# Patient Record
Sex: FEMALE | Race: WHITE | NOT HISPANIC OR LATINO | Employment: OTHER | URBAN - METROPOLITAN AREA
[De-identification: names, ages, dates, MRNs, and addresses within clinical notes are randomized per-mention and may not be internally consistent; named-entity substitution may affect disease eponyms.]

---

## 2019-02-14 ENCOUNTER — OFFICE VISIT CONVERTED (OUTPATIENT)
Dept: INTERNAL MEDICINE | Facility: CLINIC | Age: 59
End: 2019-02-14
Attending: NURSE PRACTITIONER

## 2019-02-14 ENCOUNTER — CONVERSION ENCOUNTER (OUTPATIENT)
Dept: INTERNAL MEDICINE | Facility: CLINIC | Age: 59
End: 2019-02-14

## 2020-11-10 ENCOUNTER — HOSPITAL ENCOUNTER (OUTPATIENT)
Dept: ULTRASOUND IMAGING | Facility: HOSPITAL | Age: 60
Discharge: HOME | End: 2020-11-10

## 2020-11-19 ENCOUNTER — HOSPITAL ENCOUNTER (OUTPATIENT)
Dept: MRI IMAGING | Facility: HOSPITAL | Age: 60
Discharge: HOME | End: 2020-11-19
Attending: SURGERY

## 2020-11-19 ENCOUNTER — OFFICE VISIT CONVERTED (OUTPATIENT)
Dept: OTHER | Facility: HOSPITAL | Age: 60
End: 2020-11-19
Attending: SURGERY

## 2020-11-24 ENCOUNTER — HOSPITAL ENCOUNTER (OUTPATIENT)
Dept: ULTRASOUND IMAGING | Facility: HOSPITAL | Age: 60
Discharge: HOME | End: 2020-11-24
Attending: SURGERY

## 2020-12-03 ENCOUNTER — HOSPITAL ENCOUNTER (OUTPATIENT)
Dept: MRI IMAGING | Facility: HOSPITAL | Age: 60
Discharge: HOME | End: 2020-12-03
Attending: SURGERY

## 2020-12-10 ENCOUNTER — OFFICE VISIT CONVERTED (OUTPATIENT)
Dept: OTHER | Facility: HOSPITAL | Age: 60
End: 2020-12-10
Attending: SURGERY

## 2020-12-10 ENCOUNTER — OFFICE VISIT CONVERTED (OUTPATIENT)
Dept: ONCOLOGY | Facility: HOSPITAL | Age: 60
End: 2020-12-10
Attending: INTERNAL MEDICINE

## 2020-12-10 ENCOUNTER — HOSPITAL ENCOUNTER (OUTPATIENT)
Dept: OTHER | Facility: HOSPITAL | Age: 60
Discharge: HOME | End: 2020-12-10
Attending: INTERNAL MEDICINE

## 2020-12-10 ENCOUNTER — HOSPITAL ENCOUNTER (OUTPATIENT)
Dept: PREADMISSION TESTING | Facility: HOSPITAL | Age: 60
Discharge: HOME | End: 2020-12-10
Attending: SURGERY

## 2020-12-12 LAB — SARS-COV-2 RNA SPEC QL NAA+PROBE: NOT DETECTED

## 2020-12-14 ENCOUNTER — HOSPITAL ENCOUNTER (OUTPATIENT)
Dept: CARDIOLOGY | Facility: HOSPITAL | Age: 60
Discharge: HOME | End: 2020-12-14
Attending: INTERNAL MEDICINE

## 2020-12-15 ENCOUNTER — HOSPITAL ENCOUNTER (OUTPATIENT)
Dept: PERIOP | Facility: HOSPITAL | Age: 60
Setting detail: HOSPITAL OUTPATIENT SURGERY
Discharge: HOME | End: 2020-12-15
Attending: SURGERY

## 2020-12-18 ENCOUNTER — OFFICE VISIT CONVERTED (OUTPATIENT)
Dept: ONCOLOGY | Facility: HOSPITAL | Age: 60
End: 2020-12-18
Attending: INTERNAL MEDICINE

## 2020-12-18 ENCOUNTER — HOSPITAL ENCOUNTER (OUTPATIENT)
Dept: OTHER | Facility: HOSPITAL | Age: 60
Setting detail: RECURRING SERIES
Discharge: STILL ADMITTED OR ADMINISTRATIVE | End: 2021-03-19
Attending: INTERNAL MEDICINE

## 2020-12-18 LAB
ALBUMIN SERPL-MCNC: 4.5 G/DL (ref 3.5–5)
ALBUMIN/GLOB SERPL: 2 {RATIO} (ref 1.4–2.6)
ALP SERPL-CCNC: 87 U/L (ref 53–141)
ALT SERPL-CCNC: 10 U/L (ref 10–40)
ANION GAP SERPL CALC-SCNC: 11 MMOL/L (ref 8–19)
AST SERPL-CCNC: 17 U/L (ref 15–50)
BASOPHILS # BLD AUTO: 0.04 10*3/UL (ref 0–0.2)
BASOPHILS NFR BLD AUTO: 0.6 % (ref 0–3)
BILIRUB SERPL-MCNC: 0.17 MG/DL (ref 0.2–1.3)
BUN SERPL-MCNC: 13 MG/DL (ref 5–25)
BUN/CREAT SERPL: 17 {RATIO} (ref 6–20)
CALCIUM SERPL-MCNC: 9 MG/DL (ref 8.7–10.4)
CHLORIDE SERPL-SCNC: 106 MMOL/L (ref 99–111)
CONV ABS IMM GRAN: 0.01 10*3/UL (ref 0–0.54)
CONV CO2: 22 MMOL/L (ref 22–32)
CONV EOSINOPHILS PERCENT BY MANUAL COUNT: 1.4 % (ref 0–7)
CONV IMMATURE GRAN: 0.1 % (ref 0–0.4)
CONV TOTAL PROTEIN: 6.7 G/DL (ref 6.3–8.2)
CREAT UR-MCNC: 0.75 MG/DL (ref 0.5–0.9)
EOSINOPHIL # BLD MANUAL: 0.1 10*3/UL (ref 0–0.7)
ERYTHROCYTE [DISTWIDTH] IN BLOOD BY AUTOMATED COUNT: 12.1 % (ref 11.5–14.5)
ERYTHROCYTE [DISTWIDTH] IN BLOOD BY AUTOMATED COUNT: 40.9 FL
GFR SERPLBLD BASED ON 1.73 SQ M-ARVRAT: >60 ML/MIN/{1.73_M2}
GLOBULIN UR ELPH-MCNC: 2.2 G/DL (ref 2–3.5)
GLUCOSE SERPL-MCNC: 121 MG/DL (ref 65–99)
HBA1C MFR BLD: 11.8 G/DL (ref 12–16)
HCT VFR BLD AUTO: 34.3 % (ref 37–47)
LYMPHOCYTES # BLD AUTO: 0.83 10*3/UL (ref 1–5)
LYMPHOCYTES NFR BLD AUTO: 11.7 % (ref 20–45)
MCH RBC QN AUTO: 31.6 PG (ref 27–31)
MCHC RBC AUTO-ENTMCNC: 34.4 G/DL (ref 33–37)
MCV RBC AUTO: 91.7 FL (ref 81–99)
MONOCYTES # BLD AUTO: 0.35 10*3/UL (ref 0.2–1.2)
MONOCYTES NFR BLD MANUAL: 4.9 % (ref 3–10)
NEUTROPHILS # BLD AUTO: 5.79 10*3/UL (ref 2–8)
NEUTROPHILS NFR BLD MANUAL: 81.3 % (ref 30–85)
OSMOLALITY SERPL CALC.SUM OF ELEC: 281 MOSM/KG (ref 273–304)
PLATELET # BLD AUTO: 216 10*3/UL (ref 130–400)
PMV BLD AUTO: 9 FL (ref 7.4–10.4)
POTASSIUM SERPL-SCNC: 3.6 MMOL/L (ref 3.5–5.3)
RBC MORPH BLD: 3.74 10*6/UL (ref 4.2–5.4)
SODIUM SERPL-SCNC: 135 MMOL/L (ref 135–147)
WBC # BLD AUTO: 7.12 10*3/UL (ref 4.8–10.8)

## 2020-12-23 ENCOUNTER — OFFICE VISIT CONVERTED (OUTPATIENT)
Dept: SURGERY | Facility: CLINIC | Age: 60
End: 2020-12-23
Attending: SURGERY

## 2021-01-08 ENCOUNTER — OFFICE VISIT CONVERTED (OUTPATIENT)
Dept: ONCOLOGY | Facility: HOSPITAL | Age: 61
End: 2021-01-08
Attending: INTERNAL MEDICINE

## 2021-01-08 LAB
ALBUMIN SERPL-MCNC: 4.2 G/DL (ref 3.5–5)
ALBUMIN/GLOB SERPL: 2 {RATIO} (ref 1.4–2.6)
ALP SERPL-CCNC: 93 U/L (ref 53–141)
ALT SERPL-CCNC: 17 U/L (ref 10–40)
ANION GAP SERPL CALC-SCNC: 11 MMOL/L (ref 8–19)
AST SERPL-CCNC: 21 U/L (ref 15–50)
BASOPHILS # BLD AUTO: 0.05 10*3/UL (ref 0–0.2)
BASOPHILS NFR BLD AUTO: 0.5 % (ref 0–3)
BILIRUB SERPL-MCNC: <0.15 MG/DL (ref 0.2–1.3)
BUN SERPL-MCNC: 15 MG/DL (ref 5–25)
BUN/CREAT SERPL: 19 {RATIO} (ref 6–20)
CALCIUM SERPL-MCNC: 8.9 MG/DL (ref 8.7–10.4)
CHLORIDE SERPL-SCNC: 110 MMOL/L (ref 99–111)
CONV ABS IMM GRAN: 0.02 10*3/UL (ref 0–0.54)
CONV CO2: 21 MMOL/L (ref 22–32)
CONV EOSINOPHILS PERCENT BY MANUAL COUNT: 0.2 % (ref 0–7)
CONV IMMATURE GRAN: 0.2 % (ref 0–0.4)
CONV TOTAL PROTEIN: 6.3 G/DL (ref 6.3–8.2)
CREAT UR-MCNC: 0.77 MG/DL (ref 0.5–0.9)
EOSINOPHIL # BLD MANUAL: 0.02 10*3/UL (ref 0–0.7)
ERYTHROCYTE [DISTWIDTH] IN BLOOD BY AUTOMATED COUNT: 13.7 % (ref 11.5–14.5)
ERYTHROCYTE [DISTWIDTH] IN BLOOD BY AUTOMATED COUNT: 44.6 FL
GFR SERPLBLD BASED ON 1.73 SQ M-ARVRAT: >60 ML/MIN/{1.73_M2}
GLOBULIN UR ELPH-MCNC: 2.1 G/DL (ref 2–3.5)
GLUCOSE SERPL-MCNC: 115 MG/DL (ref 65–99)
HBA1C MFR BLD: 10.9 G/DL (ref 12–16)
HCT VFR BLD AUTO: 33.4 % (ref 37–47)
LYMPHOCYTES # BLD AUTO: 1.19 10*3/UL (ref 1–5)
LYMPHOCYTES NFR BLD AUTO: 13 % (ref 20–45)
MAGNESIUM SERPL-MCNC: 1.97 MG/DL (ref 1.6–2.3)
MCH RBC QN AUTO: 30.4 PG (ref 27–31)
MCHC RBC AUTO-ENTMCNC: 32.6 G/DL (ref 33–37)
MCV RBC AUTO: 93 FL (ref 81–99)
MONOCYTES # BLD AUTO: 0.52 10*3/UL (ref 0.2–1.2)
MONOCYTES NFR BLD MANUAL: 5.7 % (ref 3–10)
NEUTROPHILS # BLD AUTO: 7.32 10*3/UL (ref 2–8)
NEUTROPHILS NFR BLD MANUAL: 80.4 % (ref 30–85)
OSMOLALITY SERPL CALC.SUM OF ELEC: 290 MOSM/KG (ref 273–304)
PLATELET # BLD AUTO: 214 10*3/UL (ref 130–400)
PMV BLD AUTO: 8.7 FL (ref 7.4–10.4)
POTASSIUM SERPL-SCNC: 3.2 MMOL/L (ref 3.5–5.3)
RBC MORPH BLD: 3.59 10*6/UL (ref 4.2–5.4)
SODIUM SERPL-SCNC: 139 MMOL/L (ref 135–147)
WBC # BLD AUTO: 9.12 10*3/UL (ref 4.8–10.8)

## 2021-01-11 ENCOUNTER — DOCUMENTATION (OUTPATIENT)
Dept: GENETICS | Facility: HOSPITAL | Age: 61
End: 2021-01-11

## 2021-01-11 DIAGNOSIS — C50.912 MALIGNANT NEOPLASM OF LEFT BREAST IN FEMALE, ESTROGEN RECEPTOR POSITIVE, UNSPECIFIED SITE OF BREAST (HCC): ICD-10-CM

## 2021-01-11 DIAGNOSIS — Z17.0 MALIGNANT NEOPLASM OF LEFT BREAST IN FEMALE, ESTROGEN RECEPTOR POSITIVE, UNSPECIFIED SITE OF BREAST (HCC): ICD-10-CM

## 2021-01-11 DIAGNOSIS — D05.12 DUCTAL CARCINOMA IN SITU (DCIS) OF LEFT BREAST: Primary | ICD-10-CM

## 2021-01-11 NOTE — PROGRESS NOTES
Ragini Dozier is a 60-year-old female who was seen for genetic counseling due to a personal history of breast cancer.   Genetic counseling was provided via telehealth.  Ms. Dozier was recently diagnosed with multifocal breast cancer at age 60.  She is undergoing neoadjuvant chemotherapy.  Ms. Dozier is postmenopausal, has had a hysterectomy, but retains her ovaries.   Ms. Dozier was interested in discussing her risk for a hereditary cancer syndrome, and decided to pursue genetic testing.   Ms. Dozier opted to pursue comprehensive testing via the CancerNext panel ordered through [employer] which includes BRCA1/2 and 34 additional genes associated with an increased risk of breast cancer or other cancers (APC PANCHITO, AXIN2, BARD1, BMPR1A, BRCA1, BRCA2, BRIP1, CDH1, CDK4, CDKN2A, CHEK2, DICER1, EPCAM, GREM1, HOXB13, MLH1, MRE11A, MSH2, MSH3, MSH6, MUTYH, NBN, NF1, NTHL1, PALB2, PMS2, POLD1, POLE, PTEN, RAD51C, RAD51D, RECQL, SMAD4, SMARCA4, STK11, and TP53). The results are expected in 2-3 weeks.       PERTINENT FAMILY HISTORY:   Father:   Kidney cancer, 77  Pat. Grandfather: Prostate cancer (metastatic)  Maternal family history is unknown    RISK ASSESSMENT:  Ms. Dozier’s personal history and family history of breast cancer raises the question of a hereditary cancer syndrome.   NCCN guidelines recommend BRCA1/2 testing for individuals diagnosed with breast cancer at any age if there is history off a close relative with high grade or metastatic prostate cancer.  Therefore, testing is appropriate for Ms. Dozier.  We discussed that standard approach to BRCA1/2 testing is via a multigene panel that evaluates BRCA1/2 and multiple other genes known to impact cancer risk.  This risk assessment is based on the family history information provided at the time of the appointment.  The assessment could change in the future should new information be obtained.     GENETIC COUNSELING: We reviewed the family history  information in detail.  Cases of breast cancer follow three general patterns: sporadic, familial, and hereditary.  While most cancer is sporadic, some cases appear to occur in family clusters.  These cases are said to be familial and account for 10-20% of breast cancer cases.  Familial cases may be due to a combination of shared genes and environmental factors among family members.  In even fewer families, the risk for cancer is inherited, and the genes responsible for the increased cancer risk are known.       Family histories typical of hereditary cancer syndromes usually include multiple first- and second-degree relatives diagnosed with cancer types that define a syndrome.  These cases tend to be diagnosed at younger-than-expected ages and can be bilateral or multifocal.  The cancer in these families follows an autosomal dominant inheritance pattern, which indicates the likely presence of a mutation in a cancer susceptibility gene.  Children and siblings of an individual believed to carry this mutation have a 50% chance of inheriting that mutation, thereby inheriting the increased risk to develop cancer.  These mutations can be passed down from the maternal or the paternal lineage.     Hereditary breast cancer accounts for 5-10% of all cases of breast cancer.  A significant proportion of hereditary breast cancer can be attributed to mutations in the BRCA1 and BRCA2 genes.  Mutations in these genes confer an increased risk for breast cancer, ovarian cancer, male breast cancer, prostate cancer and pancreatic cancer.  There are other genes that are known to be associated with an increased risk for breast cancer and other cancers. Based on Ms. Dozier’s desire to get as much information as possible regarding her personal risks and potential risks for her family, she opted to pursue testing through a panel that would look at several other genes known to increase the risk for breast cancer.     GENETIC TESTING:  The  risks, benefits and limitations of genetic testing and implications for clinical management following testing were reviewed.  DNA test results can influence decisions regarding screening, prevention and surgical management.  Genetic testing can have significant psychological implications for both individuals and families.  Also discussed was the possibility of insurance discrimination based on genetic test results and the laws ([place]) in place to prevent this.     We discussed panel testing, which would involve testing for BRCA1/2 as well as several other cancer susceptibility genes at the same time.  The benefits and limitations of genetic testing were discussed and Ms. Dozier decided to pursue testing. The implications of a positive or negative test result were discussed. We discussed the possibility that, in some cases, genetic test results may be uninformative due to the identification of a genetic variant. These variants may or may not be associated with an increased cancer risk.  Given her personal history, a negative test result would not eliminate all breast cancer risk to her relatives, although the risk would not be as high as it would with positive genetic testing.          PLAN: Results are expected in 2-3 weeks, and the patient will be contacted by telephone at that time.  Genetic counseling remains available to Ms. Dozier and her family in the future, and she is welcome to contact us at [phone] with any questions she may have.        Blanca Levy MS, [facility] [place], [facility]  Licensed Certified Genetic Counselor

## 2021-01-28 ENCOUNTER — DOCUMENTATION (OUTPATIENT)
Dept: GENETICS | Facility: HOSPITAL | Age: 61
End: 2021-01-28

## 2021-01-28 NOTE — PROGRESS NOTES
Ragini Dozier is a 60-year-old female who was seen for genetic counseling due to a personal history of breast cancer.   Genetic counseling was provided via telehealth.  Ms. Dozier was recently diagnosed with multifocal breast cancer at age 60.  She is undergoing neoadjuvant chemotherapy.  Ms. Dozier is postmenopausal, has had a hysterectomy, but retains her ovaries.   Ms. Dozier was interested in discussing her risk for a hereditary cancer syndrome, and decided to pursue genetic testing.   Ms. Dozier opted to pursue comprehensive testing via the CancerNext panel ordered through [employer] which includes BRCA1/2 and 34 additional genes associated with an increased risk of breast cancer or other cancers (APC PANCHITO, AXIN2, BARD1, BMPR1A, BRCA1, BRCA2, BRIP1, CDH1, CDK4, CDKN2A, CHEK2, DICER1, EPCAM, GREM1, HOXB13, MLH1, MRE11A, MSH2, MSH3, MSH6, MUTYH, NBN, NF1, NTHL1, PALB2, PMS2, POLD1, POLE, PTEN, RAD51C, RAD51D, RECQL, SMAD4, SMARCA4, STK11, and TP53). Genetic testing was negative by DNA sequencing and rearrangement testing for deleterious mutations in the BRCA1/2 genes and 34 additional genes included on the CancerNext Panel (see attached results). These normal results were discussed with Ms. Dozier by telephone on 1/28/21.    PERTINENT FAMILY HISTORY:   Father:   Kidney cancer, 77  Pat. Grandfather: Prostate cancer (metastatic)  Maternal family history is unknown    RISK ASSESSMENT:  Ms. Dozier’ personal history and family history of breast cancer raises the question of a hereditary cancer syndrome.   NCCN guidelines recommend BRCA1/2 testing for individuals diagnosed with breast cancer at any age if there is history off a close relative with high grade or metastatic prostate cancer.  Therefore, testing is appropriate for Ms. Dozier.  We discussed that standard approach to BRCA1/2 testing is via a multigene panel that evaluates BRCA1/2 and multiple other genes known to impact cancer risk.  This risk  assessment is based on the family history information provided at the time of the appointment.  The assessment could change in the future should new information be obtained.     GENETIC COUNSELING: We reviewed the family history information in detail.  Cases of breast cancer follow three general patterns: sporadic, familial, and hereditary.  While most cancer is sporadic, some cases appear to occur in family clusters.  These cases are said to be familial and account for 10-20% of breast cancer cases.  Familial cases may be due to a combination of shared genes and environmental factors among family members.  In even fewer families, the risk for cancer is inherited, and the genes responsible for the increased cancer risk are known.       Family histories typical of hereditary cancer syndromes usually include multiple first- and second-degree relatives diagnosed with cancer types that define a syndrome.  These cases tend to be diagnosed at younger-than-expected ages and can be bilateral or multifocal.  The cancer in these families follows an autosomal dominant inheritance pattern, which indicates the likely presence of a mutation in a cancer susceptibility gene.  Children and siblings of an individual believed to carry this mutation have a 50% chance of inheriting that mutation, thereby inheriting the increased risk to develop cancer.  These mutations can be passed down from the maternal or the paternal lineage.     Hereditary breast cancer accounts for 5-10% of all cases of breast cancer.  A significant proportion of hereditary breast cancer can be attributed to mutations in the BRCA1 and BRCA2 genes.  Mutations in these genes confer an increased risk for breast cancer, ovarian cancer, male breast cancer, prostate cancer and pancreatic cancer.  There are other genes that are known to be associated with an increased risk for breast cancer and other cancers. Based on Ms. oDzier’s desire to get as much information as  possible regarding her personal risks and potential risks for her family, she opted to pursue testing through a panel that would look at several other genes known to increase the risk for breast cancer.     GENETIC TESTING:  The risks, benefits and limitations of genetic testing and implications for clinical management following testing were reviewed.  DNA test results can influence decisions regarding screening, prevention and surgical management.  Genetic testing can have significant psychological implications for both individuals and families.  Also discussed was the possibility of insurance discrimination based on genetic test results and the laws ([place]) in place to prevent this.     We discussed panel testing, which would involve testing for BRCA1/2 as well as several other cancer susceptibility genes at the same time.  The benefits and limitations of genetic testing were discussed and Ms. Dozier decided to pursue testing. The implications of a positive or negative test result were discussed. We discussed the possibility that, in some cases, genetic test results may be uninformative due to the identification of a genetic variant. These variants may or may not be associated with an increased cancer risk.  Given her personal history, a negative test result would not eliminate all breast cancer risk to her relatives, although the risk would not be as high as it would with positive genetic testing.          TEST RESULTS:  Genetic testing was negative by sequencing, deletion/duplication testing for mutations in BRCA1/2 and the additional 34 genes on the CancerNext panel.  The negative result greatly lowers the risk of a hereditary cancer syndrome.  Ms. Dozier’s unaffected female relatives may still be considered to have a somewhat increased risk for breast cancer based on the family history.  This assessment is based on the information provided at the time of the consultation.    Cancer Prevention:  Despite the  negative genetic test results, Ms. Dozier’ female relatives may have a somewhat increased lifetime risk for breast cancer based on family history.  Female relatives could have a risk assessment performed using a family history-based model, such as the Tyrer-Cuzick model, to determine their individual risks.   Given the increased risk, options available to individuals with an elevated lifetime risk for breast cancer were briefly discussed.   Surveillance for individuals with an elevated lifetime risk of breast cancer (>20%, versus the average risk of 12%), based on NCCN guidelines, would consist of semi-annual clinical breast exams and monthly self-breast exams starting by age 18 and annual mammography starting 10 years younger than the earliest diagnosis in a close relative, or by age 40.  According to an American Cancer Society expert panel and NCCN guidelines, annual breast MRI should be offered to women whose lifetime risk of breast cancer is 20-25 percent or more.      PLAN:  Genetic counseling remains available for Ms. Dozier.  If Ms. Dozier has any questions or concerns she is welcome to call us at [phone].     Blanca Levy MS, [facility] [place], [place], [facility]  Licensed Certified Genetic Counselor    Cc: MD Ragini Adams

## 2021-01-29 ENCOUNTER — OFFICE VISIT CONVERTED (OUTPATIENT)
Dept: ONCOLOGY | Facility: HOSPITAL | Age: 61
End: 2021-01-29
Attending: INTERNAL MEDICINE

## 2021-01-29 LAB
ALBUMIN SERPL-MCNC: 4.2 G/DL (ref 3.5–5)
ALBUMIN/GLOB SERPL: 2.2 {RATIO} (ref 1.4–2.6)
ALP SERPL-CCNC: 92 U/L (ref 53–141)
ALT SERPL-CCNC: 13 U/L (ref 10–40)
ANION GAP SERPL CALC-SCNC: 12 MMOL/L (ref 8–19)
AST SERPL-CCNC: 16 U/L (ref 15–50)
BASOPHILS # BLD AUTO: 0.03 10*3/UL (ref 0–0.2)
BASOPHILS NFR BLD AUTO: 0.3 % (ref 0–3)
BILIRUB SERPL-MCNC: 0.16 MG/DL (ref 0.2–1.3)
BUN SERPL-MCNC: 15 MG/DL (ref 5–25)
BUN/CREAT SERPL: 20 {RATIO} (ref 6–20)
CALCIUM SERPL-MCNC: 9 MG/DL (ref 8.7–10.4)
CHLORIDE SERPL-SCNC: 106 MMOL/L (ref 99–111)
CONV ABS IMM GRAN: 0.02 10*3/UL (ref 0–0.54)
CONV CO2: 21 MMOL/L (ref 22–32)
CONV EOSINOPHILS PERCENT BY MANUAL COUNT: 0.1 % (ref 0–7)
CONV IMMATURE GRAN: 0.2 % (ref 0–0.4)
CONV TOTAL PROTEIN: 6.1 G/DL (ref 6.3–8.2)
CREAT UR-MCNC: 0.74 MG/DL (ref 0.5–0.9)
EOSINOPHIL # BLD MANUAL: 0.01 10*3/UL (ref 0–0.7)
ERYTHROCYTE [DISTWIDTH] IN BLOOD BY AUTOMATED COUNT: 15.9 % (ref 11.5–14.5)
ERYTHROCYTE [DISTWIDTH] IN BLOOD BY AUTOMATED COUNT: 53.2 FL
GFR SERPLBLD BASED ON 1.73 SQ M-ARVRAT: >60 ML/MIN/{1.73_M2}
GLOBULIN UR ELPH-MCNC: 1.9 G/DL (ref 2–3.5)
GLUCOSE SERPL-MCNC: 120 MG/DL (ref 65–99)
HBA1C MFR BLD: 9.8 G/DL (ref 12–16)
HCT VFR BLD AUTO: 28.8 % (ref 37–47)
LYMPHOCYTES # BLD AUTO: 1.08 10*3/UL (ref 1–5)
LYMPHOCYTES NFR BLD AUTO: 9.7 % (ref 20–45)
MCH RBC QN AUTO: 31.9 PG (ref 27–31)
MCHC RBC AUTO-ENTMCNC: 34 G/DL (ref 33–37)
MCV RBC AUTO: 93.8 FL (ref 81–99)
MONOCYTES # BLD AUTO: 0.84 10*3/UL (ref 0.2–1.2)
MONOCYTES NFR BLD MANUAL: 7.6 % (ref 3–10)
NEUTROPHILS # BLD AUTO: 9.13 10*3/UL (ref 2–8)
NEUTROPHILS NFR BLD MANUAL: 82.1 % (ref 30–85)
OSMOLALITY SERPL CALC.SUM OF ELEC: 284 MOSM/KG (ref 273–304)
PLATELET # BLD AUTO: 245 10*3/UL (ref 130–400)
PMV BLD AUTO: 8.5 FL (ref 7.4–10.4)
POTASSIUM SERPL-SCNC: 3.3 MMOL/L (ref 3.5–5.3)
RBC MORPH BLD: 3.07 10*6/UL (ref 4.2–5.4)
SODIUM SERPL-SCNC: 136 MMOL/L (ref 135–147)
WBC # BLD AUTO: 11.11 10*3/UL (ref 4.8–10.8)

## 2021-02-19 ENCOUNTER — OFFICE VISIT CONVERTED (OUTPATIENT)
Dept: ONCOLOGY | Facility: HOSPITAL | Age: 61
End: 2021-02-19
Attending: INTERNAL MEDICINE

## 2021-02-19 LAB
ALBUMIN SERPL-MCNC: 3.8 G/DL (ref 3.5–5)
ALBUMIN/GLOB SERPL: 2.1 {RATIO} (ref 1.4–2.6)
ALP SERPL-CCNC: 87 U/L (ref 53–141)
ALT SERPL-CCNC: 10 U/L (ref 10–40)
ANION GAP SERPL CALC-SCNC: 18 MMOL/L (ref 8–19)
AST SERPL-CCNC: 18 U/L (ref 15–50)
BASOPHILS # BLD AUTO: 0.01 10*3/UL (ref 0–0.2)
BASOPHILS NFR BLD AUTO: 0.1 % (ref 0–3)
BILIRUB SERPL-MCNC: 0.2 MG/DL (ref 0.2–1.3)
BUN SERPL-MCNC: 20 MG/DL (ref 5–25)
BUN/CREAT SERPL: 25 {RATIO} (ref 6–20)
CALCIUM SERPL-MCNC: 8.5 MG/DL (ref 8.7–10.4)
CHLORIDE SERPL-SCNC: 107 MMOL/L (ref 99–111)
CONV ABS IMM GRAN: 0.06 10*3/UL (ref 0–0.54)
CONV CO2: 15 MMOL/L (ref 22–32)
CONV EOSINOPHILS PERCENT BY MANUAL COUNT: 0 % (ref 0–7)
CONV IMMATURE GRAN: 0.5 % (ref 0–0.4)
CONV TOTAL PROTEIN: 5.6 G/DL (ref 6.3–8.2)
CREAT UR-MCNC: 0.81 MG/DL (ref 0.5–0.9)
EOSINOPHIL # BLD MANUAL: 0 10*3/UL (ref 0–0.7)
ERYTHROCYTE [DISTWIDTH] IN BLOOD BY AUTOMATED COUNT: 18.2 % (ref 11.5–14.5)
ERYTHROCYTE [DISTWIDTH] IN BLOOD BY AUTOMATED COUNT: 66.3 FL
GFR SERPLBLD BASED ON 1.73 SQ M-ARVRAT: >60 ML/MIN/{1.73_M2}
GLOBULIN UR ELPH-MCNC: 1.8 G/DL (ref 2–3.5)
GLUCOSE SERPL-MCNC: 133 MG/DL (ref 65–99)
HBA1C MFR BLD: 9.5 G/DL (ref 12–16)
HCT VFR BLD AUTO: 28.3 % (ref 37–47)
LYMPHOCYTES # BLD AUTO: 0.74 10*3/UL (ref 1–5)
LYMPHOCYTES NFR BLD AUTO: 6.6 % (ref 20–45)
MCH RBC QN AUTO: 33.5 PG (ref 27–31)
MCHC RBC AUTO-ENTMCNC: 33.6 G/DL (ref 33–37)
MCV RBC AUTO: 99.6 FL (ref 81–99)
MONOCYTES # BLD AUTO: 0.32 10*3/UL (ref 0.2–1.2)
MONOCYTES NFR BLD MANUAL: 2.8 % (ref 3–10)
NEUTROPHILS # BLD AUTO: 10.13 10*3/UL (ref 2–8)
NEUTROPHILS NFR BLD MANUAL: 90 % (ref 30–85)
OSMOLALITY SERPL CALC.SUM OF ELEC: 287 MOSM/KG (ref 273–304)
PLATELET # BLD AUTO: 175 10*3/UL (ref 130–400)
PMV BLD AUTO: 8.1 FL (ref 7.4–10.4)
POTASSIUM SERPL-SCNC: 3.7 MMOL/L (ref 3.5–5.3)
RBC MORPH BLD: 2.84 10*6/UL (ref 4.2–5.4)
SODIUM SERPL-SCNC: 136 MMOL/L (ref 135–147)
WBC # BLD AUTO: 11.26 10*3/UL (ref 4.8–10.8)

## 2021-03-12 ENCOUNTER — OFFICE VISIT CONVERTED (OUTPATIENT)
Dept: ONCOLOGY | Facility: HOSPITAL | Age: 61
End: 2021-03-12
Attending: INTERNAL MEDICINE

## 2021-03-12 LAB
ALBUMIN SERPL-MCNC: 3 G/DL (ref 3.5–5)
ALBUMIN/GLOB SERPL: 1.8 {RATIO} (ref 1.4–2.6)
ALP SERPL-CCNC: 57 U/L (ref 53–141)
ALT SERPL-CCNC: 6 U/L (ref 10–40)
ANION GAP SERPL CALC-SCNC: 11 MMOL/L (ref 8–19)
APPEARANCE UR: ABNORMAL
AST SERPL-CCNC: 13 U/L (ref 15–50)
BASOPHILS # BLD AUTO: 0.01 10*3/UL (ref 0–0.2)
BASOPHILS NFR BLD AUTO: 0.1 % (ref 0–3)
BILIRUB SERPL-MCNC: 0.16 MG/DL (ref 0.2–1.3)
BILIRUB UR QL: NEGATIVE
BUN SERPL-MCNC: 20 MG/DL (ref 5–25)
BUN/CREAT SERPL: 25 {RATIO} (ref 6–20)
CALCIUM SERPL-MCNC: 8.9 MG/DL (ref 8.7–10.4)
CHLORIDE SERPL-SCNC: 110 MMOL/L (ref 99–111)
COLOR UR: YELLOW
CONV ABS IMM GRAN: 0.04 10*3/UL (ref 0–0.54)
CONV BACTERIA: NEGATIVE
CONV CO2: 19 MMOL/L (ref 22–32)
CONV COLLECTION SOURCE (UA): ABNORMAL
CONV EOSINOPHILS PERCENT BY MANUAL COUNT: 0 % (ref 0–7)
CONV IMMATURE GRAN: 0.2 % (ref 0–0.4)
CONV TOTAL PROTEIN: 4.7 G/DL (ref 6.3–8.2)
CONV UROBILINOGEN IN URINE BY AUTOMATED TEST STRIP: 0.2 {EHRLICHU}/DL (ref 0.1–1)
CREAT UR-MCNC: 0.81 MG/DL (ref 0.5–0.9)
EOSINOPHIL # BLD MANUAL: 0 10*3/UL (ref 0–0.7)
ERYTHROCYTE [DISTWIDTH] IN BLOOD BY AUTOMATED COUNT: 17.7 % (ref 11.5–14.5)
ERYTHROCYTE [DISTWIDTH] IN BLOOD BY AUTOMATED COUNT: 67.9 FL
GFR SERPLBLD BASED ON 1.73 SQ M-ARVRAT: >60 ML/MIN/{1.73_M2}
GLOBULIN UR ELPH-MCNC: 1.7 G/DL (ref 2–3.5)
GLUCOSE SERPL-MCNC: 167 MG/DL (ref 65–99)
GLUCOSE UR QL: NEGATIVE MG/DL
HBA1C MFR BLD: 7.6 G/DL (ref 12–16)
HCT VFR BLD AUTO: 23.5 % (ref 37–47)
HGB UR QL STRIP: NEGATIVE
KETONES UR QL STRIP: NEGATIVE MG/DL
LEUKOCYTE ESTERASE UR QL STRIP: NEGATIVE
LYMPHOCYTES # BLD AUTO: 1.34 10*3/UL (ref 1–5)
LYMPHOCYTES NFR BLD AUTO: 8.2 % (ref 20–45)
MCH RBC QN AUTO: 34.2 PG (ref 27–31)
MCHC RBC AUTO-ENTMCNC: 32.3 G/DL (ref 33–37)
MCV RBC AUTO: 105.9 FL (ref 81–99)
MONOCYTES # BLD AUTO: 0.96 10*3/UL (ref 0.2–1.2)
MONOCYTES NFR BLD MANUAL: 5.9 % (ref 3–10)
NEUTROPHILS # BLD AUTO: 14.06 10*3/UL (ref 2–8)
NEUTROPHILS NFR BLD MANUAL: 85.6 % (ref 30–85)
NITRITE UR QL STRIP: NEGATIVE
OSMOLALITY SERPL CALC.SUM OF ELEC: 290 MOSM/KG (ref 273–304)
PH UR STRIP.AUTO: 7.5 [PH] (ref 5–8)
PLATELET # BLD AUTO: 154 10*3/UL (ref 130–400)
PMV BLD AUTO: 8.9 FL (ref 7.4–10.4)
POTASSIUM SERPL-SCNC: 3 MMOL/L (ref 3.5–5.3)
PROT UR QL: NEGATIVE MG/DL
RBC #/AREA URNS HPF: ABNORMAL /[HPF]
RBC MORPH BLD: 2.22 10*6/UL (ref 4.2–5.4)
SODIUM SERPL-SCNC: 137 MMOL/L (ref 135–147)
SP GR UR: 1.02 (ref 1–1.03)
WBC # BLD AUTO: 16.41 10*3/UL (ref 4.8–10.8)
WBC #/AREA URNS HPF: ABNORMAL /[HPF]

## 2021-03-17 LAB
ALBUMIN SERPL-MCNC: 3.1 G/DL (ref 3.5–5)
ALBUMIN/GLOB SERPL: 1.4 {RATIO} (ref 1.4–2.6)
ALP SERPL-CCNC: 68 U/L (ref 53–141)
ALT SERPL-CCNC: 7 U/L (ref 10–40)
ANION GAP SERPL CALC-SCNC: 14 MMOL/L (ref 8–19)
AST SERPL-CCNC: 9 U/L (ref 15–50)
BASOPHILS # BLD AUTO: 0.05 10*3/UL (ref 0–0.2)
BASOPHILS NFR BLD AUTO: 1.7 % (ref 0–3)
BILIRUB SERPL-MCNC: 0.27 MG/DL (ref 0.2–1.3)
BUN SERPL-MCNC: 15 MG/DL (ref 5–25)
BUN/CREAT SERPL: 23 {RATIO} (ref 6–20)
CALCIUM SERPL-MCNC: 8.7 MG/DL (ref 8.7–10.4)
CHLORIDE SERPL-SCNC: 108 MMOL/L (ref 99–111)
CONV ABS IMM GRAN: 0.05 10*3/UL (ref 0–0.2)
CONV CO2: 22 MMOL/L (ref 22–32)
CONV IMMATURE GRAN: 1.7 % (ref 0–1.8)
CONV TOTAL PROTEIN: 5.3 G/DL (ref 6.3–8.2)
CREAT UR-MCNC: 0.65 MG/DL (ref 0.5–0.9)
DEPRECATED RDW RBC AUTO: 63.6 FL (ref 36.4–46.3)
EOSINOPHIL # BLD AUTO: 0.08 10*3/UL (ref 0–0.7)
EOSINOPHIL # BLD AUTO: 2.7 % (ref 0–7)
ERYTHROCYTE [DISTWIDTH] IN BLOOD BY AUTOMATED COUNT: 16.2 % (ref 11.7–14.4)
GFR SERPLBLD BASED ON 1.73 SQ M-ARVRAT: >60 ML/MIN/{1.73_M2}
GLOBULIN UR ELPH-MCNC: 2.2 G/DL (ref 2–3.5)
GLUCOSE SERPL-MCNC: 160 MG/DL (ref 65–99)
HCT VFR BLD AUTO: 23.9 % (ref 37–47)
HGB BLD-MCNC: 7.8 G/DL (ref 12–16)
LYMPHOCYTES # BLD AUTO: 0.72 10*3/UL (ref 1–5)
LYMPHOCYTES NFR BLD AUTO: 24.6 % (ref 20–45)
MAGNESIUM SERPL-MCNC: 1.6 MG/DL (ref 1.6–2.3)
MCH RBC QN AUTO: 34.5 PG (ref 27–31)
MCHC RBC AUTO-ENTMCNC: 32.6 G/DL (ref 33–37)
MCV RBC AUTO: 105.8 FL (ref 81–99)
MONOCYTES # BLD AUTO: 0.13 10*3/UL (ref 0.2–1.2)
MONOCYTES NFR BLD AUTO: 4.4 % (ref 3–10)
NEUTROPHILS # BLD AUTO: 1.9 10*3/UL (ref 2–8)
NEUTROPHILS NFR BLD AUTO: 64.9 % (ref 30–85)
NRBC CBCN: 0 % (ref 0–0.7)
OSMOLALITY SERPL CALC.SUM OF ELEC: 296 MOSM/KG (ref 273–304)
PLATELET # BLD AUTO: 104 10*3/UL (ref 130–400)
PMV BLD AUTO: 10.6 FL (ref 9.4–12.3)
POTASSIUM SERPL-SCNC: 2.8 MMOL/L (ref 3.5–5.3)
RBC # BLD AUTO: 2.26 10*6/UL (ref 4.2–5.4)
SODIUM SERPL-SCNC: 141 MMOL/L (ref 135–147)
WBC # BLD AUTO: 2.93 10*3/UL (ref 4.8–10.8)

## 2021-03-22 ENCOUNTER — HOSPITAL ENCOUNTER (OUTPATIENT)
Dept: OTHER | Facility: HOSPITAL | Age: 61
Discharge: HOME | End: 2021-03-22
Attending: INTERNAL MEDICINE

## 2021-03-23 LAB
ABO GROUP BLD: NORMAL
BASOPHILS # BLD AUTO: 0.02 10*3/UL (ref 0–0.2)
BASOPHILS NFR BLD AUTO: 0.1 % (ref 0–3)
BLD GP AB SCN SERPL QL: NORMAL
CONV ABD CONTROL: NORMAL
CONV ABS IMM GRAN: 0.31 10*3/UL (ref 0–0.54)
CONV EOSINOPHILS PERCENT BY MANUAL COUNT: 0.1 % (ref 0–7)
CONV IMMATURE GRAN: 2.1 % (ref 0–0.4)
EOSINOPHIL # BLD MANUAL: 0.01 10*3/UL (ref 0–0.7)
ERYTHROCYTE [DISTWIDTH] IN BLOOD BY AUTOMATED COUNT: 17.4 % (ref 11.5–14.5)
ERYTHROCYTE [DISTWIDTH] IN BLOOD BY AUTOMATED COUNT: 69.1 FL
HBA1C MFR BLD: 6.3 G/DL (ref 12–16)
HCT VFR BLD AUTO: 20.5 % (ref 37–47)
LYMPHOCYTES # BLD AUTO: 1.73 10*3/UL (ref 1–5)
LYMPHOCYTES NFR BLD AUTO: 11.9 % (ref 20–45)
MCH RBC QN AUTO: 34.2 PG (ref 27–31)
MCHC RBC AUTO-ENTMCNC: 30.7 G/DL (ref 33–37)
MCV RBC AUTO: 111.4 FL (ref 81–99)
MONOCYTES # BLD AUTO: 1.29 10*3/UL (ref 0.2–1.2)
MONOCYTES NFR BLD MANUAL: 8.9 % (ref 3–10)
NEUTROPHILS # BLD AUTO: 11.18 10*3/UL (ref 2–8)
NEUTROPHILS NFR BLD MANUAL: 76.9 % (ref 30–85)
PATHOLOGY REVIEW: NORMAL
PLATELET # BLD AUTO: 287 10*3/UL (ref 130–400)
PMV BLD AUTO: 8.7 FL (ref 7.4–10.4)
RBC MORPH BLD: 1.84 10*6/UL (ref 4.2–5.4)
RH BLD: NORMAL
WBC # BLD AUTO: 14.54 10*3/UL (ref 4.8–10.8)

## 2021-03-24 ENCOUNTER — HOSPITAL ENCOUNTER (OUTPATIENT)
Dept: ONCOLOGY | Facility: HOSPITAL | Age: 61
Discharge: HOME | End: 2021-03-24
Attending: INTERNAL MEDICINE

## 2021-03-24 ENCOUNTER — OFFICE VISIT CONVERTED (OUTPATIENT)
Dept: ONCOLOGY | Facility: HOSPITAL | Age: 61
End: 2021-03-24
Attending: NURSE PRACTITIONER

## 2021-03-24 LAB
APPEARANCE UR: ABNORMAL
BILIRUB UR QL: NEGATIVE
COLOR UR: YELLOW
CONV COLLECTION SOURCE (UA): ABNORMAL
CONV CRYSTALS: ABNORMAL /[HPF]
CONV UROBILINOGEN IN URINE BY AUTOMATED TEST STRIP: 0.2 {EHRLICHU}/DL (ref 0.1–1)
DAT C3: NORMAL
DAT POLY-SP REAG RBC QL: NORMAL
FERRITIN SERPL-MCNC: 61 NG/ML (ref 10–200)
FOLATE SERPL-MCNC: 7.4 NG/ML (ref 4.8–20)
GLUCOSE UR QL: NEGATIVE MG/DL
HCT VFR BLD AUTO: 23.1 % (ref 37–47)
HGB BLD-MCNC: 7.2 G/DL (ref 12–16)
HGB UR QL STRIP: ABNORMAL
IRON SATN MFR SERPL: 8 % (ref 20–55)
IRON SERPL-MCNC: 18 UG/DL (ref 60–170)
KETONES UR QL STRIP: NEGATIVE MG/DL
LEUKOCYTE ESTERASE UR QL STRIP: NEGATIVE
Lab: NORMAL
NITRITE UR QL STRIP: NEGATIVE
PH UR STRIP.AUTO: 8 [PH] (ref 5–8)
PROT UR QL: NEGATIVE MG/DL
RBC # BLD AUTO: 2.02 10*6/UL (ref 4.2–5.4)
RBC #/AREA URNS HPF: ABNORMAL /[HPF]
RENAL EPI CELLS #/AREA URNS HPF: ABNORMAL /[HPF]
RETICS # AUTO: 0.13 10*6/UL (ref 0.02–0.08)
RETICS/RBC NFR AUTO: 6.44 % (ref 0.5–1.7)
SP GR UR: 1.01 (ref 1–1.03)
SQUAMOUS SPT QL MICRO: ABNORMAL /[HPF]
TIBC SERPL-MCNC: 216 UG/DL (ref 245–450)
TRANSFERRIN SERPL-MCNC: 151 MG/DL (ref 250–380)
VIT B12 SERPL-MCNC: >2000 PG/ML (ref 211–911)
WBC # BLD AUTO: 17.63 10*3/UL (ref 4.8–10.8)
WBC #/AREA URNS HPF: ABNORMAL /[HPF]

## 2021-03-29 LAB — BACTERIA BLD CULT: NORMAL

## 2021-04-02 ENCOUNTER — CONVERSION ENCOUNTER (OUTPATIENT)
Dept: ONCOLOGY | Facility: HOSPITAL | Age: 61
End: 2021-04-02

## 2021-04-02 LAB
ALBUMIN SERPL-MCNC: 3.5 G/DL (ref 3.5–5)
ALBUMIN/GLOB SERPL: 1.7 {RATIO} (ref 1.4–2.6)
ALP SERPL-CCNC: 82 U/L (ref 53–141)
ALT SERPL-CCNC: 4 U/L (ref 10–40)
ANION GAP SERPL CALC-SCNC: 14 MMOL/L (ref 8–19)
AST SERPL-CCNC: 9 U/L (ref 15–50)
BASOPHILS # BLD AUTO: 0.02 10*3/UL (ref 0–0.2)
BASOPHILS NFR BLD AUTO: 0.2 % (ref 0–3)
BILIRUB SERPL-MCNC: <0.15 MG/DL (ref 0.2–1.3)
BUN SERPL-MCNC: 17 MG/DL (ref 5–25)
BUN/CREAT SERPL: 15 {RATIO} (ref 6–20)
CALCIUM SERPL-MCNC: 9.4 MG/DL (ref 8.7–10.4)
CHLORIDE SERPL-SCNC: 108 MMOL/L (ref 99–111)
CONV ABS IMM GRAN: 0.05 10*3/UL (ref 0–0.54)
CONV CO2: 20 MMOL/L (ref 22–32)
CONV EOSINOPHILS PERCENT BY MANUAL COUNT: 0 % (ref 0–7)
CONV IMMATURE GRAN: 0.5 % (ref 0–0.4)
CONV TOTAL PROTEIN: 5.6 G/DL (ref 6.3–8.2)
CREAT UR-MCNC: 1.15 MG/DL (ref 0.5–0.9)
EOSINOPHIL # BLD MANUAL: 0 10*3/UL (ref 0–0.7)
ERYTHROCYTE [DISTWIDTH] IN BLOOD BY AUTOMATED COUNT: 17.5 % (ref 11.5–14.5)
ERYTHROCYTE [DISTWIDTH] IN BLOOD BY AUTOMATED COUNT: 65.9 FL
GFR SERPLBLD BASED ON 1.73 SQ M-ARVRAT: 52 ML/MIN/{1.73_M2}
GLOBULIN UR ELPH-MCNC: 2.1 G/DL (ref 2–3.5)
GLUCOSE SERPL-MCNC: 168 MG/DL (ref 65–99)
HBA1C MFR BLD: 9.4 G/DL (ref 12–16)
HCT VFR BLD AUTO: 29.8 % (ref 37–47)
LYMPHOCYTES # BLD AUTO: 1.1 10*3/UL (ref 1–5)
LYMPHOCYTES NFR BLD AUTO: 11.8 % (ref 20–45)
MCH RBC QN AUTO: 31.5 PG (ref 27–31)
MCHC RBC AUTO-ENTMCNC: 31.5 G/DL (ref 33–37)
MCV RBC AUTO: 100 FL (ref 81–99)
MONOCYTES # BLD AUTO: 0.72 10*3/UL (ref 0.2–1.2)
MONOCYTES NFR BLD MANUAL: 7.7 % (ref 3–10)
NEUTROPHILS # BLD AUTO: 7.46 10*3/UL (ref 2–8)
NEUTROPHILS NFR BLD MANUAL: 79.8 % (ref 30–85)
OSMOLALITY SERPL CALC.SUM OF ELEC: 293 MOSM/KG (ref 273–304)
PLATELET # BLD AUTO: 423 10*3/UL (ref 130–400)
PMV BLD AUTO: 8.3 FL (ref 7.4–10.4)
POTASSIUM SERPL-SCNC: 3.4 MMOL/L (ref 3.5–5.3)
RBC MORPH BLD: 2.98 10*6/UL (ref 4.2–5.4)
SODIUM SERPL-SCNC: 139 MMOL/L (ref 135–147)
WBC # BLD AUTO: 9.35 10*3/UL (ref 4.8–10.8)

## 2021-04-13 ENCOUNTER — OFFICE VISIT CONVERTED (OUTPATIENT)
Dept: ONCOLOGY | Facility: HOSPITAL | Age: 61
End: 2021-04-13
Attending: INTERNAL MEDICINE

## 2021-04-13 LAB
ALBUMIN SERPL-MCNC: 3.5 G/DL (ref 3.5–5)
ALBUMIN/GLOB SERPL: 1.8 {RATIO} (ref 1.4–2.6)
ALP SERPL-CCNC: 84 U/L (ref 53–141)
ALT SERPL-CCNC: 7 U/L (ref 10–40)
ANION GAP SERPL CALC-SCNC: 10 MMOL/L (ref 8–19)
AST SERPL-CCNC: 13 U/L (ref 15–50)
BASOPHILS # BLD AUTO: 0.02 10*3/UL (ref 0–0.2)
BASOPHILS NFR BLD AUTO: 0.2 % (ref 0–3)
BILIRUB SERPL-MCNC: <0.15 MG/DL (ref 0.2–1.3)
BUN SERPL-MCNC: 16 MG/DL (ref 5–25)
BUN/CREAT SERPL: 19 {RATIO} (ref 6–20)
CALCIUM SERPL-MCNC: 9 MG/DL (ref 8.7–10.4)
CHLORIDE SERPL-SCNC: 110 MMOL/L (ref 99–111)
CONV ABS IMM GRAN: 0.03 10*3/UL (ref 0–0.54)
CONV CO2: 22 MMOL/L (ref 22–32)
CONV EOSINOPHILS PERCENT BY MANUAL COUNT: 0 % (ref 0–7)
CONV IMMATURE GRAN: 0.3 % (ref 0–0.4)
CONV TOTAL PROTEIN: 5.4 G/DL (ref 6.3–8.2)
CREAT UR-MCNC: 0.83 MG/DL (ref 0.5–0.9)
EOSINOPHIL # BLD MANUAL: 0 10*3/UL (ref 0–0.7)
ERYTHROCYTE [DISTWIDTH] IN BLOOD BY AUTOMATED COUNT: 15.6 % (ref 11.5–14.5)
ERYTHROCYTE [DISTWIDTH] IN BLOOD BY AUTOMATED COUNT: 55.7 FL
GFR SERPLBLD BASED ON 1.73 SQ M-ARVRAT: >60 ML/MIN/{1.73_M2}
GLOBULIN UR ELPH-MCNC: 1.9 G/DL (ref 2–3.5)
GLUCOSE SERPL-MCNC: 123 MG/DL (ref 65–99)
HBA1C MFR BLD: 8.8 G/DL (ref 12–16)
HCT VFR BLD AUTO: 27 % (ref 37–47)
LYMPHOCYTES # BLD AUTO: 1.09 10*3/UL (ref 1–5)
LYMPHOCYTES NFR BLD AUTO: 11.4 % (ref 20–45)
MCH RBC QN AUTO: 31.8 PG (ref 27–31)
MCHC RBC AUTO-ENTMCNC: 32.6 G/DL (ref 33–37)
MCV RBC AUTO: 97.5 FL (ref 81–99)
MONOCYTES # BLD AUTO: 0.77 10*3/UL (ref 0.2–1.2)
MONOCYTES NFR BLD MANUAL: 8.1 % (ref 3–10)
NEUTROPHILS # BLD AUTO: 7.64 10*3/UL (ref 2–8)
NEUTROPHILS NFR BLD MANUAL: 80 % (ref 30–85)
OSMOLALITY SERPL CALC.SUM OF ELEC: 291 MOSM/KG (ref 273–304)
PLATELET # BLD AUTO: 274 10*3/UL (ref 130–400)
PMV BLD AUTO: 8.5 FL (ref 7.4–10.4)
POTASSIUM SERPL-SCNC: 3.4 MMOL/L (ref 3.5–5.3)
RBC MORPH BLD: 2.77 10*6/UL (ref 4.2–5.4)
SODIUM SERPL-SCNC: 139 MMOL/L (ref 135–147)
WBC # BLD AUTO: 9.55 10*3/UL (ref 4.8–10.8)

## 2021-04-19 ENCOUNTER — OFFICE VISIT CONVERTED (OUTPATIENT)
Dept: SURGERY | Facility: CLINIC | Age: 61
End: 2021-04-19
Attending: SURGERY

## 2021-04-19 ENCOUNTER — HOSPITAL ENCOUNTER (OUTPATIENT)
Dept: MRI IMAGING | Facility: HOSPITAL | Age: 61
Discharge: HOME | End: 2021-04-19
Attending: INTERNAL MEDICINE

## 2021-04-27 ENCOUNTER — HOSPITAL ENCOUNTER (OUTPATIENT)
Dept: CARDIOLOGY | Facility: HOSPITAL | Age: 61
Discharge: HOME | End: 2021-04-27
Attending: INTERNAL MEDICINE

## 2021-05-10 NOTE — H&P
History and Physical      Patient Name: Ragini Dozier   Patient ID: [id number]   Sex: Female   YOB: 1960    Primary Care Provider: Aure DOMINGUEZ    Visit Date: November 19, 2020    Provider: Lizbeth York MD   Location: [facility]   Location Address: [address] [place] [address]  [place]  [id number]   Location Phone: [phone]          Chief Complaint  · Breast Cancer      History Of Present Illness  Ragini Dozier is a 60 year old /White female who is referred from Ashlee Dubose PA-C ; very pleasant lady who had mammogram done and it showed a some calcifications and on ultrasound 2 masses at 0100 hours about 5-6 cm FTN in the left breast and it was biopsied and returned as:   Palpation-guided or Image Guided needle biopsy:    * PALPATION GUIDED IMAGE-GUIDED NEEDLE BIOSPY WAS PERFORMED. Results from the needle guided biopsy are high grade DCIS with er and pr weakly positive.   Breast Cancer Staging:  * T is ;N 0 ;M 0   * 0 Stage Group      She had an MRI that showed the 2 masses right beside the hematoma with one being immediately above and one immediately below the hematoma and it was recommended these areas be biopsied due to suspicious appearance on MRI and possibility of upgraded path.       Past Medical History  Anxiety; Cataract; Depression; Migraine; Psychiatric Care         Medication List  [place] Thyroid 15 mg oral tablet; buspirone 15 mg oral tablet; butalbital-acetaminophen-caff -40 mg oral tablet; Calcium 500 + D 500 mg(1,250mg) -200 unit oral tablet; diazepam 5 mg oral tablet; doxepin 10 mg oral capsule; gabapentin 100 mg oral capsule; propranolol 80 mg oral tablet; risedronate 150 mg oral tablet; sumatriptan succinate 100 mg oral tablet; topiramate 50 mg oral tablet; venlafaxine 150 mg oral tablet extended release 24hr; zolpidem 10 mg oral tablet         Allergy List  NO KNOWN DRUG ALLERGIES       Allergies Reconciled  Family Medical  History  Heart Disease; Diabetes Mellitus, Type II         Social History  Alcohol; Tobacco (Never)         Review of Systems  · Constitutional  o Denies  o : fever, chills  · Breasts  o * See HPI  · Cardiovascular  o Denies  o : chest pain, cardiac murmurs, irregular heart beats, dyspnea on exertion  · Integument  o Denies  o : rash, itching, new skin lesions  · Heme-Lymph  o Denies  o : easy bleeding, easy bruising, lymph node enlargement or tenderness      Physical Examination  · Constitutional  o Appearance  o : A well-nourished, well-developed patient who ambulates without difficulty, Alert and Oriented X3.  · Respiratory  o Respiratory Effort  o : breathing unlabored  o Inspection of Chest  o : breaths equal bilaterally  · Breasts  o Inspection of Breasts  o : developmental state normal for age  o Palpation of Breasts, Axillae  o : no masses or tenderness noted on palpation, well helaed biopsy site, no skin ornipple cahgnes, no LAD          Assessment  · Ductal carcinoma in situ (DCIS) of left breast     233.0/D05.12      Plan  · Orders  o Lymphedema Clinic Consult (Order_PT/OT Consult for Bioimpedence Lymph fluid analysis,Pre-op and retest Post-op, every 3 mo. Yr. 1-3, 6 mo. Yr. 4-5, annually year 6+) (LYMCL) - 233.0/D05.12 - 11/19/2020  o Biopsy, breast, with placement of localization device (clip), first lesion, with ultrasound guidance [facility] ([id number]) - 233.0/D05.12 - 11/19/2020  o Biopsy, breast, with placement of localization device (clip), each additional lesion, with ultrasound guidance [facility] ([id number]) - 233.0/D05.12 - 11/19/2020  · Instructions  o DISCUSSION:  o PLAN:  o Needs to have 2 masses biopsied after discussion in tumor board   o Proceed with biopsy  o Follow up after biopsy of 2 masses  o ****Surgical Treatment Addendum****  o Breast Conserving Therapy: Offered  o [place] node dissection offfered and will perform as a part of surgery.  o Reconstructive/Plastice Surgery referral offered prior to  surgery and patient declined. Reason: likely bct            Electronically Signed by: Lizbeth York MD -Author on November 19, 2020 01:33:13 PM

## 2021-05-10 NOTE — H&P
History and Physical      Patient Name: Ragini Dozier   Patient ID: [id number]   Sex: Female   YOB: 1960    Primary Care Provider: Aure DOMINGUEZ    Visit Date: December 23, 2020    Provider: Lizbeth York MD   [place] [facility]   Location Address: [address] [place] [address]  [id number]   Location Phone: [phone]          Chief Complaint  · Follow Up Surgery      History Of Present Illness  Ragini Dozier is a 60 year old /White female who is here today for follow up of: Central Venous Access Catheter Placement -Right Side.   She is doing well-status post surgery.       Past Medical History  Anxiety; Cataract; Depression; Migraine; Psychiatric Care         Past Surgical History  Cataract surgery; Hysterectomy; Tubal ligation         Medication List  [place] Thyroid 15 mg oral tablet; buspirone 15 mg oral tablet; butalbital-acetaminophen-caff -40 mg oral tablet; Calcium 500 + D 500 mg(1,250mg) -200 unit oral tablet; diazepam 5 mg oral tablet; doxepin 10 mg oral capsule; gabapentin 100 mg oral capsule; propranolol 80 mg oral tablet; risedronate 150 mg oral tablet; sumatriptan succinate 100 mg oral tablet; topiramate 50 mg oral tablet; venlafaxine 150 mg oral tablet extended release 24hr; zolpidem 10 mg oral tablet         Allergy List  NO KNOWN DRUG ALLERGIES       Allergies Reconciled  Family Medical History  Heart Disease; Diabetes Mellitus, Type II         Social History  Alcohol; Tobacco (Never)         Review of Systems  · Constitutional  o Denies  o : fever, chills  · Eyes  o Denies  o : yellowish discoloration of the eyes, eye pain  · HENT  o Denies  o : difficulty swallowing, hoarseness  · Cardiovascular  o Denies  o : chest pain on exertion, irregular heart beats  · Respiratory  o Denies  o : shortness of breath, cough  · Gastrointestinal  o Denies  o : nausea, vomiting, diarrhea, constipation  · Integument  o Admits  o : see HPI for  surgical incision healing  · Neurologic  o Denies  o : tingling or numbness, loss of balance  · Musculoskeletal  o Denies  o : joint pain, back pain  · Endocrine  o Denies  o : weight gain, weight loss  · All Others Negative      Vitals  Date Time BP Position Site L\R Cuff Size HR RR TEMP (F) WT  HT  BMI kg/m2 BSA m2 O2 Sat FR L/min FiO2 HC       12/23/2020 09:22 AM       15  120lbs 0oz 5'   23.44 1.52             Physical Examination  · Constitutional  o Appearance  o : well developed/well nourished patient in no apparent distress  · Respiratory  o Inspection of Chest  o : equal breaths bilaterally  · Gastrointestinal  o Abdominal Examination  o : soft/nontender, nondistended, no organomegaly appreciated  · Post Surgical Incision  o Surgical wound  o : healing well, no erythema          Assessment  · Postoperative Exam Following Surgery     V67.00/Z09      Plan  · Medications  o Medications have been Reconciled  o Transition of Care or Provider Policy  · Instructions  o Return to office with any issues.  o F/U PRN  o Keep apt with cancer care cetlanden            Electronically Signed by: Lizbeth York MD -Author on December 23, 2020 09:32:53 AM

## 2021-05-10 NOTE — H&P
History and Physical      Patient Name: Ragini Dozier   Patient ID: [id number]   Sex: Female   YOB: 1960    Primary Care Provider: Aure DOMINGUEZ    Visit Date: December 10, 2020    Provider: Lizbeth York MD   Location: [facility] [place] [facility]   Location Address: [address] [place] [address]  [place]  [id number]   Location Phone: [phone]          Chief Complaint  · Breast Cancer  · Pre-Surgical History and Physical Examination for [facility]  · Consents for Surgery      History Of Present Illness  Ragini Dozier is a 60 year old /White female who is referred from [facility]; very pleasant lady who had been seen for DCIS of L breast was sent for MRI that showed 2 masses with the more superiorly located above post bx hematoma being 1.1 cm and the mass below the hematoma being 1.2 cm and these were also biopsied and returned as:   Palpation-guided or Image Guided needle biopsy:    * Image guided needle biospy performed. Results from the needle guided biopsy are IDC grade 2 with ER/SC weakly positive with Her2 + and ki of 60% with associated high grade DCIS.   Breast Cancer Staging:  * T 1 ;N 0 ;M 0   * 1 Stage Group       Past Medical History  Anxiety; Cataract; Depression; Migraine; Psychiatric Care         Past Surgical History  Cataract surgery; Hysterectomy; Tubal ligation         Medication List  [place] Thyroid 15 mg oral tablet; buspirone 15 mg oral tablet; butalbital-acetaminophen-caff -40 mg oral tablet; Calcium 500 + D 500 mg(1,250mg) -200 unit oral tablet; diazepam 5 mg oral tablet; doxepin 10 mg oral capsule; gabapentin 100 mg oral capsule; propranolol 80 mg oral tablet; risedronate 150 mg oral tablet; sumatriptan succinate 100 mg oral tablet; topiramate 50 mg oral tablet; venlafaxine 150 mg oral tablet extended release 24hr; zolpidem 10 mg oral tablet         Allergy List  NO KNOWN DRUG ALLERGIES         Family Medical History  Heart  Disease; Diabetes Mellitus, Type II         Social History  Alcohol; Tobacco (Never)         Review of Systems  · Constitutional  o Denies  o : fever, chills  · Breasts  o * See HPI  · Cardiovascular  o Denies  o : chest pain, cardiac murmurs, irregular heart beats, dyspnea on exertion  · Integument  o Denies  o : rash, itching, new skin lesions  · Heme-Lymph  o Denies  o : easy bleeding, easy bruising, lymph node enlargement or tenderness      Physical Examination  · Constitutional  o Appearance  o : A well-nourished, well-developed patient who ambulates without difficulty, Alert and Oriented X3.  · Respiratory  o Respiratory Effort  o : breathing unlabored  o Inspection of Chest  o : breaths equal bilaterally  · Breasts  o Inspection of Breasts  o : developmental state normal for age  o Palpation of Breasts, Axillae  o : no masses or tenderness noted on palpation on right breast, left breast with well helaed biopsy site, no LAD, no skin or nipple chagnes          Assessment  · Breast cancer of lower-inner quadrant of left female breast     174.3/C50.312  · Preoperative Examination     V72.83      Plan  · Orders  o Lymphedema Clinic Consult (Order_PT/OT Consult for Bioimpedence Lymph fluid analysis,Pre-op and retest Post-op, every 3 mo. Yr. 1-3, 6 mo. Yr. 4-5, annually year 6+) (LYMCL) - 174.3/C50.312 - 12/10/2020  o PowerPort/Port Placement (01674) - 174.3/C50.312 - 12/15/2020  o [facility] [place] (49773) - 174.3/C50.312 - 12/10/2020  · Instructions  o DISCUSSION:  o The patient has a primary breast cancer confirmed by biopsy. I have reviewed the radiographic studies and pathology report. I have recommended a surgical procedure to the patient as a option in treatment. Other options were discussed in detail with ample time to answer questions.  o PLAN:  o Schedule operation  o Handouts Provided-Pre-Procedure Instructions including date and time and location of procedure.  o ****Surgical  Orders****  o ****Patient Status****  o RISK AND BENEFITS:  o Consent for surgery: Given these options, the patient has verbally expressed an understanding of the risks of surgery and finds these risks acceptable. We will proceed with surgery as soon as possible.  o Possible risks, complications, benefits, and alternatives to surgical or invasive procedure have been explained to patient and/or legal guardian.  o O.R. PREP: Per protocol  o IV: Per Anesthesia  o Please sign permit for: Powerport placement  o Kefzol 2 grams IV on call to OR.  o The above History and Physical Examination has been completed within 30 days of admission.            Electronically Signed by: Lizbeth York MD -Author on December 10, 2020 02:34:40 PM

## 2021-05-11 ENCOUNTER — HOSPITAL ENCOUNTER (OUTPATIENT)
Dept: PERIOP | Facility: HOSPITAL | Age: 61
Setting detail: HOSPITAL OUTPATIENT SURGERY
Discharge: HOME | End: 2021-05-11
Attending: SURGERY

## 2021-05-11 NOTE — H&P
History and Physical      Patient Name: Ragini Dozier   Patient ID: [id number]   Sex: Female   YOB: 1960    Primary Care Provider: Aure DOMINGUEZ    Visit Date: April 19, 2021    Provider: Lizbeth York MD   Location: [facility]   Location Address: [address] [place] [address]  [id number]   Location Phone: [phone]          Chief Complaint  · Breast Cancer  · Pre-Surgical History and Physical Examination for [facility]  · Consents for Surgery      History Of Present Illness  Ragini Dozier is a 60 year old /White female who is referred from Princess Alvarez MD ; she has completed her neoadjuvant chemotherapy for her her2 + breast cancer and is here today to schedule surgery. She has an MRI ordered for later today.   Palpation-guided or Image Guided needle biopsy:    * Image guided needle biospy performed. Results from the needle guided biopsy are IDC grade2 ER/PA weakly positive and her 2+ with ki 67 of 60% and additional high grade DCIS.   Breast Cancer Staging:  * T 1 ;N 0 ;M 0   * 1 Stage Group       Past Medical History  Anxiety; Cataract; Depression; Migraine; Psychiatric Care         Past Surgical History  Cataract surgery; Hysterectomy; Tubal ligation         Medication List  [place] Thyroid 15 mg oral tablet; buspirone 15 mg oral tablet; butalbital-acetaminophen-caff -40 mg oral tablet; Calcium 500 + D 500 mg(1,250mg) -200 unit oral tablet; diazepam 5 mg oral tablet; doxepin 10 mg oral capsule; gabapentin 100 mg oral capsule; propranolol 80 mg oral tablet; risedronate 150 mg oral tablet; sumatriptan succinate 100 mg oral tablet; topiramate 50 mg oral tablet; venlafaxine 150 mg oral tablet extended release 24hr; zolpidem 10 mg oral tablet         Allergy List  NO KNOWN DRUG ALLERGIES       Allergies Reconciled  Family Medical History  Heart Disease; Diabetes Mellitus, Type II         Social History  Alcohol; Tobacco (Never)          Review of Systems  · Constitutional  o Denies  o : fever, chills  · Breasts  o * See HPI  · Cardiovascular  o Denies  o : chest pain, cardiac murmurs, irregular heart beats, dyspnea on exertion  · Integument  o Denies  o : rash, itching, new skin lesions  · Heme-Lymph  o Denies  o : easy bleeding, easy bruising, lymph node enlargement or tenderness      Vitals  Date Time BP Position Site L\R Cuff Size HR RR TEMP (F) WT  HT  BMI kg/m2 BSA m2 O2 Sat FR L/min FiO2        04/19/2021 09:27 AM       15   5'                 Physical Examination  · Constitutional  o Appearance  o : A well-nourished, well-developed patient who ambulates without difficulty, Alert and Oriented X3.  · Respiratory  o Respiratory Effort  o : breathing unlabored  o Inspection of Chest  o : breaths equal bilaterally  · Breasts  o Inspection of Breasts  o : developmental state normal for age  o Palpation of Breasts, Axillae  o : no masses or tenderness noted on palpation in right breast, left breast with area of increased density, hematoma resolved, no LAD          Assessment  · Malignant neoplasm of upper-outer quadrant of left breast in female, estrogen receptor positive       Malignant neoplasm of upper-outer quadrant of left female breast     174.4/C50.412  Estrogen receptor positive status [ER+]     174.4/Z17.0  · Preoperative Examination     V72.83      Plan  · Orders  o General Surgery Order (GENOR) - - 04/19/2021  o Lymphedema Clinic Consult (Order_PT/OT Consult for Bioimpedence Lymph fluid analysis,Pre-op and retest Post-op, every 3 mo. Yr. 1-3, 6 mo. Yr. 4-5, annually year 6+) (LYMCL) - 174.4/C50.412, 174.4/Z17.0 - 04/19/2021  o Needle localization, percutaneous, for wire placement, 1st lesion, mammographic guidance [facility] ([id number]) - 174.4/C50.412, 174.4/Z17.0 - 05/11/2021  o Needle localization, percutaneous, for wire placement, 1st lesion, ultrasound guidance [facility] ([id number]) - 174.4/C50.412, 174.4/Z17.0 - 05/11/2021  o [place]  Node(Left) Identification (Detection) [[id number]] ([id number]) - 174.4/C50.412, 174.4/Z17.0 - 05/11/2021  o General Surgery Order (GENOR) - 174.4/C50.412, 174.4/Z17.0 - 05/11/2021  · Medications  o Medications have been Reconciled  o Transition of Care or Provider Policy  · Instructions  o DISCUSSION:  o The patient has a primary breast cancer confirmed by biopsy. I have reviewed the radiographic studies and pathology report. I have recommended a surgical procedure to the patient as a option in treatment. Other options were discussed in detail with ample time to answer questions.  o PLAN:  o ****Surgical Treatment Addendum****  o Breast Conserving Therapy: Offered  o [place] node dissection offfered and will perform as a part of surgery.  o Reconstructive/Plastice Surgery referral offered prior to surgery and patient declined. Reason: wants bct  o Handouts Provided-Pre-Procedure Instructions including date and time and location of procedure.  o ****Surgical Orders****  o ****Patient Status****  o RISK AND BENEFITS:  o Consent for surgery: Given these options, the patient has verbally expressed an understanding of the risks of surgery and finds these risks acceptable. We will proceed with surgery as soon as possible.  o Possible risks, complications, benefits, and alternatives to surgical or invasive procedure have been explained to patient and/or legal guardian.  o O.R. PREP: Per protocol  o IV: Per Anesthesia  o Please sign permit for: Left needle localized lumpectomy with left sentinel node identification and biopsy  o Kefzol 2 grams IV on call to OR.  o The above History and Physical Examination has been completed within 30 days of admission.  · Referrals  o ID: [id number] Date: 11/17/2020 Type: Inbound  Specialty: General Surgery            Electronically Signed by: Lizbeth York MD -Author on April 19, 2021 10:03:31 AM

## 2021-05-14 VITALS — RESPIRATION RATE: 15 BRPM | WEIGHT: 120 LBS | BODY MASS INDEX: 23.56 KG/M2 | HEIGHT: 60 IN

## 2021-05-14 VITALS — WEIGHT: 120 LBS | HEIGHT: 60 IN | BODY MASS INDEX: 23.56 KG/M2 | RESPIRATION RATE: 16 BRPM

## 2021-05-14 VITALS — HEIGHT: 60 IN | RESPIRATION RATE: 15 BRPM

## 2021-05-14 VITALS — RESPIRATION RATE: 16 BRPM | WEIGHT: 121 LBS | HEIGHT: 60 IN | BODY MASS INDEX: 23.75 KG/M2

## 2021-05-15 VITALS
TEMPERATURE: 99 F | WEIGHT: 121.12 LBS | OXYGEN SATURATION: 99 % | RESPIRATION RATE: 15 BRPM | BODY MASS INDEX: 23.78 KG/M2 | HEIGHT: 60 IN | HEART RATE: 57 BPM | SYSTOLIC BLOOD PRESSURE: 100 MMHG | DIASTOLIC BLOOD PRESSURE: 68 MMHG

## 2021-05-19 ENCOUNTER — OFFICE VISIT CONVERTED (OUTPATIENT)
Dept: SURGERY | Facility: CLINIC | Age: 61
End: 2021-05-19
Attending: SURGERY

## 2021-05-21 ENCOUNTER — HOSPITAL ENCOUNTER (OUTPATIENT)
Dept: OTHER | Facility: HOSPITAL | Age: 61
Setting detail: RECURRING SERIES
Discharge: HOME | End: 2021-05-21
Attending: INTERNAL MEDICINE

## 2021-05-28 VITALS
TEMPERATURE: 98.1 F | BODY MASS INDEX: 23.42 KG/M2 | RESPIRATION RATE: 18 BRPM | DIASTOLIC BLOOD PRESSURE: 75 MMHG | TEMPERATURE: 98.5 F | WEIGHT: 125.44 LBS | OXYGEN SATURATION: 96 % | BODY MASS INDEX: 24.37 KG/M2 | HEART RATE: 102 BPM | OXYGEN SATURATION: 99 % | SYSTOLIC BLOOD PRESSURE: 118 MMHG | HEART RATE: 79 BPM | WEIGHT: 119.93 LBS | WEIGHT: 121.25 LBS | DIASTOLIC BLOOD PRESSURE: 65 MMHG | WEIGHT: 124.78 LBS | BODY MASS INDEX: 23.35 KG/M2 | OXYGEN SATURATION: 100 % | HEART RATE: 85 BPM | TEMPERATURE: 98.5 F | TEMPERATURE: 98.7 F | OXYGEN SATURATION: 98 % | RESPIRATION RATE: 18 BRPM | WEIGHT: 122.14 LBS | OXYGEN SATURATION: 99 % | WEIGHT: 123.68 LBS | SYSTOLIC BLOOD PRESSURE: 127 MMHG | RESPIRATION RATE: 20 BRPM | DIASTOLIC BLOOD PRESSURE: 80 MMHG | BODY MASS INDEX: 23.68 KG/M2 | SYSTOLIC BLOOD PRESSURE: 108 MMHG | TEMPERATURE: 98.8 F | BODY MASS INDEX: 23.23 KG/M2 | HEART RATE: 82 BPM | TEMPERATURE: 98 F | TEMPERATURE: 98.9 F | HEART RATE: 63 BPM | BODY MASS INDEX: 23.85 KG/M2 | HEART RATE: 88 BPM | SYSTOLIC BLOOD PRESSURE: 125 MMHG | RESPIRATION RATE: 18 BRPM | DIASTOLIC BLOOD PRESSURE: 76 MMHG | RESPIRATION RATE: 16 BRPM | HEIGHT: 61 IN | SYSTOLIC BLOOD PRESSURE: 119 MMHG | RESPIRATION RATE: 20 BRPM | DIASTOLIC BLOOD PRESSURE: 69 MMHG | DIASTOLIC BLOOD PRESSURE: 63 MMHG | BODY MASS INDEX: 24.5 KG/M2 | HEART RATE: 109 BPM | DIASTOLIC BLOOD PRESSURE: 82 MMHG | WEIGHT: 123.02 LBS | OXYGEN SATURATION: 98 % | HEIGHT: 61 IN | SYSTOLIC BLOOD PRESSURE: 113 MMHG | SYSTOLIC BLOOD PRESSURE: 110 MMHG | OXYGEN SATURATION: 98 %

## 2021-05-28 NOTE — PROGRESS NOTES
Patient: MURPHY SY     Acct: [id number]     Report: #[id number]  UNIT #: [id number]     : 1960    Encounter Date:2020  PRIMARY CARE: GARY TIWARI  ***Signed***  --------------------------------------------------------------------------------------------------------------------  NURSE INTAKE      Visit Type      Established Patient Visit            Chief Complaint      BREAST CA            Referring Provider/Copies To      Primary Care Provider:  [facility]      Copies To:   ALEN SMITH [facility]            History and Present Illness      Past Oncology Illness History      HR positive, HER-2 positive breast cancer:      -Found on routine screening mammogram      -Diagnostic mammogram on 11/10/2020: Calcifications in the left breast      -11/10/2020 left breast biopsy: Pathology positive for DCIS, high-grade,     estrogen receptor positive (5%, strong), progesterone receptor positive (10%,     weak-moderate)      -2020 MRI of the breast: 1.8 cm hematoma at the site of the recent biopsy     showing DCIS.  There is a 1.1 cm nonfocal mass enhancement at the superior     lateral margin of the hematoma and a separate 1.2 cm suspicious mass along the     inferior medial margin.  These correspond the masses seen on ultrasound on 10/2          -2020 MRI guided breast biopsy: Left lower inner quadrant pathology     positive for invasive ductal carcinoma, grade 2, estrogen receptor positive (3%,    moderate), progesterone receptor positive (1%, weak), HER-2 positive (3+ by     IHC), Ki-67 approximately 60%.  Associated with high-grade ductal carcinoma in     situ.      -Cycle 1 of TCH P on 2020.  Echocardiogram on 2020 shows an EF of     55%            HPI - Oncology Interim      Patient comes in today for her first cycle of chemotherapy.  Her only complaint     is that she has a lot of anxiety around starting the treatment.  She is very     nervous about  what it would mean and what her side effects will be.  I reviewed     typical expected side effects with her and how she will take medications.  I     also gave her instructions on when she should contact the clinic.  I reviewed     the results of her recent echo on 12/14/2020 which shows that her ejection     fraction is 55% which is normal.  I do not anticipate that she will have any     complications from her septum but we will continue monitor.            ECOG Performance Status      0            PAST, FAMILY   Past Medical History      Past Medical History:  Depression, Osteoporosis      Other PMH:        Migraine headaches      Hematology/Oncology (F):  Breast Cancer            Past Surgical History      Biopsy, Cataract Surgery, Hysterectomy (PARTIAL), Skin Cancer Removal            Family History      Family History:  Kidney Cancer (FATHER), Myeloma (FATHER), Skin Cancer (FATHER)            Mother's history is unknown            Social History      Marital Status:        Lives independently:  Yes      Number of Children:  2      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            Tobacco Use      Tobacco status:  Never smoker      Currently Vaping:  No            Alcohol Use      Alcohol intake:  OCCASIONAL            Substance Use      Substance use:  Denies use            Additional  Information      Additional History:        COLONOSCOPY--2019      HEMOCCULT STOOL--2019      MAMMOGRAM--2020            REVIEW OF SYSTEMS      General:  Denies: Appetite Change, Fatigue, Fever, Night Sweats, Weight Gain,     Weight Loss      Eye:  Denies Blurred Vision, Denies Corrective Lenses, Denies Diplopia, Denies     Vision Changes      ENT:  Denies Headache, Denies Hearing Loss, Denies Hoarseness, Denies Sore     Throat      Cardiovascular:  Denies Chest Pain, Denies Palpitations      Respiratory:  Denies: Cough, Coughing Blood, Productive Cough, Shortness of Air,    Wheezing      Gastrointestinal:  Denies Bloody Stools,  Denies Constipation, Denies Diarrhea,     Denies Nausea/Vomiting, Denies Problem Swallowing, Denies Unable to Control     Bowels      Genitourinary:  Denies Blood in Urine, Denies Incontinence, Denies Painful Uri    nation      Musculoskeletal:  Denies Back Pain, Denies Muscle Pain, Denies Painful Joints      Integumentary:  Denies Itching, Denies Lesions, Denies Rash      Neurologic:  Denies Dizziness, Denies Numbness\Tingling, Denies Seizures      Psychiatric:  Denies Anxiety; Admits Depression      Endocrine:  Denies Cold Intolerance, Denies Heat Intolerance      Hematologic/Lymphatic:  Denies Bruising, Denies Bleeding, Denies Enlarged Lymph     Nodes      Reproductive:  Denies: Menopause, Heavy Periods, Pregnant, Still Menstruating            VITAL SIGNS AND SCORES      Vitals      Weight 125 lbs 7.068 oz / 56.9 kg      Temperature 98.7 F / 37.06 C - Temporal      Pulse 79      Respirations 18      Blood Pressure 127/82 Sitting      Pulse Oximetry 99%, ROOM AIR            Pain Score      Experiencing any pain?:  No      Pain Scale Used:  Numerical      Pain Intensity:  0            Fatigue Score      Experiencing any fatigue?:  No      Fatigue (0-10 scale):  0 (none)            EXAM      General: Alert, cooperative, no acute distress      Eyes: Anicteric sclera, PERRLA      Respiratory: CTAB, normal respiratory effort      Abdomen: Normal active bowel sounds, no tenderness, no distention      Cardiovascular: RRR, no murmur, no lower extremity edema      Skin: Normal tone, no rash, no lesions      Psychiatric: Appropriate affect, intact judgment      Neurologic: No focal sensory or motor deficits, no weakness, numbness, dizziness      Musculoskeletal: Normal muscle strength and tone      Extremities: No clubbing, cyanosis, or deformities            PREVENTION      Hx Influenza Vaccination:  Yes      Date Influenza Vaccine Given:  Nov 2, 2020      Influenza Vaccine Declined:  No      2 or More Falls in Past Year?:   No      Fall Past Year with Injury?:  No      Hx Pneumococcal Vaccination:  No      Encouraged to follow-up with:  PCP regarding preventative exams.      Chart initiated by      ISHAN HYDE MA            ALLERGY/MEDS      Allergies      Coded Allergies:             NO KNOWN ALLERGIES (Unverified , 12/18/20)            Medications      Last Reconciled on 12/18/20 18:12 by PANCHO LUKE      Prochlorperazine Maleate (Prochlorperazine Maleate) 10 Mg Tab      10 MG PO Q6H PRN for NAUSEA AND/OR VOMITING, #60 TAB 0 Refills         Prov: PANCHO LUKE         12/17/20       Ondansetron Odt (ONDANSETRON ODT) 8 Mg Tab.rapdis      8 MG PO Q8 PRN for NAUSEA AND/OR VOMITING, #30 TAB.RAPDIS 0 Refills         Prov: PANCHO LUKE         12/17/20       Dexamethasone (Decadron) 4 Mg Tablet      8 MG PO ASDIR, #12 TAB 0 Refills         Prov: PANCHO LUKE         12/17/20       HYDROcodone-Acetaminophen 5-325 Mg (HYDROcodone-Acetaminophen 5-325 Mg) 1 Each     Tablet      2 TAB PO Q6H PRN for PAIN, #15 TAB         Prov: ZACHARY PARDO MD         12/15/20       Topiramate (Topamax*) 50 Mg Tablet      50 MG PO BID, TAB         Reported         12/14/20       Risedronate Sodium (Actonel) 150 Mg Tab      150 MG PO Q30D, #1 TAB         Reported         12/14/20       Propranolol ER (Propranolol ER) 80 Mg Cap.sa.24h      80 MG PO QDAY, CAP         Reported         12/14/20       Prochlorperazine Maleate (Prochlorperazine Maleate) 10 Mg Tab      10 MG PO Q6H PRN for NAUSEA AND/OR VOMITING, #60 TAB 3 Refills         Prov: PANCHO LUKE         12/11/20       Ondansetron Odt (ONDANSETRON ODT) 8 Mg Tab.rapdis      8 MG PO Q8 PRN for NAUSEA AND/OR VOMITING, #30 TAB.RAPDIS 3 Refills         Prov: PANCHO LUKE         12/11/20       Dexamethasone (Decadron) 4 Mg Tablet      8 MG PO ASDIR, #72 TAB 0 Refills         Prov: PANCHO LUKE         12/11/20       QUEtiapine (QUEtiapine) 400 Mg Tablet      200 MG PO HS for 30 Days, #15 TAB          Reported         12/10/20       Calcium Citrate/Vitamin D3 (Calcium Citrate 315 + D) 1 Each Tablet      1 EACH PO BID, #60 TAB         Reported         12/10/20       SUMAtriptan Succinate (Imitrex) 100 Mg Tablet      100 MG PO ASDIR PRN for MIGRAINE, TAB         Reported         12/10/20       diazePAM (diazePAM) 5 Mg Tablet      5 MG PO QDAY PRN for ANXIETY, #60 TAB         Reported         12/10/20       Acetaminophen (ACETAMINOPHEN) 325 Mg Tablet      325 MG PO Q6H PRN for HEADACHE, #100 TAB         Reported         12/10/20       DULoxetine (Cymbalta) 30 Mg Capsule.dr      30 MG PO BID, #60 CAP 0 Refills         Reported         12/10/20       OXcarbazepine (OXcarbazepine) 300 Mg Tab      300 MG PO BID, TAB         Reported         12/10/20      Medications Reviewed:  No Changes made to meds            IMPRESSION/PLAN      Plan      HR positive, HER-2 positive left breast cancer: Patient was initially diagnosed     with DCIS, weakly ER/SC positive.  After breast MRI she was found have a second     lesion.  This appears be 1.2 cm in size and is also weakly ER/SC positive and     HER-2 positive.  The plan will be for 6 cycles of TCHP.  She is here today for     her first cycle.  Echocardiograms shows a normal EF.  The patient will return     the clinic in 3 weeks for her next cycle.            Osteoporosis: Patient is currently being treated with an oral medication by her     primary care provider.  Her recent follow-up DEXA scan was canceled.  We will     plan to do this prior to the start of her aromatase inhibitor therapy.            Genetic risk: Patient has a father with a diagnosis of RCC and smoldering     myeloma.  Patient's mother's history is unknown.  She is interested in genetic     testing which can be arranged at a later date.            Patient Education      Patient Education Provided:  Yes            Electronically signed by PANCHO LUKE  12/18/2020 18:12       Disclaimer: Converted  document may not contain table formatting or lab diagrams. Please see [employer] for the authenticated document.

## 2021-05-28 NOTE — PROGRESS NOTES
Patient: MURPHY SY     Acct: [id number]     Report: #[id number]  UNIT #: [id number]     : 1960    Encounter Date:2021  PRIMARY CARE: GARY TIWARI  ***Signed***  --------------------------------------------------------------------------------------------------------------------  NURSE INTAKE      Chief Complaint       blood transfusion reaction            Referring Provider/Copies To      Primary Care Provider:  [facility]            History and Present Illness      Past Oncology Illness History      HR positive, HER-2 positive breast cancer:      -Found on routine screening mammogram      -Diagnostic mammogram on 11/10/2020: Calcifications in the left breast      -11/10/2020 left breast biopsy: Pathology positive for DCIS, high-grade,     estrogen receptor positive (5%, strong), progesterone receptor positive (10%,     weak-moderate)      -2020 MRI of the breast: 1.8 cm hematoma at the site of the recent biopsy     showing DCIS.  There is a 1.1 cm nonfocal mass enhancement at the superior     lateral margin of the hematoma and a separate 1.2 cm suspicious mass along the     inferior medial margin.  These correspond the masses seen on ultrasound on     10/20/2020      -2020 MRI guided breast biopsy: Left lower inner quadrant pathology     positive for invasive ductal carcinoma, grade 2, estrogen receptor positive (3%,    moderate), progesterone receptor positive (1%, weak), HER-2 positive (3+ by     IHC), Ki-67 approximately 60%.  Associated with high-grade ductal carcinoma in     situ.      -Cycle 1 of TCH P on 2020.  Echocardiogram on 2020 shows an EF of     55%. C3 on 21            HPI - Oncology Interim      1) Anemia: Patient with a hemoglobin of 6.3 on 3/23/21 and was scheduled to have    2 units of blood on 3/24/ here at the infusion center. Anemia is likely     secondary to recent chemotherapy.             2)  Blood Transfusion reaction:  Started # 1 transfusion of blood. Within martinez    wojciech, patient develops a rash to her face, and then with fevers up to 102.2.     Blood was stopped. Blood bank was notified of reaction. Transfusion reaction     protocol initiated.             3) Fevers: Temp of 102.2. after few minutes of # 1 blood transfusion. Stopped     blood transfusion. Fevers coming down to 101.1.Other vitals are stable. Denies     any cough, itching, SOA, throat swelling.            ECOG Performance Status      0            Most Recent Lab Findings      Laboratory Tests      3/19/21 17:04            3/23/21 10:41            Laboratory Tests            Test       3/19/21      17:04             Magnesium Level       1.67 mg/dL      (1.60-2.30)            PAST, FAMILY   Past Medical History      Past Medical History:  Depression, Osteoporosis      Other PMH:        Migraine headaches      Hematology/Oncology (F):  Breast Cancer            Past Surgical History      Biopsy, Cataract Surgery, Hysterectomy (PARTIAL), Skin Cancer Removal            Family History      Family History:  Kidney Cancer (FATHER), Myeloma (FATHER), Skin Cancer (FATHER)            Mother's history is unknown            Social History      Marital Status:        Lives independently:  Yes      Number of Children:  2      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            Tobacco Use      Tobacco status:  Never smoker      Currently Vaping:  No            Alcohol Use      Alcohol intake:  OCCASIONAL            Substance Use      Substance use:  Denies use            Additional  Information      Additional History:        COLONOSCOPY--2019      HEMOCCULT STOOL--2019      MAMMOGRAM--2020            REVIEW OF SYSTEMS      General:  Admits: Fatigue      ENT:  Denies Headache      Cardiovascular:  Denies Chest Pain      Respiratory:  Denies: Cough, Coughing Blood, Shortness of Air      Gastrointestinal:  Denies Bloody Stools, Denies Constipation      Genitourinary:  Denies Blood in Urine       Musculoskeletal:  Denies Back Pain      Integumentary:  Denies Itching; Admits Rash      Neurologic:  Admits Dizziness      Psychiatric:  Denies Anxiety      Endocrine:  Denies Cold Intolerance      Hematologic/Lymphatic:  Denies Bruising      Reproductive:  Denies: Menopause, Heavy Periods            PT/OT Functional Screening      No needs identified            Speech Functional Screening      No needs identified            Rehab to be Ordered      Type of Referral to be Ordered:  No needs identified            EXAM      General appearance:  in no apparent distress, cooperative, appears stated age.      HEENT: No pallor, no icterus, oral mucosa moist      Neck: Supple, trachea central-not deviated      Lymph nodes: none palpable peripherally      Cardiovascular: S1-S2 heard, no murmurs, no rubs, no gallops.      Breast exam:      Respiratory: Clear to auscultation bilaterally, no adventitious sounds      Abdomen/gastro: Soft, nontender, no palpable hepatosplenomegaly, bowel sounds     heard      Skin:  Rash to bilateral cheeks, No lesions, no petechiae.      Extremities: No pedal edema, peripheral pulses felt, no clubbing      Musculoskeletal: Normal tone, no rigidity of muscles; range of motion intact      Spine: No deformities      Psychiatric: Alert and oriented x3, appropriate affect, intact judgment      Neurologic: Cranial nerves II through XII grossly intact, no gross neurological     deficits noted.            PREVENTION      Hx Influenza Vaccination:  Yes      Date Influenza Vaccine Given:  Nov 2, 2020      Influenza Vaccine Declined:  No      Hx Pneumococcal Vaccination:  No      Encouraged to follow-up with:  PCP regarding preventative exams.            ALLERGY/MEDS      Allergies      Coded Allergies:             NO KNOWN ALLERGIES (Unverified , 3/12/21)            Medications      Last Reconciled on 3/24/21 12:00 by STEPHY TYLER      OLANZapine (OLANZapine) 5 Mg Tablet      5 MG PO HS for  30 Days, #30 TAB 2 Refills         Prov: PANCHO LUKE         3/23/21       Docusate Sodium (Colace) 100 Mg Capsule      100 MG PO BID PRN for CONSTIPATION, #60 CAP 1 Refill         Prov: PANCHO LUKE         3/18/21       Mouthwash (Magic Mouthwash) 1 Each Bottle      5 ML PO Q6H PRN for SORE THROAT, #600 ML 3 Refills         Prov: PANCHO LUKE         3/18/21       Potassium Chloride (Klor-Con*) 20 Meq Packet      20 MEQ PO QDAY, #30 PKT         Prov: PANCHO LUKE         3/18/21       predniSONE (Deltasone) 10 Mg Tablet      40 MG PO QDAY for 5 Days, #20 TAB 0 Refills         Prov: LARRY LUKEIE         3/16/21       Ondansetron Hcl (ONDANSETRON HCL) 8 Mg Tablet      8 MG PO Q8H PRN for NAUSEA AND/OR VOMITING, #30 TAB 3 Refills         Prov: LUKE,PANCHO         3/12/21       Sennosides/Docusate Sod 8.6/50 MG (Senokot-S) 1 Each Tablet      2 TAB PO QDAY PRN for CONSTIPATION, #60 TAB 3 Refills         Prov: PANCHO LUKE         3/12/21       Potassium Chloride (Potassium Chloride) 10 Meq Capsule.er      20 MEQ PO QDAY, #60 CAP.ER 0 Refills         Prov: PANCHO LUKE         1/29/21       Potassium Chloride (Potassium Chloride) 10 Meq Capsule.er      20 MEQ PO QDAY, #60 CAP.ER 0 Refills         Prov: LUKE,PANCHO         1/8/21       Dexamethasone (Decadron) 4 Mg Tablet      8 MG PO ASDIR, #12 TAB 0 Refills         Prov: PANCHO LUKE         12/17/20       HYDROcodone-Acetaminophen 5-325 Mg (HYDROcodone-Acetaminophen 5-325 Mg) 1 Each     Tablet      2 TAB PO Q6H PRN for PAIN, #15 TAB         Prov: ZACHARY PARDO MD         12/15/20       Topiramate (Topamax*) 50 Mg Tablet      50 MG PO BID, TAB         Reported         12/14/20       Risedronate Sodium (Actonel) 150 Mg Tab      150 MG PO Q30D, #1 TAB         Reported         12/14/20       Propranolol ER (Propranolol ER) 80 Mg Cap.sa.24h      80 MG PO QDAY, CAP         Reported         12/14/20       QUEtiapine (QUEtiapine) 400 Mg Tablet       200 MG PO HS for 30 Days, #15 TAB         Reported         12/10/20       Calcium Citrate/Vitamin D3 (Calcium Citrate 315 + D) 1 Each Tablet      1 EACH PO BID, #60 TAB         Reported         12/10/20       diazePAM (diazePAM) 5 Mg Tablet      5 MG PO QDAY PRN for ANXIETY, #60 TAB         Reported         12/10/20       Acetaminophen (ACETAMINOPHEN) 325 Mg Tablet      325 MG PO Q6H PRN for HEADACHE, #100 TAB         Reported         12/10/20       DULoxetine (Cymbalta) 30 Mg Capsule.dr      30 MG PO BID, #60 CAP 0 Refills         Reported         12/10/20       OXcarbazepine (OXcarbazepine) 300 Mg Tab      300 MG PO BID, TAB         Reported         12/10/20            IMPRESSION/PLAN      Impression      1) Anemia: Patient with a hemoglobin of 6.3 on 3/23/21 and was scheduled to have    2 units of blood on 3/24/ here at the infusion center. Anemia is likely     secondary to recent chemotherapy.             2)  Blood Transfusion reaction: Started # 1 transfusion of blood. Within     minutes, patient develops a rash to her face, and then with fevers up to 102.2.     Blood was stopped. Blood bank was notified of reaction. Transfusion reaction     protocol initiated.             3) Fevers: Temp of 102.2. after few minutes of # 1 blood transfusion. Stopped     blood transfusion. Fevers coming down to 101.1.Other vitals are stable. Denies     any cough, itching, SOA, throat swelling. Pt. to be given Tylenol 650 mg and     Benadry 25 mg x 1 now and will watch for any further reactions for an hour.            Plan      Blood transfusion reaction protocol initiated after speaking to blood bank.             Tylenol 650 mg po x 1, Benadryl 25 mg x 1 now.             Observe for 1 hour with vital signs.             Discharge to home after observation period.             Patient can not receive any blood transfusions until BB procedure completed for     tranfusion reaction.             Patient instructed to go to ER if any  further decline in respiratory status or     otherwise S / S of reactions.             Instructed patient to have Benadryl and Tylenol on hand in case needed.            Pain      Pain Zero Today            Advanced Care Plan Discussion      Declines Discussion 1124F            Patient Education            Blood Transfusion      DI for Adverse Drug Reaction -- Allergic      Patient Education Provided:  Yes            Topics Patient Counseled on      Topics Pt. Counseled On:  Other (signs and symptoms of transfusion reaction. )            Electronically signed by STEPHY TYLER onc  03/24/2021 12:00       Disclaimer: Converted document may not contain table formatting or lab diagrams. Please see [facility] for the authenticated document.

## 2021-05-28 NOTE — PROGRESS NOTES
Patient: MURPHY SY     Acct: [id number]     Report: #[id number]  UNIT #: [id number]     : 1960    Encounter Date:2021  PRIMARY CARE: GARY TIWARI  ***Signed***  --------------------------------------------------------------------------------------------------------------------  NURSE INTAKE      Visit Type      Established Patient Visit            Chief Complaint      BREAST CA            Referring Provider/Copies To      Primary Care Provider:  [facility]      Copies To:   [facility]            History and Present Illness      Past Oncology Illness History      HR positive, HER-2 positive breast cancer:      -Found on routine screening mammogram      -Diagnostic mammogram on 11/10/2020: Calcifications in the left breast      -11/10/2020 left breast biopsy: Pathology positive for DCIS, high-grade,     estrogen receptor positive (5%, strong), progesterone receptor positive (10%,     weak-moderate)      -2020 MRI of the breast: 1.8 cm hematoma at the site of the recent biopsy     showing DCIS.  There is a 1.1 cm nonfocal mass enhancement at the superior     lateral margin of the hematoma and a separate 1.2 cm suspicious mass along the     inferior medial margin.  These correspond the masses seen on ultrasound on     10/20/2020      -2020 MRI guided breast biopsy: Left lower inner quadrant pathology     positive for invasive ductal carcinoma, grade 2, estrogen receptor positive (3%,    moderate), progesterone receptor positive (1%, weak), HER-2 positive (3+ by     IHC), Ki-67 approximately 60%.  Associated with high-grade ductal carcinoma in     situ.      -Cycle 1 of TCH P on 2020.  Echocardiogram on 2020 shows an EF of     55%. C3 on 21            HPI - Oncology Interim      Patient comes in today for her third cycle of chemotherapy.  She has started     having diarrhea almost daily.  She is taking 2 to 3 tablets of Imodium.  She has     nausea 2-3 times in the second week after chemotherapy.            ECOG Performance Status      0            Most Recent Lab Findings      Laboratory Tests      1/29/21 08:44            1/29/21 09:00            PAST, FAMILY   Past Medical History      Past Medical History:  Depression, Osteoporosis      Other PMH:        Migraine headaches      Hematology/Oncology (F):  Breast Cancer            Past Surgical History      Biopsy, Cataract Surgery, Hysterectomy (PARTIAL), Skin Cancer Removal            Family History      Family History:  Kidney Cancer (FATHER), Myeloma (FATHER), Skin Cancer (FATHER)            Mother's history is unknown            Social History      Marital Status:        Lives independently:  Yes      Number of Children:  2      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[employer]            Tobacco Use      Tobacco status:  Never smoker      Currently Vaping:  No            Alcohol Use      Alcohol intake:  OCCASIONAL            Substance Use      Substance use:  Denies use            Additional  Information      Additional History:        COLONOSCOPY--2019      HEMOCCULT STOOL--2019      MAMMOGRAM--2020            REVIEW OF SYSTEMS      General:  Admits: Fatigue;          Denies: Appetite Change, Fever, Night Sweats, Weight Gain, Weight Loss      Eye:  Denies Blurred Vision, Denies Corrective Lenses, Denies Diplopia, Denies     Vision Changes      ENT:  Denies Headache, Denies Hearing Loss, Denies Hoarseness, Denies Sore     Throat      Cardiovascular:  Denies Chest Pain, Denies Palpitations      Respiratory:  Denies: Cough, Coughing Blood, Productive Cough, Shortness of Air,    Wheezing      Gastrointestinal:  Admits Diarrhea; Denies Bloody Stools, Denies Constipation,     Denies Nausea/Vomiting, Denies Problem Swallowing, Denies Unable to Control     Bowels      Genitourinary:  Denies Blood in Urine, Denies Incontinence, Denies Painful     Urination      Musculoskeletal:  Denies Back Pain; Admits Muscle Pain;  Denies Painful Joints      Integumentary:  Denies Itching, Denies Lesions, Denies Rash      Neurologic:  Denies Dizziness, Denies Numbness\Tingling, Denies Seizures      Psychiatric:  Denies Anxiety; Admits Depression      Endocrine:  Denies Cold Intolerance, Denies Heat Intolerance      Hematologic/Lymphatic:  Denies Bruising, Denies Bleeding, Denies Enlarged Lymph     Nodes      Reproductive:  Denies: Menopause, Heavy Periods, Pregnant, Still Menstruating            VITAL SIGNS AND SCORES      Vitals      Weight 122 lbs 2.157 oz / 55.4 kg      Temperature 98.9 F / 37.17 C - Temporal      Pulse 85      Respirations 20      Blood Pressure 118/65 Sitting      Pulse Oximetry 98%, RM AIR            Pain Score      Experiencing any pain?:  Yes      Pain Scale Used:  Numerical      Pain Intensity:  3            Fatigue Score      Experiencing any fatigue?:  Yes      Fatigue (0-10 scale):  8            EXAM      General: Alert, cooperative, no acute distress, well appearing      Eyes: Anicteric sclera, PERRLA      Respiratory: CTAB, normal respiratory effort      Abdomen: Normal active bowel sounds, no tenderness, no distention      Cardiovascular: RRR, no murmur, no lower extremity edema      Skin: Normal tone, no rash, no lesions      Psychiatric: Appropriate affect, intact judgment      Neurologic: No focal sensory or motor deficits, no weakness, numbness, dizziness      Musculoskeletal: Normal muscle strength and tone      Extremities: No clubbing, cyanosis, or deformities            PREVENTION      Hx Influenza Vaccination:  Yes      Date Influenza Vaccine Given:  Nov 2, 2020      Influenza Vaccine Declined:  No      2 or More Falls in Past Year?:  No      Fall Past Year with Injury?:  No      Hx Pneumococcal Vaccination:  No      Encouraged to follow-up with:  PCP regarding preventative exams.      Chart initiated by      ISHAN HYDE MA            ALLERGY/MEDS      Allergies      Coded Allergies:             NO KNOWN  ALLERGIES (Unverified , 1/29/21)            Medications      Last Reconciled on 2/28/21 21:27 by PANCHO LUKE      Potassium Chloride (Potassium Chloride) 10 Meq Capsule.er      20 MEQ PO QDAY, #60 CAP.ER 0 Refills         Prov: PANCHO LUKE         1/29/21       OLANZapine (OLANZapine) 5 Mg Tablet      5 MG PO HS for 30 Days, #30 TAB 1 Refill         Prov: PANCHO LUKE         1/29/21       Potassium Chloride (Potassium Chloride) 10 Meq Capsule.er      20 MEQ PO QDAY, #60 CAP.ER 0 Refills         Prov: PANCHO LUKE         1/8/21       Ondansetron Hcl (ONDANSETRON HCL) 8 Mg Tablet      8 MG PO Q8H PRN for NAUSEA AND/OR VOMITING, #30 TAB 3 Refills         Prov: PNACHO LUKE         1/8/21       Prochlorperazine Maleate (Prochlorperazine Maleate) 10 Mg Tab      10 MG PO Q6H PRN for NAUSEA AND/OR VOMITING, #60 TAB 0 Refills         Prov: PANCHO LUKE         12/17/20       Dexamethasone (Decadron) 4 Mg Tablet      8 MG PO ASDIR, #12 TAB 0 Refills         Prov: PANCHO LUKE         12/17/20       HYDROcodone-Acetaminophen 5-325 Mg (HYDROcodone-Acetaminophen 5-325 Mg) 1 Each     Tablet      2 TAB PO Q6H PRN for PAIN, #15 TAB         Prov: ZACHARY PARDO MD         12/15/20       Topiramate (Topamax*) 50 Mg Tablet      50 MG PO BID, TAB         Reported         12/14/20       Risedronate Sodium (Actonel) 150 Mg Tab      150 MG PO Q30D, #1 TAB         Reported         12/14/20       Propranolol ER (Propranolol ER) 80 Mg Cap.sa.24h      80 MG PO QDAY, CAP         Reported         12/14/20       Prochlorperazine Maleate (Prochlorperazine Maleate) 10 Mg Tab      10 MG PO Q6H PRN for NAUSEA AND/OR VOMITING, #60 TAB 3 Refills         Prov: PANCHO LUKE         12/11/20       Ondansetron Odt (ONDANSETRON ODT) 8 Mg Tab.rapdis      8 MG PO Q8 PRN for NAUSEA AND/OR VOMITING, #30 TAB.RAPDIS 3 Refills         Prov: PANCHO LUKE         12/11/20       Dexamethasone (Decadron) 4 Mg Tablet      8 MG PO ASDIR, #72  TAB 0 Refills         Prov: PANCHO LKUE         12/11/20       QUEtiapine (QUEtiapine) 400 Mg Tablet      200 MG PO HS for 30 Days, #15 TAB         Reported         12/10/20       Calcium Citrate/Vitamin D3 (Calcium Citrate 315 + D) 1 Each Tablet      1 EACH PO BID, #60 TAB         Reported         12/10/20       SUMAtriptan Succinate (Imitrex) 100 Mg Tablet      100 MG PO ASDIR PRN for MIGRAINE, TAB         Reported         12/10/20       diazePAM (diazePAM) 5 Mg Tablet      5 MG PO QDAY PRN for ANXIETY, #60 TAB         Reported         12/10/20       Acetaminophen (ACETAMINOPHEN) 325 Mg Tablet      325 MG PO Q6H PRN for HEADACHE, #100 TAB         Reported         12/10/20       DULoxetine (Cymbalta) 30 Mg Capsule.dr      30 MG PO BID, #60 CAP 0 Refills         Reported         12/10/20       OXcarbazepine (OXcarbazepine) 300 Mg Tab      300 MG PO BID, TAB         Reported         12/10/20      Medications Reviewed:  No Changes made to meds            IMPRESSION/PLAN      Diagnosis      Notes      New Medications      * OLANZapine 5 MG TABLET: 5 MG PO HS 30 Days #30      * Potassium Chloride 10 MEQ CAPSULE.ER: 20 MEQ PO QDAY #60            Plan      HR positive, HER-2 positive left breast cancer: Patient was initially diagnosed     with DCIS, weakly ER/KS positive.  After breast MRI she was found have a second     lesion.  This appears be 1.2 cm in size and is also weakly ER/KS positive and     HER-2 positive.  The plan will be for 6 cycles of TCHP.  She is here today for     her 3rd cycle. Her labs are adequate to proceed.   The patient will return the     clinic in 3 weeks for her next cycle.            Macrocytic anemia: Secondary to chemotherapy. will monitor.            Diarrhea: Chemotherapy induced.  Encouraged to patient to increase use of     imodium up to 7 times a day.             Osteoporosis: Patient is currently being treated with an oral medication by her     primary care provider.  Her recent  follow-up DEXA scan was canceled.  We will     plan to do this prior to the start of her aromatase inhibitor therapy.            Genetic risk: Patient has a father with a diagnosis of RCC and smoldering     myeloma.  Patient's mother's history is unknown.  We discussed her genetic risk     again and the benefit to testing. I will refer her to genetic counseling.            Patient Education      Patient Education Provided:  Yes            Electronically signed by PANCHO LUKE  02/28/2021 21:27       Disclaimer: Converted document may not contain table formatting or lab diagrams. Please see [facility] for the authenticated document.

## 2021-05-28 NOTE — PROGRESS NOTES
Patient: MURPHY SY     Acct: [id number]     Report: #[id number]  UNIT #: [id number]     : 1960    Encounter Date:2021  PRIMARY CARE: GARY TIWARI  ***Signed***  --------------------------------------------------------------------------------------------------------------------  NURSE INTAKE      Visit Type      Established Patient Visit            Chief Complaint      BREAST CA            Referring Provider/Copies To      Primary Care Provider:  [facility]      Copies To:   [facility]            History and Present Illness      Past Oncology Illness History      HR positive, HER-2 positive breast cancer:      -Found on routine screening mammogram      -Diagnostic mammogram on 11/10/2020: Calcifications in the left breast      -11/10/2020 left breast biopsy: Pathology positive for DCIS, high-grade,     estrogen receptor positive (5%, strong), progesterone receptor positive (10%,     weak-moderate)      -2020 MRI of the breast: 1.8 cm hematoma at the site of the recent biopsy     showing DCIS.  There is a 1.1 cm nonfocal mass enhancement at the superior     lateral margin of the hematoma and a separate 1.2 cm suspicious mass along the     inferior medial margin.  These correspond the masses seen on ultrasound on     10/20/2020      -2020 MRI guided breast biopsy: Left lower inner quadrant pathology     positive for invasive ductal carcinoma, grade 2, estrogen receptor positive (3%,    moderate), progesterone receptor positive (1%, weak), HER-2 positive (3+ by     IHC), Ki-67 approximately 60%.  Associated with high-grade ductal carcinoma in     situ.      -Cycle 1 of TCH P on 2020.  Echocardiogram on 2020 shows an EF of     55%      - Cycle 2 on 21.  Cycle 3 on 2021.  Cycle 4 on 2021.            HPI - Oncology Interim      Patient comes in today for her fourth cycle of TCHP.  She still has a little bit    of numbness in her  fingertips.  It is persistent throughout the cycle.  She also    has some joint pain in her knees and her elbows but it does not limit her     activity.  Her diarrhea has improved on Imodium.            ECOG Performance Status      0            Most Recent Lab Findings      Laboratory Tests      2/19/21 08:43            PAST, FAMILY   Past Medical History      Past Medical History:  Depression, Osteoporosis      Other PMH:        Migraine headaches      Hematology/Oncology (F):  Breast Cancer            Past Surgical History      Biopsy, Cataract Surgery, Hysterectomy (PARTIAL), Skin Cancer Removal            Family History      Family History:  Kidney Cancer (FATHER), Myeloma (FATHER), Skin Cancer (FATHER)            Mother's history is unknown            Social History      Marital Status:        Lives independently:  Yes      Number of Children:  2      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[employer]            Tobacco Use      Tobacco status:  Never smoker      Currently Vaping:  No            Alcohol Use      Alcohol intake:  OCCASIONAL            Substance Use      Substance use:  Denies use            Additional  Information      Additional History:        COLONOSCOPY--2019      HEMOCCULT STOOL--2019      MAMMOGRAM--2020            REVIEW OF SYSTEMS      General:  Admits: Fatigue      Eye:  Denies Blurred Vision, Denies Corrective Lenses, Denies Diplopia, Denies     Vision Changes      ENT:  Denies Headache, Denies Hearing Loss, Denies Hoarseness, Denies Sore     Throat      Cardiovascular:  Denies Chest Pain, Denies Palpitations      Respiratory:  Denies: Cough, Coughing Blood, Productive Cough, Shortness of Air,    Wheezing      Gastrointestinal:  Admits Diarrhea; Denies Bloody Stools, Denies Constipation,     Denies Nausea/Vomiting, Denies Problem Swallowing, Denies Unable to Control     Bowels      Genitourinary:  Denies Blood in Urine, Denies Incontinence, Denies Painful     Urination      Musculoskeletal:  Denies Back  Pain, Denies Muscle Pain; Admits Painful Joints     (KNEES, ELBOWS)      Integumentary:  Denies Itching, Denies Lesions, Denies Rash      Neurologic:  Denies Dizziness; Admits Numbness\Tingling; Denies Seizures      Psychiatric:  Denies Anxiety; Admits Depression      Endocrine:  Denies Cold Intolerance, Denies Heat Intolerance      Hematologic/Lymphatic:  Denies Bruising, Denies Bleeding, Denies Enlarged Lymph     Nodes      Reproductive:  Denies: Menopause, Heavy Periods, Pregnant, Still Menstruating            VITAL SIGNS AND SCORES      Vitals      Weight 124 lbs 12.486 oz / 56.6 kg      Temperature 98.8 F / 37.11 C - Temporal      Pulse 88      Respirations 18      Blood Pressure 119/75 Sitting      Pulse Oximetry 98%, RM AIR            Pain Score      Experiencing any pain?:  No      Pain Scale Used:  Numerical      Pain Intensity:  0            Fatigue Score      Experiencing any fatigue?:  Yes      Fatigue (0-10 scale):  5            EXAM      General: Alert, cooperative, no acute distress, well appearing      Eyes: Anicteric sclera, PERRLA      Respiratory: CTAB, normal respiratory effort      Abdomen: Normal active bowel sounds, no tenderness, no distention      Cardiovascular: RRR, no murmur, no lower extremity edema      Skin: Normal tone, no rash, no lesions      Psychiatric: Appropriate affect, intact judgment      Neurologic: No focal sensory or motor deficits, no weakness, numbness, dizziness      Musculoskeletal: Normal muscle strength and tone      Extremities: No clubbing, cyanosis, or deformities            PREVENTION      Hx Influenza Vaccination:  Yes      Date Influenza Vaccine Given:  Nov 2, 2020      Influenza Vaccine Declined:  No      2 or More Falls in Past Year?:  No      Fall Past Year with Injury?:  No      Hx Pneumococcal Vaccination:  No      Encouraged to follow-up with:  PCP regarding preventative exams.      Chart initiated by      ISHAN HYDE MA            ALLERGY/MEDS       Allergies      Coded Allergies:             NO KNOWN ALLERGIES (Unverified , 2/19/21)            Medications      Last Reconciled on 2/19/21 20:29 by PANCHO LUKE      Potassium Chloride (Potassium Chloride) 10 Meq Capsule.er      20 MEQ PO QDAY, #60 CAP.ER 0 Refills         Prov: PANCHO LUKE         1/29/21       OLANZapine (OLANZapine) 5 Mg Tablet      5 MG PO HS for 30 Days, #30 TAB 1 Refill         Prov: PANCHO LUKE         1/29/21       Potassium Chloride (Potassium Chloride) 10 Meq Capsule.er      20 MEQ PO QDAY, #60 CAP.ER 0 Refills         Prov: PANCHO LUKE         1/8/21       Ondansetron Hcl (ONDANSETRON HCL) 8 Mg Tablet      8 MG PO Q8H PRN for NAUSEA AND/OR VOMITING, #30 TAB 3 Refills         Prov: PANCHO LUKE         1/8/21       Prochlorperazine Maleate (Prochlorperazine Maleate) 10 Mg Tab      10 MG PO Q6H PRN for NAUSEA AND/OR VOMITING, #60 TAB 0 Refills         Prov: PANCHO LUKE         12/17/20       Dexamethasone (Decadron) 4 Mg Tablet      8 MG PO ASDIR, #12 TAB 0 Refills         Prov: PANCHO LUKE         12/17/20       HYDROcodone-Acetaminophen 5-325 Mg (HYDROcodone-Acetaminophen 5-325 Mg) 1 Each     Tablet      2 TAB PO Q6H PRN for PAIN, #15 TAB         Prov: ZACHARY PARDO MD         12/15/20       Topiramate (Topamax*) 50 Mg Tablet      50 MG PO BID, TAB         Reported         12/14/20       Risedronate Sodium (Actonel) 150 Mg Tab      150 MG PO Q30D, #1 TAB         Reported         12/14/20       Propranolol ER (Propranolol ER) 80 Mg Cap.sa.24h      80 MG PO QDAY, CAP         Reported         12/14/20       Prochlorperazine Maleate (Prochlorperazine Maleate) 10 Mg Tab      10 MG PO Q6H PRN for NAUSEA AND/OR VOMITING, #60 TAB 3 Refills         Prov: PANCHO LUKE         12/11/20       Ondansetron Odt (ONDANSETRON ODT) 8 Mg Tab.rapdis      8 MG PO Q8 PRN for NAUSEA AND/OR VOMITING, #30 TAB.RAPDIS 3 Refills         Prov: PANCHO LUKE         12/11/20        Dexamethasone (Decadron) 4 Mg Tablet      8 MG PO ASDIR, #72 TAB 0 Refills         Prov: PANCHO LUKE         12/11/20       QUEtiapine (QUEtiapine) 400 Mg Tablet      200 MG PO HS for 30 Days, #15 TAB         Reported         12/10/20       Calcium Citrate/Vitamin D3 (Calcium Citrate 315 + D) 1 Each Tablet      1 EACH PO BID, #60 TAB         Reported         12/10/20       SUMAtriptan Succinate (Imitrex) 100 Mg Tablet      100 MG PO ASDIR PRN for MIGRAINE, TAB         Reported         12/10/20       diazePAM (diazePAM) 5 Mg Tablet      5 MG PO QDAY PRN for ANXIETY, #60 TAB         Reported         12/10/20       Acetaminophen (ACETAMINOPHEN) 325 Mg Tablet      325 MG PO Q6H PRN for HEADACHE, #100 TAB         Reported         12/10/20       DULoxetine (Cymbalta) 30 Mg Capsule.dr      30 MG PO BID, #60 CAP 0 Refills         Reported         12/10/20       OXcarbazepine (OXcarbazepine) 300 Mg Tab      300 MG PO BID, TAB         Reported         12/10/20      Medications Reviewed:  No Changes made to meds            IMPRESSION/PLAN      Plan      HR positive, HER-2 positive left breast cancer: Patient was initially diagnosed     with DCIS, weakly ER/IL positive.  After breast MRI she was found have a second     lesion.  This appears be 1.2 cm in size and is also weakly ER/IL positive and     HER-2 positive.  The plan will be for 6 cycles of TCHP.  She is here today for     her 4th cycle.her labs are adequate to proceed.  Plan to decrease taxane by 20%     due to neuropathy.  The patient will return the clinic in 3 weeks for her next     cycle.            Neuropathy: Patient developed neuropathy few weeks ago and has persisted.  We     will decrease the dose of her taxane by 20%.            Macrocytic anemia: Secondary to chemotherapy.  Not significantly changed from     the prior cycle.  We will continue to monitor.  No additional dose reduction     needed.            Diarrhea: Chemotherapy induced.  Improved with  the use of imodium             Osteoporosis: Patient is currently being treated with an oral medication by her     primary care provider.  Her recent follow-up DEXA scan was canceled.  We will     plan to do this prior to the start of her aromatase inhibitor therapy.            Genetic risk: Patient has a father with a diagnosis of RCC and smoldering myelo    ma.  Patient's mother's history is unknown.  She is interested in genetic     testing which can be arranged at a later date.            Patient Education      Patient Education Provided:  Yes            Electronically signed by PANCHO LUKE  02/19/2021 20:29       Disclaimer: Converted document may not contain table formatting or lab diagrams. Please see [employer] for the authenticated document.

## 2021-05-28 NOTE — PROGRESS NOTES
Patient: MURPHY SY     Acct: [id number]     Report: #[id number]  UNIT #: [id number]     : 1960    Encounter Date:2021  PRIMARY CARE: GARY TIWARI  ***Signed***  --------------------------------------------------------------------------------------------------------------------  NURSE INTAKE      Visit Type      Established Patient Visit            Chief Complaint      BREAST CA            Referring Provider/Copies To      Primary Care Provider:  [facility]      Copies To:   [facility]            History and Present Illness      Past Oncology Illness History      HR positive, HER-2 positive breast cancer:      -Found on routine screening mammogram      -Diagnostic mammogram on 11/10/2020: Calcifications in the left breast      -11/10/2020 left breast biopsy: Pathology positive for DCIS, high-grade,     estrogen receptor positive (5%, strong), progesterone receptor positive (10%, we    ak-moderate)      -2020 MRI of the breast: 1.8 cm hematoma at the site of the recent biopsy     showing DCIS.  There is a 1.1 cm nonfocal mass enhancement at the superior     lateral margin of the hematoma and a separate 1.2 cm suspicious mass along the     inferior medial margin.  These correspond the masses seen on ultrasound on     10/20/2020      -2020 MRI guided breast biopsy: Left lower inner quadrant pathology     positive for invasive ductal carcinoma, grade 2, estrogen receptor positive (3%,    moderate), progesterone receptor positive (1%, weak), HER-2 positive (3+ by     IHC), Ki-67 approximately 60%.  Associated with high-grade ductal carcinoma in     situ.      -Cycle 1 of TCH P on 2020.  Echocardiogram on 2020 shows an EF of     55%. C3 on 21. C5 on 3/12/21 with TH only. C6 on 21 with Herceptin     only.            HPI - Oncology Interim      The patient comes in today for C6 of chemotherapy. She missed last week because     her Father  .  We discussed her side effects. She really stuggles with throat    pain but this was better after starting a PPI.  She needs a refill but cannot     remember the medication.              She fell last week when stepping over the dog gate and tripping on the dogs.      She landed on her butt but did not injure herself.  She states that her     neuropathy may have contributed to this.            She is overdue for her ECHO but has no signs of CHF.  She is not short of     breath, no cough, no LE edema.            ECOG Performance Status      0            Most Recent Lab Findings      Laboratory Tests      21 08:40            21 09:37            PAST, FAMILY   Past Medical History      Past Medical History:  Depression, Osteoporosis      Other PMH:        Migraine headaches      Hematology/Oncology (F):  Breast Cancer            Past Surgical History      Biopsy, Cataract Surgery, Hysterectomy (PARTIAL), Skin Cancer Removal            Family History      Family History:  Kidney Cancer (FATHER), Myeloma (FATHER), Skin Cancer (FATHER)            Mother's history is unknown            Social History      Marital Status:        Lives independently:  Yes      Number of Children:  2      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[employer]            Tobacco Use      Tobacco status:  Never smoker      Currently Vaping:  No            Alcohol Use      Alcohol intake:  OCCASIONAL            Substance Use      Substance use:  Denies use            Additional  Information      Additional History:        COLONOSCOPY--2019      HEMOCCULT STOOL--2019      MAMMOGRAM--2020            REVIEW OF SYSTEMS      General:  Admits: Fatigue;          Denies: Appetite Change, Fever, Night Sweats, Weight Gain, Weight Loss      Eye:  Denies Blurred Vision, Denies Corrective Lenses, Denies Diplopia, Denies     Vision Changes      ENT:  Denies Headache, Denies Hearing Loss, Denies Hoarseness, Denies Sore     Throat      Cardiovascular:  Denies Chest  Pain, Denies Palpitations      Respiratory:  Denies: Cough, Coughing Blood, Productive Cough, Shortness of Air,    Wheezing      Gastrointestinal:  Denies Bloody Stools, Denies Constipation, Denies Diarrhea,     Denies Nausea/Vomiting, Denies Problem Swallowing, Denies Unable to Control     Bowels      Genitourinary:  Denies Blood in Urine, Denies Incontinence, Denies Painful     Urination      Musculoskeletal:  Denies Back Pain, Denies Muscle Pain, Denies Painful Joints      Integumentary:  Denies Itching, Denies Lesions, Denies Rash      Neurologic:  Denies Dizziness; Admits Numbness\Tingling; Denies Seizures      Psychiatric:  Denies Anxiety, Denies Depression      Endocrine:  Denies Cold Intolerance, Denies Heat Intolerance      Hematologic/Lymphatic:  Denies Bruising, Denies Bleeding, Denies Enlarged Lymph     Nodes      Reproductive:  Denies: Menopause, Heavy Periods, Pregnant, Still Menstruating            VITAL SIGNS AND SCORES      Pain Score      Experiencing any pain?:  No      Pain Intensity:  0            Fatigue Score      Experiencing any fatigue?:  Yes      Fatigue (0-10 scale):  2            PT/OT Functional Screening      No needs identified            Speech Functional Screening      No needs identified            Rehab to be Ordered      Type of Referral to be Ordered:  No needs identified            EXAM      General: Alert, cooperative, no acute distress but appears tired      Eyes: Anicteric sclera, PERRLA      Respiratory: CTAB, normal respiratory effort      Abdomen: Normal active bowel sounds, no tenderness, no distention      Cardiovascular: RRR, no murmur, no lower extremity edema      Skin: Normal tone, no rash, no lesions      Psychiatric: Appropriate affect, intact judgment      Neurologic: No focal sensory or motor deficits, no weakness, numbness, dizziness      Musculoskeletal: Normal muscle strength and tone      Extremities: No clubbing, cyanosis, or deformities             PREVENTION      Hx Influenza Vaccination:  Yes      Date Influenza Vaccine Given:  Nov 2, 2020      Influenza Vaccine Declined:  No      2 or More Falls in Past Year?:  No      Fall Past Year with Injury?:  No      Hx Pneumococcal Vaccination:  No      Encouraged to follow-up with:  PCP regarding preventative exams.      Chart initiated by      MICHAEL THIBODEAUX CMA            ALLERGY/MEDS      Allergies      Coded Allergies:             NO KNOWN ALLERGIES (Unverified , 4/13/21)            Medications      Last Reconciled on 4/13/21 17:40 by PANCHO LUKE      OLANZapine (OLANZapine) 5 Mg Tablet      5 MG PO HS for 30 Days, #30 TAB 2 Refills         Prov: PANCHO LUKE         3/23/21       Docusate Sodium (Colace) 100 Mg Capsule      100 MG PO BID PRN for CONSTIPATION, #60 CAP 1 Refill         Prov: PANCHO LUKE         3/18/21       Mouthwash (Magic Mouthwash) 1 Each Bottle      5 ML PO Q6H PRN for SORE THROAT, #600 ML 3 Refills         Prov: PANCHO LUKE         3/18/21       Potassium Chloride (Klor-Con*) 20 Meq Packet      20 MEQ PO QDAY, #30 PKT         Prov: PANCHO LUKE         3/18/21       predniSONE (Deltasone) 10 Mg Tablet      40 MG PO QDAY for 5 Days, #20 TAB 0 Refills         Prov: PANCHO LUKE         3/16/21       Ondansetron Hcl (ONDANSETRON HCL) 8 Mg Tablet      8 MG PO Q8H PRN for NAUSEA AND/OR VOMITING, #30 TAB 3 Refills         Prov: PANCHO LUKE         3/12/21       Sennosides/Docusate Sod 8.6/50 MG (Senokot-S) 1 Each Tablet      2 TAB PO QDAY PRN for CONSTIPATION, #60 TAB 3 Refills         Prov: PANCHO LUKE         3/12/21       Potassium Chloride (Potassium Chloride) 10 Meq Capsule.er      20 MEQ PO QDAY, #60 CAP.ER 0 Refills         Prov: PANCHO LUKE         1/29/21       Potassium Chloride (Potassium Chloride) 10 Meq Capsule.er      20 MEQ PO QDAY, #60 CAP.ER 0 Refills         Prov: PANCHO LUKE         1/8/21       Dexamethasone (Decadron) 4 Mg Tablet      8 MG PO ASDIR,  #12 TAB 0 Refills         Prov: PANCHO LUKE         12/17/20       HYDROcodone-Acetaminophen 5-325 Mg (HYDROcodone-Acetaminophen 5-325 Mg) 1 Each     Tablet      2 TAB PO Q6H PRN for PAIN, #15 TAB         Prov: ZACHARY PARDO MD         12/15/20       Topiramate (Topamax*) 50 Mg Tablet      50 MG PO BID, TAB         Reported         12/14/20       Risedronate Sodium (Actonel) 150 Mg Tab      150 MG PO Q30D, #1 TAB         Reported         12/14/20       Propranolol ER (Propranolol ER) 80 Mg Cap.sa.24h      80 MG PO QDAY, CAP         Reported         12/14/20       QUEtiapine (QUEtiapine) 400 Mg Tablet      200 MG PO HS for 30 Days, #15 TAB         Reported         12/10/20       Calcium Citrate/Vitamin D3 (Calcium Citrate 315 + D) 1 Each Tablet      1 EACH PO BID, #60 TAB         Reported         12/10/20       diazePAM (diazePAM) 5 Mg Tablet      5 MG PO QDAY PRN for ANXIETY, #60 TAB         Reported         12/10/20       Acetaminophen (ACETAMINOPHEN) 325 Mg Tablet      325 MG PO Q6H PRN for HEADACHE, #100 TAB         Reported         12/10/20       DULoxetine (Cymbalta) 30 Mg Capsule.dr      30 MG PO BID, #60 CAP 0 Refills         Reported         12/10/20       OXcarbazepine (OXcarbazepine) 300 Mg Tab      300 MG PO BID, TAB         Reported         12/10/20            IMPRESSION/PLAN      Diagnosis      Breast cancer - C50.919            High risk medication use - Z79.899            Breast cancer, left - C50.912            Notes      New Diagnostics      * Echo Complete, SCHEDULED PROCEDURE         Dx: Breast cancer - C50.919      * OLIVIA Breast W/WO MRI, As Soon As Possible         Dx: Breast cancer - C50.919            Plan      HR positive, HER-2 positive left breast cancer: Patient was initially diagnosed     with DCIS, weakly ER/WA positive.  After breast MRI she was found have a second     lesion.  This appears be 1.2 cm in size and is also weakly ER/WA positive and     HER-2 positive.  She is here  today for C6 of TCHP but will get Herceptin only     and will proceed to surgery. She had numerous side effects with C4 and 5. I have    discussed her case with Dr. York who will see her in clinic next week. I     have ordered a f/u Breast MRI and ECHO.  She will f/u with me 2-3 weeks after     surgery.             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  Will continue to monitor and     discuss treatment at the next visit if it persists or worsens.             Normocytic anemia: Secondary to chemotherapy and iron deficiency.  Will plan to     give IV iron as soon as possible so that her anemia may improve prior to     surgery. Later we can discuss the need for colonoscopy to rule out GI bleeding.             Osteoporosis: Patient is currently being treated with an oral medication by her     primary care provider. We will plan to do DEXA prior to the start of her     aromatase inhibitor therapy.            Genetic risk: Patient's father with a diagnosis of RCC and smoldering myeloma.      Patient's mother's history is unknown.  She is interested in genetic testing but    this can be done at a later date. It is unlikely to impact her surgical     decision.            Patient Education      Patient Education Provided:  Yes            Electronically signed by PANCHO LUKE  04/13/2021 17:41       Disclaimer: Converted document may not contain table formatting or lab diagrams. Please see [employer] for the authenticated document.

## 2021-05-28 NOTE — PROGRESS NOTES
Patient: MURPHY SY     Acct: [id number]     Report: #[id number]  UNIT #: [id number]     : 1960    Encounter Date:12/10/2020  PRIMARY CARE: GARY TIWARI  ***Signed***  --------------------------------------------------------------------------------------------------------------------  NURSE INTAKE      Visit Type      New Patient Visit            Chief Complaint      BREAST CA            History and Present Illness      Past Oncology Illness History      HR positive, HER-2 positive breast cancer:      -Found on routine screening mammogram      -Diagnostic mammogram on 11/10/2020: Calcifications in the left breast      -11/10/2020 left breast biopsy: Pathology positive for DCIS, high-grade,     estrogen receptor positive (5%, strong), progesterone receptor positive (10%,     weak-moderate)      -2020 MRI of the breast: 1.8 cm hematoma at the site of the recent biopsy     showing DCIS.  There is a 1.1 cm nonfocal mass enhancement at the superior     lateral margin of the hematoma and a separate 1.2 cm suspicious mass along the i    nferior medial margin.  These correspond the masses seen on ultrasound on     10/20/2020      -2020 MRI guided breast biopsy: Left lower inner quadrant pathology     positive for invasive ductal carcinoma, grade 2, estrogen receptor positive (3%,    moderate), progesterone receptor positive (1%, weak), HER-2 positive (3+ by     IHC), Ki-67 approximately 60%.  Associated with high-grade ductal carcinoma in     situ.            ECOG Performance Status      0            PAST, FAMILY   Past Medical History      Past Medical History:  Depression, Osteoporosis      Other PMH:        Migraine headaches      Hematology/Oncology (F):  Breast Cancer            Past Surgical History      Biopsy, Cataract Surgery, Hysterectomy (PARTIAL), Skin Cancer Removal            Family History      Family History:  Kidney Cancer (FATHER), Myeloma (FATHER), Skin Cancer (FATHER)             Mother's history is unknown            Social History      Marital Status:        Lives independently:  Yes      Number of Children:  2      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            Tobacco Use      Tobacco status:  Never smoker      Currently Vaping:  No            Alcohol Use      Alcohol intake:  OCCASIONAL            Substance Use      Substance use:  Denies use            Additional  Information      Additional History:        COLONOSCOPY--2019      HEMOCCULT STOOL--2019      MAMMOGRAM--2020            REVIEW OF SYSTEMS      General:  Denies: Appetite Change, Fatigue, Fever, Night Sweats, Weight Gain,     Weight Loss      Eye:  Admits Corrective Lenses; Denies Blurred Vision, Denies Diplopia, Denies     Vision Changes      ENT:  Admits Headache; Denies Hearing Loss, Denies Hoarseness, Denies Sore     Throat      Cardiovascular:  Denies Chest Pain, Denies Palpitations      Respiratory:  Denies: Cough, Coughing Blood, Productive Cough, Shortness of Air,    Wheezing      Gastrointestinal:  Denies Bloody Stools, Denies Constipation, Denies Diarrhea,     Denies Nausea/Vomiting, Denies Problem Swallowing, Denies Unable to Control     Bowels      Genitourinary:  Denies Blood in Urine, Denies Incontinence, Denies Painful     Urination      Musculoskeletal:  Denies Back Pain, Denies Muscle Pain, Denies Painful Joints      Integumentary:  Denies Itching, Denies Lesions, Denies Rash      Neurologic:  Denies Dizziness, Denies Numbness\Tingling, Denies Seizures      Psychiatric:  Admits Anxiety, Admits Depression      Endocrine:  Denies Cold Intolerance, Denies Heat Intolerance      Hematologic/Lymphatic:  Denies Bruising, Denies Bleeding, Denies Enlarged Lymph     Nodes      Reproductive:  Denies: Menopause, Heavy Periods, Pregnant, Still Menstruating            VITAL SIGNS AND SCORES      Vitals      Height 5 ft 1.00 in / 154.94 cm      Weight 123 lbs 0.267 oz / 55.8 kg      BSA 1.54 m2      BMI 23.2 kg/m2       Temperature 98.1 F / 36.72 C - Temporal      Pulse 63      Respirations 18      Blood Pressure 113/69 Sitting      Pulse Oximetry 98%, RM AIR            Pain Score      Experiencing any pain?:  No      Pain Scale Used:  Numerical      Pain Intensity:  0            Fatigue Score      Experiencing any fatigue?:  No      Fatigue (0-10 scale):  0 (none)            EXAM      General: Alert, cooperative, no acute distress      Eyes: Anicteric sclera, PERRLA      Breast: Small punctate lesion from most recent biopsy, no bruising or associated    masses, no axillary adenopathy, no skin changes or nipple retraction      Respiratory: CTAB, normal respiratory effort      Abdomen: Normal active bowel sounds, no tenderness, no distention      Cardiovascular: RRR, no murmur, no peripheral edema      Skin: Normal tone, no rash, no lesions      Psychiatric: Appropriate affect, intact judgment      Neurologic: No focal sensory or motor deficits, no weakness, numbness, dizziness      Musculoskeletal: Normal muscle strength, normal muscle tone      Extremities: No clubbing, cyanosis, or deformities            PREVENTION      Hx Influenza Vaccination:  Yes      Date Influenza Vaccine Given:  Nov 2, 2020      Influenza Vaccine Declined:  No      2 or More Falls in Past Year?:  No      Fall Past Year with Injury?:  No      Hx Pneumococcal Vaccination:  No      Encouraged to follow-up with:  PCP regarding preventative exams.      Chart initiated by      ISHAN HYDE MA            ALLERGY/MEDS      Allergies      Coded Allergies:             NO KNOWN ALLERGIES (Unverified , 12/10/20)            Medications      Last Reconciled on 12/10/20 17:05 by PANCHO LUKE      QUEtiapine (QUEtiapine) 400 Mg Tablet      400 MG PO HS for 30 Days, #30 TAB         Reported         12/10/20       Propranolol HCl (Propranolol HCl*) 20 Mg Tablet      80 MG PO QDAY, TAB         Reported         12/10/20       Calcium Citrate/Vitamin D3 (Calcium Citrate 315 +  D) 1 Each Tablet      1 EACH PO BID, #60 TAB         Reported         12/10/20       SUMAtriptan Succinate (Imitrex) 100 Mg Tablet      100 MG PO ASDIR PRN for MIGRAINE, TAB         Reported         12/10/20       Topiramate (Topiramate*) 25 Mg Tablet      50 MG PO BID, TAB         Reported         12/10/20       Gabapentin (Gabapentin) 100 Mg Capsule      100 MG PO HS, #30 CAP 0 Refills         Reported         12/10/20       diazePAM (diazePAM) 5 Mg Tablet      5 MG PO QDAY, #60 TAB         Reported         12/10/20       Acetaminophen (ACETAMINOPHEN) 325 Mg Tablet      325 MG PO Q6H PRN for HEADACHE, #100 TAB         Reported         12/10/20       DULoxetine (Cymbalta) 30 Mg Capsule.dr      30 MG PO BID, #60 CAP 0 Refills         Reported         12/10/20       Risedronate Sodium (Risedronate Sodium) 30 Mg Tab      30 MG PO QMONTH, #30 TAB         Prov: PANCHO LUKE         12/10/20       OXcarbazepine (OXcarbazepine) 300 Mg Tab      300 MG PO BID, TAB         Reported         12/10/20      Medications Reviewed:  Changes made to meds            IMPRESSION/PLAN      Diagnosis      High risk medication use - Z79.899            Breast cancer - C50.919            Notes      New Medications      * OXcarbazepine 300 MG TAB: 300 MG PO BID      * Risedronate Sodium 30 MG TAB: 30 MG PO QMONTH #30      * DULoxetine (Cymbalta) 30 MG CAPSULE.DR: 30 MG PO BID #60      * ACETAMINOPHEN 325 MG TABLET: 325 MG PO Q6H PRN HEADACHE #100         Instructions: WITH BUTALBITAL 50 MG      * diazePAM 5 MG TABLET: 5 MG PO QDAY #60      * Gabapentin 100 MG CAPSULE: 100 MG PO HS #30      * Topiramate (Topiramate*) 25 MG TABLET: 50 MG PO BID      * SUMAtriptan Succinate (Imitrex) 100 MG TABLET: 100 MG PO ASDIR PRN MIGRAINE         Instructions: May repeat dose in 2 hours.  MAXIMUM of 200 mg in 24 hours.      * Calcium Citrate/Vitamin D3 (Calcium Citrate 315 + D) 1 EACH TABLET: 1 EACH PO       BID #60      * Propranolol HCl (Propranolol  HCl*) 20 MG TABLET: 80 MG PO QDAY      * QUEtiapine 400 MG TABLET: 400 MG PO HS 30 Days #30      New Diagnostics      * Echo Complete, SCHEDULED PROCEDURE         Dx: High risk medication use - Z79.899      New Referrals      * Genetic Counseling, Routine         [facility] Genetics         Dx: Breast cancer - C50.919            Plan      HR positive, HER-2 positive left breast cancer: Patient was initially diagnosed     with DCIS, weakly ER/KY positive.  After breast MRI she was found have a second     lesion.  This appears be 1.2 cm in size and is also weakly ER/KY positive and     HER-2 positive.  Patient comes in today for establish care.  She was also seen     by Dr. Pelaez.  The plan will be for 6 cycles of TCHP which the patient is     agreeable 2.  She will need an ECHO prior the first cycle of chemotherapy.  She     is already scheduled for a port by Dr. Pelaez.  She will follow-up with me     prior today for cycle of chemotherapy.            Osteoporosis: Patient is currently being treated with an oral medication by her     primary care provider.  Her recent follow-up DEXA scan was canceled.  We will     plan to do this prior to the start of her aromatase inhibitor therapy.            Genetic risk: Patient has a father with a diagnosis of RCC and smoldering     myeloma.  Patient's mother's history is unknown.  She is interested in genetic     testing which can be arranged at a later date.            Patient Education      Patient Education Provided:  Yes            Electronically signed by PANCHO LUKE  12/10/2020 17:05       Disclaimer: Converted document may not contain table formatting or lab diagrams. Please see [employer] for the authenticated document.

## 2021-05-28 NOTE — PROGRESS NOTES
Patient: MURPHY SY     Acct: [id number]     Report: #[id number]  UNIT #: [id number]     : 1960    Encounter Date:2021  PRIMARY CARE: GARY TIWARI  ***Signed***  --------------------------------------------------------------------------------------------------------------------  NURSE INTAKE      Visit Type      Established Patient Visit            Chief Complaint      BREAST CA            Referring Provider/Copies To      Referring Provider:  Andres SMITH      Primary Care Provider:  Andres SMITH      Copies To:   ALEN SMITH the doctors            History and Present Illness      Past Oncology Illness History      HR positive, HER-2 positive breast cancer:      -Found on routine screening mammogram      -Diagnostic mammogram on 11/10/2020: Calcifications in the left breast      -11/10/2020 left breast biopsy: Pathology positive for DCIS, high-grade,     estrogen receptor positive (5%, strong), progesterone receptor positive (10%,     weak-moderate)      -2020 MRI of the breast: 1.8 cm hematoma at the site of the recent biopsy     showing DCIS.  There is a 1.1 cm nonfocal mass enhancement at the superior     lateral margin of the hematoma and a separate 1.2 cm suspicious mass along the     inferior medial margin.  These correspond the masses seen on ultrasound on     10/20/2020      -2020 MRI guided breast biopsy: Left lower inner quadrant pathology     positive for invasive ductal carcinoma, grade 2, estrogen receptor positive (3%,    moderate), progesterone receptor positive (1%, weak), HER-2 positive (3+ by     IHC), Ki-67 approximately 60%.  Associated with high-grade ductal carcinoma in     situ.      -Cycle 1 of TCH P on 2020.  Echocardiogram on 2020 shows an EF of     55%. C3 on 21      -C5 of TCHP on 3/12/21            HPI - Oncology Interim      Patient comes in today for cycle 5 of TCHP.  She says she has really been      struggling with a lot of symptoms lately.  She was recently in the emergency     room with severe sudden onset of vomiting.  She denied any associated headaches     but did develop a sore throat.  She is now having some difficulty swallowing.      She is also had some neuropathy in her fingers but not in her toes.  She has     noticed that her urine has been brown at times.  When asked further details she     admits to possibly not drinking enough fluids.  On review of labs is notable     that her hemoglobin is down to 7.6.  Her potassium is 3.0.            Some of the side effects could be from her first Covid vaccine which was given     on Monday.  After that is when she had the vomiting Tuesday Wednesday night.            ECOG Performance Status      1            Most Recent Lab Findings      Laboratory Tests      3/12/21 08:52            PAST, FAMILY   Past Medical History      Past Medical History:  Depression, Osteoporosis      Other PMH:        Migraine headaches      Hematology/Oncology (F):  Breast Cancer            Past Surgical History      Biopsy, Cataract Surgery, Hysterectomy (PARTIAL), Skin Cancer Removal            Family History      Family History:  Kidney Cancer (FATHER), Myeloma (FATHER), Skin Cancer (FATHER)            Mother's history is unknown            Social History      Marital Status:        Lives independently:  Yes      Number of Children:  2      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            Tobacco Use      Tobacco status:  Never smoker      Currently Vaping:  No            Alcohol Use      Alcohol intake:  OCCASIONAL            Substance Use      Substance use:  Denies use            Additional  Information      Additional History:        COLONOSCOPY--2019      HEMOCCULT STOOL--2019      MAMMOGRAM--2020            REVIEW OF SYSTEMS      General:  Admits: Fatigue, Weight Loss;          Denies: Appetite Change, Fever, Night Sweats, Weight Gain      Eye:  Denies Blurred Vision, Denies  Corrective Lenses, Denies Diplopia, Denies     Vision Changes      ENT:  Denies Headache, Denies Hearing Loss, Denies Hoarseness; Admits Sore     Throat      Other      SORE THROAT - HURTS TO SWALLOW      Cardiovascular:  Denies Chest Pain, Denies Palpitations      Respiratory:  Denies: Cough, Coughing Blood, Productive Cough, Shortness of Air,    Wheezing      Gastrointestinal:  Admits Nausea/Vomiting; Denies Bloody Stools, Denies     Constipation, Denies Diarrhea, Denies Problem Swallowing, Denies Unable to Cont    rol Bowels      Genitourinary:  Denies Blood in Urine, Denies Incontinence, Denies Painful     Urination      Musculoskeletal:  Admits Back Pain, Admits Muscle Pain, Admits Painful Joints      Integumentary:  Denies Itching, Denies Lesions, Denies Rash      Neurologic:  Denies Dizziness; Admits Numbness\Tingling; Denies Seizures      Psychiatric:  Denies Anxiety, Denies Depression      Endocrine:  Denies Cold Intolerance, Denies Heat Intolerance      Hematologic/Lymphatic:  Denies Bruising, Denies Bleeding, Denies Enlarged Lymph     Nodes      Reproductive:  Denies: Menopause, Heavy Periods, Pregnant, Still Menstruating            VITAL SIGNS AND SCORES      Vitals      Height 5 ft 0.98 in / 154.9 cm      Weight 123 lbs 10.849 oz / 56.1 kg      BSA 1.54 m2      BMI 23.4 kg/m2      Temperature 98.0 F / 36.67 C - Temporal      Pulse 109      Respirations 20      Blood Pressure 108/63 Sitting, Left Arm      Pulse Oximetry 96%, RM AIR            Pain Score      Experiencing any pain?:  Yes      Pain Scale Used:  Numerical      Pain Intensity:  5            Fatigue Score      Experiencing any fatigue?:  Yes (8)      Fatigue (0-10 scale):  7            EXAM      General: Alert, cooperative, no acute distress but appears very fatigued and     thinner      Eyes: Anicteric sclera, PERRLA      Respiratory: CTAB, normal respiratory effort      Abdomen: Normal active bowel sounds, no tenderness, no distention       Cardiovascular: RRR, no murmur, no lower extremity edema      Skin: Normal tone, no rash, no lesions      Psychiatric: Appropriate affect, intact judgment      Neurologic: No focal sensory or motor deficits, no weakness, numbness, dizziness      Musculoskeletal: Normal muscle strength and tone      Extremities: No clubbing, cyanosis, or deformities            PREVENTION      Hx Influenza Vaccination:  Yes      Date Influenza Vaccine Given:  Nov 2, 2020      Influenza Vaccine Declined:  No      2 or More Falls in Past Year?:  No      Fall Past Year with Injury?:  No      Hx Pneumococcal Vaccination:  No      Encouraged to follow-up with:  PCP regarding preventative exams.      Chart initiated by      CHENTE MAIN MA            ALLERGY/MEDS      Allergies      Coded Allergies:             NO KNOWN ALLERGIES (Unverified , 3/12/21)            Medications      Last Reconciled on 5/10/21 18:44 by PANCHO LUKE      Prochlorperazine Maleate (Prochlorperazine Maleate) 10 Mg Tab      10 MG PO Q6H PRN for NAUSEA AND/OR VOMITING, #60 TAB         Reported         5/6/21       Zolpidem Tartrate (Ambien) 10 Mg Tablet      10 MG PO HS, #30 TAB 0 Refills         Reported         5/6/21       Famotidine (Famotidine) 20 Mg Tablet      20 MG PO BID for 30 Days, #60 TAB 1 Refill         Prov: PANCHO LUKE         4/14/21       Pantoprazole (Protonix) 40 Mg Tablet.dr      40 MG PO QDAY@07, #30 TAB 1 Refill         Prov: PANCHO LUKE         4/14/21       Topiramate (Topamax*) 50 Mg Tablet      50 MG PO BID, TAB         Reported         12/14/20       Propranolol ER (Propranolol ER) 80 Mg Cap.sa.24h      80 MG PO QDAY, CAP         Reported         12/14/20       QUEtiapine (QUEtiapine) 400 Mg Tablet      200 MG PO HS for 30 Days, #15 TAB         Reported         12/10/20       Calcium Citrate/Vitamin D3 (Calcium Citrate 315 + D) 1 Each Tablet      1 EACH PO BID, #60 TAB         Reported         12/10/20       diazePAM (diazePAM) 5  Mg Tablet      5 MG PO HS PRN for ANXIETY, #60 TAB         Reported         12/10/20       Acetaminophen (ACETAMINOPHEN) 325 Mg Tablet      325 MG PO Q6H PRN for HEADACHE, #100 TAB         Reported         12/10/20       DULoxetine (Cymbalta) 30 Mg Capsule.dr      30 MG PO BID, #60 CAP 0 Refills         Reported         12/10/20       OXcarbazepine (OXcarbazepine) 300 Mg Tab      300 MG PO BID, TAB         Reported         12/10/20      Medications Reviewed:  No Changes made to meds            IMPRESSION/PLAN      Diagnosis      Breast cancer - C50.919            Anemia - D64.9            Difficulty urinating - R39.198            Headache - R51.9            Transfusion reaction - T80.92XA            Notes      New Medications      * Sennosides/Docusate Sod 8.6/50 MG (Senokot-S) 1 EACH TABLET: 2 TAB PO QDAY PRN      CONSTIPATION #60         Instructions: 1 to 2 tab once daily for constipation. May take twice daily if       no relief)      * ONDANSETRON HCL 8 MG TABLET: 8 MG PO Q8H PRN NAUSEA AND/OR VOMITING #30      * predniSONE (Deltasone) 10 MG TABLET: 40 MG PO QDAY 5 Days #20      * Potassium Chloride (Klor-Con*) 20 MEQ PACKET: 20 MEQ PO QDAY #30         Instructions: ADM W/ 8 OZ WATER      * MOUTHWASH (Magic Mouthwash) 1 EACH BOTTLE: 5 ML PO Q6H PRN SORE THROAT #600         Instructions: THIS COMPOUND CONTAINS 1/4 NYSTATIN 1/4 BENADRYL 1/4 MAALOX 1/4       VISCOUS LIDOCAINE      * Docusate Sodium (Colace) 100 MG CAPSULE: 100 MG PO BID PRN CONSTIPATION #60      Renewed Medications      * OLANZapine 5 MG TABLET: 5 MG PO HS 30 Days #30      Discontinued Medications      * Prochlorperazine Maleate 10 MG TAB: 10 MG PO Q6H PRN NAUSEA AND/OR VOMITING       #60      * ONDANSETRON HCL 8 MG TABLET: 8 MG PO Q8H PRN NAUSEA AND/OR VOMITING #30      New Diagnostics      * CBC, 03/18/21         Dx: Breast cancer - C50.919      * Urinalyis W/Micro, Routine         Dx: Anemia - D64.9      * Brain W/WO Cont MRI, SCHEDULED  PROCEDURE         Dx: Breast cancer - C50.919      * CBC With Auto Diff, 03/24/21         Dx: Anemia - D64.9      * Comp Metabolic Panel, 03/24/21         Dx: Anemia - D64.9      * Hemoglobin And Hemat, Routine         Dx: Transfusion reaction - T80.92XA      * Blood Culture, Routine         Dx: Transfusion reaction - T80.92XA      * Urinalysis, Routine         Dx: Transfusion reaction - T80.92XA            Plan      HR positive, HER-2 positive left breast cancer: Patient was initially diagnosed     with DCIS, weakly ER/WI positive.  After breast MRI she was found have a second     lesion.  This appears be 1.2 cm in size and is also weakly ER/WI positive and     HER-2 positive.  She is here for  of [facility]. The taxane was reduced by 20% due     to neuropathy.  I reviewed her labs and they are adequate for treatment today.      The patient will return the clinic in 3 weeks for her next cycle.            Chemotherapy-induced neuropathy: Patient developed neuropathy in her fingers     which is interfering with typing.  I Decrease the dose of her taxane by 20%. She    does not want gabpentin at this time. Will continue to monitor.             Macrocytic anemia: Secondary to chemotherapy. Significantly decreased from the     prior cycle and is now 7.6.  I added haptoglobin and LDH to today's labs to     ensrue there is not hemolysis. She will get treatment today and I will have her     RTC in 1 week for repeat CBC and type and cross for blood if needed.             Diarrhea: Chemotherapy induced.  Improved with the use of imodium             Chemotherapy associated nausea: Patient is on quetiapine instead of my usual     olanzapine.  I am unsure why she is on this medication.  I will refill her     Zofran ODT.            Osteoporosis: Patient is currently being treated with an oral medication by her     primary care provider.  Her recent follow-up DEXA scan was canceled.  We will     plan to do this prior to the start of  her aromatase inhibitor therapy.            Dark urine: I suspect this is secondary to dehydration.  However I am adding     haptoglobin and LDH to ensure there is no significant hemolysis.  We will add 1     L of IV fluids to patient's treatment today.            Patient Education      Patient Education Provided:  Yes            Electronically signed by PANCHO LUKE  05/10/2021 18:45       Disclaimer: Converted document may not contain table formatting or lab diagrams. Please see [employer] for the authenticated document.

## 2021-05-28 NOTE — PROGRESS NOTES
Patient: MURPHY SY     Acct: [id number]     Report: #[id number]  UNIT #: [id number]     : 1960    Encounter Date:2021  PRIMARY CARE: GARY TIWARI  ***Signed***  --------------------------------------------------------------------------------------------------------------------  NURSE INTAKE      Visit Type      Established Patient Visit            Chief Complaint      BREAST CA            Referring Provider/Copies To      Primary Care Provider:  [facility]      Copies To:   [facility]            History and Present Illness      Past Oncology Illness History      HR positive, HER-2 positive breast cancer:      -Found on routine screening mammogram      -Diagnostic mammogram on 11/10/2020: Calcifications in the left breast      -11/10/2020 left breast biopsy: Pathology positive for DCIS, high-grade,     estrogen receptor positive (5%, strong), progesterone receptor positive (10%,     weak-moderate)      -2020 MRI of the breast: 1.8 cm hematoma at the site of the recent biopsy     showing DCIS.  There is a 1.1 cm nonfocal mass enhancement at the superior     lateral margin of the hematoma and a separate 1.2 cm suspicious mass along the     inferior medial margin.  These correspond the masses seen on ultrasound on 10/2          -2020 MRI guided breast biopsy: Left lower inner quadrant pathology     positive for invasive ductal carcinoma, grade 2, estrogen receptor positive (3%,    moderate), progesterone receptor positive (1%, weak), HER-2 positive (3+ by     I[facility]), Ki-67 approximately 60%.  Associated with high-grade ductal carcinoma in     situ.      -Cycle 1 of TCH P on 2020.  Echocardiogram on 2020 shows an EF of     55%      - Cycle 2 on 21            HPI - Oncology Interim      Patient comes in today for C2 of TCHP. She has been having diarrhea 3-4 times a     day and has lost 4lbs.  She is also having joint pain that wakes her  up at     night.  She denies fever or vomiting.  She has been taking Tylenol as needed and    Imodium about twice daily.            ECOG Performance Status      0            PAST, FAMILY   Past Medical History      Past Medical History:  Depression, Osteoporosis      Other PMH:        Migraine headaches      Hematology/Oncology (F):  Breast Cancer            Past Surgical History      Biopsy, Cataract Surgery, Hysterectomy (PARTIAL), Skin Cancer Removal            Family History      Family History:  Kidney Cancer (FATHER), Myeloma (FATHER), Skin Cancer (FATHER)            Mother's history is unknown            Social History      Marital Status:        Lives independently:  Yes      Number of Children:  2      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            Tobacco Use      Tobacco status:  Never smoker      Currently Vaping:  No            Alcohol Use      Alcohol intake:  OCCASIONAL            Substance Use      Substance use:  Denies use            Additional  Information      Additional History:        COLONOSCOPY--2019      HEMOCCULT STOOL--2019      MAMMOGRAM--2020            REVIEW OF SYSTEMS      General:  Admits: Appetite Change (NO APPETITE), Fatigue, Weight Loss;          Denies: Fever, Night Sweats, Weight Gain      Eye:  Denies Blurred Vision, Denies Corrective Lenses, Denies Diplopia, Denies     Vision Changes      ENT:  Denies Headache, Denies Hearing Loss, Denies Hoarseness, Denies Sore     Throat      Cardiovascular:  Denies Chest Pain, Denies Palpitations      Respiratory:  Denies: Cough, Coughing Blood, Productive Cough, Shortness of Air,    Wheezing      Gastrointestinal:  Admits Diarrhea (WATERY); Denies Bloody Stools, Denies     Constipation, Denies Nausea/Vomiting, Denies Problem Swallowing, Denies Unable     to Control Bowels      Genitourinary:  Denies Blood in Urine, Denies Incontinence, Denies Painful     Urination      Musculoskeletal:  Denies Back Pain, Denies Muscle Pain; Admits Painful  Joints      Integumentary:  Denies Itching, Denies Lesions, Denies Rash      Neurologic:  Denies Dizziness, Denies Numbness\Tingling, Denies Seizures      Psychiatric:  Denies Anxiety; Admits Depression      Endocrine:  Denies Cold Intolerance, Denies Heat Intolerance      Hematologic/Lymphatic:  Denies Bruising, Denies Bleeding, Denies Enlarged Lymph     Nodes      Reproductive:  Denies: Menopause, Heavy Periods, Pregnant, Still Menstruating            VITAL SIGNS AND SCORES      Vitals      Weight 121 lbs 4.048 oz / 55.0 kg      Temperature 98.5 F / 36.94 C - Temporal      Pulse 82      Respirations 16      Blood Pressure 110/80 Sitting      Pulse Oximetry 100%, RM AIR            Pain Score      Experiencing any pain?:  No      Pain Scale Used:  Numerical      Pain Intensity:  0            Fatigue Score      Experiencing any fatigue?:  Yes      Fatigue (0-10 scale):  8            EXAM      General: Alert, cooperative, no acute distress but thinner and more fatigued.       Eyes: Anicteric sclera, PERRLA      Respiratory: CTAB, normal respiratory effort      Abdomen: Normal active bowel sounds, no tenderness, no distention      Cardiovascular: RRR, no murmur, no lower extremity edema      Skin: Normal tone, no rash, no lesions      Psychiatric: Appropriate affect, intact judgment      Neurologic: No focal sensory or motor deficits, no weakness, numbness, dizziness      Musculoskeletal: Normal muscle strength and tone      Extremities: No clubbing, cyanosis, or deformities            PREVENTION      Hx Influenza Vaccination:  Yes      Date Influenza Vaccine Given:  Nov 2, 2020      Influenza Vaccine Declined:  No      2 or More Falls in Past Year?:  No      Fall Past Year with Injury?:  No      Hx Pneumococcal Vaccination:  No      Encouraged to follow-up with:  PCP regarding preventative exams.      Chart initiated by      ISHAN HYDE MA            ALLERGY/MEDS      Allergies      Coded Allergies:             NO KNOWN  ALLERGIES (Unverified , 1/8/21)            Medications      Last Reconciled on 2/4/21 00:28 by PANCHO LUKE      Potassium Chloride (Potassium Chloride) 10 Meq Capsule.er      20 MEQ PO QDAY, #60 CAP.ER 0 Refills         Prov: PANCHO LUKE         1/29/21       OLANZapine (OLANZapine) 5 Mg Tablet      5 MG PO HS for 30 Days, #30 TAB 1 Refill         Prov: PANCHO LUKE         1/29/21       Potassium Chloride (Potassium Chloride) 10 Meq Capsule.er      20 MEQ PO QDAY, #60 CAP.ER 0 Refills         Prov: PANCHO LUKE         1/8/21       Ondansetron Hcl (ONDANSETRON HCL) 8 Mg Tablet      8 MG PO Q8H PRN for NAUSEA AND/OR VOMITING, #30 TAB 3 Refills         Prov: PANCHO LUKE         1/8/21       Prochlorperazine Maleate (Prochlorperazine Maleate) 10 Mg Tab      10 MG PO Q6H PRN for NAUSEA AND/OR VOMITING, #60 TAB 0 Refills         Prov: PANCHO LUKE         12/17/20       Dexamethasone (Decadron) 4 Mg Tablet      8 MG PO ASDIR, #12 TAB 0 Refills         Prov: PANCHO LUKE         12/17/20       HYDROcodone-Acetaminophen 5-325 Mg (HYDROcodone-Acetaminophen 5-325 Mg) 1 Each     Tablet      2 TAB PO Q6H PRN for PAIN, #15 TAB         Prov: ZACHARY PARDO MD         12/15/20       Topiramate (Topamax*) 50 Mg Tablet      50 MG PO BID, TAB         Reported         12/14/20       Risedronate Sodium (Actonel) 150 Mg Tab      150 MG PO Q30D, #1 TAB         Reported         12/14/20       Propranolol ER (Propranolol ER) 80 Mg Cap.sa.24h      80 MG PO QDAY, CAP         Reported         12/14/20       Prochlorperazine Maleate (Prochlorperazine Maleate) 10 Mg Tab      10 MG PO Q6H PRN for NAUSEA AND/OR VOMITING, #60 TAB 3 Refills         Prov: PANCHO LUKE         12/11/20       Ondansetron Odt (ONDANSETRON ODT) 8 Mg Tab.rapdis      8 MG PO Q8 PRN for NAUSEA AND/OR VOMITING, #30 TAB.RAPDIS 3 Refills         Prov: PANCHO LUKE         12/11/20       Dexamethasone (Decadron) 4 Mg Tablet      8 MG PO ASDIR, #72  TAB 0 Refills         Prov: PANCHO LUKE         12/11/20       QUEtiapine (QUEtiapine) 400 Mg Tablet      200 MG PO HS for 30 Days, #15 TAB         Reported         12/10/20       Calcium Citrate/Vitamin D3 (Calcium Citrate 315 + D) 1 Each Tablet      1 EACH PO BID, #60 TAB         Reported         12/10/20       SUMAtriptan Succinate (Imitrex) 100 Mg Tablet      100 MG PO ASDIR PRN for MIGRAINE, TAB         Reported         12/10/20       diazePAM (diazePAM) 5 Mg Tablet      5 MG PO QDAY PRN for ANXIETY, #60 TAB         Reported         12/10/20       Acetaminophen (ACETAMINOPHEN) 325 Mg Tablet      325 MG PO Q6H PRN for HEADACHE, #100 TAB         Reported         12/10/20       DULoxetine (Cymbalta) 30 Mg Capsule.dr      30 MG PO BID, #60 CAP 0 Refills         Reported         12/10/20       OXcarbazepine (OXcarbazepine) 300 Mg Tab      300 MG PO BID, TAB         Reported         12/10/20      Medications Reviewed:  No Changes made to meds            IMPRESSION/PLAN      Diagnosis      Breast cancer - C50.919            Dehydration - E86.0            Notes      New Medications      * ONDANSETRON HCL 8 MG TABLET: 8 MG PO Q8H PRN NAUSEA AND/OR VOMITING #30      * Potassium Chloride 10 MEQ CAPSULE.ER: 20 MEQ PO QDAY #60         Dx: Breast cancer - C50.919      Discontinued Medications      * ONDANSETRON ODT 8 MG TAB.RAPDIS: 8 MG PO Q8 PRN NAUSEA AND/OR VOMITING #30      New Diagnostics      * CCC Magnesium Serum, Routine         Dx: Breast cancer - C50.919            Plan      HR positive, HER-2 positive left breast cancer: Patient was initially diagnosed     with DCIS, weakly ER/ID positive.  After breast MRI she was found have a second     lesion.  This appears be 1.2 cm in size and is also weakly ER/ID positive and     HER-2 positive.  The plan will be for 6 cycles of TCHP.  She is here today for     her second cycle. The first cycle was complicated by diarrhea. Echocardiograms     shows a normal EF. Labs have  been reviewed. The patient will return the clinic     in 3 weeks for her next cycle.            Hypokalemia: secondary to diarrhea.  Pt will take 20meq of KCl daily for two     weeks or until diarrhea resolves.             Diarrhea: Chemotherapy induced. Recommend increasing imodium             Osteoporosis: Patient is currently being treated with an oral medication by her     primary care provider.  Her recent follow-up DEXA scan was canceled.  We will     plan to do this prior to the start of her aromatase inhibitor therapy.            Genetic risk: Patient has a father with a diagnosis of RCC and smoldering     myeloma.  Patient's mother's history is unknown.  She is interested in genetic     testing which can be arranged at a later date.            Patient Education      Patient Education Provided:  Yes            Electronically signed by PANCHO LUKE  02/04/2021 00:28       Disclaimer: Converted document may not contain table formatting or lab diagrams. Please see [employer] for the authenticated document.

## 2021-06-01 ENCOUNTER — TRANSCRIBE ORDERS (OUTPATIENT)
Dept: OCCUPATIONAL THERAPY | Facility: HOSPITAL | Age: 61
End: 2021-06-01

## 2021-06-01 ENCOUNTER — OFFICE VISIT CONVERTED (OUTPATIENT)
Dept: RADIATION ONCOLOGY | Facility: HOSPITAL | Age: 61
End: 2021-06-01
Attending: INTERNAL MEDICINE

## 2021-06-01 ENCOUNTER — HOSPITAL ENCOUNTER (OUTPATIENT)
Dept: OTHER | Facility: HOSPITAL | Age: 61
Discharge: HOME | End: 2021-06-01
Attending: INTERNAL MEDICINE

## 2021-06-01 DIAGNOSIS — C50.912 MALIGNANT NEOPLASM OF LEFT FEMALE BREAST, UNSPECIFIED ESTROGEN RECEPTOR STATUS, UNSPECIFIED SITE OF BREAST (HCC): Primary | ICD-10-CM

## 2021-06-01 DIAGNOSIS — I97.2 POSTMASTECTOMY LYMPHEDEMA SYNDROME: ICD-10-CM

## 2021-06-01 DIAGNOSIS — L90.5 SCAR CONDITION AND FIBROSIS OF SKIN: ICD-10-CM

## 2021-06-01 DIAGNOSIS — R52 PAIN: ICD-10-CM

## 2021-06-01 DIAGNOSIS — Z91.89 AT RISK FOR LYMPHEDEMA: ICD-10-CM

## 2021-06-01 LAB
ALBUMIN SERPL-MCNC: 4.1 G/DL (ref 3.5–5)
ALBUMIN/GLOB SERPL: 1.6 {RATIO} (ref 1.4–2.6)
ALP SERPL-CCNC: 124 U/L (ref 53–141)
ALT SERPL-CCNC: 8 U/L (ref 10–40)
ANION GAP SERPL CALC-SCNC: 15 MMOL/L (ref 8–19)
AST SERPL-CCNC: 16 U/L (ref 15–50)
BASOPHILS # BLD AUTO: 0.05 10*3/UL (ref 0–0.2)
BASOPHILS NFR BLD AUTO: 0.8 % (ref 0–3)
BILIRUB SERPL-MCNC: 0.19 MG/DL (ref 0.2–1.3)
BUN SERPL-MCNC: 29 MG/DL (ref 5–25)
BUN/CREAT SERPL: 24 {RATIO} (ref 6–20)
CALCIUM SERPL-MCNC: 8.9 MG/DL (ref 8.7–10.4)
CHLORIDE SERPL-SCNC: 103 MMOL/L (ref 99–111)
CONV ABS IMM GRAN: 0.02 10*3/UL (ref 0–0.2)
CONV CO2: 22 MMOL/L (ref 22–32)
CONV IMMATURE GRAN: 0.3 % (ref 0–1.8)
CONV TOTAL PROTEIN: 6.6 G/DL (ref 6.3–8.2)
CREAT UR-MCNC: 1.19 MG/DL (ref 0.5–0.9)
DEPRECATED RDW RBC AUTO: 54.4 FL (ref 36.4–46.3)
EOSINOPHIL # BLD AUTO: 0.29 10*3/UL (ref 0–0.7)
EOSINOPHIL # BLD AUTO: 4.5 % (ref 0–7)
ERYTHROCYTE [DISTWIDTH] IN BLOOD BY AUTOMATED COUNT: 15.9 % (ref 11.7–14.4)
GFR SERPLBLD BASED ON 1.73 SQ M-ARVRAT: 50 ML/MIN/{1.73_M2}
GLOBULIN UR ELPH-MCNC: 2.5 G/DL (ref 2–3.5)
GLUCOSE SERPL-MCNC: 101 MG/DL (ref 65–99)
HCT VFR BLD AUTO: 35.2 % (ref 37–47)
HGB BLD-MCNC: 11.5 G/DL (ref 12–16)
LYMPHOCYTES # BLD AUTO: 1.74 10*3/UL (ref 1–5)
LYMPHOCYTES NFR BLD AUTO: 27.1 % (ref 20–45)
MCH RBC QN AUTO: 30.6 PG (ref 27–31)
MCHC RBC AUTO-ENTMCNC: 32.7 G/DL (ref 33–37)
MCV RBC AUTO: 93.6 FL (ref 81–99)
MONOCYTES # BLD AUTO: 0.84 10*3/UL (ref 0.2–1.2)
MONOCYTES NFR BLD AUTO: 13.1 % (ref 3–10)
NEUTROPHILS # BLD AUTO: 3.48 10*3/UL (ref 2–8)
NEUTROPHILS NFR BLD AUTO: 54.2 % (ref 30–85)
NRBC CBCN: 0 % (ref 0–0.7)
OSMOLALITY SERPL CALC.SUM OF ELEC: 288 MOSM/KG (ref 273–304)
PLATELET # BLD AUTO: 291 10*3/UL (ref 130–400)
PMV BLD AUTO: 9.6 FL (ref 9.4–12.3)
POTASSIUM SERPL-SCNC: 4.1 MMOL/L (ref 3.5–5.3)
RBC # BLD AUTO: 3.76 10*6/UL (ref 4.2–5.4)
SODIUM SERPL-SCNC: 136 MMOL/L (ref 135–147)
WBC # BLD AUTO: 6.42 10*3/UL (ref 4.8–10.8)

## 2021-06-03 NOTE — PROCEDURES
Patient: MURPHY SY     Acct: [id number]     Report: #[id number]  MR #:  [id number]     DOS: 2021 1201     : 1960  DICTATING: LIZBETH YORK MD  ***Signed***  --------------------------------------------------------------------------------------------------------------------  Post Procedure/Operative Note      Date       21            Pre-Operative Diagnosis:      Breast cancer left            Post-Operative Diagnosis:      Same as pre-op diagnosis            Surgeon/Assistants      Surgeon      Lizbeth York      Assistants      Melissa Xiong            Anesthesia      General            Procedure Performed/Technique      Left needle localized lumpectomy with sentinel node identification and biopsy            Specimen/Tissue Removed:      Yes            lumpectomy      nodes            Findings:      None            Complications:      None            Estimated Blood Loss:      Yes      20            Procedure:      The risks and benefits and treatment options were discussed with her.  After     informed consent was obtained, she was taken to the OR and placed supine on      the table.  Earlier this AM she had a wire placed and she had also been injected    for a sentinel node.  We began with axillary portion of the procedure.  A small     incision was made in the axilla and this probe was used to identify the hottest     nodes.  After the clavipectoral fascia had been incised, one node had a count of    600 and the second node had a count of 200.  The remainder of the bed had a     count of less than 10% of the highest number.  The wound was copiously irrigated    with electrocautery and clips were used to achieve hemostasis.  The deeper     layers were closed with 2-0 Vicryl and the skin was closed with running sutures.          We then moved to the breast portion of the procedure.  An incision was made     around the wires and then skin flaps were raised anteriorly  and the tissue was     excised circumferentially around the area.  The specimen was removed, marked for    orientation, and handed off as a specimen.   The wound bed was irrigated.      Hemostasis was achieved with electrocautery and clips.  The deeper layers were     closed with 2-0 Vicryl, the skin was closed with a 4.0 subcuticular stitch.  The    specimen had been sent to radiology for evaluation and they did call back and     confirm that we had the the wires, the clips, the abnormal appearing tissue and     a good margin of normal tissue around it.            ZACHARY PARDO MD          May 11, 2021 12:01      Electronically signed by ZACHARY PARDO MD  05/11/2021 12:01     Disclaimer: Converted hospital document may not contain table formatting or lab diagrams. Please see [employer] for authenticated document.

## 2021-06-04 ENCOUNTER — HOSPITAL ENCOUNTER (OUTPATIENT)
Dept: RADIATION ONCOLOGY | Facility: HOSPITAL | Age: 61
Setting detail: RADIATION/ONCOLOGY SERIES
End: 2021-06-04

## 2021-06-05 NOTE — H&P
History and Physical      Patient Name: Ragini Dozier   Patient ID: [id number]   Sex: Female   YOB: 1960    Primary Care Provider: Aure DOMINGUEZ    Visit Date: May 19, 2021    Provider: Lizbeth York MD   Location: [facility]   Location Address: [address] [place] [address]  [id number]   Location Phone: [phone]          Chief Complaint  · Follow Up Surgery      History Of Present Illness  Ragini Dozier is a 61 year old /White female who is here today for follow up of: left breast lumpectomy with sentinel node.   She is doing well-status post surgery. Pathology was negative nodes and complete pathologic response in breast.       Past Medical History  Anxiety; Cataract; Depression; Migraine; Psychiatric Care         Past Surgical History  Cataract surgery; Hysterectomy; Tubal ligation         Medication List  Morganza Thyroid 15 mg oral tablet; buspirone 15 mg oral tablet; butalbital-acetaminophen-caff -40 mg oral tablet; Calcium 500 + D 500 mg(1,250mg) -200 unit oral tablet; diazepam 5 mg oral tablet; doxepin 10 mg oral capsule; gabapentin 100 mg oral capsule; propranolol 80 mg oral tablet; risedronate 150 mg oral tablet; sumatriptan succinate 100 mg oral tablet; topiramate 50 mg oral tablet; venlafaxine 150 mg oral tablet extended release 24hr; zolpidem 10 mg oral tablet         Allergy List  NO KNOWN DRUG ALLERGIES       Allergies Reconciled  Family Medical History  Heart Disease; Diabetes Mellitus, Type II         Social History  Alcohol; Tobacco (Never)         Review of Systems  · Constitutional  o Denies  o : fever, chills  · Eyes  o Denies  o : yellowish discoloration of the eyes, eye pain  · HENT  o Denies  o : difficulty swallowing, hoarseness  · Breasts  o * See HPI  · Cardiovascular  o Denies  o : chest pain on exertion, irregular heart beats  · Respiratory  o Denies  o : shortness of breath, cough  · Gastrointestinal  o Denies  o :  nausea, vomiting, diarrhea, constipation  · Integument  o Admits  o : see HPI for surgical incision healing  · Neurologic  o Denies  o : tingling or numbness, loss of balance  · Musculoskeletal  o Denies  o : joint pain, back pain  · Endocrine  o Denies  o : weight gain, weight loss  · All Others Negative      Vitals  Date Time BP Position Site L\R Cuff Size HR RR TEMP (F) WT  HT  BMI kg/m2 BSA m2 O2 Sat FR L/min FiO2 HC       05/19/2021 11:46 AM       14  121lbs 6oz 5'   23.7 1.53             Physical Examination  · Constitutional  o Appearance  o : well developed/well nourished patient in no apparent distress  · Respiratory  o Inspection of Chest  o : equal breaths bilaterally  · Gastrointestinal  o Abdominal Examination  o : soft/nontender, nondistended, no organomegaly appreciated  · Post Surgical Incision  o Surgical wound  o : healing well, no erythema          Assessment  · Postoperative Exam Following Surgery     V67.00/Z09      Plan  · Orders  o RADIATION ONCOLOGY CONSULT (RADON) - V67.00/Z09 - 05/26/2021  · Medications  o Medications have been Reconciled  o Transition of Care or Provider Policy  · Instructions  o Return to office with any issues.  o Keep apt with dr munguia and will refer to rad onc.  o F/U in 4 weeks.            Electronically Signed by: Lizbeth York MD -Author on May 19, 2021 12:00:18 PM

## 2021-06-06 VITALS
BODY MASS INDEX: 22.69 KG/M2 | OXYGEN SATURATION: 98 % | RESPIRATION RATE: 18 BRPM | DIASTOLIC BLOOD PRESSURE: 52 MMHG | WEIGHT: 116.18 LBS | HEART RATE: 73 BPM | TEMPERATURE: 96.8 F | SYSTOLIC BLOOD PRESSURE: 87 MMHG

## 2021-06-06 NOTE — PROGRESS NOTES
Patient: MURPHY SY     Acct: [id number]     Report: #[id number]  UNIT #: [id number]     : 1960    Encounter Date:2021  PRIMARY CARE: GARY TIWARI  ***Signed***  --------------------------------------------------------------------------------------------------------------------  NURSE INTAKE      Visit Type      Established Patient Visit            Chief Complaint      BREAST CA            Referring Provider/Copies To      Referring Provider:  ALEN SMITH      Primary Care Provider:  ALEN SMITH      Copies To:   ALEN SMITH            History and Present Illness      Past Oncology Illness History      HR positive, HER-2 positive breast cancer:      -Found on routine screening mammogram      -Diagnostic mammogram on 11/10/2020: Calcifications in the left breast      -11/10/2020 left breast biopsy: Pathology positive for DCIS, high-grade,     estrogen receptor positive (5%, strong), progesterone receptor positive (10%,     weak-moderate)      -2020 MRI of the breast: 1.8 cm hematoma at the site of the recent biopsy     showing DCIS.  There is a 1.1 cm nonfocal mass enhancement at the superior     lateral margin of the hematoma and a separate 1.2 cm suspicious mass along the     inferior medial margin.  These correspond the masses seen on ultrasound on     10/20/2020      -2020 MRI guided breast biopsy: Left lower inner quadrant pathology     positive for invasive ductal carcinoma, grade 2, estrogen receptor positive (3%,    moderate), progesterone receptor positive (1%, weak), HER-2 positive (3+ by     IHC), Ki-67 approximately 60%.  Associated with high-grade ductal carcinoma in     situ.      -Cycle 1 of TCH P on 2020.  Echocardiogram on 2020 shows an EF of     55%. C3 on 21      -C5 of TCHP on 3/12/21. Held C6 due to side effects.      -Left lumpectomy on 2021: No residual invasive carcinoma found, 0/8 lymph     nodes involved, [id number]            HPI -  Oncology Interim      The patient comes in today discuss the plan of care after her lumpectomy.      Fortunately this showed no evidence of residual disease.  She states that     surgery and possibly little bit before she has had a very sore throat.  She     continues to take the proton pump inhibitor.  She feels like food is getting     stuck in her throat and sometimes causes her to vomit.  This is true with     swallowing any solid foods regardless of whether it is very soft or more to me     like meat.  She does not have any difficulty swallowing water.  She is losing     weight due to her decreased intake.  She is sick of Ensure and other     supplemental drinks.  We discussed switching to fruit smoothies and adding     protein powder and other nutrition.            ECOG Performance Status      1            PAST, FAMILY   Past Medical History      Past Medical History:  Depression, Osteoporosis      Other PMH:        Migraine headaches      Hematology/Oncology (F):  Breast Cancer            Past Surgical History      Biopsy, Cataract Surgery, Hysterectomy (PARTIAL), Skin Cancer Removal            Family History      Family History:  Kidney Cancer (FATHER), Myeloma (FATHER), Skin Cancer (FATHER)            Mother's history is unknown            Social History      Marital Status:        Lives independently:  Yes      Number of Children:  2      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            Tobacco Use      Tobacco status:  Never smoker      Currently Vaping:  No            Alcohol Use      Alcohol intake:  OCCASIONAL            Substance Use      Substance use:  Denies use            Additional  Information      Additional History:        COLONOSCOPY--2019      HEMOCCULT STOOL--2019      MAMMOGRAM--2020            REVIEW OF SYSTEMS      General:  Admits: Fatigue, Weight Loss;          Denies: Appetite Change, Fever, Night Sweats, Weight Gain      Eye:  Denies Blurred Vision, Denies Corrective Lenses, Denies  Diplopia, Denies     Vision Changes      ENT:  Denies Headache, Denies Hearing Loss, Denies Hoarseness; Admits Sore     Throat      Cardiovascular:  Denies Chest Pain, Denies Palpitations      Respiratory:  Denies: Cough, Coughing Blood, Productive Cough, Shortness of Air,    Wheezing      Gastrointestinal:  Denies Bloody Stools, Denies Constipation, Denies Diarrhea,     Denies Nausea/Vomiting, Denies Problem Swallowing, Denies Unable to Control     Bowels      Genitourinary:  Denies Blood in Urine, Denies Incontinence, Denies Painful     Urination      Musculoskeletal:  Denies Back Pain, Denies Muscle Pain, Denies Painful Joints      Integumentary:  Denies Itching, Denies Lesions, Denies Rash      Neurologic:  Denies Dizziness, Denies Numbness\Tingling, Denies Seizures      Psychiatric:  Denies Anxiety, Denies Depression      Endocrine:  Denies Cold Intolerance, Denies Heat Intolerance      Hematologic/Lymphatic:  Denies Bruising, Denies Bleeding, Denies Enlarged Lymph     Nodes      Reproductive:  Denies: Menopause, Heavy Periods, Pregnant, Still Menstruating            VITAL SIGNS AND SCORES      Vitals      Weight 116 lbs 2.919 oz / 52.7 kg      Temperature 96.8 F / 36 C - Temporal      Pulse 73      Respirations 18      Blood Pressure 87/52 Sitting, Left Arm      Pulse Oximetry 98%, RM AIR            Pain Score      Experiencing any pain?:  Yes      Pain Scale Used:  Numerical      Pain Intensity:  4            Fatigue Score      Experiencing any fatigue?:  Yes      Fatigue (0-10 scale):  7            PT/OT Functional Screening      No needs identified            Speech Functional Screening      No needs identified            Rehab to be Ordered      Type of Referral to be Ordered:  No needs identified            EXAM      General: Alert, cooperative, no acute distress      Oropharynx: No lesions      Eyes: Anicteric sclera, PERRLA      Respiratory: CTAB, normal respiratory effort      Abdomen: Normal active  bowel sounds, no tenderness, no distention      Cardiovascular: RRR, no murmur, no lower extremity edema      Skin: Normal tone, no rash, no lesions      Psychiatric: Appropriate affect, intact judgment      Neurologic: No focal sensory or motor deficits, no weakness, numbness, dizziness      Musculoskeletal: Normal muscle strength and tone      Extremities: No clubbing, cyanosis, or deformities            PREVENTION      Hx Influenza Vaccination:  Yes      Date Influenza Vaccine Given:  Nov 2, 2020      Influenza Vaccine Declined:  No      2 or More Falls in Past Year?:  No      Fall Past Year with Injury?:  No      Hx Pneumococcal Vaccination:  No      Encouraged to follow-up with:  PCP regarding preventative exams.      Chart initiated by      MICHAEL THIBODEAUX [facility]            ALLERGY/MEDS      Allergies      Coded Allergies:             NO KNOWN ALLERGIES (Unverified , 6/1/21)            Medications      Last Reconciled on 6/1/21 19:17 by PANCHO LUKE      Prochlorperazine Maleate (Prochlorperazine Maleate) 10 Mg Tab      10 MG PO Q6H PRN for NAUSEA AND/OR VOMITING, #60 TAB         Reported         5/6/21       Zolpidem Tartrate (Ambien) 10 Mg Tablet      10 MG PO HS, #30 TAB 0 Refills         Reported         5/6/21       Famotidine (Famotidine) 20 Mg Tablet      20 MG PO BID for 30 Days, #60 TAB 1 Refill         Prov: PANCHO LUKE         4/14/21       Pantoprazole (Protonix) 40 Mg Tablet.dr      40 MG PO QDAY@07, #30 TAB 1 Refill         Prov: PANCHO LUKE         4/14/21       Topiramate (Topamax*) 50 Mg Tablet      50 MG PO BID, TAB         Reported         12/14/20       Propranolol ER (Propranolol ER) 80 Mg Cap.sa.24h      80 MG PO QDAY, CAP         Reported         12/14/20       QUEtiapine (QUEtiapine) 400 Mg Tablet      200 MG PO HS for 30 Days, #15 TAB         Reported         12/10/20       Calcium Citrate/Vitamin D3 (Calcium Citrate 315 + D) 1 Each Tablet      1 EACH PO BID, #60 TAB         Reported          12/10/20       diazePAM (diazePAM) 5 Mg Tablet      5 MG PO HS PRN for ANXIETY, #60 TAB         Reported         12/10/20       Acetaminophen (ACETAMINOPHEN) 325 Mg Tablet      325 MG PO Q6H PRN for HEADACHE, #100 TAB         Reported         12/10/20       DULoxetine (Cymbalta) 30 Mg Capsule.dr      30 MG PO BID, #60 CAP 0 Refills         Reported         12/10/20       OXcarbazepine (OXcarbazepine) 300 Mg Tab      300 MG PO BID, TAB         Reported         12/10/20      Medications Reviewed:  No Changes made to meds            IMPRESSION/PLAN      Diagnosis      Difficulty swallowing - R13.10            Breast cancer - C50.919            Difficulty swallowing solids - R13.10            Notes      New Diagnostics      * CBC With Auto Diff, Routine         Dx: Breast cancer - C50.919      * Comp Metabolic Panel, Routine         Dx: Breast cancer - C50.919      New Referrals      * Gastroenterology, Routine         CARYL BARAHONA         Reason for Referral: evaluate for sore throat and difficulty swallowing         Dx: Difficulty swallowing solids - R13.10            Plan      HR positive, HER-2 positive left breast cancer: Patient was initially diagnosed     with DCIS, weakly ER/HI positive.  After breast MRI she was found have a second     lesion approximately 1.2 cm in size. She completed 5 cycles of neoadjuvant TCHP     and one cycle of Herceptin only. Left lumpectomy on 5/11/21 reveal complete     pathologic response.  In the next 1-2 weeks she can restart Herceptin with C7 of    17.  Will will discuss starting an AI in the coming months. She has an appt with    Dr. Ramos in radiation next week.             Dysphagia and sore throat: unknown etiology.  The patient believes it may have     started prior to surgery.  Will refer the pt to GI for EGD to evaluate.  She     should continue PPI for now.             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  Will continue to monitor. She     is not  interested in gapapentin at this time.              Normocytic anemia: Secondary to chemotherapy and iron deficiency.  Got IV iron     prior to surgery. Will recheck labs today.             Osteoporosis: Patient was on an oral medication by her primary care provider. We    will plan to do DEXA prior to the start of her aromatase inhibitor therapy but     she would benefit from Prolia regardless.             Genetic risk: Patient's father with a diagnosis of RCC and smoldering myeloma.      Patient's mother's history is unknown.  She is interested in genetic testing but    this can be done at a later date. It is unlikely to impact her surgical     decision.            Patient Education      Patient Education Provided:  Yes            Electronically signed by PANCHO LUKE  06/01/2021 19:18       Disclaimer: Converted document may not contain table formatting or lab diagrams. Please see [employer] for the authenticated document.

## 2021-06-21 PROBLEM — F41.9 ANXIETY: Status: ACTIVE | Noted: 2021-06-21

## 2021-06-21 PROBLEM — H26.9 CATARACT: Status: ACTIVE | Noted: 2021-06-21

## 2021-06-21 PROBLEM — F32.A DEPRESSION: Status: ACTIVE | Noted: 2021-06-21

## 2021-06-21 PROBLEM — G43.909 MIGRAINE: Status: ACTIVE | Noted: 2021-06-21

## 2021-06-21 RX ORDER — PROPRANOLOL HYDROCHLORIDE 80 MG/1
TABLET ORAL
COMMUNITY
End: 2021-07-13

## 2021-06-21 RX ORDER — TOPIRAMATE 50 MG/1
TABLET, FILM COATED ORAL
Status: ON HOLD | COMMUNITY
End: 2022-08-09

## 2021-06-21 RX ORDER — QUETIAPINE FUMARATE 400 MG/1
200 TABLET, FILM COATED ORAL NIGHTLY
COMMUNITY
Start: 2021-04-27 | End: 2022-06-07 | Stop reason: DRUGHIGH

## 2021-06-21 RX ORDER — FAMOTIDINE 20 MG/1
TABLET, FILM COATED ORAL
COMMUNITY
Start: 2021-05-23 | End: 2021-08-05

## 2021-06-21 RX ORDER — DOXEPIN HYDROCHLORIDE 10 MG/1
CAPSULE ORAL
COMMUNITY
End: 2021-08-05

## 2021-06-21 RX ORDER — BUTALBITAL, ACETAMINOPHEN AND CAFFEINE 50; 325; 40 MG/1; MG/1; MG/1
TABLET ORAL
Status: ON HOLD | COMMUNITY
End: 2022-08-09

## 2021-06-21 RX ORDER — RISEDRONATE SODIUM 150 MG/1
TABLET, FILM COATED ORAL
COMMUNITY
End: 2023-02-27

## 2021-06-21 RX ORDER — DULOXETIN HYDROCHLORIDE 30 MG/1
30 CAPSULE, DELAYED RELEASE ORAL DAILY
Status: ON HOLD | COMMUNITY
Start: 2021-04-29 | End: 2022-08-09

## 2021-06-21 RX ORDER — OXCARBAZEPINE 300 MG/1
TABLET, FILM COATED ORAL
COMMUNITY
Start: 2021-05-03 | End: 2021-07-13

## 2021-06-21 RX ORDER — LEVOTHYROXINE AND LIOTHYRONINE 9.5; 2.25 UG/1; UG/1
TABLET ORAL
COMMUNITY
End: 2021-07-13

## 2021-06-21 RX ORDER — OLANZAPINE 5 MG/1
TABLET ORAL
COMMUNITY
Start: 2021-05-31 | End: 2021-07-13

## 2021-06-21 RX ORDER — DIAZEPAM 5 MG/1
5 TABLET ORAL EVERY 8 HOURS PRN
Status: ON HOLD | COMMUNITY
Start: 2021-05-26 | End: 2022-08-09

## 2021-06-21 RX ORDER — GABAPENTIN 100 MG/1
CAPSULE ORAL
COMMUNITY
End: 2021-07-13

## 2021-06-21 RX ORDER — POTASSIUM CHLORIDE 1.5 G/1
POWDER, FOR SOLUTION ORAL
COMMUNITY
Start: 2021-03-19 | End: 2021-07-13

## 2021-06-21 RX ORDER — HYDROCODONE BITARTRATE AND ACETAMINOPHEN 5; 325 MG/1; MG/1
TABLET ORAL
COMMUNITY
Start: 2021-05-11 | End: 2021-07-13

## 2021-06-21 RX ORDER — ZOLPIDEM TARTRATE 10 MG/1
5 TABLET ORAL NIGHTLY PRN
COMMUNITY
Start: 2021-05-26

## 2021-06-21 RX ORDER — PANTOPRAZOLE SODIUM 40 MG/1
40 TABLET, DELAYED RELEASE ORAL DAILY
COMMUNITY
Start: 2021-05-23 | End: 2021-12-23

## 2021-06-21 RX ORDER — SUMATRIPTAN 100 MG/1
TABLET, FILM COATED ORAL
COMMUNITY
End: 2021-07-13

## 2021-06-21 RX ORDER — VENLAFAXINE HYDROCHLORIDE 150 MG/1
TABLET, EXTENDED RELEASE ORAL
COMMUNITY
End: 2021-07-13

## 2021-06-21 RX ORDER — PREDNISONE 10 MG/1
40 TABLET ORAL DAILY
COMMUNITY
Start: 2021-03-16 | End: 2021-07-13

## 2021-06-21 RX ORDER — BUSPIRONE HYDROCHLORIDE 15 MG/1
TABLET ORAL
COMMUNITY
End: 2021-08-05

## 2021-06-22 ENCOUNTER — OFFICE VISIT (OUTPATIENT)
Dept: GASTROENTEROLOGY | Facility: CLINIC | Age: 61
End: 2021-06-22

## 2021-06-22 VITALS
BODY MASS INDEX: 22.97 KG/M2 | WEIGHT: 117 LBS | SYSTOLIC BLOOD PRESSURE: 109 MMHG | HEART RATE: 62 BPM | DIASTOLIC BLOOD PRESSURE: 66 MMHG | HEIGHT: 60 IN

## 2021-06-22 DIAGNOSIS — R12 HEARTBURN: ICD-10-CM

## 2021-06-22 DIAGNOSIS — R09.89 GLOBUS SENSATION: ICD-10-CM

## 2021-06-22 DIAGNOSIS — R13.19 ESOPHAGEAL DYSPHAGIA: Primary | ICD-10-CM

## 2021-06-22 PROBLEM — R09.A2 GLOBUS SENSATION: Status: ACTIVE | Noted: 2021-06-22

## 2021-06-22 PROCEDURE — 99214 OFFICE O/P EST MOD 30 MIN: CPT | Performed by: NURSE PRACTITIONER

## 2021-06-22 NOTE — PROGRESS NOTES
"Patient Name: Ragini Dozier   Visit Date: 06/22/2021   Patient ID: [id number]  Provider: ANGELICA Castro    Sex: female  Location:  Location Address:  Location Phone: [address] [place] [address] [phone]    YOB: 1960      Primary Care Provider Ashlee Dubose PA      Referring Provider: No ref. provider found        Chief Complaint  Difficulty Swallowing    History of Present Illness  Ragini Dozier is a 61 y.o. who presents to [facility] on referral from No ref. provider found for a gastroenterology evaluation of Difficulty Swallowing.    Ms. Dozier reports lower esophageal dysphagia for the last 2 months with liquids and solids. \"Bananas are the worse\". Reports a globus sensation at all times as well.Food has occasionally became stuck and she had to vomit in order to dislodge. When dysphagia happens with liquids she will vigorously cough. Heartburn mainly controlled with pantoprazole 40mg qd however does occasionally have breakthrough HB throughout the week. Breakthrough HB also worse when \"food gets stuck\". Denies nausea, vomiting, change in appetite, or frequent  NSAID usage.     Diagnosed with Left sided DCIS breast cancer in 11/2020. Lumpectomy in 5/2021. Has had 6 months of chemo and starts radiation tomorrow. Also currently doing immunotherapy.       Labs Result Review Imaging    Past Medical History:   Diagnosis Date   • Breast cancer (CMS/HCC)    • Breast cancer screening by mammogram 2020   • Depression    • Encounter for Hemoccult screening 2019   • Migraine headache    • Osteoporosis        Past Surgical History:   Procedure Laterality Date   • CATARACT EXTRACTION     • COLONOSCOPY  2019   • HYSTERECTOMY      PARTIAL   • OTHER SURGICAL HISTORY      BIOPSY   • SKIN CANCER EXCISION           Current Outpatient Medications:   •  busPIRone (BUSPAR) 15 MG tablet, buspirone 15 mg oral tablet take 1 tablet (15 mg) by oral " route 2 times per day   Active, Disp: , Rfl:   •  butalbital-acetaminophen-caffeine (FIORICET, ESGIC) -40 MG per tablet, butalbital-acetaminophen-caff -40 mg oral tablet take 1 tablet by oral route every 4 hours as needed not to exceed 6 tablets per 24hrs   Active, Disp: , Rfl:   •  Calcium Carbonate-Vitamin D (calcium-vitamin D) 500-200 MG-UNIT tablet per tablet, Calcium 500 + D 500 mg(1,250mg) -200 unit oral tablet take 1 tablet by oral route daily   Active, Disp: , Rfl:   •  diazePAM (VALIUM) 5 MG tablet, , Disp: , Rfl:   •  doxepin (SINEquan) 10 MG capsule, doxepin 10 mg oral capsule take 1 capsule (10 mg) by oral route daily   Active, Disp: , Rfl:   •  DULoxetine (CYMBALTA) 30 MG capsule, , Disp: , Rfl:   •  famotidine (PEPCID) 20 MG tablet, , Disp: , Rfl:   •  pantoprazole (PROTONIX) 40 MG EC tablet, , Disp: , Rfl:   •  QUEtiapine (SEROquel) 400 MG tablet, , Disp: , Rfl:   •  risedronate (ACTONEL) 150 MG tablet, risedronate 150 mg oral tablet take 1 tablet (150 mg) by oral route once a month on the same date with a full glass of water at least 30 min before first food or drink of the day; remain in an upright position for at least 30 min.   Active, Disp: , Rfl:   •  topiramate (TOPAMAX) 50 MG tablet, topiramate 50 mg oral tablet take 1 tablet (50 mg) by oral route 2 times per day   Active, Disp: , Rfl:   •  zolpidem (AMBIEN) 10 MG tablet, , Disp: , Rfl:   •  gabapentin (NEURONTIN) 100 MG capsule, gabapentin 100 mg oral capsule take 1 capsule (100 mg) by oral route once daily   Active, Disp: , Rfl:   •  HYDROcodone-acetaminophen (NORCO) 5-325 MG per tablet, , Disp: , Rfl:   •  Klor-Con 20 MEQ packet, DISSOLVE CONTENTS OF 1 PACKET IN EVERY DAY WITH 8 OUNCE OF WATER, Disp: , Rfl:   •  OLANZapine (zyPREXA) 5 MG tablet, , Disp: , Rfl:   •  OXcarbazepine (TRILEPTAL) 300 MG tablet, , Disp: , Rfl:   •  predniSONE (DELTASONE) 10 MG tablet, Take 40 mg by mouth Daily., Disp: , Rfl:   •  propranolol (INDERAL)  "80 MG tablet, propranolol 80 mg oral tablet take 1 tablet (80 mg) by oral route daily   Active, Disp: , Rfl:   •  SUMAtriptan (IMITREX) 100 MG tablet, sumatriptan succinate 100 mg oral tablet take 1 tablet (100 mg) by oral route once with fluids as early as possible after the onset of a migraine attack;may repeat after 2 hours if headache returns, not to exceed 200mg in 24hrs   Active, Disp: , Rfl:   •  thyroid (Peabody Thyroid) 15 MG tablet, Peabody Thyroid 15 mg oral tablet take 1 tablet by oral route daily   Active, Disp: , Rfl:   •  venlafaxine (EFFEXOR) 150 MG tablet sustained-release 24 hour 24 hr tablet, venlafaxine 150 mg oral tablet extended release 24hr take 1 tablet (150 mg) by oral route once daily in the morning at the same time each day with food   Active, Disp: , Rfl:      No Known Allergies    Family History   Problem Relation Age of Onset   • Kidney cancer Father    • Skin cancer Father    • Other Father         MYELOMA   • No Known Problems Mother         Social History     Social History Narrative   • Not on file         Objective     Review of Systems   Constitutional: Negative for appetite change.   Gastrointestinal: Negative for nausea and vomiting.        Vital Signs:   /66   Pulse 62   Ht 152.4 cm (60\")   Wt 53.1 kg (117 lb)   BMI 22.85 kg/m²       Physical Exam  Constitutional:       General: She is not in acute distress.     Appearance: Normal appearance. She is well-developed and normal weight.   HENT:      Head: Normocephalic and atraumatic.   Eyes:      Conjunctiva/sclera: Conjunctivae normal.      Pupils: Pupils are equal, round, and reactive to light.      Visual Fields: Right eye visual fields normal and left eye visual fields normal.   Cardiovascular:      Rate and Rhythm: Normal rate and regular rhythm.      Heart sounds: Normal heart sounds.   Pulmonary:      Effort: Pulmonary effort is normal. No retractions.      Breath sounds: Normal breath sounds and air entry. "   Abdominal:      General: Bowel sounds are normal. There is no distension.      Palpations: Abdomen is soft.      Tenderness: There is no abdominal tenderness.      Comments: No appreciable hepatosplenomegaly or ascites   Musculoskeletal:         General: Normal range of motion.      Cervical back: Normal range of motion and neck supple.   Skin:     General: Skin is warm and dry.   Neurological:      Mental Status: She is alert and oriented to person, place, and time.   Psychiatric:         Mood and Affect: Mood and affect normal.         Behavior: Behavior normal.         Result Review :   The following data was reviewed by: ANGELICA Castro on 06/22/2021:  CMP    CMP 4/2/21 4/13/21 6/1/21   Glucose 168 (A) 123 (A) 101 (A)   BUN 17 16 29 (A)   Creatinine 1.15 (A) 0.83 1.19 (A)   Sodium 139 139 136   Potassium 3.4 (A) 3.4 (A) 4.1   Chloride 108 110 103   Calcium 9.4 9.0 8.9   Albumin 3.5 3.5 4.1   Total Bilirubin <0.15 (A) <0.15 (A) 0.19 (A)   Alkaline Phosphatase 82 84 124   AST (SGOT) 9 (A) 13 (A) 16   ALT (SGPT) 4 (A) 7 (A) 8 (A)   (A) Abnormal value       Comments are available for some flowsheets but are not being displayed.           CBC w/diff    CBC w/Diff 4/2/21 4/2/21 4/13/21 4/13/21 6/1/21    0849 0849 0937 0937    WBC 9.35  9.55  6.42   RBC     3.76 (A)   Hemoglobin 9.4 (A)  8.8 (A)  11.5 (A)   Hematocrit 29.8 (A)  27.0 (A)  35.2 (A)   .0 (A)  97.5  93.6   MCH 31.5 (A)  31.8 (A)  30.6   MCHC 31.5 (A)  32.6 (A)  32.7 (A)   RDW 65.9 17.5 (A) 55.7 15.6 (A) 15.9 (A)   Platelets     291   Neutrophil Rel %     54.2   Lymphocyte Rel % 11.8 (A)  11.4 (A)  27.1   Monocyte Rel %     13.1 (A)   Eosinophil Rel %     4.5   Basophil Rel % 0.2  0.2  0.8   (A) Abnormal value            Lab Results   Component Value Date    IRON 18 (L) 03/24/2021    TIBC 216 (L) 03/24/2021    FERRITIN 61 03/24/2021    LABIRON 8 (L) 03/24/2021              Assessment and Plan    Diagnoses and all orders for this  visit:    1. Esophageal dysphagia (Primary)  -     FL Esophagram Complete Single Contrast; Future  -     Case Request; Standing  -     Case Request    2. Globus sensation  -     FL Esophagram Complete Single Contrast; Future  -     Case Request; Standing  -     Case Request    3. Heartburn    Other orders  -     Follow Anesthesia Guidelines / Protocol; Future  -     Obtain Informed Consent; Future  -     Verify NPO; Standing      ESOPHAGOGASTRODUODENOSCOPY (N/A)       Follow Up   Return for Follow up after procedure.  Patient was given instructions and counseling regarding her condition or for health maintenance advice. Please see specific information pulled into the AVS if appropriate.

## 2021-06-23 ENCOUNTER — HOSPITAL ENCOUNTER (OUTPATIENT)
Dept: RADIATION ONCOLOGY | Facility: HOSPITAL | Age: 61
Setting detail: RADIATION/ONCOLOGY SERIES
Discharge: HOME | End: 2021-06-23

## 2021-06-23 DIAGNOSIS — C50.412 MALIGNANT NEOPLASM OF UPPER-OUTER QUADRANT OF LEFT FEMALE BREAST, UNSPECIFIED ESTROGEN RECEPTOR STATUS (HCC): Primary | ICD-10-CM

## 2021-06-23 DIAGNOSIS — C50.412 MALIGNANT NEOPLASM OF UPPER-OUTER QUADRANT OF LEFT FEMALE BREAST (HCC): ICD-10-CM

## 2021-06-23 PROCEDURE — 77332 RADIATION TREATMENT AID(S): CPT | Performed by: RADIOLOGY

## 2021-06-23 PROCEDURE — 77263 THER RADIOLOGY TX PLNG CPLX: CPT | Performed by: RADIOLOGY

## 2021-06-23 PROCEDURE — 77290 THER RAD SIMULAJ FIELD CPLX: CPT | Performed by: RADIOLOGY

## 2021-06-23 RX ORDER — SODIUM CHLORIDE 9 MG/ML
250 INJECTION, SOLUTION INTRAVENOUS ONCE
Status: CANCELLED | OUTPATIENT
Start: 2021-06-24

## 2021-06-24 ENCOUNTER — HOSPITAL ENCOUNTER (OUTPATIENT)
Dept: ONCOLOGY | Facility: HOSPITAL | Age: 61
Setting detail: INFUSION SERIES
Discharge: HOME | End: 2021-06-24

## 2021-06-24 ENCOUNTER — OFFICE VISIT (OUTPATIENT)
Dept: ONCOLOGY | Facility: HOSPITAL | Age: 61
End: 2021-06-24

## 2021-06-24 VITALS
BODY MASS INDEX: 23.07 KG/M2 | RESPIRATION RATE: 18 BRPM | DIASTOLIC BLOOD PRESSURE: 66 MMHG | SYSTOLIC BLOOD PRESSURE: 106 MMHG | OXYGEN SATURATION: 100 % | TEMPERATURE: 98 F | WEIGHT: 117.5 LBS | HEIGHT: 60 IN | HEART RATE: 62 BPM

## 2021-06-24 VITALS
HEIGHT: 60 IN | RESPIRATION RATE: 18 BRPM | TEMPERATURE: 98 F | BODY MASS INDEX: 23.07 KG/M2 | SYSTOLIC BLOOD PRESSURE: 106 MMHG | HEART RATE: 62 BPM | WEIGHT: 117.5 LBS | DIASTOLIC BLOOD PRESSURE: 66 MMHG | OXYGEN SATURATION: 100 %

## 2021-06-24 DIAGNOSIS — Z79.899 HIGH RISK MEDICATION USE: ICD-10-CM

## 2021-06-24 DIAGNOSIS — C50.412 MALIGNANT NEOPLASM OF UPPER-OUTER QUADRANT OF LEFT FEMALE BREAST, UNSPECIFIED ESTROGEN RECEPTOR STATUS (HCC): Primary | ICD-10-CM

## 2021-06-24 DIAGNOSIS — Z45.2 ADJUSTMENT AND MANAGEMENT OF VASCULAR ACCESS DEVICE: ICD-10-CM

## 2021-06-24 LAB
BASOPHILS # BLD AUTO: 0.02 10*3/MM3 (ref 0–0.2)
BASOPHILS NFR BLD AUTO: 0.4 % (ref 0–1.5)
DEPRECATED RDW RBC AUTO: 51.5 FL (ref 37–54)
EOSINOPHIL # BLD AUTO: 0.16 10*3/MM3 (ref 0–0.4)
EOSINOPHIL NFR BLD AUTO: 3 % (ref 0.3–6.2)
ERYTHROCYTE [DISTWIDTH] IN BLOOD BY AUTOMATED COUNT: 15.1 % (ref 12.3–15.4)
HCT VFR BLD AUTO: 31.1 % (ref 34–46.6)
HGB BLD-MCNC: 10.3 G/DL (ref 12–15.9)
IMM GRANULOCYTES # BLD AUTO: 0.01 10*3/MM3 (ref 0–0.05)
IMM GRANULOCYTES NFR BLD AUTO: 0.2 % (ref 0–0.5)
LYMPHOCYTES # BLD AUTO: 0.9 10*3/MM3 (ref 0.7–3.1)
LYMPHOCYTES NFR BLD AUTO: 16.8 % (ref 19.6–45.3)
MCH RBC QN AUTO: 30.3 PG (ref 26.6–33)
MCHC RBC AUTO-ENTMCNC: 33.1 G/DL (ref 31.5–35.7)
MCV RBC AUTO: 91.5 FL (ref 79–97)
MONOCYTES # BLD AUTO: 0.48 10*3/MM3 (ref 0.1–0.9)
MONOCYTES NFR BLD AUTO: 8.9 % (ref 5–12)
NEUTROPHILS NFR BLD AUTO: 3.8 10*3/MM3 (ref 1.7–7)
NEUTROPHILS NFR BLD AUTO: 70.7 % (ref 42.7–76)
PLATELET # BLD AUTO: 191 10*3/MM3 (ref 140–450)
PMV BLD AUTO: 8.5 FL (ref 6–12)
RBC # BLD AUTO: 3.4 10*6/MM3 (ref 3.77–5.28)
WBC # BLD AUTO: 5.37 10*3/MM3 (ref 3.4–10.8)

## 2021-06-24 PROCEDURE — 77295 3-D RADIOTHERAPY PLAN: CPT | Performed by: RADIOLOGY

## 2021-06-24 PROCEDURE — 99214 OFFICE O/P EST MOD 30 MIN: CPT | Performed by: INTERNAL MEDICINE

## 2021-06-24 PROCEDURE — 77300 RADIATION THERAPY DOSE PLAN: CPT | Performed by: RADIOLOGY

## 2021-06-24 PROCEDURE — 85025 COMPLETE CBC W/AUTO DIFF WBC: CPT | Performed by: INTERNAL MEDICINE

## 2021-06-24 PROCEDURE — 77334 RADIATION TREATMENT AID(S): CPT | Performed by: RADIOLOGY

## 2021-06-24 PROCEDURE — 25010000002 TRASTUZUMAB PER 10 MG: Performed by: INTERNAL MEDICINE

## 2021-06-24 PROCEDURE — 96413 CHEMO IV INFUSION 1 HR: CPT

## 2021-06-24 PROCEDURE — 25010000002 HEPARIN LOCK FLUSH PER 10 UNITS: Performed by: INTERNAL MEDICINE

## 2021-06-24 RX ORDER — SODIUM CHLORIDE 0.9 % (FLUSH) 0.9 %
20 SYRINGE (ML) INJECTION AS NEEDED
Status: DISCONTINUED | OUTPATIENT
Start: 2021-06-24 | End: 2021-06-25 | Stop reason: HOSPADM

## 2021-06-24 RX ORDER — HEPARIN SODIUM (PORCINE) LOCK FLUSH IV SOLN 100 UNIT/ML 100 UNIT/ML
500 SOLUTION INTRAVENOUS AS NEEDED
Status: DISCONTINUED | OUTPATIENT
Start: 2021-06-24 | End: 2021-06-25 | Stop reason: HOSPADM

## 2021-06-24 RX ORDER — SODIUM CHLORIDE 0.9 % (FLUSH) 0.9 %
20 SYRINGE (ML) INJECTION AS NEEDED
Status: CANCELLED | OUTPATIENT
Start: 2021-06-24

## 2021-06-24 RX ORDER — SODIUM CHLORIDE 9 MG/ML
250 INJECTION, SOLUTION INTRAVENOUS ONCE
Status: COMPLETED | OUTPATIENT
Start: 2021-06-24 | End: 2021-06-24

## 2021-06-24 RX ORDER — HEPARIN SODIUM (PORCINE) LOCK FLUSH IV SOLN 100 UNIT/ML 100 UNIT/ML
500 SOLUTION INTRAVENOUS AS NEEDED
Status: CANCELLED | OUTPATIENT
Start: 2021-06-24

## 2021-06-24 RX ADMIN — SODIUM CHLORIDE 250 ML: 9 INJECTION, SOLUTION INTRAVENOUS at 12:05

## 2021-06-24 RX ADMIN — HEPARIN SODIUM (PORCINE) LOCK FLUSH IV SOLN 100 UNIT/ML 500 UNITS: 100 SOLUTION at 13:00

## 2021-06-24 RX ADMIN — Medication 20 ML: at 13:00

## 2021-06-24 RX ADMIN — TRASTUZUMAB 300 MG: 150 INJECTION, POWDER, LYOPHILIZED, FOR SOLUTION INTRAVENOUS at 12:06

## 2021-06-24 NOTE — PROGRESS NOTES
"Outpatient Nutrition Oncology Assessment    Patient Name: Ragini Dozier  YOB: 1960  MRN: [id number]  Assessment Date: 6/24/2021    CLINICAL NUTRITION ASSESSMENT      Type of Cancer Treatment  Herceptin        CLINICAL NUTRITION ASSESSMENT      Reason for Assessment  Follow-up protocol     H&P:    Past Medical History:   Diagnosis Date   • Breast cancer (CMS/HCC)    • Breast cancer screening by mammogram 2020   • Depression    • Encounter for Hemoccult screening 2019   • Migraine headache    • Osteoporosis         Current Problems:   Patient Active Problem List   Diagnosis Code   • Anxiety F41.9   • Cataract H26.9   • Depression F32.9   • Migraine G43.909   • Esophageal dysphagia R13.10   • Globus sensation R09.89   • Malignant neoplasm of upper-outer quadrant of left female breast (CMS/HCC) C50.412   • Adjustment and management of vascular access device Z45.2        Anthropometrics     Row Name 06/24/21 1002          Anthropometrics    Height  152.4 cm (60\")     Weight  53.3 kg (117 lb 8.1 oz)        Ideal Body Weight (IBW)    Ideal Body Weight (IBW) (kg)  45.86     % Ideal Body Weight  116.22        Body Mass Index (BMI)    BMI (kg/m2)  23           BMI kg/m2   Body mass index is 22.95 kg/m².    Weight Hx  Wt Readings from Last 30 Encounters:   06/24/21 1117 53.3 kg (117 lb 8.1 oz)   06/24/21 1002 53.3 kg (117 lb 8.1 oz)   06/22/21 1054 53.1 kg (117 lb)   06/01/21 1439 52.7 kg (116 lb 2.9 oz)   05/19/21 0000 55.1 kg (121 lb 6 oz)   04/02/21 0859 54.4 kg (119 lb 14.9 oz)   03/12/21 0909 56.1 kg (123 lb 10.9 oz)   02/19/21 0851 56.6 kg (124 lb 12.5 oz)   01/29/21 0952 55.4 kg (122 lb 2.2 oz)   01/08/21 0905 55 kg (121 lb 4.1 oz)   12/23/20 0000 54.4 kg (120 lb)   12/18/20 0912 56.9 kg (125 lb 7.1 oz)   12/10/20 1422 55.8 kg (123 lb 0.3 oz)   12/10/20 0000 54.9 kg (121 lb)   11/19/20 0000 54.4 kg (120 lb)   02/14/19 0000 54.9 kg (121 lb 2 oz)        Estimated/Assessed Needs     Row Name 06/24/21 1200 " "06/24/21 1002       Calculation Measurements    Weight Used For Calculations  53.3 kg (117 lb 8.1 oz)  --    Height  --  152.4 cm (60\")       Estimated/Assessed Needs    Additional Documentation  KCAL/KG (Group);Protein Requirements (Group);Fluid Requirements (Group)  --       KCAL/KG    KCAL/KG  30 Kcal/Kg (kcal);35 Kcal/Kg (kcal)  --    30 Kcal/Kg (kcal)  1599  --    35 Kcal/Kg (kcal)  1865.5  --       Protein Requirements    Weight Used For Protein Calculations  53.3 kg (117 lb 8.1 oz)  --    Est Protein Requirement Amount (gms/kg)  1.5 gm protein  --    Estimated Protein Requirements (gms/day)  79.95  --       Fluid Requirements    Fluid Requirements (mL/day)  1700  --    RDA Method (mL)  1700  --           Labs/Medications        Pertinent Labs Reviewed.         Invalid input(s): LABMARISABEL WOODALL  Results from last 7 days   Lab Units 06/24/21  1025   HEMOGLOBIN g/dL 10.3*   HEMATOCRIT % 31.1*     Coronavirus (COVID-19)   Date Value Ref Range Status   03/19/2021 NOT DETECTED NA Final     Comment:     The SARS-CoV-2 assay is a real-time, RT-PCR test intended  for the qualitative detection of nucleic acid from the  SARS-CoV-2 in respiratory specimens from individuals,  testing performed at [facility].       No results found for: HGBA1C      Pertinent Medications DULoxetine, HYDROcodone-acetaminophen, OLANZapine, OXcarbazepine, QUEtiapine, SUMAtriptan, busPIRone, butalbital-acetaminophen-caffeine, calcium citrate-vitamin D, calcium-vitamin D, diazePAM, doxepin, famotidine, gabapentin, pantoprazole, potassium chloride, predniSONE, propranolol, risedronate, thyroid, topiramate, venlafaxine, and zolpidem     Physical Findings            Current Nutrition Orders & Evaluation of Intake       Oral Nutrition     Current PO Diet    Supplement      Enteral Nutrition     Current Formula    Schedule      Parenteral Nutrition     Current Prescription    Schedule      Nutrition Diagnosis        Nutrition Dx Problem 1 " "Increased nutrient needs related to increased nutrient needs due to catabolic disease as evidenced by physiological causes increasing nutrient needs.       Nutrition Intervention       RD Action      Monitor/Evaluation       Monitor Per oncology nutrition protocol.     Comments:  Spoke with pt via phone. She reports continued dysphagia - feeling like food \"gets stuck\" on occasion. She says she is scheduled for an EGD in August. She is eating soft foods with gravy added to them, consumes 3 meals a day along with snacks. She does not like any oral nutrition supplements such as Ensure. Discussed with her ways to increase kcal and protein since her weight does fluctuate. Suggested she try homemade smoothies with high protein / high kcal ingredients 1-2 times a day to help meet increased nutrition needs. Pt verbalized understanding. Will continue to f/u accordingly.     Due to the current Covid-19 pandemic and circumstances of having to work remotely, nutrition assessment was conducted using review of the medical record and/or telephone interview of the patient (in which permission was obtained prior to our discussion).     Electronically signed by:  Naye Henry RD  06/24/21 12:01 EDT  "

## 2021-06-24 NOTE — PROGRESS NOTES
Patient  Ragini Dozier    Location  [facility] HEMATOLOGY & ONCOLOGY    Chief Complaint  Breast Cancer and Chemotherapy    Referring Provider: Princess Alvarez MD PhD  PCP: Ashlee Dubose PA    Subjective          Oncology/Hematology History Overview Note   HR positive, HER-2 positive breast cancer:  -Found on routine screening mammogram  -Diagnostic mammogram on 11/10/2020: Calcifications in the left breast  -11/10/2020 left breast biopsy: Pathology positive for DCIS, high-grade, estrogen receptor positive (5%, strong), progesterone receptor positive (10%, weak-moderate)  -11/19/2020 MRI of the breast: 1.8 cm hematoma at the site of the recent biopsy showing DCIS.  There is a 1.1 cm nonfocal mass enhancement at the superior lateral margin of the hematoma and a separate 1.2 cm suspicious mass along the inferior medial margin.  These correspond the masses seen on ultrasound on 10/20/2020  -12/23/2020 MRI guided breast biopsy: Left lower inner quadrant pathology positive for invasive ductal carcinoma, grade 2, estrogen receptor positive (3%, moderate), progesterone receptor positive (1%, weak), HER-2 positive (3+ by IHC), Ki-67 approximately 60%.  Associated with high-grade ductal carcinoma in situ.  -Cycle 1 of TCH P on 12/18/2020.  Echocardiogram on 12/14/2020 shows an EF of 55%. C3 on 1/29/21  -C5 of TCHP on 3/12/21. Held C6 due to side effects.  -Left lumpectomy on 5/11/2021: No residual invasive carcinoma found, 0/8 lymph nodes involved, [id number]     Malignant neoplasm of upper-outer quadrant of left female breast (CMS/HCC)   6/23/2021 Initial Diagnosis    Malignant neoplasm of upper-outer quadrant of left female breast (CMS/HCC)     6/24/2021 -  Chemotherapy    OP BREAST Trastuzumab Q21D (maintenance)     6/29/2021 -  Radiation    RADIATION THERAPY Treatment Details (Noted on 6/23/2021)  Site: Left Breast  Technique: 3D CRT  Goal: No goal specified  Planned Treatment Start Date:  6/29/2021      Chemotherapy    TCHP: 12/18/2020-3/12/21 (5 cycles)  OP BREAST Trastuzumab-anns Q21D (maintenance)   - 6/3/21-present         History of Present Illness  Patient comes in today for her next cycle of Herceptin.  She is tolerating treatment well.  She is starting to feel better overall.  She is gotten to spend some time by the pool with a family member.  Her energy is slowly improving.  She has no new complaints or concerns.    Review of Systems   Constitutional: Positive for fatigue. Negative for appetite change, diaphoresis, fever, unexpected weight gain and unexpected weight loss.   HENT: Negative for hearing loss, mouth sores, sore throat, swollen glands, trouble swallowing and voice change.    Eyes: Negative for blurred vision.   Respiratory: Negative for cough, shortness of breath and wheezing.    Cardiovascular: Negative for chest pain and palpitations.   Gastrointestinal: Negative for abdominal pain, blood in stool, constipation, diarrhea, nausea and vomiting.   Endocrine: Negative for cold intolerance and heat intolerance.   Genitourinary: Negative for difficulty urinating, dysuria, frequency, hematuria and urinary incontinence.   Musculoskeletal: Negative for arthralgias, back pain and myalgias.   Skin: Negative for rash, skin lesions and bruise.   Neurological: Negative for dizziness, seizures, weakness, numbness and headache.   Hematological: Does not bruise/bleed easily.   Psychiatric/Behavioral: Negative for depressed mood. The patient is not nervous/anxious.        Past Medical History:   Diagnosis Date   • Breast cancer (CMS/HCC)    • Breast cancer screening by mammogram 2020   • Depression    • Encounter for Hemoccult screening 2019   • Migraine headache    • Osteoporosis      Past Surgical History:   Procedure Laterality Date   • CATARACT EXTRACTION     • COLONOSCOPY  2019   • HYSTERECTOMY      PARTIAL   • OTHER SURGICAL HISTORY      BIOPSY   • SKIN CANCER EXCISION       Social History  "    Socioeconomic History   • Marital status:      Spouse name: Not on file   • Number of children: 2   • Years of education: Not on file   • Highest education level: Not on file   Tobacco Use   • Smoking status: Never Smoker   • Smokeless tobacco: Never Used   Vaping Use   • Vaping Use: Never used   Substance and Sexual Activity   • Alcohol use: Yes     Comment: OCCASIONAL   • Drug use: Never   • Sexual activity: Defer     Family History   Problem Relation Age of Onset   • Kidney cancer Father    • Skin cancer Father    • Other Father         MYELOMA   • No Known Problems Mother        Objective   Physical Exam  General: Alert, cooperative, no acute distress, well appearing  Eyes: Anicteric sclera, PERRLA  Respiratory: CTAB, normal respiratory effort  Abdomen: Normal active bowel sounds, no tenderness, no distention  Cardiovascular: RRR, no murmur, no lower extremity edema  Skin: Normal tone, no rash, no lesions  Psychiatric: Appropriate affect, intact judgment  Neurologic: No focal sensory or motor deficits, no weakness, numbness, dizziness  Musculoskeletal: Normal muscle strength and tone  Extremities: No clubbing, cyanosis, or deformities      Vitals:    06/24/21 1117   BP: 106/66   Pulse: 62   Resp: 18   Temp: 98 °F (36.7 °C)   TempSrc: Temporal   SpO2: 100%   Weight: 53.3 kg (117 lb 8.1 oz)   Height: 152.4 cm (60\")   PainSc: 0-No pain               PHQ-9 Total Score: 0       Result Review :   The following data was reviewed by: Princess Alvarez MD PhD on 06/24/2021:  Lab Results   Component Value Date    HGB 10.3 (L) 06/24/2021    HCT 31.1 (L) 06/24/2021    MCV 91.5 06/24/2021     06/24/2021    WBC 5.37 06/24/2021    NEUTROABS 3.80 06/24/2021    LYMPHSABS 0.90 06/24/2021    MONOSABS 0.48 06/24/2021    EOSABS 0.16 06/24/2021    BASOSABS 0.02 06/24/2021     Lab Results   Component Value Date    BUN 29 (H) 06/01/2021    CREATININE 1.19 (H) 06/01/2021     06/01/2021    K 4.1 06/01/2021    CL " 103 06/01/2021    CO2 22 06/01/2021    CALCIUM 8.9 06/01/2021    PROTEINTOT 6.6 06/01/2021    ALBUMIN 4.1 06/01/2021    BILITOT 0.19 (L) 06/01/2021    ALKPHOS 124 06/01/2021    AST 16 06/01/2021    ALT 8 (L) 06/01/2021          Assessment and Plan    Diagnoses and all orders for this visit:    1. Malignant neoplasm of upper-outer quadrant of left female breast, unspecified estrogen receptor status (CMS/HCC) (Primary)  -     Adult Transthoracic Echo Complete W/ Cont if Necessary Per Protocol; Future    2. High risk medication use  -     Adult Transthoracic Echo Complete W/ Cont if Necessary Per Protocol; Future    Patient is here today for C7 of Herceptin.  Will plan for a total of 17 cycles.  She is tolerating it well.  Reviewed labs for treatment today.  She will follow up with me every other cycle.  We will plan for repeat echocardiogram before her follow-up in 6 weeks.    Patient was given instructions and counseling regarding her condition or for health maintenance advice. Please see specific information pulled into the AVS if appropriate.     Princess Alvarez MD PhD    6/24/2021

## 2021-06-29 ENCOUNTER — HOSPITAL ENCOUNTER (OUTPATIENT)
Dept: RADIATION ONCOLOGY | Facility: HOSPITAL | Age: 61
Discharge: HOME | End: 2021-06-29

## 2021-06-29 PROCEDURE — 77412 RADIATION TX DELIVERY LVL 3: CPT | Performed by: RADIOLOGY

## 2021-06-29 PROCEDURE — 77280 THER RAD SIMULAJ FIELD SMPL: CPT | Performed by: RADIOLOGY

## 2021-06-30 ENCOUNTER — HOSPITAL ENCOUNTER (OUTPATIENT)
Dept: RADIATION ONCOLOGY | Facility: HOSPITAL | Age: 61
Discharge: HOME | End: 2021-06-30

## 2021-06-30 VITALS
OXYGEN SATURATION: 100 % | TEMPERATURE: 97.7 F | HEART RATE: 69 BPM | DIASTOLIC BLOOD PRESSURE: 72 MMHG | RESPIRATION RATE: 16 BRPM | WEIGHT: 117.5 LBS | BODY MASS INDEX: 22.95 KG/M2 | SYSTOLIC BLOOD PRESSURE: 111 MMHG

## 2021-06-30 DIAGNOSIS — C50.412 MALIGNANT NEOPLASM OF UPPER-OUTER QUADRANT OF LEFT BREAST IN FEMALE, ESTROGEN RECEPTOR POSITIVE (HCC): Primary | ICD-10-CM

## 2021-06-30 DIAGNOSIS — Z17.0 MALIGNANT NEOPLASM OF UPPER-OUTER QUADRANT OF LEFT BREAST IN FEMALE, ESTROGEN RECEPTOR POSITIVE (HCC): Primary | ICD-10-CM

## 2021-06-30 PROCEDURE — G6002 STEREOSCOPIC X-RAY GUIDANCE: HCPCS | Performed by: RADIOLOGY

## 2021-06-30 PROCEDURE — 77387 GUIDANCE FOR RADJ TX DLVR: CPT | Performed by: RADIOLOGY

## 2021-06-30 PROCEDURE — 77412 RADIATION TX DELIVERY LVL 3: CPT | Performed by: RADIOLOGY

## 2021-06-30 PROCEDURE — 77427 RADIATION TX MANAGEMENT X5: CPT | Performed by: RADIOLOGY

## 2021-06-30 NOTE — PROGRESS NOTES
On Treatment Visit       Patient: Ragini Dozier   YOB: 1960   Medical Record Number: [id number]     Date of Visit  June 30, 2021   Primary Diagnosis:Malignant neoplasm of upper-outer quadrant of left breast in female, estrogen receptor positive (CMS/HCC) [C50.412, Z17.0]  Cancer Staging: Cancer Staging  No matching staging information was found for the patient.       was seen today for an on treatment visit.  She is receiving radiation therapy to the left breast.  She  has received 532 cGy in 2 fractions out of a planned dose of 4256 cGy in 16 fractions. She completed neoadjuvant chemotherapy per Dr. Alvarez.     Today on exam the patient is tolerating radiation therapy well and has no new disease or treatment-related complaints.                                         Review of Systems:   Review of Systems   Constitutional: Negative for appetite change and fatigue.   Respiratory: Negative for cough and shortness of breath.    Gastrointestinal: Negative for constipation and diarrhea.   Skin: Negative for rash.   Neurological: Negative for dizziness and headaches.   Psychiatric/Behavioral: The patient is not nervous/anxious.        Vitals:     Vitals:    06/30/21 1615   BP: 111/72   Pulse: 69   Resp: 16   Temp: 97.7 °F (36.5 °C)   SpO2: 100%       Weight:   Wt Readings from Last 3 Encounters:   06/30/21 53.3 kg (117 lb 8.1 oz)   06/24/21 53.3 kg (117 lb 8.1 oz)   06/24/21 53.3 kg (117 lb 8.1 oz)      Pain:    Pain Score    06/30/21 1615   PainSc: 0-No pain         Physical Exam:  Physical Exam  Vitals reviewed.   Constitutional:       Appearance: Normal appearance.   Chest:      Breasts:         Left: No skin change.   Neurological:      Mental Status: She is alert.      Cranial Nerves: Cranial nerves are intact.           Plan: I have reviewed treatment setup notes, checked and approved the daily guidance images.  I reviewed dose delivery, treatment parameters and deemed them appropriate. We  plan to continue radiation therapy as prescribed.      Bertha Vega MD  Radiation Oncology   Electronically signed 6/30/2021  16:36 EDT

## 2021-07-01 ENCOUNTER — HOSPITAL ENCOUNTER (OUTPATIENT)
Dept: RADIATION ONCOLOGY | Facility: HOSPITAL | Age: 61
Setting detail: RADIATION/ONCOLOGY SERIES
End: 2021-07-01

## 2021-07-01 ENCOUNTER — HOSPITAL ENCOUNTER (OUTPATIENT)
Dept: RADIATION ONCOLOGY | Facility: HOSPITAL | Age: 61
Discharge: HOME | End: 2021-07-01

## 2021-07-01 PROCEDURE — 77412 RADIATION TX DELIVERY LVL 3: CPT | Performed by: RADIOLOGY

## 2021-07-01 PROCEDURE — G6002 STEREOSCOPIC X-RAY GUIDANCE: HCPCS | Performed by: RADIOLOGY

## 2021-07-01 PROCEDURE — 77387 GUIDANCE FOR RADJ TX DLVR: CPT | Performed by: RADIOLOGY

## 2021-07-02 ENCOUNTER — HOSPITAL ENCOUNTER (OUTPATIENT)
Dept: RADIATION ONCOLOGY | Facility: HOSPITAL | Age: 61
Setting detail: RADIATION/ONCOLOGY SERIES
Discharge: HOME | End: 2021-07-02

## 2021-07-02 PROCEDURE — 77412 RADIATION TX DELIVERY LVL 3: CPT | Performed by: RADIOLOGY

## 2021-07-02 PROCEDURE — 77387 GUIDANCE FOR RADJ TX DLVR: CPT | Performed by: RADIOLOGY

## 2021-07-02 PROCEDURE — G6002 STEREOSCOPIC X-RAY GUIDANCE: HCPCS | Performed by: RADIOLOGY

## 2021-07-06 ENCOUNTER — HOSPITAL ENCOUNTER (OUTPATIENT)
Dept: RADIATION ONCOLOGY | Facility: HOSPITAL | Age: 61
Discharge: HOME | End: 2021-07-06

## 2021-07-06 PROCEDURE — G6002 STEREOSCOPIC X-RAY GUIDANCE: HCPCS | Performed by: RADIOLOGY

## 2021-07-06 PROCEDURE — 77387 GUIDANCE FOR RADJ TX DLVR: CPT | Performed by: RADIOLOGY

## 2021-07-06 PROCEDURE — 77412 RADIATION TX DELIVERY LVL 3: CPT | Performed by: RADIOLOGY

## 2021-07-06 PROCEDURE — 77336 RADIATION PHYSICS CONSULT: CPT | Performed by: RADIOLOGY

## 2021-07-07 ENCOUNTER — HOSPITAL ENCOUNTER (OUTPATIENT)
Dept: RADIATION ONCOLOGY | Facility: HOSPITAL | Age: 61
Discharge: HOME | End: 2021-07-07

## 2021-07-07 PROCEDURE — 77387 GUIDANCE FOR RADJ TX DLVR: CPT | Performed by: RADIOLOGY

## 2021-07-07 PROCEDURE — G6002 STEREOSCOPIC X-RAY GUIDANCE: HCPCS | Performed by: RADIOLOGY

## 2021-07-07 PROCEDURE — 77412 RADIATION TX DELIVERY LVL 3: CPT | Performed by: RADIOLOGY

## 2021-07-08 ENCOUNTER — HOSPITAL ENCOUNTER (OUTPATIENT)
Dept: RADIATION ONCOLOGY | Facility: HOSPITAL | Age: 61
Discharge: HOME | End: 2021-07-08

## 2021-07-08 ENCOUNTER — APPOINTMENT (OUTPATIENT)
Dept: GENERAL RADIOLOGY | Facility: HOSPITAL | Age: 61
End: 2021-07-08

## 2021-07-08 PROCEDURE — 77412 RADIATION TX DELIVERY LVL 3: CPT | Performed by: NURSE PRACTITIONER

## 2021-07-09 ENCOUNTER — HOSPITAL ENCOUNTER (OUTPATIENT)
Dept: RADIATION ONCOLOGY | Facility: HOSPITAL | Age: 61
Discharge: HOME | End: 2021-07-09

## 2021-07-09 PROCEDURE — G6002 STEREOSCOPIC X-RAY GUIDANCE: HCPCS | Performed by: RADIOLOGY

## 2021-07-09 PROCEDURE — 77387 GUIDANCE FOR RADJ TX DLVR: CPT | Performed by: RADIOLOGY

## 2021-07-09 PROCEDURE — 77412 RADIATION TX DELIVERY LVL 3: CPT | Performed by: RADIOLOGY

## 2021-07-12 ENCOUNTER — HOSPITAL ENCOUNTER (OUTPATIENT)
Dept: RADIATION ONCOLOGY | Facility: HOSPITAL | Age: 61
Discharge: HOME | End: 2021-07-12

## 2021-07-12 PROCEDURE — 77387 GUIDANCE FOR RADJ TX DLVR: CPT | Performed by: RADIOLOGY

## 2021-07-12 PROCEDURE — 77412 RADIATION TX DELIVERY LVL 3: CPT | Performed by: RADIOLOGY

## 2021-07-12 PROCEDURE — G6002 STEREOSCOPIC X-RAY GUIDANCE: HCPCS | Performed by: RADIOLOGY

## 2021-07-13 ENCOUNTER — HOSPITAL ENCOUNTER (OUTPATIENT)
Dept: RADIATION ONCOLOGY | Facility: HOSPITAL | Age: 61
Discharge: HOME | End: 2021-07-13

## 2021-07-13 VITALS
HEIGHT: 60 IN | DIASTOLIC BLOOD PRESSURE: 66 MMHG | TEMPERATURE: 97 F | BODY MASS INDEX: 22.85 KG/M2 | RESPIRATION RATE: 16 BRPM | SYSTOLIC BLOOD PRESSURE: 106 MMHG | OXYGEN SATURATION: 100 % | WEIGHT: 116.4 LBS | HEART RATE: 68 BPM

## 2021-07-13 DIAGNOSIS — C50.412 MALIGNANT NEOPLASM OF UPPER-OUTER QUADRANT OF LEFT BREAST IN FEMALE, ESTROGEN RECEPTOR POSITIVE (HCC): Primary | ICD-10-CM

## 2021-07-13 DIAGNOSIS — Z17.0 MALIGNANT NEOPLASM OF UPPER-OUTER QUADRANT OF LEFT BREAST IN FEMALE, ESTROGEN RECEPTOR POSITIVE (HCC): Primary | ICD-10-CM

## 2021-07-13 PROCEDURE — 77387 GUIDANCE FOR RADJ TX DLVR: CPT | Performed by: RADIOLOGY

## 2021-07-13 PROCEDURE — 77412 RADIATION TX DELIVERY LVL 3: CPT | Performed by: RADIOLOGY

## 2021-07-13 PROCEDURE — 77336 RADIATION PHYSICS CONSULT: CPT | Performed by: RADIOLOGY

## 2021-07-13 PROCEDURE — G6002 STEREOSCOPIC X-RAY GUIDANCE: HCPCS | Performed by: RADIOLOGY

## 2021-07-13 PROCEDURE — 77427 RADIATION TX MANAGEMENT X5: CPT | Performed by: RADIOLOGY

## 2021-07-13 NOTE — PROGRESS NOTES
On Treatment Visit       Patient: Ragini Dozier   YOB: 1960   Medical Record Number: [id number]     Date of Visit  July 13, 2021   Primary Diagnosis:Malignant neoplasm of upper-outer quadrant of left breast in female, estrogen receptor positive (CMS/HCC) [C50.412, Z17.0]         was seen today for an on treatment visit.  She is receiving radiation therapy to the left breast.  She  has received 2660cGy in 10 fractions out of a planned dose of 4256 cGy in 16 fractions. She completed neoadjuvant chemotherapy per Dr. Alvarez.     Today on exam the patient is tolerating radiation therapy well and has no new disease or treatment-related complaints other than mild skin changes. She is not moisterizing her skin.                                       Review of Systems:   Review of Systems   Constitutional: Positive for fatigue. Negative for appetite change.   Respiratory: Negative for cough and shortness of breath.    Gastrointestinal: Negative for constipation, diarrhea and nausea.   Genitourinary: Negative for dysuria, frequency and urgency.   Skin: Positive for color change. Negative for rash.   Neurological: Negative for dizziness and headaches.   Psychiatric/Behavioral: The patient is not nervous/anxious.        Vitals:     Vitals:    07/13/21 0753   BP: 106/66   Pulse: 68   Resp: 16   Temp: 97 °F (36.1 °C)   SpO2: 100%       Weight:   Wt Readings from Last 3 Encounters:   07/13/21 52.8 kg (116 lb 6.5 oz)   06/30/21 53.3 kg (117 lb 8.1 oz)   06/24/21 53.3 kg (117 lb 8.1 oz)      Pain:    Pain Score    07/13/21 0753   PainSc: 0-No pain         Physical Exam:  Skin: faint rubor and skin papules    Plan: I have reviewed treatment setup notes, checked and approved the daily guidance images.  I reviewed dose delivery, treatment parameters and deemed them appropriate. We plan to continue radiation therapy as prescribed. Skin care instructions provided.      Radiation Oncology   Electronically signed  7/13/2021  08:12 EDT

## 2021-07-14 ENCOUNTER — APPOINTMENT (OUTPATIENT)
Dept: OCCUPATIONAL THERAPY | Facility: HOSPITAL | Age: 61
End: 2021-07-14

## 2021-07-14 ENCOUNTER — HOSPITAL ENCOUNTER (OUTPATIENT)
Dept: RADIATION ONCOLOGY | Facility: HOSPITAL | Age: 61
Discharge: HOME | End: 2021-07-14

## 2021-07-14 PROCEDURE — G6002 STEREOSCOPIC X-RAY GUIDANCE: HCPCS | Performed by: RADIOLOGY

## 2021-07-14 PROCEDURE — 77387 GUIDANCE FOR RADJ TX DLVR: CPT | Performed by: RADIOLOGY

## 2021-07-14 PROCEDURE — 77412 RADIATION TX DELIVERY LVL 3: CPT | Performed by: RADIOLOGY

## 2021-07-15 ENCOUNTER — HOSPITAL ENCOUNTER (OUTPATIENT)
Dept: ONCOLOGY | Facility: HOSPITAL | Age: 61
Setting detail: INFUSION SERIES
Discharge: HOME | End: 2021-07-15

## 2021-07-15 ENCOUNTER — HOSPITAL ENCOUNTER (OUTPATIENT)
Dept: RADIATION ONCOLOGY | Facility: HOSPITAL | Age: 61
Discharge: HOME | End: 2021-07-15

## 2021-07-15 ENCOUNTER — OFFICE VISIT (OUTPATIENT)
Dept: ONCOLOGY | Facility: HOSPITAL | Age: 61
End: 2021-07-15

## 2021-07-15 VITALS
BODY MASS INDEX: 23.2 KG/M2 | OXYGEN SATURATION: 97 % | DIASTOLIC BLOOD PRESSURE: 58 MMHG | RESPIRATION RATE: 18 BRPM | WEIGHT: 118.17 LBS | SYSTOLIC BLOOD PRESSURE: 95 MMHG | TEMPERATURE: 97.5 F | HEIGHT: 60 IN | HEART RATE: 70 BPM

## 2021-07-15 VITALS
DIASTOLIC BLOOD PRESSURE: 58 MMHG | TEMPERATURE: 97.5 F | OXYGEN SATURATION: 97 % | RESPIRATION RATE: 18 BRPM | BODY MASS INDEX: 23.2 KG/M2 | HEART RATE: 70 BPM | SYSTOLIC BLOOD PRESSURE: 95 MMHG | HEIGHT: 60 IN | WEIGHT: 118.17 LBS

## 2021-07-15 VITALS — HEIGHT: 60 IN | BODY MASS INDEX: 23.83 KG/M2 | WEIGHT: 121.37 LBS | RESPIRATION RATE: 14 BRPM

## 2021-07-15 DIAGNOSIS — Z45.2 ADJUSTMENT AND MANAGEMENT OF VASCULAR ACCESS DEVICE: ICD-10-CM

## 2021-07-15 DIAGNOSIS — T45.1X5A ANEMIA ASSOCIATED WITH CHEMOTHERAPY: ICD-10-CM

## 2021-07-15 DIAGNOSIS — D64.81 ANEMIA ASSOCIATED WITH CHEMOTHERAPY: ICD-10-CM

## 2021-07-15 DIAGNOSIS — C50.412 MALIGNANT NEOPLASM OF UPPER-OUTER QUADRANT OF LEFT BREAST IN FEMALE, ESTROGEN RECEPTOR POSITIVE (HCC): Primary | ICD-10-CM

## 2021-07-15 DIAGNOSIS — Z17.0 MALIGNANT NEOPLASM OF UPPER-OUTER QUADRANT OF LEFT BREAST IN FEMALE, ESTROGEN RECEPTOR POSITIVE (HCC): Primary | ICD-10-CM

## 2021-07-15 DIAGNOSIS — C50.412 MALIGNANT NEOPLASM OF UPPER-OUTER QUADRANT OF LEFT FEMALE BREAST, UNSPECIFIED ESTROGEN RECEPTOR STATUS (HCC): Primary | ICD-10-CM

## 2021-07-15 LAB
BASOPHILS # BLD AUTO: 0.01 10*3/MM3 (ref 0–0.2)
BASOPHILS NFR BLD AUTO: 0.2 % (ref 0–1.5)
DEPRECATED RDW RBC AUTO: 52.3 FL (ref 37–54)
EOSINOPHIL # BLD AUTO: 0.13 10*3/MM3 (ref 0–0.4)
EOSINOPHIL NFR BLD AUTO: 2.6 % (ref 0.3–6.2)
ERYTHROCYTE [DISTWIDTH] IN BLOOD BY AUTOMATED COUNT: 15.2 % (ref 12.3–15.4)
HCT VFR BLD AUTO: 32.3 % (ref 34–46.6)
HGB BLD-MCNC: 10.7 G/DL (ref 12–15.9)
IMM GRANULOCYTES # BLD AUTO: 0.02 10*3/MM3 (ref 0–0.05)
IMM GRANULOCYTES NFR BLD AUTO: 0.4 % (ref 0–0.5)
LYMPHOCYTES # BLD AUTO: 0.71 10*3/MM3 (ref 0.7–3.1)
LYMPHOCYTES NFR BLD AUTO: 14.3 % (ref 19.6–45.3)
MCH RBC QN AUTO: 30.7 PG (ref 26.6–33)
MCHC RBC AUTO-ENTMCNC: 33.1 G/DL (ref 31.5–35.7)
MCV RBC AUTO: 92.6 FL (ref 79–97)
MONOCYTES # BLD AUTO: 0.5 10*3/MM3 (ref 0.1–0.9)
MONOCYTES NFR BLD AUTO: 10.1 % (ref 5–12)
NEUTROPHILS NFR BLD AUTO: 3.59 10*3/MM3 (ref 1.7–7)
NEUTROPHILS NFR BLD AUTO: 72.4 % (ref 42.7–76)
PLATELET # BLD AUTO: 159 10*3/MM3 (ref 140–450)
PMV BLD AUTO: 8.3 FL (ref 6–12)
RBC # BLD AUTO: 3.49 10*6/MM3 (ref 3.77–5.28)
WBC # BLD AUTO: 4.96 10*3/MM3 (ref 3.4–10.8)

## 2021-07-15 PROCEDURE — 96413 CHEMO IV INFUSION 1 HR: CPT

## 2021-07-15 PROCEDURE — 25010000002 TRASTUZUMAB PER 10 MG: Performed by: INTERNAL MEDICINE

## 2021-07-15 PROCEDURE — 85025 COMPLETE CBC W/AUTO DIFF WBC: CPT | Performed by: INTERNAL MEDICINE

## 2021-07-15 PROCEDURE — 25010000002 HEPARIN LOCK FLUSH PER 10 UNITS: Performed by: INTERNAL MEDICINE

## 2021-07-15 PROCEDURE — 99214 OFFICE O/P EST MOD 30 MIN: CPT | Performed by: INTERNAL MEDICINE

## 2021-07-15 PROCEDURE — 77412 RADIATION TX DELIVERY LVL 3: CPT | Performed by: NURSE PRACTITIONER

## 2021-07-15 RX ORDER — SODIUM CHLORIDE 0.9 % (FLUSH) 0.9 %
20 SYRINGE (ML) INJECTION AS NEEDED
Status: CANCELLED | OUTPATIENT
Start: 2021-07-15

## 2021-07-15 RX ORDER — HEPARIN SODIUM (PORCINE) LOCK FLUSH IV SOLN 100 UNIT/ML 100 UNIT/ML
500 SOLUTION INTRAVENOUS AS NEEDED
Status: DISCONTINUED | OUTPATIENT
Start: 2021-07-15 | End: 2021-07-16 | Stop reason: HOSPADM

## 2021-07-15 RX ORDER — SODIUM CHLORIDE 9 MG/ML
250 INJECTION, SOLUTION INTRAVENOUS ONCE
Status: COMPLETED | OUTPATIENT
Start: 2021-07-15 | End: 2021-07-15

## 2021-07-15 RX ORDER — HEPARIN SODIUM (PORCINE) LOCK FLUSH IV SOLN 100 UNIT/ML 100 UNIT/ML
500 SOLUTION INTRAVENOUS AS NEEDED
Status: CANCELLED | OUTPATIENT
Start: 2021-07-15

## 2021-07-15 RX ORDER — SODIUM CHLORIDE 0.9 % (FLUSH) 0.9 %
20 SYRINGE (ML) INJECTION AS NEEDED
Status: DISCONTINUED | OUTPATIENT
Start: 2021-07-15 | End: 2021-07-16 | Stop reason: HOSPADM

## 2021-07-15 RX ORDER — SODIUM CHLORIDE 9 MG/ML
250 INJECTION, SOLUTION INTRAVENOUS ONCE
Status: CANCELLED | OUTPATIENT
Start: 2021-07-15

## 2021-07-15 RX ADMIN — SODIUM CHLORIDE, PRESERVATIVE FREE 20 ML: 5 INJECTION INTRAVENOUS at 12:40

## 2021-07-15 RX ADMIN — SODIUM CHLORIDE 250 ML: 9 INJECTION, SOLUTION INTRAVENOUS at 11:54

## 2021-07-15 RX ADMIN — HEPARIN SODIUM (PORCINE) LOCK FLUSH IV SOLN 100 UNIT/ML 500 UNITS: 100 SOLUTION at 12:40

## 2021-07-15 RX ADMIN — TRASTUZUMAB 300 MG: 150 INJECTION, POWDER, LYOPHILIZED, FOR SOLUTION INTRAVENOUS at 12:00

## 2021-07-15 NOTE — PROGRESS NOTES
Nurse Navigator Evaluation     07/15/2021  Ragini Dozier    Care Plan Delivery Method: Patient Visit    Care Plan Delivered to PCP Via: Fax    Referrals: none    Education Material Provided on: Care Plan, Diagnosis, Support Groups, Survivorship and  Late and Long Term Effects of Treatment.     [employer] Acuity Tool:  1    Diagnosis:   Malignant neoplasm of upper-outer quadrant of left female breast    New Cancer Screening:   none    Nurse Navigator Intervention:   Reviewed Survivorship care plan with patient. Discussed educational materials with patient. All questions answered, no further concerns.     Time Spent Evaluating: I spent 45 minutes caring for Ragini on this date of service. This time includes time spent by me in the following activities:preparing for the visit and counseling and educating the patient/family/caregiver    Astrid Moffett RN  7/15/2021

## 2021-07-15 NOTE — PROGRESS NOTES
Patient  Ragini Dozier    Location  [facility]    Chief Complaint  Breast Cancer and Follow-up    Referring Provider: TRISTON Pavon  PCP: Ashlee Dubose PA    Subjective          Oncology/Hematology History Overview Note   HR positive, HER-2 positive breast cancer:  -Found on routine screening mammogram  -Diagnostic mammogram on 11/10/2020: Calcifications in the left breast  -11/10/2020 left breast biopsy: Pathology positive for DCIS, high-grade, estrogen receptor positive (5%, strong), progesterone receptor positive (10%, weak-moderate)  -11/19/2020 MRI of the breast: 1.8 cm hematoma at the site of the recent biopsy showing DCIS.  There is a 1.1 cm nonfocal mass enhancement at the superior lateral margin of the hematoma and a separate 1.2 cm suspicious mass along the inferior medial margin.  These correspond the masses seen on ultrasound on 10/20/2020  -12/23/2020 MRI guided breast biopsy: Left lower inner quadrant pathology positive for invasive ductal carcinoma, grade 2, estrogen receptor positive (3%, moderate), progesterone receptor positive (1%, weak), HER-2 positive (3+ by IHC), Ki-67 approximately 60%.  Associated with high-grade ductal carcinoma in situ.  -Cycle 1 of TCH P on 12/18/2020.  Echocardiogram on 12/14/2020 shows an EF of 55%. C3 on 1/29/21  -C5 of TCHP on 3/12/21. Held C6 due to side effects.  -Left lumpectomy on 5/11/2021: No residual invasive carcinoma found, 0/8 lymph nodes involved, ypT0N0     Malignant neoplasm of upper-outer quadrant of left female breast (CMS/HCC)   6/23/2021 Initial Diagnosis    Malignant neoplasm of upper-outer quadrant of left female breast (CMS/HCC)     6/24/2021 -  Chemotherapy    OP BREAST Trastuzumab Q21D (maintenance)     6/29/2021 -  Radiation    RADIATION THERAPY Treatment Details (Noted on 6/23/2021)  Site: Left Breast  Technique: 3D CRT  Goal: No goal specified  Planned Treatment Start Date: 6/29/2021       Chemotherapy    TCHP: 12/18/2020-3/12/21 (5 cycles)  OP BREAST Trastuzumab-anns Q21D (maintenance)   - 6/3/21-present         History of Present Illness  The patient is here for her next cycle of Herceptin. She is currently getting radiation therapy but has almost finished.  She is feeling much better. Between radiation and this appt she and her  walked almost 5 miles at the park.      Review of Systems   Constitutional: Positive for fatigue. Negative for appetite change, diaphoresis, fever, unexpected weight gain and unexpected weight loss.   HENT: Negative for hearing loss, mouth sores, sore throat, swollen glands, trouble swallowing and voice change.    Eyes: Negative for blurred vision.   Respiratory: Negative for cough, shortness of breath and wheezing.    Cardiovascular: Negative for chest pain and palpitations.   Gastrointestinal: Negative for abdominal pain, blood in stool, constipation, diarrhea, nausea and vomiting.   Endocrine: Negative for cold intolerance and heat intolerance.   Genitourinary: Negative for difficulty urinating, dysuria, frequency, hematuria and urinary incontinence.   Musculoskeletal: Negative for arthralgias, back pain and myalgias.   Skin: Negative for rash, skin lesions and bruise.   Neurological: Negative for dizziness, seizures, weakness, numbness and headache.   Hematological: Does not bruise/bleed easily.   Psychiatric/Behavioral: Negative for depressed mood. The patient is not nervous/anxious.        Past Medical History:   Diagnosis Date   • Breast cancer (CMS/HCC)    • Breast cancer screening by mammogram 2020   • Depression    • Encounter for Hemoccult screening 2019   • Migraine headache    • Osteoporosis      Past Surgical History:   Procedure Laterality Date   • BREAST LUMPECTOMY     • CATARACT EXTRACTION     • COLONOSCOPY  2019   • HYSTERECTOMY      PARTIAL   • OTHER SURGICAL HISTORY      BIOPSY   • SKIN CANCER EXCISION       Social History     Socioeconomic  "History   • Marital status:      Spouse name: Not on file   • Number of children: 2   • Years of education: Not on file   • Highest education level: Not on file   Tobacco Use   • Smoking status: Never Smoker   • Smokeless tobacco: Never Used   Vaping Use   • Vaping Use: Never used   Substance and Sexual Activity   • Alcohol use: Yes     Comment: OCCASIONAL   • Drug use: Never   • Sexual activity: Defer     Family History   Problem Relation Age of Onset   • Kidney cancer Father    • Skin cancer Father    • Other Father         MYELOMA   • No Known Problems Mother        Objective   Physical Exam  General: Alert, cooperative, no acute distress  Eyes: Anicteric sclera, PERRLA  Respiratory: CTAB, normal respiratory effort  Abdomen: Normal active bowel sounds, no tenderness, no distention  Cardiovascular: RRR, no murmur, no lower extremity edema  Skin: Normal tone, no rash, no lesions  Psychiatric: Appropriate affect, intact judgment  Neurologic: No focal sensory or motor deficits, no weakness, numbness, dizziness  Musculoskeletal: Normal muscle strength and tone  Extremities: No clubbing, cyanosis, or deformities      Vitals:    07/15/21 1054   BP: 95/58   Pulse: 70   Resp: 18   Temp: 97.5 °F (36.4 °C)   TempSrc: Temporal   SpO2: 97%   Weight: 53.6 kg (118 lb 2.7 oz)   Height: 152.4 cm (60\")   PainSc: 0-No pain               PHQ-9 Total Score:         Result Review :   The following data was reviewed by: Princess Alvarez MD PhD on 07/15/2021:  Lab Results   Component Value Date    HGB 10.7 (L) 07/15/2021    HCT 32.3 (L) 07/15/2021    MCV 92.6 07/15/2021     07/15/2021    WBC 4.96 07/15/2021    NEUTROABS 3.59 07/15/2021    LYMPHSABS 0.71 07/15/2021    MONOSABS 0.50 07/15/2021    EOSABS 0.13 07/15/2021    BASOSABS 0.01 07/15/2021     Lab Results   Component Value Date    BUN 29 (H) 06/01/2021    CREATININE 1.19 (H) 06/01/2021     06/01/2021    K 4.1 06/01/2021     06/01/2021    CO2 22 06/01/2021 "    CALCIUM 8.9 06/01/2021    PROTEINTOT 6.6 06/01/2021    ALBUMIN 4.1 06/01/2021    BILITOT 0.19 (L) 06/01/2021    ALKPHOS 124 06/01/2021    AST 16 06/01/2021    ALT 8 (L) 06/01/2021          Assessment and Plan    Diagnoses and all orders for this visit:    1. Malignant neoplasm of upper-outer quadrant of left breast in female, estrogen receptor positive (CMS/HCC) (Primary)    2. Anemia associated with chemotherapy        Patient is here today for C7 of Herceptin (3 cycles given with chemotherapy before surgery).  Will plan for a total of 17 cycles.  She is tolerating it well and clinically improving.  Reviewed labs for treatment today which are normal except for mild anemia.   Her echocardiogram is scheduled prior to the next appt but she will not need to f/u that day unless there are concerns.  She will follow up with me every other cycle.     Normocytic Anemia: likely secondary to past chemotherapy.  Stable.  Will recheck iron studies which were last done in March. I will f/u results at the next appt.     Patient was given instructions and counseling regarding her condition or for health maintenance advice. Please see specific information pulled into the AVS if appropriate.     Princess Alvarez MD PhD    7/15/2021

## 2021-07-16 ENCOUNTER — HOSPITAL ENCOUNTER (OUTPATIENT)
Dept: RADIATION ONCOLOGY | Facility: HOSPITAL | Age: 61
Discharge: HOME | End: 2021-07-16

## 2021-07-16 PROCEDURE — 77387 GUIDANCE FOR RADJ TX DLVR: CPT | Performed by: RADIOLOGY

## 2021-07-16 PROCEDURE — G6002 STEREOSCOPIC X-RAY GUIDANCE: HCPCS | Performed by: RADIOLOGY

## 2021-07-16 PROCEDURE — 77412 RADIATION TX DELIVERY LVL 3: CPT | Performed by: RADIOLOGY

## 2021-07-19 ENCOUNTER — HOSPITAL ENCOUNTER (OUTPATIENT)
Dept: RADIATION ONCOLOGY | Facility: HOSPITAL | Age: 61
Discharge: HOME | End: 2021-07-19

## 2021-07-19 PROCEDURE — G6002 STEREOSCOPIC X-RAY GUIDANCE: HCPCS | Performed by: RADIOLOGY

## 2021-07-19 PROCEDURE — 77412 RADIATION TX DELIVERY LVL 3: CPT | Performed by: RADIOLOGY

## 2021-07-19 PROCEDURE — 77387 GUIDANCE FOR RADJ TX DLVR: CPT | Performed by: RADIOLOGY

## 2021-07-20 ENCOUNTER — HOSPITAL ENCOUNTER (OUTPATIENT)
Dept: RADIATION ONCOLOGY | Facility: HOSPITAL | Age: 61
Discharge: HOME | End: 2021-07-20

## 2021-07-20 VITALS
DIASTOLIC BLOOD PRESSURE: 57 MMHG | TEMPERATURE: 97.6 F | SYSTOLIC BLOOD PRESSURE: 96 MMHG | HEART RATE: 74 BPM | BODY MASS INDEX: 22.3 KG/M2 | WEIGHT: 114.2 LBS | OXYGEN SATURATION: 99 % | RESPIRATION RATE: 16 BRPM

## 2021-07-20 DIAGNOSIS — Z17.0 MALIGNANT NEOPLASM OF UPPER-OUTER QUADRANT OF LEFT BREAST IN FEMALE, ESTROGEN RECEPTOR POSITIVE (HCC): Primary | ICD-10-CM

## 2021-07-20 DIAGNOSIS — C50.412 MALIGNANT NEOPLASM OF UPPER-OUTER QUADRANT OF LEFT BREAST IN FEMALE, ESTROGEN RECEPTOR POSITIVE (HCC): Primary | ICD-10-CM

## 2021-07-20 PROCEDURE — 77427 RADIATION TX MANAGEMENT X5: CPT | Performed by: RADIOLOGY

## 2021-07-20 PROCEDURE — 77387 GUIDANCE FOR RADJ TX DLVR: CPT | Performed by: RADIOLOGY

## 2021-07-20 PROCEDURE — G6002 STEREOSCOPIC X-RAY GUIDANCE: HCPCS | Performed by: RADIOLOGY

## 2021-07-20 PROCEDURE — 77336 RADIATION PHYSICS CONSULT: CPT | Performed by: RADIOLOGY

## 2021-07-20 PROCEDURE — 77412 RADIATION TX DELIVERY LVL 3: CPT | Performed by: RADIOLOGY

## 2021-07-20 NOTE — PROGRESS NOTES
On Treatment Visit       Patient: Ragini Dozier   YOB: 1960   Medical Record Number: [id number]     Date of Visit  July 20, 2021   Primary Diagnosis:No primary diagnosis found.         was seen today for an on treatment visit.  She is receiving radiation therapy to the left breast.  She  has received 2660cGy in 10 fractions out of a planned dose of 4256 cGy in 16 fractions. She completed neoadjuvant chemotherapy per Dr. Alvarez.     Today on exam the patient is tolerating radiation therapy well and has no new disease or treatment-related complaints other than mild skin changes. She is not moisterizing her skin.                                       Review of Systems:   Review of Systems   Constitutional: Positive for fatigue. Negative for appetite change.   Respiratory: Negative for cough and shortness of breath.    Gastrointestinal: Negative for constipation, diarrhea and nausea.   Genitourinary: Negative for dysuria, frequency and urgency.   Skin: Positive for color change. Negative for rash.        itching   Neurological: Negative for dizziness and headaches.   Psychiatric/Behavioral: The patient is not nervous/anxious.        Vitals:     Vitals:    07/20/21 0749   BP: 96/57   Pulse: 74   Resp: 16   Temp: 97.6 °F (36.4 °C)   SpO2: 99%       Weight:   Wt Readings from Last 3 Encounters:   07/20/21 51.8 kg (114 lb 3.2 oz)   07/15/21 53.6 kg (118 lb 2.7 oz)   07/15/21 53.6 kg (118 lb 2.7 oz)      Pain:    Pain Score    07/20/21 0749   PainSc: 0-No pain         Physical Exam:  Skin: faint rubor and skin papules    Plan: I have reviewed treatment setup notes, checked and approved the daily guidance images.  I reviewed dose delivery, treatment parameters and deemed them appropriate. We plan to continue radiation therapy as prescribed. Skin care instructions provided.      Radiation Oncology   Electronically signed 7/20/2021  07:50 EDT

## 2021-07-20 NOTE — PROGRESS NOTES
On Treatment Visit       Patient: Ragini Dozier   YOB: 1960   Medical Record Number: [id number]     Date of Visit  July 20, 2021   Primary Diagnosis:No primary diagnosis found.         was seen today for an on treatment visit.  She is receiving radiation therapy to the left breast.  She  has received 3990cGy in 15 fractions out of a planned dose of 4256 cGy in 16 fractions. She completed neoadjuvant chemotherapy per Dr. Alvarez.     Today on exam the patient is tolerating radiation therapy well and has no new disease or treatment-related complaints other than mild skin changes. She is not moisterizing her skin. Sha has lost weight but is more active.                                       Review of Systems:   Review of Systems   Constitutional: Positive for fatigue. Negative for appetite change.   Respiratory: Negative for cough and shortness of breath.    Gastrointestinal: Negative for constipation, diarrhea and nausea.   Genitourinary: Negative for dysuria, frequency and urgency.   Skin: Positive for color change. Negative for rash.        itching   Neurological: Negative for dizziness and headaches.   Psychiatric/Behavioral: The patient is not nervous/anxious.        Vitals:     There were no vitals filed for this visit.    Weight:   Wt Readings from Last 3 Encounters:   07/20/21 51.8 kg (114 lb 3.2 oz)   07/15/21 53.6 kg (118 lb 2.7 oz)   07/15/21 53.6 kg (118 lb 2.7 oz)      Pain:    There were no vitals filed for this visit.      Physical Exam:  Skin: faint rubor and skin papules    Plan: I have reviewed treatment setup notes, checked and approved the daily guidance images.  I reviewed dose delivery, treatment parameters and deemed them appropriate. We plan to continue radiation therapy as prescribed.     Follow up in two months after completion of XRT    Archie Ramos MD  Radiation Oncology   Electronically signed 7/20/2021  08:10 EDT

## 2021-07-21 ENCOUNTER — HOSPITAL ENCOUNTER (OUTPATIENT)
Dept: RADIATION ONCOLOGY | Facility: HOSPITAL | Age: 61
Discharge: HOME | End: 2021-07-21

## 2021-07-21 PROCEDURE — 77412 RADIATION TX DELIVERY LVL 3: CPT | Performed by: RADIOLOGY

## 2021-07-21 PROCEDURE — 77387 GUIDANCE FOR RADJ TX DLVR: CPT | Performed by: RADIOLOGY

## 2021-07-21 PROCEDURE — G6002 STEREOSCOPIC X-RAY GUIDANCE: HCPCS | Performed by: RADIOLOGY

## 2021-07-26 ENCOUNTER — APPOINTMENT (OUTPATIENT)
Dept: GENERAL RADIOLOGY | Facility: HOSPITAL | Age: 61
End: 2021-07-26

## 2021-08-05 ENCOUNTER — HOSPITAL ENCOUNTER (OUTPATIENT)
Dept: CARDIOLOGY | Facility: HOSPITAL | Age: 61
Discharge: HOME | End: 2021-08-05

## 2021-08-05 ENCOUNTER — HOSPITAL ENCOUNTER (OUTPATIENT)
Dept: ONCOLOGY | Facility: HOSPITAL | Age: 61
Setting detail: INFUSION SERIES
Discharge: HOME | End: 2021-08-05

## 2021-08-05 ENCOUNTER — OFFICE VISIT (OUTPATIENT)
Dept: ONCOLOGY | Facility: HOSPITAL | Age: 61
End: 2021-08-05

## 2021-08-05 VITALS
TEMPERATURE: 97.3 F | HEART RATE: 58 BPM | DIASTOLIC BLOOD PRESSURE: 69 MMHG | SYSTOLIC BLOOD PRESSURE: 111 MMHG | OXYGEN SATURATION: 100 % | RESPIRATION RATE: 18 BRPM | WEIGHT: 117.95 LBS | BODY MASS INDEX: 23.03 KG/M2

## 2021-08-05 VITALS
DIASTOLIC BLOOD PRESSURE: 69 MMHG | RESPIRATION RATE: 18 BRPM | BODY MASS INDEX: 23.16 KG/M2 | OXYGEN SATURATION: 100 % | SYSTOLIC BLOOD PRESSURE: 111 MMHG | HEIGHT: 60 IN | WEIGHT: 117.95 LBS | HEART RATE: 58 BPM | TEMPERATURE: 97.3 F

## 2021-08-05 DIAGNOSIS — Z79.899 HIGH RISK MEDICATION USE: ICD-10-CM

## 2021-08-05 DIAGNOSIS — C50.412 MALIGNANT NEOPLASM OF UPPER-OUTER QUADRANT OF LEFT FEMALE BREAST, UNSPECIFIED ESTROGEN RECEPTOR STATUS (HCC): Primary | ICD-10-CM

## 2021-08-05 DIAGNOSIS — T45.1X5A ANEMIA ASSOCIATED WITH CHEMOTHERAPY: ICD-10-CM

## 2021-08-05 DIAGNOSIS — Z17.0 MALIGNANT NEOPLASM OF UPPER-OUTER QUADRANT OF LEFT BREAST IN FEMALE, ESTROGEN RECEPTOR POSITIVE (HCC): ICD-10-CM

## 2021-08-05 DIAGNOSIS — C50.412 MALIGNANT NEOPLASM OF UPPER-OUTER QUADRANT OF LEFT BREAST IN FEMALE, ESTROGEN RECEPTOR POSITIVE (HCC): ICD-10-CM

## 2021-08-05 DIAGNOSIS — C50.412 MALIGNANT NEOPLASM OF UPPER-OUTER QUADRANT OF LEFT FEMALE BREAST, UNSPECIFIED ESTROGEN RECEPTOR STATUS (HCC): ICD-10-CM

## 2021-08-05 DIAGNOSIS — D64.81 ANEMIA ASSOCIATED WITH CHEMOTHERAPY: ICD-10-CM

## 2021-08-05 DIAGNOSIS — Z45.2 ADJUSTMENT AND MANAGEMENT OF VASCULAR ACCESS DEVICE: ICD-10-CM

## 2021-08-05 LAB
BASOPHILS # BLD AUTO: 0.04 10*3/MM3 (ref 0–0.2)
BASOPHILS NFR BLD AUTO: 0.8 % (ref 0–1.5)
BH CV ECHO MEAS - AO ROOT DIAM: 2.8 CM
BH CV ECHO MEAS - EDV(MOD-SP2): 66 ML
BH CV ECHO MEAS - EDV(MOD-SP4): 52 ML
BH CV ECHO MEAS - ESV(MOD-SP2): 19 ML
BH CV ECHO MEAS - ESV(MOD-SP4): 23 ML
BH CV ECHO MEAS - IVSD: 1 CM
BH CV ECHO MEAS - LA DIMENSION(2D): 3.1 CM
BH CV ECHO MEAS - LAT PEAK E' VEL: 7 CM/SEC
BH CV ECHO MEAS - LVIDD: 4.2 CM
BH CV ECHO MEAS - LVIDS: 2.2 CM
BH CV ECHO MEAS - LVPWD: 1 CM
BH CV ECHO MEAS - MED PEAK E' VEL: 12 CM/SEC
BH CV ECHO MEAS - MR MAX PG: 112 MMHG
BH CV ECHO MEAS - MR MAX VEL: 530 CM/SEC
BH CV ECHO MEAS - MR MEAN VEL: 84 CM/SEC
BH CV ECHO MEAS - MV A MAX VEL: 62 CM/SEC
BH CV ECHO MEAS - MV DEC TIME: 168 MSEC
BH CV ECHO MEAS - MV E MAX VEL: 62 CM/SEC
BH CV ECHO MEAS - MV E/A: 1
BH CV ECHO MEASUREMENTS AVERAGE E/E' RATIO: 6.53
DEPRECATED RDW RBC AUTO: 50.6 FL (ref 37–54)
EOSINOPHIL # BLD AUTO: 0.14 10*3/MM3 (ref 0–0.4)
EOSINOPHIL NFR BLD AUTO: 2.7 % (ref 0.3–6.2)
ERYTHROCYTE [DISTWIDTH] IN BLOOD BY AUTOMATED COUNT: 14.7 % (ref 12.3–15.4)
FERRITIN SERPL-MCNC: 433.1 NG/ML (ref 13–150)
HCT VFR BLD AUTO: 32.4 % (ref 34–46.6)
HGB BLD-MCNC: 11.2 G/DL (ref 12–15.9)
IMM GRANULOCYTES # BLD AUTO: 0.01 10*3/MM3 (ref 0–0.05)
IMM GRANULOCYTES NFR BLD AUTO: 0.2 % (ref 0–0.5)
IRON 24H UR-MRATE: 72 MCG/DL (ref 37–145)
IRON SATN MFR SERPL: 31 % (ref 20–50)
IVRT: 71 MSEC
LEFT ATRIUM VOLUME INDEX: 22 ML/M2
LYMPHOCYTES # BLD AUTO: 0.68 10*3/MM3 (ref 0.7–3.1)
LYMPHOCYTES NFR BLD AUTO: 13.1 % (ref 19.6–45.3)
MAXIMAL PREDICTED HEART RATE: 161 BPM
MCH RBC QN AUTO: 32 PG (ref 26.6–33)
MCHC RBC AUTO-ENTMCNC: 34.6 G/DL (ref 31.5–35.7)
MCV RBC AUTO: 92.6 FL (ref 79–97)
MONOCYTES # BLD AUTO: 0.52 10*3/MM3 (ref 0.1–0.9)
MONOCYTES NFR BLD AUTO: 10 % (ref 5–12)
MR PISA EROA: 0.18 CM2
NEUTROPHILS NFR BLD AUTO: 3.8 10*3/MM3 (ref 1.7–7)
NEUTROPHILS NFR BLD AUTO: 73.2 % (ref 42.7–76)
PLATELET # BLD AUTO: 161 10*3/MM3 (ref 140–450)
PMV BLD AUTO: 8.3 FL (ref 6–12)
RBC # BLD AUTO: 3.5 10*6/MM3 (ref 3.77–5.28)
STRESS TARGET HR: 137 BPM
TIBC SERPL-MCNC: 229 MCG/DL (ref 298–536)
TRANSFERRIN SERPL-MCNC: 154 MG/DL (ref 200–360)
WBC # BLD AUTO: 5.19 10*3/MM3 (ref 3.4–10.8)

## 2021-08-05 PROCEDURE — 84466 ASSAY OF TRANSFERRIN: CPT | Performed by: INTERNAL MEDICINE

## 2021-08-05 PROCEDURE — 96413 CHEMO IV INFUSION 1 HR: CPT

## 2021-08-05 PROCEDURE — 93306 TTE W/DOPPLER COMPLETE: CPT

## 2021-08-05 PROCEDURE — 25010000002 TRASTUZUMAB PER 10 MG: Performed by: INTERNAL MEDICINE

## 2021-08-05 PROCEDURE — 99214 OFFICE O/P EST MOD 30 MIN: CPT | Performed by: INTERNAL MEDICINE

## 2021-08-05 PROCEDURE — 25010000002 HEPARIN LOCK FLUSH PER 10 UNITS: Performed by: INTERNAL MEDICINE

## 2021-08-05 PROCEDURE — 85025 COMPLETE CBC W/AUTO DIFF WBC: CPT | Performed by: INTERNAL MEDICINE

## 2021-08-05 PROCEDURE — 83540 ASSAY OF IRON: CPT | Performed by: INTERNAL MEDICINE

## 2021-08-05 PROCEDURE — 93306 TTE W/DOPPLER COMPLETE: CPT | Performed by: INTERNAL MEDICINE

## 2021-08-05 PROCEDURE — 82728 ASSAY OF FERRITIN: CPT | Performed by: INTERNAL MEDICINE

## 2021-08-05 RX ORDER — OXCARBAZEPINE 300 MG/1
300 TABLET, FILM COATED ORAL DAILY
Status: ON HOLD | COMMUNITY
End: 2022-08-09

## 2021-08-05 RX ORDER — SODIUM CHLORIDE 0.9 % (FLUSH) 0.9 %
20 SYRINGE (ML) INJECTION AS NEEDED
Status: CANCELLED | OUTPATIENT
Start: 2021-08-05

## 2021-08-05 RX ORDER — SODIUM CHLORIDE 0.9 % (FLUSH) 0.9 %
20 SYRINGE (ML) INJECTION AS NEEDED
Status: DISCONTINUED | OUTPATIENT
Start: 2021-08-05 | End: 2021-08-06 | Stop reason: HOSPADM

## 2021-08-05 RX ORDER — HEPARIN SODIUM (PORCINE) LOCK FLUSH IV SOLN 100 UNIT/ML 100 UNIT/ML
500 SOLUTION INTRAVENOUS AS NEEDED
Status: CANCELLED | OUTPATIENT
Start: 2021-08-05

## 2021-08-05 RX ORDER — SODIUM CHLORIDE 9 MG/ML
250 INJECTION, SOLUTION INTRAVENOUS ONCE
Status: CANCELLED | OUTPATIENT
Start: 2021-08-05

## 2021-08-05 RX ORDER — PROPRANOLOL HYDROCHLORIDE 80 MG/1
80 TABLET ORAL DAILY
COMMUNITY
End: 2022-05-09

## 2021-08-05 RX ORDER — HEPARIN SODIUM (PORCINE) LOCK FLUSH IV SOLN 100 UNIT/ML 100 UNIT/ML
500 SOLUTION INTRAVENOUS AS NEEDED
Status: DISCONTINUED | OUTPATIENT
Start: 2021-08-05 | End: 2021-08-06 | Stop reason: HOSPADM

## 2021-08-05 RX ORDER — SODIUM CHLORIDE 9 MG/ML
250 INJECTION, SOLUTION INTRAVENOUS ONCE
Status: COMPLETED | OUTPATIENT
Start: 2021-08-05 | End: 2021-08-05

## 2021-08-05 RX ADMIN — HEPARIN SODIUM (PORCINE) LOCK FLUSH IV SOLN 100 UNIT/ML 500 UNITS: 100 SOLUTION at 12:23

## 2021-08-05 RX ADMIN — SODIUM CHLORIDE 250 ML: 9 INJECTION, SOLUTION INTRAVENOUS at 11:41

## 2021-08-05 RX ADMIN — SODIUM CHLORIDE, PRESERVATIVE FREE 20 ML: 5 INJECTION INTRAVENOUS at 12:23

## 2021-08-05 RX ADMIN — TRASTUZUMAB 300 MG: 150 INJECTION, POWDER, LYOPHILIZED, FOR SOLUTION INTRAVENOUS at 11:49

## 2021-08-05 NOTE — PROGRESS NOTES
Patient  Ragini Dozier    Location  [facility]    Chief Complaint  Breast Cancer and Chemotherapy    Referring Provider: TRISTON Pavon  PCP: Ashlee Dubose PA    Subjective          Oncology/Hematology History Overview Note   HR positive, HER-2 positive breast cancer:  -Found on routine screening mammogram  -Diagnostic mammogram on 11/10/2020: Calcifications in the left breast  -11/10/2020 left breast biopsy: Pathology positive for DCIS, high-grade, estrogen receptor positive (5%, strong), progesterone receptor positive (10%, weak-moderate)  -11/19/2020 MRI of the breast: 1.8 cm hematoma at the site of the recent biopsy showing DCIS.  There is a 1.1 cm nonfocal mass enhancement at the superior lateral margin of the hematoma and a separate 1.2 cm suspicious mass along the inferior medial margin.  These correspond the masses seen on ultrasound on 10/20/2020  -12/23/2020 MRI guided breast biopsy: Left lower inner quadrant pathology positive for invasive ductal carcinoma, grade 2, estrogen receptor positive (3%, moderate), progesterone receptor positive (1%, weak), HER-2 positive (3+ by IHC), Ki-67 approximately 60%.  Associated with high-grade ductal carcinoma in situ.  -Cycle 1 of TCH P on 12/18/2020.  Echocardiogram on 12/14/2020 shows an EF of 55%. C3 on 1/29/21  -C5 of TCHP on 3/12/21. Held C6 due to side effects.  -Left lumpectomy on 5/11/2021: No residual invasive carcinoma found, 0/8 lymph nodes involved, ypT0N0     Malignant neoplasm of upper-outer quadrant of left breast in female, estrogen receptor positive (CMS/HCC)   6/23/2021 Initial Diagnosis    Malignant neoplasm of upper-outer quadrant of left female breast (CMS/HCC)     6/24/2021 -  Chemotherapy    OP BREAST Trastuzumab Q21D (maintenance)     6/29/2021 -  Radiation    RADIATION THERAPY Treatment Details (Noted on 6/23/2021)  Site: Left Breast  Technique: 3D CRT  Goal: No goal specified  Planned  Treatment Start Date: 6/29/2021      Chemotherapy    TCHP: 12/18/2020-3/12/21 (5 cycles)  OP BREAST Trastuzumab-anns Q21D (maintenance)   - 6/3/21-present         History of Present Illness  Patient comes in today for her next cycle of Herceptin.  She continues tolerating this very well.  She had her repeat echocardiogram today but that is not yet resulted.  She denies shortness of breath, lower extremity swelling, or any limitations at this point.  She recently came back from vacation and is feeling well.    She did ask about why her breast looks no different after treatment given that she only had a lumpectomy followed by radiation.  We discussed the impact of these treatments.  She is interested in referral to plastic surgery.    Review of Systems   Constitutional: Negative for appetite change, diaphoresis, fatigue, fever, unexpected weight gain and unexpected weight loss.   HENT: Negative for hearing loss, mouth sores, sore throat, swollen glands, trouble swallowing and voice change.    Eyes: Negative for blurred vision.   Respiratory: Negative for cough, shortness of breath and wheezing.    Cardiovascular: Negative for chest pain and palpitations.   Gastrointestinal: Negative for abdominal pain, blood in stool, constipation, diarrhea, nausea and vomiting.   Endocrine: Negative for cold intolerance and heat intolerance.   Genitourinary: Negative for difficulty urinating, dysuria, frequency, hematuria and urinary incontinence.   Musculoskeletal: Negative for arthralgias, back pain and myalgias.   Skin: Negative for rash, skin lesions and bruise.   Neurological: Negative for dizziness, seizures, weakness, numbness and headache.   Hematological: Does not bruise/bleed easily.   Psychiatric/Behavioral: Negative for depressed mood. The patient is not nervous/anxious.        Past Medical History:   Diagnosis Date   • Breast cancer (CMS/HCC)    • Breast cancer screening by mammogram 2020   • Depression    • Encounter  "for Hemoccult screening 2019   • Migraine headache    • Osteoporosis      Past Surgical History:   Procedure Laterality Date   • BREAST LUMPECTOMY     • CATARACT EXTRACTION     • COLONOSCOPY  2019   • HYSTERECTOMY      PARTIAL   • OTHER SURGICAL HISTORY      BIOPSY   • SKIN CANCER EXCISION       Social History     Socioeconomic History   • Marital status:      Spouse name: Not on file   • Number of children: 2   • Years of education: Not on file   • Highest education level: Not on file   Tobacco Use   • Smoking status: Never Smoker   • Smokeless tobacco: Never Used   Vaping Use   • Vaping Use: Never used   Substance and Sexual Activity   • Alcohol use: Yes     Comment: OCCASIONAL   • Drug use: Never   • Sexual activity: Defer     Family History   Problem Relation Age of Onset   • Kidney cancer Father    • Skin cancer Father    • Other Father         MYELOMA   • No Known Problems Mother        Objective   Physical Exam  General: Alert, cooperative, no acute distress, well appearing  Eyes: Anicteric sclera, PERRLA  Respiratory: CTAB, normal respiratory effort  Abdomen: Normal active bowel sounds, no tenderness, no distention  Cardiovascular: RRR, no murmur, no lower extremity edema  Skin: Normal tone, no rash, no lesions  Psychiatric: Appropriate affect, intact judgment  Neurologic: No focal sensory or motor deficits, no weakness, numbness, dizziness  Musculoskeletal: Normal muscle strength and tone, appears fit  Extremities: No clubbing, cyanosis, or deformities      Vitals:    08/05/21 1048   BP: 111/69   Pulse: 58   Resp: 18   Temp: 97.3 °F (36.3 °C)   TempSrc: Temporal   SpO2: 100%   Weight: 53.5 kg (117 lb 15.1 oz)   Height: 152.4 cm (60\")   PainSc: 0-No pain               PHQ-9 Total Score: 0       Result Review :   The following data was reviewed by: Princess Alvarez MD PhD on 08/05/2021:  Lab Results   Component Value Date    HGB 11.2 (L) 08/05/2021    HCT 32.4 (L) 08/05/2021    MCV 92.6 08/05/2021    "  08/05/2021    WBC 5.19 08/05/2021    NEUTROABS 3.80 08/05/2021    LYMPHSABS 0.68 (L) 08/05/2021    MONOSABS 0.52 08/05/2021    EOSABS 0.14 08/05/2021    BASOSABS 0.04 08/05/2021     Lab Results   Component Value Date    BUN 29 (H) 06/01/2021    CREATININE 1.19 (H) 06/01/2021     06/01/2021    K 4.1 06/01/2021     06/01/2021    CO2 22 06/01/2021    CALCIUM 8.9 06/01/2021    PROTEINTOT 6.6 06/01/2021    ALBUMIN 4.1 06/01/2021    BILITOT 0.19 (L) 06/01/2021    ALKPHOS 124 06/01/2021    AST 16 06/01/2021    ALT 8 (L) 06/01/2021          Assessment and Plan    Diagnoses and all orders for this visit:    1. Malignant neoplasm of upper-outer quadrant of left female breast, unspecified estrogen receptor status (CMS/HCC) (Primary)  -     Cancel: sodium chloride 0.9 % infusion 250 mL  -     Cancel: Trastuzumab (HERCEPTIN) 320 mg in sodium chloride 0.9 % 250 mL chemo IVPB  -     Ambulatory Referral to Plastic Surgery    2. Malignant neoplasm of upper-outer quadrant of left breast in female, estrogen receptor positive (CMS/HCC)      HR+/HER-2 positive breast cancer: Patient completed neoadjuvant TCHP followed by left breast lumpectomy.  She is here today for cycle 3 of Herceptin out of 11 total to complete her year of Herceptin therapy.  I reviewed her labs and there is no evidence of toxicity.  Her echocardiogram to monitor toxicity of the treatment is pending from today.  I will refer her to plastic surgery to discuss options for breast augmentation following her treatment.  I will follow up with her every other cycle.  I will contact the patient if there are any abnormalities on her echocardiogram prior to the next cycle.  At that time we will also need to discuss an aromatase inhibitor.    Patient was given instructions and counseling regarding her condition or for health maintenance advice. Please see specific information pulled into the AVS if appropriate.     Princess Alvarez MD PhD    8/5/2021

## 2021-08-17 ENCOUNTER — HOSPITAL ENCOUNTER (OUTPATIENT)
Dept: OCCUPATIONAL THERAPY | Facility: HOSPITAL | Age: 61
Discharge: HOME | End: 2021-08-17
Admitting: SURGERY

## 2021-08-17 DIAGNOSIS — L90.5 SCAR CONDITION AND FIBROSIS OF SKIN: ICD-10-CM

## 2021-08-17 DIAGNOSIS — Z98.890 S/P LUMPECTOMY, LEFT BREAST: ICD-10-CM

## 2021-08-17 DIAGNOSIS — Z17.0 MALIGNANT NEOPLASM OF UPPER-OUTER QUADRANT OF LEFT BREAST IN FEMALE, ESTROGEN RECEPTOR POSITIVE (HCC): ICD-10-CM

## 2021-08-17 DIAGNOSIS — Z91.89 AT RISK FOR LYMPHEDEMA: Primary | ICD-10-CM

## 2021-08-17 DIAGNOSIS — C50.412 MALIGNANT NEOPLASM OF UPPER-OUTER QUADRANT OF LEFT BREAST IN FEMALE, ESTROGEN RECEPTOR POSITIVE (HCC): ICD-10-CM

## 2021-08-17 PROCEDURE — 97110 THERAPEUTIC EXERCISES: CPT

## 2021-08-17 PROCEDURE — 97535 SELF CARE MNGMENT TRAINING: CPT

## 2021-08-17 NOTE — THERAPY RE-EVALUATION
Outpatient Occupational Therapy Lymphedema Re-Evaluation   [place]     Patient Name: Ragini Dozier  : 1960  MRN: [id number]  Today's Date: 2021      Visit Date: 2021    Patient Active Problem List   Diagnosis   • Anxiety   • Cataract   • Depression   • Migraine   • Esophageal dysphagia   • Globus sensation   • Malignant neoplasm of upper-outer quadrant of left breast in female, estrogen receptor positive (CMS/HCC)   • Adjustment and management of vascular access device   • Anemia associated with chemotherapy        Past Medical History:   Diagnosis Date   • Breast cancer (CMS/HCC)    • Breast cancer screening by mammogram    • Depression    • Encounter for Hemoccult screening    • GERD (gastroesophageal reflux disease)    • Malignant neoplasm of upper-outer quadrant of left female breast (CMS/HCC)    • Migraine headache    • Osteoporosis         Past Surgical History:   Procedure Laterality Date   • BREAST LUMPECTOMY     • BREAST LUMPECTOMY WITH SENTINEL NODE BIOPSY Left    • BUNIONECTOMY Bilateral    • CATARACT EXTRACTION     • COLONOSCOPY     • HYSTERECTOMY      PARTIAL   • OTHER SURGICAL HISTORY      BIOPSY   • SKIN CANCER EXCISION     • TONSILLECTOMY           Visit Dx:     ICD-10-CM ICD-9-CM   1. At risk for lymphedema  Z91.89 V49.89   2. Scar condition and fibrosis of skin  L90.5 709.2       Patient History     Row Name 21 0800             History    Chief Complaint  Other 1 (comment) at risk for lymphedema  -CH      Date Current Problem(s) Began  21  -CH      Brief Description of Current Complaint  Left invasive ductal carcinoma with associated DCIS status post lumpectomy with sentinel node biopsy (x8). Pt. has completed XRT.  Pt. is completing monthy herceptin.   -CH      Hand Dominance  right-handed  -CH         Services    Are you currently receiving Home Health services  No  -CH      Do you plan to receive Home Health services in the near future  No  -CH          Daily Activities    Primary Language  English  -CH      Are you able to read  Yes  -CH      Are you able to write  Yes  -CH      How does patient learn best?  Listening;Reading;Demonstration;Pictures/Video  -CH      Barriers to learning  None  -CH         Safety    Are you being hurt, hit, or frightened by anyone at home or in your life?  No  -CH      Are you being neglected by a caregiver  No  -CH      Have you had any of the following issues with  Depression;Eating Disorders controlled at this time  -CH        User Key  (r) = Recorded By, (t) = Taken By, (c) = Cosigned By    Initials Name Provider Type     Cassidy Grigsby, OT Occupational Therapist          Lymphedema     Row Name 08/17/21 0800             Subjective Pain    Able to rate subjective pain?  yes  -CH      Pre-Treatment Pain Level  0  -CH      Post-Treatment Pain Level  0  -CH         Subjective Comments    Subjective Comments  Pt. states she has ongoing itching and numbness. Pt. states she has an occasional ache in the L lateral chest wall that is relieve with stretch and massage to the area.    -CH         Lymphedema Assessment    Lymphedema Classification  LUE:;at risk/stage 0  -CH      Lymphedema Cancer Related Sx  left;lumpectomy;sentinel node biopsy  -CH      Lymph Nodes Removed #  8  -CH      Positive Lymph Nodes #  0  -CH      Chemo Received  yes  -CH      Chemo Treatments #/Timeframe  TCHP    -CH      Adverse Chemo Reactions/Complication  increased gastrointestinal distress with increased vomitting and had to go to ER  -CH      Radiation Therapy Received  yes  -CH      Radiation Treatments #/Timeframe  16  -CH         Physical Concerns    The amount of pain associated with my lymphedema is:  0  -CH      The amount of limb heaviness associated with my lymphedema is:  0  -CH      The amount of skin tightness associated with my lymphedema is:  0  -CH      The size of my swollen limb(s) seems:  0  -CH      Lymphedema affects the movement of my  "swollen limb(s):  0  -CH      The strength in my swollen limb(s) is:  0  -CH         Psychosocial Concerns    Lymphedema affects my body image (i.e., \"how I think I look\").  0  -CH      Lymphedema affects my socializing with others.  0  -CH      Lymphedema affects my intimate relations with spouse or partner (rate 0 if not applicable  0  -CH      Lymphedema \"gets me down\" (i.e., depression, frustration, or anger)  0  -CH      I must rely on others for help due to my lymphedema.  0  -CH      I know what to do to manage my lymphedema  2  -CH         Functional Concerns    Lymphedema affects my ability to perform self-care activities (i.e. eating, dressing, hygiene)  0  -CH      Lymphedema affects my ability to perform routine home or work-related activities.  0  -CH      Lymphedema affects my performance of preferred leisure activities.  0  -CH      Lymphedema affects proper fit of clothing/shoes  0  -CH      Lymphedema affects my sleep  0  -CH         Posture/Observations    Posture- WNL  Posture is WNL  -CH         General ROM    GENERAL ROM COMMENTS  B UE WFL  -CH         MMT (Manual Muscle Testing)    General MMT Comments  B UE WFL  -CH         Skin Changes/Observations    Location/Assessment  Upper Quadrant  -CH      Upper Quadrant Conditions  left:;normal;intact  -CH      Upper Quadrant Color/Pigment  left:;normal  -CH      Skin Observations Comment  mild skin thickening and scar tissue fibrosis noted around lumpectomy and snld biopsy incision sites.  mild tenderness with palpation superior to snld biospy insiciaion site no lymphatic chording noted.   -CH         Lymphedema Measurements    Measurement Type(s)  Circumferential  -CH         Lymphedema Life Impact Scale Totals    A.  Total Q1 - Q17 (Do not include Q18)  2  -CH      B.  Total number of questions answered (Q1-Q17)  17  -CH      C. Divide A by B  0.12  -CH      D. Multiple C by 25  3  -CH        User Key  (r) = Recorded By, (t) = Taken By, (c) = " Cosigned By    Initials Name Provider Type    Cassidy Contreras OT Occupational Therapist         Circumferential Measurements:        Pt. Is noted with -8.3% lymph volume in L UE compared to R.         Therapy Education  Education Details: Review of lymphedema prevention education.  Education on exercise benefit on prevention of lymphedema and cancer reoccurance.  HEP provided to support moderate resistive exercises in the affect limb for prevention of lymphedema as well as to improve postural control to improve soft tissue tightening from XRT to prevent orthopedic dysfunction.  Given: Symptoms/condition management, HEP  Program: New, Reinforced  How Provided: Verbal  Provided to: Patient  Level of Understanding: Verbalized, Teach back education performed  15352 - OT Self Care/Mgmt Minutes: 35  OT reviewed with patient the signs and symptoms of lymph exasperation and emphasized the importance to contact OT if she does start to experience any of those symptoms as early intervention is key to stop progression through stages of lymphedema.  OT reference the Lymphedema Surveillance program handout reviewing signs and symptoms as well as appropriate action to take during a lymph exacerbation.  Rehabilitation Oncology: July 2019 - Volume 37 - Issue 3 - p 122-127 doi: [id number].    Pt. educated on the benefit of engaging in regular exercise routine with monitoring her heart rate range to ensure patient is working in a safe heart rate range for exercise, to maximize gains from exercise and to minimize side effect of cancer treatment. Pt. was educated on the importance of gradual progression as well as recovery days to minimize exercise injury, build endurance and support competence/confidence in her lifestyle behavioral change. It has been explained the patient that the program is designed to have the ultimate goal of at least 150 min of moderate exercises each week in 90 min coming from cardio in  suggested intensity/HR and at least 60 min from strength, balance, and flexibility.   OT Exercises     Row Name 08/17/21 0918             Exercise 1    Exercise Name 1  Corner assisted pectoralis major stretch  -CH         Exercise 2    Exercise Name 2  Corner assisted pectoralis minor stretch  -CH         Exercise 3    Exercise Name 3  Wall assisted external rotation with scapular retraction stretch  -CH         Exercise 4    Exercise Name 4  Scapular retraction isometric exercise  -CH         Exercise 5    Exercise Name 5  Horizontal abduction with resistance  -CH      Equipment 5  Theraband  -CH      Resistance 5  Green  -CH         Exercise 6    Exercise Name 6  PNF D2 extension/flexion  -CH      Equipment 6  Theraband  -CH      Resistance 6  Green  -CH         Exercise 7    Exercise Name 7  Resistive external rotation and scapular retraction  -CH      Equipment 7  Theraband  -CH      Resistance 7  Green  -CH         Exercise 8    Exercise Name 8  Tricep extension  -CH      Equipment 8  Theraband  -CH      Resistance 8  Green  -CH         Exercise 9    Exercise Name 9  Bicep flexion  -CH      Equipment 9  Theraband  -CH      Resistance 9  Green  -CH        User Key  (r) = Recorded By, (t) = Taken By, (c) = Cosigned By    Initials Name Provider Type    Cassidy Contreras OT Occupational Therapist          OT Goals     Row Name 08/17/21 0918          Time Calculation    OT Goal Re-Cert Due Date  09/16/21  -CH       User Key  (r) = Recorded By, (t) = Taken By, (c) = Cosigned By    Initials Name Provider Type    Cassidy Contreras OT Occupational Therapist        Goals  1. Self-care Functional Limitation    LTG 1: 12 weeks:  As an indicator of no exacerbation of lymphedema staging, the patient will present with no greater than 10% increase in total UE lymph volumetric from baseline of non-affected upper extremity.    STATUS: Met: Ongoing  STG 1a: 4 weeks:  As an indicator of no exacerbation of lymphedema staging,  the patient will present with no greater than 10% increase in total UE lymph volumetric from baseline of non-affected upper extremity.   STATUS: Met: Ongoing  STG1b: 4 weeks: To prevent exacerbation of mixed edema to lymphedema, patient will utilize the 2 (58442) Daya compression bra  daily.      STATUS: Met: Ongoing   STG 1c: 4 weeks: Patient will be independent with self-manual lymphatic massage.    STATUS: Met: Ongoing   STG 1d: 4 weeks: Patient will be independent with identification of signs and symptoms of lymphedema exasperation per stoplight to recovery education sheet.    STATUS: Met: Ongoing  TREATMENT:  Self Care/ADL retraining, Therapeutic Activity, Neuromuscular Re-education, Therapeutic Exercise, Bioimpedence Fluid Analysis, Post-Surgical compression x [id number] Daya Zip-ST-High, Orthotic Management and training,  and Manual Therapy.    2. The patient complains of pain in the B shoulder.     LTG 2: 12 weeks:  The patient will report a pain rating of 1/10 or better in order to improve sleep quality and tolerance to performance of activities of daily living.    STATUS: Met   STG 2a: 4 weeks:  The patient will report a pain rating of 1/10 or better.     STATUS: Met   TREATMENT:  Manual Therapy, Therapeutic exercise, ADL/self-care therapy, Bioimpedence Fluid Analysis, Orthotic management and training, Therapeutic Activity, Wound dressing as needed, Modalities: TENS, NMES, Ultrasound, Fluidotherapy, patient and family/caregiver education.    OT Assessment/Plan     Row Name 08/17/21 0915          OT Assessment    Functional Limitations  Other (comment) At risk for lymphedema  -CH     Impairments  Impaired lymphatic circulation  -CH     Assessment Comments  Patient is demonstrating functional lymphatic dynamics this date.  Patient has completed XRT and has 7 more Herceptin treatments to complete.  Patient also has an appointment with the plastic surgeon for potential reconstruction surgery to improve symmetry  and balance and chest wall appearance.  Patient will benefit from continued skilled occupational therapy services to evaluate ongoing lymphatic functioning to prevent advancement in lymphedema staging.  Patient will also benefit from skilled occupational therapy services to prevent orthopedic decline in the shoulder due to soft tissue pathological changes.  -     OT Rehab Potential  Excellent  -     Patient/caregiver participated in establishment of treatment plan and goals  Yes  -     Patient would benefit from skilled therapy intervention  Yes  -CH        OT Plan    OT Frequency  Other (comment) Pt. to re-evaluated 3 weeks post-surgery,  3 weeks post XRT, every 3 months from baseline for years 1-3 and every 6 months years 4 and 5  -       User Key  (r) = Recorded By, (t) = Taken By, (c) = Cosigned By    Initials Name Provider Type     Cassidy Grigsby OT Occupational Therapist                    Time Calculation:   Timed Charges  00442 - OT Therapeutic Exercise Minutes: 15  16089 - OT Self Care/Mgmt Minutes: 35  Total Minutes  Timed Charges Total Minutes: 50   Total Minutes: 50     Therapy Charges for Today     Code Description Service Date Service Provider Modifiers Qty    94576910323  OT THER PROC EA 15 MIN 8/17/2021 Cassidy Grgisby OT GO 2    [id number]  OT SELF CARE/MGMT/TRAIN EA 15 MIN 8/17/2021 Cassidy Grigsby OT GO 1                    Cassidy Grigsby OT  8/17/2021

## 2021-09-07 ENCOUNTER — APPOINTMENT (OUTPATIENT)
Dept: CT IMAGING | Facility: HOSPITAL | Age: 61
End: 2021-09-07

## 2021-09-07 ENCOUNTER — HOSPITAL ENCOUNTER (EMERGENCY)
Facility: HOSPITAL | Age: 61
Discharge: HOME | End: 2021-09-07
Attending: EMERGENCY MEDICINE | Admitting: EMERGENCY MEDICINE

## 2021-09-07 ENCOUNTER — APPOINTMENT (OUTPATIENT)
Dept: GENERAL RADIOLOGY | Facility: HOSPITAL | Age: 61
End: 2021-09-07

## 2021-09-07 ENCOUNTER — TELEPHONE (OUTPATIENT)
Dept: ONCOLOGY | Facility: HOSPITAL | Age: 61
End: 2021-09-07

## 2021-09-07 VITALS
TEMPERATURE: 98.6 F | BODY MASS INDEX: 22.16 KG/M2 | HEIGHT: 60 IN | OXYGEN SATURATION: 98 % | SYSTOLIC BLOOD PRESSURE: 109 MMHG | WEIGHT: 112.88 LBS | HEART RATE: 69 BPM | DIASTOLIC BLOOD PRESSURE: 68 MMHG | RESPIRATION RATE: 15 BRPM

## 2021-09-07 DIAGNOSIS — N39.0 ACUTE UTI: Primary | ICD-10-CM

## 2021-09-07 LAB
ALBUMIN SERPL-MCNC: 3.8 G/DL (ref 3.5–5.2)
ALBUMIN/GLOB SERPL: 1.2 G/DL
ALP SERPL-CCNC: 93 U/L (ref 39–117)
ALT SERPL W P-5'-P-CCNC: 22 U/L (ref 1–33)
ANION GAP SERPL CALCULATED.3IONS-SCNC: 13.7 MMOL/L (ref 5–15)
AST SERPL-CCNC: 26 U/L (ref 1–32)
BACTERIA UR QL AUTO: ABNORMAL /HPF
BASOPHILS # BLD AUTO: 0.02 10*3/MM3 (ref 0–0.2)
BASOPHILS NFR BLD AUTO: 0.2 % (ref 0–1.5)
BILIRUB SERPL-MCNC: 0.4 MG/DL (ref 0–1.2)
BILIRUB UR QL STRIP: NEGATIVE
BUN SERPL-MCNC: 21 MG/DL (ref 6–20)
BUN/CREAT SERPL: 20 (ref 7–25)
CALCIUM SPEC-SCNC: 9 MG/DL (ref 8.6–10.5)
CHLORIDE SERPL-SCNC: 102 MMOL/L (ref 98–107)
CLARITY UR: ABNORMAL
CO2 SERPL-SCNC: 17.3 MMOL/L (ref 22–29)
COLOR UR: YELLOW
CREAT SERPL-MCNC: 1.05 MG/DL (ref 0.57–1)
DEPRECATED RDW RBC AUTO: 43.5 FL (ref 37–54)
EOSINOPHIL # BLD AUTO: 0.04 10*3/MM3 (ref 0–0.4)
EOSINOPHIL NFR BLD AUTO: 0.4 % (ref 0.3–6.2)
ERYTHROCYTE [DISTWIDTH] IN BLOOD BY AUTOMATED COUNT: 12.6 % (ref 12.3–15.4)
GFR SERPL CREATININE-BSD FRML MDRD: 54 ML/MIN/1.73
GLOBULIN UR ELPH-MCNC: 3.3 GM/DL
GLUCOSE BLDC GLUCOMTR-MCNC: 80 MG/DL (ref 70–99)
GLUCOSE SERPL-MCNC: 105 MG/DL (ref 65–99)
GLUCOSE UR STRIP-MCNC: NEGATIVE MG/DL
HCT VFR BLD AUTO: 30.6 % (ref 34–46.6)
HGB BLD-MCNC: 10.5 G/DL (ref 12–15.9)
HGB UR QL STRIP.AUTO: ABNORMAL
HOLD SPECIMEN: NORMAL
HOLD SPECIMEN: NORMAL
HYALINE CASTS UR QL AUTO: ABNORMAL /LPF
IMM GRANULOCYTES # BLD AUTO: 0.07 10*3/MM3 (ref 0–0.05)
IMM GRANULOCYTES NFR BLD AUTO: 0.6 % (ref 0–0.5)
KETONES UR QL STRIP: NEGATIVE
LEUKOCYTE ESTERASE UR QL STRIP.AUTO: ABNORMAL
LYMPHOCYTES # BLD AUTO: 0.62 10*3/MM3 (ref 0.7–3.1)
LYMPHOCYTES NFR BLD AUTO: 5.6 % (ref 19.6–45.3)
MAGNESIUM SERPL-MCNC: 2.1 MG/DL (ref 1.6–2.6)
MCH RBC QN AUTO: 32.3 PG (ref 26.6–33)
MCHC RBC AUTO-ENTMCNC: 34.3 G/DL (ref 31.5–35.7)
MCV RBC AUTO: 94.2 FL (ref 79–97)
MONOCYTES # BLD AUTO: 1.13 10*3/MM3 (ref 0.1–0.9)
MONOCYTES NFR BLD AUTO: 10.3 % (ref 5–12)
NEUTROPHILS NFR BLD AUTO: 82.9 % (ref 42.7–76)
NEUTROPHILS NFR BLD AUTO: 9.13 10*3/MM3 (ref 1.7–7)
NITRITE UR QL STRIP: POSITIVE
NRBC BLD AUTO-RTO: 0 /100 WBC (ref 0–0.2)
PH UR STRIP.AUTO: 6 [PH] (ref 5–8)
PLATELET # BLD AUTO: 204 10*3/MM3 (ref 140–450)
PMV BLD AUTO: 8.9 FL (ref 6–12)
POTASSIUM SERPL-SCNC: 3.4 MMOL/L (ref 3.5–5.2)
PROT SERPL-MCNC: 7.1 G/DL (ref 6–8.5)
PROT UR QL STRIP: ABNORMAL
RBC # BLD AUTO: 3.25 10*6/MM3 (ref 3.77–5.28)
RBC # UR: ABNORMAL /HPF
REF LAB TEST METHOD: ABNORMAL
SODIUM SERPL-SCNC: 133 MMOL/L (ref 136–145)
SP GR UR STRIP: 1.01 (ref 1–1.03)
SQUAMOUS #/AREA URNS HPF: ABNORMAL /HPF
TROPONIN T SERPL-MCNC: <0.01 NG/ML (ref 0–0.03)
UROBILINOGEN UR QL STRIP: ABNORMAL
WBC # BLD AUTO: 11.01 10*3/MM3 (ref 3.4–10.8)
WBC UR QL AUTO: ABNORMAL /HPF
WHOLE BLOOD HOLD SPECIMEN: NORMAL
WHOLE BLOOD HOLD SPECIMEN: NORMAL

## 2021-09-07 PROCEDURE — 93010 ELECTROCARDIOGRAM REPORT: CPT | Performed by: INTERNAL MEDICINE

## 2021-09-07 PROCEDURE — 87077 CULTURE AEROBIC IDENTIFY: CPT | Performed by: EMERGENCY MEDICINE

## 2021-09-07 PROCEDURE — 93005 ELECTROCARDIOGRAM TRACING: CPT

## 2021-09-07 PROCEDURE — 82962 GLUCOSE BLOOD TEST: CPT

## 2021-09-07 PROCEDURE — 93005 ELECTROCARDIOGRAM TRACING: CPT | Performed by: EMERGENCY MEDICINE

## 2021-09-07 PROCEDURE — 80053 COMPREHEN METABOLIC PANEL: CPT

## 2021-09-07 PROCEDURE — 81001 URINALYSIS AUTO W/SCOPE: CPT | Performed by: EMERGENCY MEDICINE

## 2021-09-07 PROCEDURE — 99283 EMERGENCY DEPT VISIT LOW MDM: CPT

## 2021-09-07 PROCEDURE — 83735 ASSAY OF MAGNESIUM: CPT

## 2021-09-07 PROCEDURE — 85025 COMPLETE CBC W/AUTO DIFF WBC: CPT

## 2021-09-07 PROCEDURE — 87086 URINE CULTURE/COLONY COUNT: CPT | Performed by: EMERGENCY MEDICINE

## 2021-09-07 PROCEDURE — 96372 THER/PROPH/DIAG INJ SC/IM: CPT

## 2021-09-07 PROCEDURE — 87186 SC STD MICRODIL/AGAR DIL: CPT | Performed by: EMERGENCY MEDICINE

## 2021-09-07 PROCEDURE — 84484 ASSAY OF TROPONIN QUANT: CPT

## 2021-09-07 PROCEDURE — 25010000002 CEFTRIAXONE PER 250 MG: Performed by: EMERGENCY MEDICINE

## 2021-09-07 PROCEDURE — 70450 CT HEAD/BRAIN W/O DYE: CPT

## 2021-09-07 PROCEDURE — 71045 X-RAY EXAM CHEST 1 VIEW: CPT

## 2021-09-07 RX ORDER — CEFTRIAXONE 1 G/1
1 INJECTION, POWDER, FOR SOLUTION INTRAMUSCULAR; INTRAVENOUS ONCE
Status: DISCONTINUED | OUTPATIENT
Start: 2021-09-07 | End: 2021-09-07 | Stop reason: SDUPTHER

## 2021-09-07 RX ORDER — CEPHALEXIN 500 MG/1
500 CAPSULE ORAL 3 TIMES DAILY
Qty: 40 CAPSULE | Refills: 0 | Status: SHIPPED | OUTPATIENT
Start: 2021-09-07 | End: 2021-11-18

## 2021-09-07 RX ORDER — SODIUM CHLORIDE 0.9 % (FLUSH) 0.9 %
10 SYRINGE (ML) INJECTION AS NEEDED
Status: DISCONTINUED | OUTPATIENT
Start: 2021-09-07 | End: 2021-09-07 | Stop reason: HOSPADM

## 2021-09-07 RX ADMIN — LIDOCAINE HYDROCHLORIDE 1 G: 10 INJECTION, SOLUTION EPIDURAL; INFILTRATION; INTRACAUDAL; PERINEURAL at 16:28

## 2021-09-07 NOTE — TELEPHONE ENCOUNTER
Alex called and states that the pt has been have mental acuity changes, stumbling when ambulating, fatigue and excessive sleeping for the last several days. pt has been to Urgent Care and was Covid tested. The test was negative. These side effects are not commonly related to the pt's Herceptin tx. Dr Alvarez's nurse, Oriana RN, notified of s/s. Last tx was on 8/5/21 and the pt was seen by Dr Alvarez during the visit. Oriana RN states the pt was normal at that visit. Alex was instructed to take the pt to the ER. Alex voices understanding.

## 2021-09-07 NOTE — ED NOTES
Report received on patient from Dotty Whitehead RN at this time, care of patient assumed.     Ruthie Felipe, RN  09/07/21 4601

## 2021-09-07 NOTE — ED PROVIDER NOTES
Time: 2:37 PM EDT  Arrived by: private car  Chief Complaint: weakness  History provided by: patient  History is limited by: N/A     History of Present Illness:  Patient is a 61 y.o. year old female that presents to the emergency department with weakness that started 1 week ago and has gotten worse.  The patient reports that she finished chemo and radiation in April.  She reports that she has been feeling somewhat cloudy.  She reports oliguria with no dysuria.  Patient has no vomiting or diarrhea.  Patient has no fever or chills.  Patient denies cough and hemoptysis.  Patient denies subjective neurological deficit including motor or sensory deficit.  Patient has no headache.  Patient denies visual changes.  Patient denies chest pain and shortness of breath.    Weakness - Generalized  Severity:  Mild  Onset quality:  Gradual  Duration:  1 week  Timing:  Constant  Progression:  Waxing and waning  Chronicity:  New  Context: increased activity    Context: not alcohol use, not allergies, not change in medication, not dehydration, not drug use, not recent infection and not stress    Relieved by:  Nothing  Worsened by:  Nothing  Ineffective treatments:  None tried  Associated symptoms: anorexia and lethargy    Associated symptoms: no abdominal pain, no aphasia, no arthralgias, no ataxia, no chest pain, no cough, no diarrhea, no difficulty walking, no dizziness, no drooling, no dysphagia, no dysuria, no numbness in extremities, no falls, no fever, no foul-smelling urine, no frequency, no headaches, no hematochezia, no loss of consciousness, no myalgias, no nausea, no near-syncope, no seizures, no sensory-motor deficit, no shortness of breath, no stroke symptoms, no syncope, no urgency, no vision change and no vomiting        Similar Symptoms Previously: yes  Recently seen: urgent care      Patient Care Team  Primary Care Provider: Ashlee Dubose PA    Past Medical History:     No Known Allergies  Past Medical History:    Diagnosis Date   • Breast cancer (CMS/HCC)    • Breast cancer screening by mammogram 2020   • Depression    • Encounter for Hemoccult screening 2019   • GERD (gastroesophageal reflux disease)    • Malignant neoplasm of upper-outer quadrant of left female breast (CMS/HCC)    • Migraine headache    • Osteoporosis      Past Surgical History:   Procedure Laterality Date   • BREAST LUMPECTOMY     • BREAST LUMPECTOMY WITH SENTINEL NODE BIOPSY Left    • BUNIONECTOMY Bilateral    • CATARACT EXTRACTION     • COLONOSCOPY  2019   • HYSTERECTOMY      PARTIAL   • OTHER SURGICAL HISTORY      BIOPSY   • SKIN CANCER EXCISION     • TONSILLECTOMY       Family History   Problem Relation Age of Onset   • Kidney cancer Father    • Skin cancer Father    • Other Father         MYELOMA   • No Known Problems Mother        Home Medications:  Prior to Admission medications    Medication Sig Start Date End Date Taking? Authorizing Provider   butalbital-acetaminophen-caffeine (FIORICET, ESGIC) -40 MG per tablet butalbital-acetaminophen-caff -40 mg oral tablet take 1 tablet by oral route every 4 hours as needed not to exceed 6 tablets per 24hrs   Active    ProviderJennifer MD   Calcium Carbonate-Vitamin D (calcium-vitamin D) 500-200 MG-UNIT tablet per tablet Calcium 500 + D 500 mg(1,250mg) -200 unit oral tablet take 1 tablet by oral route daily   Active    ProviderJennifer MD   diazePAM (VALIUM) 5 MG tablet  5/26/21   Jennifer Quinones MD   DULoxetine (CYMBALTA) 30 MG capsule  4/29/21   Jennifer Quinones MD   OXcarbazepine (TRILEPTAL) 300 MG tablet Take 300 mg by mouth 2 (Two) Times a Day.    ProviderJennifer MD   pantoprazole (PROTONIX) 40 MG EC tablet  5/23/21   Jennifer Quinones MD   propranolol (INDERAL) 80 MG tablet Take 80 mg by mouth Daily.    ProviderJennifer MD   QUEtiapine (SEROquel) 400 MG tablet  4/27/21   Jennifer Quinones MD   risedronate (ACTONEL) 150 MG tablet risedronate 150 mg  "oral tablet take 1 tablet (150 mg) by oral route once a month on the same date with a full glass of water at least 30 min before first food or drink of the day; remain in an upright position for at least 30 min.   Active    Provider, MD Jennifer   topiramate (TOPAMAX) 50 MG tablet topiramate 50 mg oral tablet take 1 tablet (50 mg) by oral route 2 times per day   Active    Provider, MD Jennifer   zolpidem (AMBIEN) 10 MG tablet  5/26/21   Provider, MD Jennifer        Social History:   Social History     Tobacco Use   • Smoking status: Never Smoker   • Smokeless tobacco: Never Used   Vaping Use   • Vaping Use: Never used   Substance Use Topics   • Alcohol use: Yes     Comment: OCCASIONAL   • Drug use: Never     Recent travel: no     Review of Systems:  Review of Systems   Constitutional: Negative for chills and fever.   HENT: Negative for congestion, drooling, rhinorrhea and sore throat.    Eyes: Negative for pain and visual disturbance.   Respiratory: Negative for apnea, cough, chest tightness and shortness of breath.    Cardiovascular: Negative for chest pain, palpitations, syncope and near-syncope.   Gastrointestinal: Positive for anorexia. Negative for abdominal pain, diarrhea, dysphagia, hematochezia, nausea and vomiting.   Genitourinary: Negative for difficulty urinating, dysuria, frequency and urgency.   Musculoskeletal: Negative for arthralgias, falls, joint swelling and myalgias.   Skin: Negative for color change.   Neurological: Negative for dizziness, seizures, loss of consciousness and headaches.   Psychiatric/Behavioral: Negative.    All other systems reviewed and are negative.       Physical Exam:  /67 (Patient Position: Lying)   Pulse 70   Temp 98.6 °F (37 °C) (Oral)   Resp 14   Ht 152.4 cm (60\")   Wt 51.2 kg (112 lb 14 oz)   SpO2 100%   BMI 22.04 kg/m²     Physical Exam  Vitals and nursing note reviewed.   Constitutional:       General: She is not in acute distress.     Appearance: " Normal appearance. She is not toxic-appearing.   HENT:      Head: Normocephalic and atraumatic.      Jaw: There is normal jaw occlusion.   Eyes:      General: Lids are normal.      Extraocular Movements: Extraocular movements intact.      Conjunctiva/sclera: Conjunctivae normal.      Pupils: Pupils are equal, round, and reactive to light.   Cardiovascular:      Rate and Rhythm: Normal rate and regular rhythm.      Pulses: Normal pulses.      Heart sounds: Normal heart sounds.   Pulmonary:      Effort: Pulmonary effort is normal. No respiratory distress.      Breath sounds: Normal breath sounds. No wheezing or rhonchi.   Abdominal:      General: Abdomen is flat.      Palpations: Abdomen is soft.      Tenderness: There is no abdominal tenderness. There is no guarding or rebound.   Musculoskeletal:         General: Normal range of motion.      Cervical back: Normal range of motion and neck supple.      Right lower leg: No edema.      Left lower leg: No edema.   Skin:     General: Skin is warm and dry.   Neurological:      Mental Status: She is alert and oriented to person, place, and time. Mental status is at baseline.   Psychiatric:         Mood and Affect: Mood normal.                Medications in the Emergency Department:  Medications   sodium chloride 0.9 % flush 10 mL (has no administration in time range)   sodium chloride 0.9 % bolus 1,000 mL (has no administration in time range)   cefTRIAXone (ROCEPHIN) 1 g/100 mL 0.9% NS (MBP) (has no administration in time range)        Labs  Lab Results (last 24 hours)     Procedure Component Value Units Date/Time    CBC & Differential [[id number]]  (Abnormal) Collected: 09/07/21 1159    Specimen: Blood Updated: 09/07/21 1222    Narrative:      The following orders were created for panel order CBC & Differential.  Procedure                               Abnormality         Status                     ---------                               -----------         ------                      CBC Auto Differential[[id number]]        Abnormal            Final result                 Please view results for these tests on the individual orders.    Comprehensive Metabolic Panel [[id number]]  (Abnormal) Collected: 09/07/21 1159    Specimen: Blood Updated: 09/07/21 1245     Glucose 105 mg/dL      BUN 21 mg/dL      Creatinine 1.05 mg/dL      Sodium 133 mmol/L      Potassium 3.4 mmol/L      Chloride 102 mmol/L      CO2 17.3 mmol/L      Calcium 9.0 mg/dL      Total Protein 7.1 g/dL      Albumin 3.80 g/dL      ALT (SGPT) 22 U/L      AST (SGOT) 26 U/L      Alkaline Phosphatase 93 U/L      Total Bilirubin 0.4 mg/dL      eGFR Non African Amer 54 mL/min/1.73      Globulin 3.3 gm/dL      A/G Ratio 1.2 g/dL      BUN/Creatinine Ratio 20.0     Anion Gap 13.7 mmol/L     Narrative:      GFR Normal >60  Chronic Kidney Disease <60  Kidney Failure <15      Troponin [[id number]]  (Normal) Collected: 09/07/21 1159    Specimen: Blood Updated: 09/07/21 1244     Troponin T <0.010 ng/mL     Narrative:      Troponin T Reference Range:  <= 0.03 ng/mL-   Negative for AMI  >0.03 ng/mL-     Abnormal for myocardial necrosis.  Clinicians would have to utilize clinical acumen, EKG, Troponin and serial changes to determine if it is an Acute Myocardial Infarction or myocardial injury due to an underlying chronic condition.       Results may be falsely decreased if patient taking Biotin.      Magnesium [[id number]]  (Normal) Collected: 09/07/21 1159    Specimen: Blood Updated: 09/07/21 1245     Magnesium 2.1 mg/dL     CBC Auto Differential [[id number]]  (Abnormal) Collected: 09/07/21 1159    Specimen: Blood Updated: 09/07/21 1222     WBC 11.01 10*3/mm3      RBC 3.25 10*6/mm3      Hemoglobin 10.5 g/dL      Hematocrit 30.6 %      MCV 94.2 fL      MCH 32.3 pg      MCHC 34.3 g/dL      RDW 12.6 %      RDW-SD 43.5 fl      MPV 8.9 fL      Platelets 204 10*3/mm3      Neutrophil % 82.9 %      Lymphocyte % 5.6 %      Monocyte % 10.3 %       Eosinophil % 0.4 %      Basophil % 0.2 %      Immature Grans % 0.6 %      Neutrophils, Absolute 9.13 10*3/mm3      Lymphocytes, Absolute 0.62 10*3/mm3      Monocytes, Absolute 1.13 10*3/mm3      Eosinophils, Absolute 0.04 10*3/mm3      Basophils, Absolute 0.02 10*3/mm3      Immature Grans, Absolute 0.07 10*3/mm3      nRBC 0.0 /100 WBC     POC Glucose Once [[id number]]  (Normal) Collected: 09/07/21 1448    Specimen: Blood Updated: 09/07/21 1448     Glucose 80 mg/dL      Comment: Serial Number: [id number]:  [id number]       Urinalysis With Culture If Indicated - Urine, Clean Catch [[id number]]  (Abnormal) Collected: 09/07/21 1451    Specimen: Urine, Clean Catch Updated: 09/07/21 1508     Color, UA Yellow     Appearance, UA Cloudy     pH, UA 6.0     Specific Gravity, UA 1.014     Glucose, UA Negative     Ketones, UA Negative     Bilirubin, UA Negative     Blood, UA Trace     Protein, UA 30 mg/dL (1+)     Leuk Esterase, UA Large (3+)     Nitrite, UA Positive     Urobilinogen, UA 1.0 E.U./dL    Urinalysis, Microscopic Only - Urine, Clean Catch [[id number]]  (Abnormal) Collected: 09/07/21 1451    Specimen: Urine, Clean Catch Updated: 09/07/21 1508     RBC, UA 3-5 /HPF      WBC, UA Too Numerous to Count /HPF      Bacteria, UA 4+ /HPF      Squamous Epithelial Cells, UA 0-2 /HPF      Hyaline Casts, UA 0-2 /LPF      Methodology Automated Microscopy    Urine Culture - Urine, Urine, Clean Catch [[id number]] Collected: 09/07/21 1451    Specimen: Urine, Clean Catch Updated: 09/07/21 1508           Imaging:  CT Head Without Contrast    Result Date: 9/7/2021  PROCEDURE: CT HEAD WO CONTRAST  COMPARISON:  [facility], MR, BRAIN W/WO CONTRAST, 3/22/2021, 10:52. INDICATIONS: body weakness/no hx. of cva/seizure/left breast ca. on immuno therapy now  PROTOCOL:   Standard imaging protocol performed    RADIATION:   DLP: 1018.2mGy*cm   MA and/or KV was adjusted to minimize radiation dose.     TECHNIQUE: After obtaining  the patient's consent, CT images were obtained without non-ionic intravenous contrast material.  FINDINGS:  CEREBRUM: No edema, hemorrhage, mass, acute infarction, or inappropriate atrophy.  CEREBELLUM: No edema, hemorrhage, mass, acute infarction, or inappropriate atrophy.  BRAINSTEM: No edema, hemorrhage, mass, acute infarction, or inappropriate atrophy.  CSF SPACES: Ventricles, cisterns, and sulci are appropriate for age.  No hydrocephalus, subarachnoid hemorrhage, or mass.  SKULL: No mass or other significant visible lesion.  SINUSES: Limited views demonstrate no significant mucosal thickening or fluid.  ORBITS: Limited views are unremarkable.  OTHER: Negative.   CONCLUSION: No acute disease.      ANSELMO MARTINEZ MD       Electronically Signed and Approved By: ANSELMO MARTINEZ MD on 9/07/2021 at 15:21             XR Chest 1 View    Result Date: 9/7/2021  PROCEDURE: XR CHEST 1 VW  COMPARISON: [facility], , CHEST AP/PA 1 VIEW, 3/19/2021, 17:22.  INDICATIONS: WEAKNESS SINCE TODAY  FINDINGS:  Heart size is within normal limits.  No dense consolidation.  No pleural fluid no pneumothorax.  Right-sided chest port is unchanged.  CONCLUSION: No active process       SEYMOUR LARSON MD       Electronically Signed and Approved By: SEYMOUR LARSON MD on 9/07/2021 at 14:54               Procedures:  Procedures    Progress                            Medical Decision Making:  MDM  Number of Diagnoses or Management Options  Diagnosis management comments: Based on the results of the patient´s urinalysis and urinary complaints, signs, symptoms, and diagnostic testing is consistent with a urinary tract infection.  The patient´s CBC was reviewed and shows no abnormalities of critical concern. Of note, there is no anemia requiring a blood transfusion and the platelet count is acceptable.  The patient´s CMP was reviewed and shows no abnormalities of critical concern. Of note, the patient´s sodium and potassium are acceptable. The  patient´s liver enzymes are unremarkable. The patient´s renal function (creatinine) is preserved. The patient has a normal anion gap.  CT scan of the head is negative for acute intracranial abnormalities.  Patient is resting comfortably, is alert, and is in no distress. The repeat examination is unremarkable and benign. The patient has no signs of urosepsis. The patient was started on antibiotics in the emergency department and will be discharged with antibiotics as an outpatient. The patient was counseled to return to the ER for fever >100.5, intractable pain or vomiting, or any other concerns that the may have. The patient has expressed a clear and thorough understanding and agreed to follow up as instructed.        Amount and/or Complexity of Data Reviewed  Independent visualization of images, tracings, or specimens: yes    Risk of Complications, Morbidity, and/or Mortality  Presenting problems: moderate  Management options: moderate    Patient Progress  Patient progress: stable       Final diagnoses:   Acute UTI        Disposition:  ED Disposition     ED Disposition Condition Comment    Discharge Stable           This medical record created using voice recognition software and may contain unintended errors.             Filomena Varner MD  09/07/21 [id number]

## 2021-09-08 LAB — QT INTERVAL: 389 MS

## 2021-09-09 LAB — BACTERIA SPEC AEROBE CULT: ABNORMAL

## 2021-09-16 ENCOUNTER — TELEPHONE (OUTPATIENT)
Dept: NUTRITION | Facility: HOSPITAL | Age: 61
End: 2021-09-16

## 2021-09-16 ENCOUNTER — OFFICE VISIT (OUTPATIENT)
Dept: ONCOLOGY | Facility: HOSPITAL | Age: 61
End: 2021-09-16

## 2021-09-16 ENCOUNTER — HOSPITAL ENCOUNTER (OUTPATIENT)
Dept: ONCOLOGY | Facility: HOSPITAL | Age: 61
Setting detail: INFUSION SERIES
Discharge: HOME | End: 2021-09-16

## 2021-09-16 VITALS
BODY MASS INDEX: 21.61 KG/M2 | SYSTOLIC BLOOD PRESSURE: 102 MMHG | OXYGEN SATURATION: 97 % | DIASTOLIC BLOOD PRESSURE: 64 MMHG | WEIGHT: 110.67 LBS | TEMPERATURE: 97.6 F | HEART RATE: 66 BPM | RESPIRATION RATE: 18 BRPM

## 2021-09-16 VITALS
WEIGHT: 110.67 LBS | DIASTOLIC BLOOD PRESSURE: 64 MMHG | HEART RATE: 66 BPM | SYSTOLIC BLOOD PRESSURE: 102 MMHG | HEIGHT: 60 IN | BODY MASS INDEX: 21.73 KG/M2 | TEMPERATURE: 97.6 F | RESPIRATION RATE: 18 BRPM | OXYGEN SATURATION: 97 %

## 2021-09-16 DIAGNOSIS — Z79.899 HIGH RISK MEDICATION USE: Primary | ICD-10-CM

## 2021-09-16 DIAGNOSIS — C50.412 MALIGNANT NEOPLASM OF UPPER-OUTER QUADRANT OF LEFT BREAST IN FEMALE, ESTROGEN RECEPTOR POSITIVE (HCC): Primary | ICD-10-CM

## 2021-09-16 DIAGNOSIS — Z45.2 ADJUSTMENT AND MANAGEMENT OF VASCULAR ACCESS DEVICE: ICD-10-CM

## 2021-09-16 DIAGNOSIS — Z17.0 MALIGNANT NEOPLASM OF UPPER-OUTER QUADRANT OF LEFT BREAST IN FEMALE, ESTROGEN RECEPTOR POSITIVE (HCC): ICD-10-CM

## 2021-09-16 DIAGNOSIS — C50.412 MALIGNANT NEOPLASM OF UPPER-OUTER QUADRANT OF LEFT BREAST IN FEMALE, ESTROGEN RECEPTOR POSITIVE (HCC): ICD-10-CM

## 2021-09-16 DIAGNOSIS — Z17.0 MALIGNANT NEOPLASM OF UPPER-OUTER QUADRANT OF LEFT BREAST IN FEMALE, ESTROGEN RECEPTOR POSITIVE (HCC): Primary | ICD-10-CM

## 2021-09-16 LAB
BASOPHILS # BLD AUTO: 0.05 10*3/MM3 (ref 0–0.2)
BASOPHILS NFR BLD AUTO: 0.8 % (ref 0–1.5)
DEPRECATED RDW RBC AUTO: 46.6 FL (ref 37–54)
EOSINOPHIL # BLD AUTO: 0.14 10*3/MM3 (ref 0–0.4)
EOSINOPHIL NFR BLD AUTO: 2.3 % (ref 0.3–6.2)
ERYTHROCYTE [DISTWIDTH] IN BLOOD BY AUTOMATED COUNT: 12.9 % (ref 12.3–15.4)
HCT VFR BLD AUTO: 32.2 % (ref 34–46.6)
HGB BLD-MCNC: 10.7 G/DL (ref 12–15.9)
IMM GRANULOCYTES # BLD AUTO: 0.05 10*3/MM3 (ref 0–0.05)
IMM GRANULOCYTES NFR BLD AUTO: 0.8 % (ref 0–0.5)
LYMPHOCYTES # BLD AUTO: 0.73 10*3/MM3 (ref 0.7–3.1)
LYMPHOCYTES NFR BLD AUTO: 12 % (ref 19.6–45.3)
MCH RBC QN AUTO: 32.7 PG (ref 26.6–33)
MCHC RBC AUTO-ENTMCNC: 33.2 G/DL (ref 31.5–35.7)
MCV RBC AUTO: 98.5 FL (ref 79–97)
MONOCYTES # BLD AUTO: 0.55 10*3/MM3 (ref 0.1–0.9)
MONOCYTES NFR BLD AUTO: 9.1 % (ref 5–12)
NEUTROPHILS NFR BLD AUTO: 4.55 10*3/MM3 (ref 1.7–7)
NEUTROPHILS NFR BLD AUTO: 75 % (ref 42.7–76)
PLATELET # BLD AUTO: 341 10*3/MM3 (ref 140–450)
PMV BLD AUTO: 8.1 FL (ref 6–12)
RBC # BLD AUTO: 3.27 10*6/MM3 (ref 3.77–5.28)
WBC # BLD AUTO: 6.07 10*3/MM3 (ref 3.4–10.8)

## 2021-09-16 PROCEDURE — 99213 OFFICE O/P EST LOW 20 MIN: CPT | Performed by: INTERNAL MEDICINE

## 2021-09-16 PROCEDURE — 85025 COMPLETE CBC W/AUTO DIFF WBC: CPT | Performed by: INTERNAL MEDICINE

## 2021-09-16 PROCEDURE — 96413 CHEMO IV INFUSION 1 HR: CPT

## 2021-09-16 PROCEDURE — 25010000002 HEPARIN LOCK FLUSH PER 10 UNITS: Performed by: INTERNAL MEDICINE

## 2021-09-16 PROCEDURE — 25010000002 TRASTUZUMAB PER 10 MG: Performed by: INTERNAL MEDICINE

## 2021-09-16 RX ORDER — HEPARIN SODIUM (PORCINE) LOCK FLUSH IV SOLN 100 UNIT/ML 100 UNIT/ML
500 SOLUTION INTRAVENOUS AS NEEDED
Status: DISCONTINUED | OUTPATIENT
Start: 2021-09-16 | End: 2021-09-17 | Stop reason: HOSPADM

## 2021-09-16 RX ORDER — SODIUM CHLORIDE 0.9 % (FLUSH) 0.9 %
20 SYRINGE (ML) INJECTION AS NEEDED
Status: DISCONTINUED | OUTPATIENT
Start: 2021-09-16 | End: 2021-09-17 | Stop reason: HOSPADM

## 2021-09-16 RX ORDER — SODIUM CHLORIDE 0.9 % (FLUSH) 0.9 %
20 SYRINGE (ML) INJECTION AS NEEDED
Status: CANCELLED | OUTPATIENT
Start: 2021-09-16

## 2021-09-16 RX ORDER — SODIUM CHLORIDE 9 MG/ML
250 INJECTION, SOLUTION INTRAVENOUS ONCE
Status: CANCELLED | OUTPATIENT
Start: 2021-09-16

## 2021-09-16 RX ORDER — HEPARIN SODIUM (PORCINE) LOCK FLUSH IV SOLN 100 UNIT/ML 100 UNIT/ML
500 SOLUTION INTRAVENOUS AS NEEDED
Status: CANCELLED | OUTPATIENT
Start: 2021-09-16

## 2021-09-16 RX ORDER — SODIUM CHLORIDE 9 MG/ML
250 INJECTION, SOLUTION INTRAVENOUS ONCE
Status: COMPLETED | OUTPATIENT
Start: 2021-09-16 | End: 2021-09-16

## 2021-09-16 RX ADMIN — HEPARIN SODIUM (PORCINE) LOCK FLUSH IV SOLN 100 UNIT/ML 500 UNITS: 100 SOLUTION at 13:29

## 2021-09-16 RX ADMIN — TRASTUZUMAB 420 MG: 150 INJECTION, POWDER, LYOPHILIZED, FOR SOLUTION INTRAVENOUS at 11:50

## 2021-09-16 RX ADMIN — SODIUM CHLORIDE, PRESERVATIVE FREE 20 ML: 5 INJECTION INTRAVENOUS at 13:28

## 2021-09-16 RX ADMIN — SODIUM CHLORIDE 250 ML: 9 INJECTION, SOLUTION INTRAVENOUS at 11:43

## 2021-09-16 NOTE — PROGRESS NOTES
Outpatient Nutrition Oncology Follow Up    Patient Name: Ragini Dozier  YOB: 1960  MRN: [id number]  Assessment Date: 9/16/2021    CLINICAL NUTRITION ASSESSMENT  Breast CA      Type of Cancer Treatment  Herceptin        CLINICAL NUTRITION ASSESSMENT      Reason for Assessment  Unintentional weight loss     H&P:    Past Medical History:   Diagnosis Date   • Breast cancer (CMS/HCC)    • Breast cancer screening by mammogram 2020   • Depression    • Encounter for Hemoccult screening 2019   • GERD (gastroesophageal reflux disease)    • Malignant neoplasm of upper-outer quadrant of left female breast (CMS/HCC)    • Migraine headache    • Osteoporosis         Current Problems:   Patient Active Problem List   Diagnosis Code   • Anxiety F41.9   • Cataract H26.9   • Depression F32.9   • Migraine G43.909   • Esophageal dysphagia R13.10   • Globus sensation R09.89   • Malignant neoplasm of upper-outer quadrant of left breast in female, estrogen receptor positive (CMS/HCC) C50.412, Z17.0   • Adjustment and management of vascular access device Z45.2   • 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    Weight Hx  Wt Readings from Last 30 Encounters:   09/16/21 1036 50.2 kg (110 lb 10.7 oz)   09/16/21 1026 50.2 kg (110 lb 10.7 oz)   09/07/21 1147 51.2 kg (112 lb 14 oz)   08/05/21 1008 53.5 kg (117 lb 15.1 oz)   08/05/21 1048 53.5 kg (117 lb 15.1 oz)   07/20/21 0749 51.8 kg (114 lb 3.2 oz)   07/15/21 1010 53.6 kg (118 lb 2.7 oz)   07/15/21 1054 53.6 kg (118 lb 2.7 oz)   07/13/21 0753 52.8 kg (116 lb 6.5 oz)   06/30/21 1615 53.3 kg (117 lb 8.1 oz)   06/24/21 1002 53.3 kg (117 lb 8.1 oz)   06/24/21 1117 53.3 kg (117 lb 8.1 oz)   06/22/21 1054 53.1 kg (117 lb)   06/01/21 1439 52.7 kg (116 lb 2.9 oz)   05/19/21 0000 55.1 kg (121 lb 6 oz)   04/02/21 0859 54.4 kg (119 lb 14.9 oz)   03/12/21 0909 56.1 kg (123 lb 10.9 oz)   02/19/21 0851 56.6  kg (124 lb 12.5 oz)   01/29/21 0952 55.4 kg (122 lb 2.2 oz)   01/08/21 0905 55 kg (121 lb 4.1 oz)   12/23/20 0000 54.4 kg (120 lb)   12/18/20 0912 56.9 kg (125 lb 7.1 oz)   12/10/20 1422 55.8 kg (123 lb 0.3 oz)   12/10/20 0000 54.9 kg (121 lb)   11/19/20 0000 54.4 kg (120 lb)   02/14/19 0000 54.9 kg (121 lb 2 oz)        Labs/Medications        Pertinent Labs Reviewed.         Invalid input(s): LABALBU, PROT  Results from last 7 days   Lab Units 09/16/21  1012   HEMOGLOBIN g/dL 10.7*   HEMATOCRIT % 32.2*     Coronavirus (COVID-19)   Date Value Ref Range Status   03/19/2021 NOT DETECTED NA Final     Comment:     The SARS-CoV-2 assay is a real-time, RT-PCR test intended  for the qualitative detection of nucleic acid from the  SARS-CoV-2 in respiratory specimens from individuals,  testing performed at [facility].       No results found for: HGBA1C      Pertinent Medications DULoxetine, OXcarbazepine, QUEtiapine, butalbital-acetaminophen-caffeine, calcium-vitamin D, cephalexin, diazePAM, pantoprazole, propranolol, risedronate, topiramate, and zolpidem     Physical Findings            Current Nutrition Orders & Evaluation of Intake       Oral Nutrition     Current PO Diet Variety of foods now, usually 3 meals / day with snacks   Supplement      Enteral Nutrition     Current Formula    Schedule      Parenteral Nutrition     Current Prescription    Schedule      Nutrition Diagnosis        Nutrition Dx Problem 1 Unintentional weight loss related to decreased ability to consume sufficient energy as evidenced by patient report and recent weight loss of 6.1% X ~1 month..       Nutrition Intervention       RD Action Nutritional Counseling (brief)     Monitor/Evaluation       Monitor Per oncology nutrition protocol.     Comments:  Spoke with pt via phone. Per EMR, pt with weight loss of 6.1% X 1-1.5 months. Pt says she recently went to the ED for a UTI about a week and a half ago which negatively effected her appetite.  She says she is still on abx for the UTI and her appetite has returned. She says she is no longer having dysphagia and can tolerate more foods. Nutrition interview was shortened due to appointment with MD. Will continue to f/u per protocol.     Due to the current Covid-19 pandemic and circumstances of having to work remotely, nutrition assessment was conducted using review of the medical record and/or telephone interview of the patient (in which permission was obtained prior to our discussion).     Electronically signed by:  Naye Henry RD  09/16/21 11:33 EDT

## 2021-09-16 NOTE — PROGRESS NOTES
Patient  Ragini Dozier    Location  [facility] HEMATOLOGY & ONCOLOGY    Chief Complaint  Breast Cancer (-trt fu)    Referring Provider: TRISTON Pavon  PCP: Ashlee Dubose PA    Subjective          Oncology/Hematology History Overview Note   HR positive, HER-2 positive breast cancer:  -Found on routine screening mammogram  -Diagnostic mammogram on 11/10/2020: Calcifications in the left breast  -11/10/2020 left breast biopsy: Pathology positive for DCIS, high-grade, estrogen receptor positive (5%, strong), progesterone receptor positive (10%, weak-moderate)  -11/19/2020 MRI of the breast: 1.8 cm hematoma at the site of the recent biopsy showing DCIS.  There is a 1.1 cm nonfocal mass enhancement at the superior lateral margin of the hematoma and a separate 1.2 cm suspicious mass along the inferior medial margin.  These correspond the masses seen on ultrasound on 10/20/2020  -12/23/2020 MRI guided breast biopsy: Left lower inner quadrant pathology positive for invasive ductal carcinoma, grade 2, estrogen receptor positive (3%, moderate), progesterone receptor positive (1%, weak), HER-2 positive (3+ by IHC), Ki-67 approximately 60%.  Associated with high-grade ductal carcinoma in situ.  -Cycle 1 of TCH P on 12/18/2020.  Echocardiogram on 12/14/2020 shows an EF of 55%. C3 on 1/29/21  -C5 of TCHP on 3/12/21. Held C6 due to side effects.  -Left lumpectomy on 5/11/2021: No residual invasive carcinoma found, 0/8 lymph nodes involved, [id number]     Malignant neoplasm of upper-outer quadrant of left breast in female, estrogen receptor positive (CMS/HCC)   6/23/2021 Initial Diagnosis    Malignant neoplasm of upper-outer quadrant of left female breast (CMS/HCC)     6/24/2021 -  Chemotherapy    OP BREAST Trastuzumab Q21D (maintenance)     6/29/2021 -  Radiation    RADIATION THERAPY Treatment Details (Noted on 6/23/2021)  Site: Left Breast  Technique: 3D CRT  Goal: No goal specified  Planned Treatment  Start Date: 6/29/2021      Chemotherapy    TCHP: 12/18/2020-3/12/21 (5 cycles)  OP BREAST Trastuzumab-anns Q21D (maintenance)   - 6/3/21-present         History of Present Illness  Patient comes in today for cycle 4 of Herceptin.  She continues to tolerate it very well.  Her weight is stable at 110 pounds.  She did go to the ER recently because she had a urinary tract infection.  At the time she had no idea what the issue was but knew she did not feel well.  Her  was very worried about her because she was not talking or behaving normally.  This was on 9/7/2021.  She was put on antibiotics and has since significantly improved.    Review of Systems   Constitutional: Negative for appetite change, diaphoresis, fatigue, fever, unexpected weight gain and unexpected weight loss.   HENT: Negative for hearing loss, mouth sores, sore throat, swollen glands, trouble swallowing and voice change.    Eyes: Negative for blurred vision.   Respiratory: Negative for cough, shortness of breath and wheezing.    Cardiovascular: Negative for chest pain and palpitations.   Gastrointestinal: Negative for abdominal pain, blood in stool, constipation, diarrhea, nausea and vomiting.   Endocrine: Negative for cold intolerance and heat intolerance.   Genitourinary: Positive for dysuria (pt being treated for uti by another provider). Negative for difficulty urinating, frequency, hematuria and urinary incontinence.   Musculoskeletal: Negative for arthralgias, back pain and myalgias.   Skin: Negative for rash, skin lesions and bruise.   Neurological: Positive for headache. Negative for dizziness, seizures, weakness and numbness.   Hematological: Does not bruise/bleed easily.   Psychiatric/Behavioral: Negative for depressed mood. The patient is not nervous/anxious.    All other systems reviewed and are negative.      Past Medical History:   Diagnosis Date   • Breast cancer (CMS/[facility])    • Breast cancer screening by mammogram 2020   •  Depression    • Encounter for Hemoccult screening 2019   • GERD (gastroesophageal reflux disease)    • Malignant neoplasm of upper-outer quadrant of left female breast (CMS/HCC)    • Migraine headache    • Osteoporosis      Past Surgical History:   Procedure Laterality Date   • BREAST LUMPECTOMY     • BREAST LUMPECTOMY WITH SENTINEL NODE BIOPSY Left    • BUNIONECTOMY Bilateral    • CATARACT EXTRACTION     • COLONOSCOPY  2019   • HYSTERECTOMY      PARTIAL   • OTHER SURGICAL HISTORY      BIOPSY   • SKIN CANCER EXCISION     • TONSILLECTOMY       Social History     Socioeconomic History   • Marital status:      Spouse name: Not on file   • Number of children: 2   • Years of education: Not on file   • Highest education level: Not on file   Tobacco Use   • Smoking status: Never Smoker   • Smokeless tobacco: Never Used   Vaping Use   • Vaping Use: Never used   Substance and Sexual Activity   • Alcohol use: Yes     Comment: OCCASIONAL   • Drug use: Never   • Sexual activity: Defer     Family History   Problem Relation Age of Onset   • Kidney cancer Father    • Skin cancer Father    • Other Father         MYELOMA   • No Known Problems Mother        Objective   Physical Exam  General: Alert, cooperative, no acute distress, very well-appearing  Eyes: Anicteric sclera, PERRLA  Respiratory: normal respiratory effort  Cardiovascular: no lower extremity edema  Skin: Normal tone, no rash, no lesions  Psychiatric: Appropriate affect, intact judgment  Neurologic: No focal sensory or motor deficits, normal cognition   Musculoskeletal: Normal muscle strength and tone  Extremities: No clubbing, cyanosis, or deformities      Vitals:    09/16/21 1036   BP: 102/64   Pulse: 66   Resp: 18   Temp: 97.6 °F (36.4 °C)   SpO2: 97%   Weight: 50.2 kg (110 lb 10.7 oz)   PainSc:   2   PainLoc: Head     ECOG score: 0         PHQ-9 Total Score:         Result Review :   The following data was reviewed by: Princess Alvarez MD PhD on  09/16/2021:  Lab Results   Component Value Date    HGB 10.7 (L) 09/16/2021    HCT 32.2 (L) 09/16/2021    MCV 98.5 (H) 09/16/2021     09/16/2021    WBC 6.07 09/16/2021    NEUTROABS 4.55 09/16/2021    LYMPHSABS 0.73 09/16/2021    MONOSABS 0.55 09/16/2021    EOSABS 0.14 09/16/2021    BASOSABS 0.05 09/16/2021     Lab Results   Component Value Date    GLUCOSE 105 (H) 09/07/2021    BUN 21 (H) 09/07/2021    CREATININE 1.05 (H) 09/07/2021     (L) 09/07/2021    K 3.4 (L) 09/07/2021     09/07/2021    CO2 17.3 (L) 09/07/2021    CALCIUM 9.0 09/07/2021    PROTEINTOT 7.1 09/07/2021    ALBUMIN 3.80 09/07/2021    BILITOT 0.4 09/07/2021    ALKPHOS 93 09/07/2021    AST 26 09/07/2021    ALT 22 09/07/2021          Assessment and Plan    Diagnoses and all orders for this visit:    1. High risk medication use (Primary)  -     Adult Transthoracic Echo Complete W/ Cont if Necessary Per Protocol; Future    2. Malignant neoplasm of upper-outer quadrant of left breast in female, estrogen receptor positive (CMS/HCC)  -     Cancel: sodium chloride 0.9 % infusion 250 mL  -     Cancel: Trastuzumab (HERCEPTIN) 420 mg in sodium chloride 0.9 % 250 mL chemo IVPB    HER-2 positive breast cancer: Patient is here today for cycle 4 of Herceptin.  I have reviewed her labs and there is no evidence of toxicity.  I will plan for repeat echocardiogram to continue to monitor for cardiac toxicity as well.      Patient was given instructions and counseling regarding her condition or for health maintenance advice. Please see specific information pulled into the AVS if appropriate.     Princess Alvarez MD PhD    9/20/2021

## 2021-09-17 ENCOUNTER — OFFICE VISIT (OUTPATIENT)
Dept: RADIATION ONCOLOGY | Facility: HOSPITAL | Age: 61
End: 2021-09-17

## 2021-09-17 VITALS
OXYGEN SATURATION: 98 % | HEART RATE: 66 BPM | SYSTOLIC BLOOD PRESSURE: 95 MMHG | DIASTOLIC BLOOD PRESSURE: 62 MMHG | TEMPERATURE: 97.9 F | BODY MASS INDEX: 22 KG/M2 | RESPIRATION RATE: 16 BRPM | WEIGHT: 112.66 LBS

## 2021-09-17 DIAGNOSIS — C50.412 MALIGNANT NEOPLASM OF UPPER-OUTER QUADRANT OF LEFT BREAST IN FEMALE, ESTROGEN RECEPTOR POSITIVE (HCC): Primary | ICD-10-CM

## 2021-09-17 DIAGNOSIS — Z17.0 MALIGNANT NEOPLASM OF UPPER-OUTER QUADRANT OF LEFT BREAST IN FEMALE, ESTROGEN RECEPTOR POSITIVE (HCC): Primary | ICD-10-CM

## 2021-09-17 PROCEDURE — 99212-NC PR NO CHARGE CBC OFFICE OUTPATIENT VISIT 10 MINUTES: Performed by: RADIOLOGY

## 2021-09-17 PROCEDURE — G0463 HOSPITAL OUTPT CLINIC VISIT: HCPCS

## 2021-09-17 NOTE — PROGRESS NOTES
FOLLOW UP NOTE    PATIENT:                                                      Ragini Dozier  MEDICAL RECORD #:                        [id number]  :                                                          1960  COMPLETION DATE:   2021        BRIEF HISTORY:  Ragini Dozier returns to radiation oncology for short interval follow-up after completing external beam radiotherapy. She reports feeling well overall with no complaints. She is recovering from an acute urinary tract infection. She continues to undergo treatment with Herceptin.      MEDICATIONS: Medication reconciliation for the patient was reviewed and confirmed in the electronic medical record.    Review of Systems   Constitutional: Positive for fatigue. Negative for appetite change.   Respiratory: Negative for cough and shortness of breath.    Gastrointestinal: Negative for constipation, diarrhea and nausea.   Endocrine: Negative for hot flashes.   Genitourinary: Negative for bladder incontinence, dysuria, frequency and nocturia.    Skin: Positive for itching.   Neurological: Positive for headaches (mild). Negative for dizziness.       : Normal; no evidence of disease  ECOG (0) Fully active, able to carry on all predisease performance without restriction    Physical Exam  Constitutional:       Appearance: Normal appearance. She is normal weight.   HENT:      Head: Normocephalic and atraumatic.      Mouth/Throat:      Mouth: Mucous membranes are moist.      Pharynx: Oropharynx is clear.   Pulmonary:      Effort: Pulmonary effort is normal. No respiratory distress.   Chest:      Comments: Volume loss in left breast compared to right; essentially no appreciable fibrosis  Abdominal:      General: Abdomen is flat. There is no distension.   Skin:     General: Skin is warm and dry.   Neurological:      General: No focal deficit present.      Mental Status: She is alert and oriented to person, place, and time.      Cranial Nerves: Cranial nerves  are intact. No cranial nerve deficit or facial asymmetry.      Gait: Gait normal.   Psychiatric:         Mood and Affect: Mood normal.         Behavior: Behavior normal.         VITAL SIGNS:   Vitals:    09/17/21 0808   BP: 95/62   Pulse: 66   Resp: 16   Temp: 97.9 °F (36.6 °C)   TempSrc: Temporal   SpO2: 98%   Weight: 51.1 kg (112 lb 10.5 oz)   PainSc: 0-No pain       The following portions of the patient's history were reviewed and updated as appropriate: allergies, current medications, past family history, past medical history, past social history, past surgical history and problem list.         No primary diagnosis found.    IMPRESSION:  Ragini Dozier is a 61-year-old female with initial cT1 cN0 M0 invasive ductal carcinoma, grade 2, ER positive at 3% and progesterone positive at 1%, HER-2 +3+.  She underwent neoadjuvant chemotherapy with TCHP and experienced pathologic complete response upon lumpectomy and sentinel lymph node biopsy performed on 5/25/2021. She is now status post external beam radiotherapy, completing 42.56 Ferrara in 16 daily fractions of 2.66 Gray on 7/21/2021. She has recovered from the acute toxicities of radiotherapy and is doing well overall. ECOG 0.      RECOMMENDATIONS:  Ragini Dozier will continue to follow with Dr. Alvarez as scheduled. She will follow up with me in 1 year. She was encouraged to contact me with any questions or concerns regarding her care.    QUALITY OF LIFE: 100 - Full Activity               Archie Ramos MD

## 2021-09-29 RX ORDER — PROPRANOLOL HYDROCHLORIDE 80 MG/1
80 TABLET ORAL DAILY
COMMUNITY
End: 2021-12-23

## 2021-10-01 ENCOUNTER — ANESTHESIA (OUTPATIENT)
Dept: GASTROENTEROLOGY | Facility: HOSPITAL | Age: 61
End: 2021-10-01

## 2021-10-01 ENCOUNTER — ANESTHESIA EVENT (OUTPATIENT)
Dept: GASTROENTEROLOGY | Facility: HOSPITAL | Age: 61
End: 2021-10-01

## 2021-10-01 ENCOUNTER — HOSPITAL ENCOUNTER (OUTPATIENT)
Facility: HOSPITAL | Age: 61
Setting detail: HOSPITAL OUTPATIENT SURGERY
Discharge: HOME | End: 2021-10-01
Attending: INTERNAL MEDICINE | Admitting: INTERNAL MEDICINE

## 2021-10-01 VITALS
RESPIRATION RATE: 17 BRPM | TEMPERATURE: 98.1 F | HEART RATE: 67 BPM | SYSTOLIC BLOOD PRESSURE: 95 MMHG | DIASTOLIC BLOOD PRESSURE: 66 MMHG | BODY MASS INDEX: 22.39 KG/M2 | WEIGHT: 114.64 LBS | OXYGEN SATURATION: 100 %

## 2021-10-01 DIAGNOSIS — R13.19 ESOPHAGEAL DYSPHAGIA: ICD-10-CM

## 2021-10-01 DIAGNOSIS — R09.89 GLOBUS SENSATION: ICD-10-CM

## 2021-10-01 PROCEDURE — 88305 TISSUE EXAM BY PATHOLOGIST: CPT | Performed by: INTERNAL MEDICINE

## 2021-10-01 PROCEDURE — 43239 EGD BIOPSY SINGLE/MULTIPLE: CPT | Performed by: INTERNAL MEDICINE

## 2021-10-01 PROCEDURE — 25010000002 PROPOFOL 10 MG/ML EMULSION: Performed by: NURSE ANESTHETIST, CERTIFIED REGISTERED

## 2021-10-01 RX ORDER — PROPOFOL 10 MG/ML
VIAL (ML) INTRAVENOUS AS NEEDED
Status: DISCONTINUED | OUTPATIENT
Start: 2021-10-01 | End: 2021-10-01 | Stop reason: SURG

## 2021-10-01 RX ORDER — SODIUM CHLORIDE 9 MG/ML
9 INJECTION, SOLUTION INTRAVENOUS CONTINUOUS
Status: DISCONTINUED | OUTPATIENT
Start: 2021-10-01 | End: 2021-10-01

## 2021-10-01 RX ORDER — LIDOCAINE HYDROCHLORIDE 20 MG/ML
INJECTION, SOLUTION INFILTRATION; PERINEURAL AS NEEDED
Status: DISCONTINUED | OUTPATIENT
Start: 2021-10-01 | End: 2021-10-01 | Stop reason: SURG

## 2021-10-01 RX ORDER — SODIUM CHLORIDE 9 MG/ML
9 INJECTION, SOLUTION INTRAVENOUS CONTINUOUS
Status: DISCONTINUED | OUTPATIENT
Start: 2021-10-01 | End: 2021-10-01 | Stop reason: HOSPADM

## 2021-10-01 RX ADMIN — LIDOCAINE HYDROCHLORIDE 30 MG: 20 INJECTION, SOLUTION INFILTRATION; PERINEURAL at 09:14

## 2021-10-01 RX ADMIN — SODIUM CHLORIDE 9 ML/HR: 9 INJECTION, SOLUTION INTRAVENOUS at 08:36

## 2021-10-01 RX ADMIN — PROPOFOL 25 MG: 10 INJECTION, EMULSION INTRAVENOUS at 09:14

## 2021-10-01 RX ADMIN — PROPOFOL 200 MCG/KG/MIN: 10 INJECTION, EMULSION INTRAVENOUS at 09:11

## 2021-10-01 NOTE — H&P
Pre Procedure History & Physical    Chief Complaint:   dysphagia    Subjective     HPI:   62 yo F here for eval of GERD, dysphagia.    Past Medical History:   Past Medical History:   Diagnosis Date   • Breast cancer (HCC)    • Breast cancer screening by mammogram 2020   • Depression    • Encounter for Hemoccult screening 2019   • GERD (gastroesophageal reflux disease)    • Insomnia    • Malignant neoplasm of upper-outer quadrant of left female breast (HCC)    • Migraine headache    • Osteoporosis        Past Surgical History:  Past Surgical History:   Procedure Laterality Date   • BREAST LUMPECTOMY     • BREAST LUMPECTOMY WITH SENTINEL NODE BIOPSY Left    • BUNIONECTOMY Bilateral    • CATARACT EXTRACTION     • COLONOSCOPY  2019   • HYSTERECTOMY      PARTIAL   • OTHER SURGICAL HISTORY      BIOPSY   • SKIN CANCER EXCISION     • TONSILLECTOMY         Family History:  Family History   Problem Relation Age of Onset   • Kidney cancer Father    • Skin cancer Father    • Other Father         MYELOMA   • No Known Problems Mother    • Malig Hyperthermia Neg Hx        Social History:   reports that she has never smoked. She has never used smokeless tobacco. She reports current alcohol use. She reports that she does not use drugs.    Medications:   Medications Prior to Admission   Medication Sig Dispense Refill Last Dose   • butalbital-acetaminophen-caffeine (FIORICET, ESGIC) -40 MG per tablet butalbital-acetaminophen-caff -40 mg oral tablet take 1 tablet by oral route every 4 hours as needed not to exceed 6 tablets per 24hrs   Active      • Calcium Carbonate-Vitamin D (calcium-vitamin D) 500-200 MG-UNIT tablet per tablet 500 mg 2 (Two) Times a Day.   Past Week at Unknown time   • diazePAM (VALIUM) 5 MG tablet Take 5 mg by mouth Every 8 (Eight) Hours As Needed.   9/30/2021 at Unknown time   • DULoxetine (CYMBALTA) 30 MG capsule Take 30 mg by mouth 2 (Two) Times a Day.   9/30/2021 at Unknown time   • OXcarbazepine  (TRILEPTAL) 300 MG tablet Take 300 mg by mouth 2 (Two) Times a Day.   9/30/2021 at Unknown time   • pantoprazole (PROTONIX) 40 MG EC tablet Take 40 mg by mouth Daily.   9/30/2021 at Unknown time   • propranolol (INDERAL) 80 MG tablet Take 80 mg by mouth Daily.   9/30/2021 at Unknown time   • QUEtiapine (SEROquel) 400 MG tablet Take 400 mg by mouth Every Night. 1/2 tab   9/30/2021 at Unknown time   • risedronate (ACTONEL) 150 MG tablet risedronate 150 mg oral tablet take 1 tablet (150 mg) by oral route once a month on the same date with a full glass of water at least 30 min before first food or drink of the day; remain in an upright position for at least 30 min.   Active   9/30/2021 at Unknown time   • topiramate (TOPAMAX) 50 MG tablet topiramate 50 mg oral tablet take 1 tablet (50 mg) by oral route 2 times per day   Active   9/30/2021 at Unknown time   • zolpidem (AMBIEN) 10 MG tablet Take 10 mg by mouth At Night As Needed. 1/2 tab   9/30/2021 at Unknown time   • cephalexin (KEFLEX) 500 MG capsule Take 1 capsule by mouth 3 (Three) Times a Day. 40 capsule 0    • propranolol (INDERAL) 80 MG tablet Take 80 mg by mouth Daily.          Allergies:  Patient has no known allergies.    ROS:    Pertinent items are noted in HPI     Objective     Blood pressure 105/64, pulse 67, temperature 98.7 °F (37.1 °C), temperature source Temporal, resp. rate 18, weight 52 kg (114 lb 10.2 oz), SpO2 100 %.    Physical Exam   Constitutional: Pt is oriented to person, place, and time and well-developed, well-nourished, and in no distress.   Mouth/Throat: Oropharynx is clear and moist.   Neck: Normal range of motion.   Cardiovascular: Normal rate, regular rhythm and normal heart sounds.    Pulmonary/Chest: Effort normal and breath sounds normal.   Abdominal: Soft. Nontender  Skin: Skin is warm and dry.   Psychiatric: Mood, memory, affect and judgment normal.     Assessment/Plan     Diagnosis:  GERD, dysphagia    Anticipated Surgical  Procedure:  EGD    The risks, benefits, and alternatives of this procedure have been discussed with the patient or the responsible party- the patient understands and agrees to proceed.

## 2021-10-01 NOTE — ANESTHESIA PREPROCEDURE EVALUATION
Anesthesia Evaluation     Patient summary reviewed and Nursing notes reviewed   no history of anesthetic complications:  NPO Solid Status: > 8 hours  NPO Liquid Status: > 2 hours           Airway   Mallampati: II  TM distance: >3 FB  Neck ROM: full  No difficulty expected  Dental - normal exam     Pulmonary - negative pulmonary ROS and normal exam   (-) not a smoker  Cardiovascular - normal exam  Exercise tolerance: good (4-7 METS)    ECG reviewed    (+) valvular problems/murmurs MR,     ROS comment: 6/24/21 echo:  Interpretation Summary    · Left ventricular ejection fraction appears to be 56 - 60%. Left ventricular systolic function is normal.  · There is mild mitral regurgitation           Neuro/Psych  (+) headaches, psychiatric history Anxiety and Depression,     GI/Hepatic/Renal/Endo    (+)  GERD,  renal disease CRI,     ROS Comment: dysphagia    Musculoskeletal (-) negative ROS    Abdominal  - normal exam   Substance History - negative use     OB/GYN negative ob/gyn ROS         Other   blood dyscrasia anemia,   history of cancer (breast)                    Anesthesia Plan    ASA 3     general   (Total IV Anesthesia    Patient understands anesthesia not responsible for dental damage.  )  intravenous induction     Anesthetic plan, all risks, benefits, and alternatives have been provided, discussed and informed consent has been obtained with: patient.

## 2021-10-04 LAB
CYTO UR: NORMAL
LAB AP CASE REPORT: NORMAL
LAB AP CLINICAL INFORMATION: NORMAL
PATH REPORT.FINAL DX SPEC: NORMAL
PATH REPORT.GROSS SPEC: NORMAL

## 2021-10-06 ENCOUNTER — TELEPHONE (OUTPATIENT)
Dept: GASTROENTEROLOGY | Facility: CLINIC | Age: 61
End: 2021-10-06

## 2021-10-06 NOTE — TELEPHONE ENCOUNTER
----- Message from ANGELICA Castro sent at 10/5/2021  4:05 PM EDT -----  Biopsies consistent with reflux esophagitis.  Please make sure patient continues PPI and avoid NSAIDs.  Please schedule patient for a follow-up visit.

## 2021-10-07 ENCOUNTER — HOSPITAL ENCOUNTER (OUTPATIENT)
Dept: ONCOLOGY | Facility: HOSPITAL | Age: 61
Setting detail: INFUSION SERIES
Discharge: HOME | End: 2021-10-07

## 2021-10-07 ENCOUNTER — APPOINTMENT (OUTPATIENT)
Dept: ONCOLOGY | Facility: HOSPITAL | Age: 61
End: 2021-10-07

## 2021-10-07 VITALS
BODY MASS INDEX: 22.12 KG/M2 | RESPIRATION RATE: 18 BRPM | DIASTOLIC BLOOD PRESSURE: 54 MMHG | HEART RATE: 71 BPM | WEIGHT: 112.66 LBS | HEIGHT: 60 IN | SYSTOLIC BLOOD PRESSURE: 101 MMHG | OXYGEN SATURATION: 100 % | TEMPERATURE: 97.7 F

## 2021-10-07 DIAGNOSIS — Z45.2 ADJUSTMENT AND MANAGEMENT OF VASCULAR ACCESS DEVICE: ICD-10-CM

## 2021-10-07 DIAGNOSIS — C50.412 MALIGNANT NEOPLASM OF UPPER-OUTER QUADRANT OF LEFT BREAST IN FEMALE, ESTROGEN RECEPTOR POSITIVE (HCC): Primary | ICD-10-CM

## 2021-10-07 DIAGNOSIS — Z17.0 MALIGNANT NEOPLASM OF UPPER-OUTER QUADRANT OF LEFT BREAST IN FEMALE, ESTROGEN RECEPTOR POSITIVE (HCC): Primary | ICD-10-CM

## 2021-10-07 LAB
BASOPHILS # BLD AUTO: 0.02 10*3/MM3 (ref 0–0.2)
BASOPHILS NFR BLD AUTO: 0.4 % (ref 0–1.5)
DEPRECATED RDW RBC AUTO: 46.8 FL (ref 37–54)
EOSINOPHIL # BLD AUTO: 0.21 10*3/MM3 (ref 0–0.4)
EOSINOPHIL NFR BLD AUTO: 4.6 % (ref 0.3–6.2)
ERYTHROCYTE [DISTWIDTH] IN BLOOD BY AUTOMATED COUNT: 13.2 % (ref 12.3–15.4)
HCT VFR BLD AUTO: 30.9 % (ref 34–46.6)
HGB BLD-MCNC: 10.2 G/DL (ref 12–15.9)
IMM GRANULOCYTES # BLD AUTO: 0.01 10*3/MM3 (ref 0–0.05)
IMM GRANULOCYTES NFR BLD AUTO: 0.2 % (ref 0–0.5)
LYMPHOCYTES # BLD AUTO: 0.6 10*3/MM3 (ref 0.7–3.1)
LYMPHOCYTES NFR BLD AUTO: 13.2 % (ref 19.6–45.3)
MCH RBC QN AUTO: 32 PG (ref 26.6–33)
MCHC RBC AUTO-ENTMCNC: 33 G/DL (ref 31.5–35.7)
MCV RBC AUTO: 96.9 FL (ref 79–97)
MONOCYTES # BLD AUTO: 0.57 10*3/MM3 (ref 0.1–0.9)
MONOCYTES NFR BLD AUTO: 12.5 % (ref 5–12)
NEUTROPHILS NFR BLD AUTO: 3.14 10*3/MM3 (ref 1.7–7)
NEUTROPHILS NFR BLD AUTO: 69.1 % (ref 42.7–76)
PLATELET # BLD AUTO: 170 10*3/MM3 (ref 140–450)
PMV BLD AUTO: 8.7 FL (ref 6–12)
RBC # BLD AUTO: 3.19 10*6/MM3 (ref 3.77–5.28)
WBC # BLD AUTO: 4.55 10*3/MM3 (ref 3.4–10.8)

## 2021-10-07 PROCEDURE — 25010000002 TRASTUZUMAB PER 10 MG: Performed by: INTERNAL MEDICINE

## 2021-10-07 PROCEDURE — 25010000002 HEPARIN LOCK FLUSH PER 10 UNITS: Performed by: INTERNAL MEDICINE

## 2021-10-07 PROCEDURE — 96413 CHEMO IV INFUSION 1 HR: CPT

## 2021-10-07 PROCEDURE — 85025 COMPLETE CBC W/AUTO DIFF WBC: CPT | Performed by: INTERNAL MEDICINE

## 2021-10-07 RX ORDER — SODIUM CHLORIDE 0.9 % (FLUSH) 0.9 %
20 SYRINGE (ML) INJECTION AS NEEDED
Status: CANCELLED | OUTPATIENT
Start: 2021-10-07

## 2021-10-07 RX ORDER — HEPARIN SODIUM (PORCINE) LOCK FLUSH IV SOLN 100 UNIT/ML 100 UNIT/ML
500 SOLUTION INTRAVENOUS AS NEEDED
Status: CANCELLED | OUTPATIENT
Start: 2021-10-07

## 2021-10-07 RX ORDER — HEPARIN SODIUM (PORCINE) LOCK FLUSH IV SOLN 100 UNIT/ML 100 UNIT/ML
500 SOLUTION INTRAVENOUS AS NEEDED
Status: DISCONTINUED | OUTPATIENT
Start: 2021-10-07 | End: 2021-10-08 | Stop reason: HOSPADM

## 2021-10-07 RX ORDER — SODIUM CHLORIDE 9 MG/ML
250 INJECTION, SOLUTION INTRAVENOUS ONCE
Status: COMPLETED | OUTPATIENT
Start: 2021-10-07 | End: 2021-10-07

## 2021-10-07 RX ORDER — SODIUM CHLORIDE 0.9 % (FLUSH) 0.9 %
20 SYRINGE (ML) INJECTION AS NEEDED
Status: DISCONTINUED | OUTPATIENT
Start: 2021-10-07 | End: 2021-10-08 | Stop reason: HOSPADM

## 2021-10-07 RX ADMIN — SODIUM CHLORIDE 250 ML: 9 INJECTION, SOLUTION INTRAVENOUS at 10:50

## 2021-10-07 RX ADMIN — TRASTUZUMAB 300 MG: 150 INJECTION, POWDER, LYOPHILIZED, FOR SOLUTION INTRAVENOUS at 10:50

## 2021-10-07 RX ADMIN — Medication 20 ML: at 11:37

## 2021-10-07 RX ADMIN — HEPARIN SODIUM (PORCINE) LOCK FLUSH IV SOLN 100 UNIT/ML 500 UNITS: 100 SOLUTION at 11:37

## 2021-10-25 ENCOUNTER — TRANSCRIBE ORDERS (OUTPATIENT)
Dept: ADMINISTRATIVE | Facility: HOSPITAL | Age: 61
End: 2021-10-25

## 2021-10-25 DIAGNOSIS — Z85.3 HX OF BREAST CANCER: Primary | ICD-10-CM

## 2021-10-28 ENCOUNTER — HOSPITAL ENCOUNTER (OUTPATIENT)
Dept: ONCOLOGY | Facility: HOSPITAL | Age: 61
Setting detail: INFUSION SERIES
Discharge: HOME | End: 2021-10-28

## 2021-10-28 ENCOUNTER — OFFICE VISIT (OUTPATIENT)
Dept: ONCOLOGY | Facility: HOSPITAL | Age: 61
End: 2021-10-28

## 2021-10-28 ENCOUNTER — APPOINTMENT (OUTPATIENT)
Dept: ONCOLOGY | Facility: HOSPITAL | Age: 61
End: 2021-10-28

## 2021-10-28 VITALS
DIASTOLIC BLOOD PRESSURE: 70 MMHG | SYSTOLIC BLOOD PRESSURE: 120 MMHG | HEIGHT: 60 IN | WEIGHT: 113.98 LBS | TEMPERATURE: 97.2 F | OXYGEN SATURATION: 99 % | RESPIRATION RATE: 16 BRPM | BODY MASS INDEX: 22.38 KG/M2 | HEART RATE: 64 BPM

## 2021-10-28 VITALS
RESPIRATION RATE: 16 BRPM | DIASTOLIC BLOOD PRESSURE: 70 MMHG | BODY MASS INDEX: 22.26 KG/M2 | OXYGEN SATURATION: 99 % | SYSTOLIC BLOOD PRESSURE: 120 MMHG | TEMPERATURE: 97.2 F | WEIGHT: 113.98 LBS | HEART RATE: 64 BPM

## 2021-10-28 DIAGNOSIS — Z45.2 ADJUSTMENT AND MANAGEMENT OF VASCULAR ACCESS DEVICE: ICD-10-CM

## 2021-10-28 DIAGNOSIS — C50.412 MALIGNANT NEOPLASM OF UPPER-OUTER QUADRANT OF LEFT BREAST IN FEMALE, ESTROGEN RECEPTOR POSITIVE (HCC): Primary | ICD-10-CM

## 2021-10-28 DIAGNOSIS — Z78.0 POST-MENOPAUSAL: Primary | ICD-10-CM

## 2021-10-28 DIAGNOSIS — T45.1X5A ANEMIA ASSOCIATED WITH CHEMOTHERAPY: ICD-10-CM

## 2021-10-28 DIAGNOSIS — Z17.0 MALIGNANT NEOPLASM OF UPPER-OUTER QUADRANT OF LEFT BREAST IN FEMALE, ESTROGEN RECEPTOR POSITIVE (HCC): ICD-10-CM

## 2021-10-28 DIAGNOSIS — D64.81 ANEMIA ASSOCIATED WITH CHEMOTHERAPY: ICD-10-CM

## 2021-10-28 DIAGNOSIS — Z17.0 MALIGNANT NEOPLASM OF UPPER-OUTER QUADRANT OF LEFT BREAST IN FEMALE, ESTROGEN RECEPTOR POSITIVE (HCC): Primary | ICD-10-CM

## 2021-10-28 DIAGNOSIS — C50.412 MALIGNANT NEOPLASM OF UPPER-OUTER QUADRANT OF LEFT BREAST IN FEMALE, ESTROGEN RECEPTOR POSITIVE (HCC): ICD-10-CM

## 2021-10-28 LAB
BASOPHILS # BLD AUTO: 0.05 10*3/MM3 (ref 0–0.2)
BASOPHILS NFR BLD AUTO: 0.9 % (ref 0–1.5)
DEPRECATED RDW RBC AUTO: 46.1 FL (ref 37–54)
EOSINOPHIL # BLD AUTO: 0.25 10*3/MM3 (ref 0–0.4)
EOSINOPHIL NFR BLD AUTO: 4.5 % (ref 0.3–6.2)
ERYTHROCYTE [DISTWIDTH] IN BLOOD BY AUTOMATED COUNT: 13.1 % (ref 12.3–15.4)
FERRITIN SERPL-MCNC: 419.6 NG/ML (ref 13–150)
HCT VFR BLD AUTO: 31 % (ref 34–46.6)
HGB BLD-MCNC: 10.6 G/DL (ref 12–15.9)
IMM GRANULOCYTES # BLD AUTO: 0 10*3/MM3 (ref 0–0.05)
IMM GRANULOCYTES NFR BLD AUTO: 0 % (ref 0–0.5)
IRON 24H UR-MRATE: 89 MCG/DL (ref 37–145)
IRON SATN MFR SERPL: 37 % (ref 20–50)
LYMPHOCYTES # BLD AUTO: 0.75 10*3/MM3 (ref 0.7–3.1)
LYMPHOCYTES NFR BLD AUTO: 13.6 % (ref 19.6–45.3)
MCH RBC QN AUTO: 32.6 PG (ref 26.6–33)
MCHC RBC AUTO-ENTMCNC: 34.2 G/DL (ref 31.5–35.7)
MCV RBC AUTO: 95.4 FL (ref 79–97)
MONOCYTES # BLD AUTO: 0.37 10*3/MM3 (ref 0.1–0.9)
MONOCYTES NFR BLD AUTO: 6.7 % (ref 5–12)
NEUTROPHILS NFR BLD AUTO: 4.11 10*3/MM3 (ref 1.7–7)
NEUTROPHILS NFR BLD AUTO: 74.3 % (ref 42.7–76)
PLATELET # BLD AUTO: 202 10*3/MM3 (ref 140–450)
PMV BLD AUTO: 8.1 FL (ref 6–12)
RBC # BLD AUTO: 3.25 10*6/MM3 (ref 3.77–5.28)
TIBC SERPL-MCNC: 240 MCG/DL (ref 298–536)
TRANSFERRIN SERPL-MCNC: 161 MG/DL (ref 200–360)
WBC # BLD AUTO: 5.53 10*3/MM3 (ref 3.4–10.8)

## 2021-10-28 PROCEDURE — 83540 ASSAY OF IRON: CPT | Performed by: NURSE PRACTITIONER

## 2021-10-28 PROCEDURE — 25010000002 TRASTUZUMAB PER 10 MG: Performed by: INTERNAL MEDICINE

## 2021-10-28 PROCEDURE — 84466 ASSAY OF TRANSFERRIN: CPT | Performed by: NURSE PRACTITIONER

## 2021-10-28 PROCEDURE — 85025 COMPLETE CBC W/AUTO DIFF WBC: CPT | Performed by: INTERNAL MEDICINE

## 2021-10-28 PROCEDURE — 82728 ASSAY OF FERRITIN: CPT | Performed by: NURSE PRACTITIONER

## 2021-10-28 PROCEDURE — 96413 CHEMO IV INFUSION 1 HR: CPT

## 2021-10-28 PROCEDURE — 99213 OFFICE O/P EST LOW 20 MIN: CPT | Performed by: NURSE PRACTITIONER

## 2021-10-28 PROCEDURE — 25010000002 HEPARIN LOCK FLUSH PER 10 UNITS: Performed by: INTERNAL MEDICINE

## 2021-10-28 RX ORDER — SODIUM CHLORIDE 0.9 % (FLUSH) 0.9 %
20 SYRINGE (ML) INJECTION AS NEEDED
Status: CANCELLED | OUTPATIENT
Start: 2021-10-28

## 2021-10-28 RX ORDER — HEPARIN SODIUM (PORCINE) LOCK FLUSH IV SOLN 100 UNIT/ML 100 UNIT/ML
500 SOLUTION INTRAVENOUS AS NEEDED
Status: CANCELLED | OUTPATIENT
Start: 2021-10-28

## 2021-10-28 RX ORDER — HEPARIN SODIUM (PORCINE) LOCK FLUSH IV SOLN 100 UNIT/ML 100 UNIT/ML
500 SOLUTION INTRAVENOUS AS NEEDED
Status: DISCONTINUED | OUTPATIENT
Start: 2021-10-28 | End: 2021-10-29 | Stop reason: HOSPADM

## 2021-10-28 RX ORDER — LETROZOLE 2.5 MG/1
2.5 TABLET, FILM COATED ORAL DAILY
Qty: 30 TABLET | Refills: 1 | Status: SHIPPED | OUTPATIENT
Start: 2021-10-28 | End: 2021-12-09 | Stop reason: SDUPTHER

## 2021-10-28 RX ORDER — SODIUM CHLORIDE 0.9 % (FLUSH) 0.9 %
20 SYRINGE (ML) INJECTION AS NEEDED
Status: DISCONTINUED | OUTPATIENT
Start: 2021-10-28 | End: 2021-10-29 | Stop reason: HOSPADM

## 2021-10-28 RX ORDER — SODIUM CHLORIDE 9 MG/ML
250 INJECTION, SOLUTION INTRAVENOUS ONCE
Status: COMPLETED | OUTPATIENT
Start: 2021-10-28 | End: 2021-10-28

## 2021-10-28 RX ADMIN — HEPARIN SODIUM (PORCINE) LOCK FLUSH IV SOLN 100 UNIT/ML 500 UNITS: 100 SOLUTION at 12:40

## 2021-10-28 RX ADMIN — TRASTUZUMAB 300 MG: 150 INJECTION, POWDER, LYOPHILIZED, FOR SOLUTION INTRAVENOUS at 11:56

## 2021-10-28 RX ADMIN — SODIUM CHLORIDE, PRESERVATIVE FREE 20 ML: 5 INJECTION INTRAVENOUS at 12:40

## 2021-10-28 RX ADMIN — SODIUM CHLORIDE 250 ML: 9 INJECTION, SOLUTION INTRAVENOUS at 11:56

## 2021-10-28 NOTE — PROGRESS NOTES
Chief Complaint  Breast Cancer (-trt fu)    Ashlee Dubose PA Baker, Teresa Ruth, PA Subjective          Ragini Dozier presents to [facility] HEMATOLOGY [facility] ONCOLOGY for breast cancer treatment visit.     History of Present Illness     Ms. Ragini Dozier presents for treatment visit for cycle 6 Herceptin treatment today. She reports she is doing well in the interim. She inquires today how many additional treatments she has left. She will complete a year of Herceptin treatment. Usually this around 14 cycles of Herceptin. She offers no complaints today. Lab work reviewed: CBC is acceptable but she does have a mild anemia present. She denies any GI blood loss. She finished chemotherapy back in March of this year. We will check labs for anemia today.     Cancer Staging  No matching staging information was found for the patient.     Treatment intent: curative    Oncology/Hematology History Overview Note   HR positive, HER-2 positive breast cancer:  -Found on routine screening mammogram  -Diagnostic mammogram on 11/10/2020: Calcifications in the left breast  -11/10/2020 left breast biopsy: Pathology positive for DCIS, high-grade, estrogen receptor positive (5%, strong), progesterone receptor positive (10%, weak-moderate)  -11/19/2020 MRI of the breast: 1.8 cm hematoma at the site of the recent biopsy showing DCIS.  There is a 1.1 cm nonfocal mass enhancement at the superior lateral margin of the hematoma and a separate 1.2 cm suspicious mass along the inferior medial margin.  These correspond the masses seen on ultrasound on 10/20/2020  -12/23/2020 MRI guided breast biopsy: Left lower inner quadrant pathology positive for invasive ductal carcinoma, grade 2, estrogen receptor positive (3%, moderate), progesterone receptor positive (1%, weak), HER-2 positive (3+ by IHC), Ki-67 approximately 60%.  Associated with high-grade ductal carcinoma in situ.  -Cycle 1 of TCH P on 12/18/2020.   Echocardiogram on 12/14/2020 shows an EF of 55%. C3 on 1/29/21  -C5 of TCHP on 3/12/21. Held C6 due to side effects.  -Left lumpectomy on 5/11/2021: No residual invasive carcinoma found, 0/8 lymph nodes involved, ypT0N0     Malignant neoplasm of upper-outer quadrant of left breast in female, estrogen receptor positive (HCC)   6/23/2021 Initial Diagnosis    Malignant neoplasm of upper-outer quadrant of left female breast (CMS/HCC)     6/24/2021 -  Chemotherapy    OP BREAST Trastuzumab Q21D (maintenance)     6/29/2021 -  Radiation    RADIATION THERAPY Treatment Details (Noted on 6/23/2021)  Site: Left Breast  Technique: 3D CRT  Goal: No goal specified  Planned Treatment Start Date: 6/29/2021      Chemotherapy    TCHP: 12/18/2020-3/12/21 (5 cycles)  OP BREAST Trastuzumab-anns Q21D (maintenance)   - 6/3/21-present         Review of Systems   Constitutional: Positive for fatigue (5/10). Negative for appetite change, diaphoresis, fever, unexpected weight gain and unexpected weight loss.   HENT: Negative for hearing loss, mouth sores, sore throat, swollen glands, trouble swallowing and voice change.    Eyes: Negative for blurred vision.   Respiratory: Negative for cough, shortness of breath and wheezing.    Cardiovascular: Negative for chest pain and palpitations.   Gastrointestinal: Negative for abdominal pain, blood in stool, constipation, diarrhea, nausea and vomiting.   Endocrine: Negative for cold intolerance and heat intolerance.   Genitourinary: Negative for difficulty urinating, dysuria, frequency, hematuria and urinary incontinence.   Musculoskeletal: Negative for arthralgias, back pain and myalgias.   Skin: Negative for rash, skin lesions and bruise.   Neurological: Negative for dizziness, seizures, weakness, numbness and headache.   Hematological: Does not bruise/bleed easily.   Psychiatric/Behavioral: Negative for depressed mood. The patient is not nervous/anxious.        Current Outpatient Medications on File  Prior to Visit   Medication Sig Dispense Refill   • butalbital-acetaminophen-caffeine (FIORICET, ESGIC) -40 MG per tablet butalbital-acetaminophen-caff -40 mg oral tablet take 1 tablet by oral route every 4 hours as needed not to exceed 6 tablets per 24hrs   Active     • Calcium Carbonate-Vitamin D (calcium-vitamin D) 500-200 MG-UNIT tablet per tablet 500 mg 2 (Two) Times a Day.     • cephalexin (KEFLEX) 500 MG capsule Take 1 capsule by mouth 3 (Three) Times a Day. 40 capsule 0   • diazePAM (VALIUM) 5 MG tablet Take 5 mg by mouth Every 8 (Eight) Hours As Needed.     • DULoxetine (CYMBALTA) 30 MG capsule Take 30 mg by mouth 2 (Two) Times a Day.     • OXcarbazepine (TRILEPTAL) 300 MG tablet Take 300 mg by mouth 2 (Two) Times a Day.     • pantoprazole (PROTONIX) 40 MG EC tablet Take 40 mg by mouth Daily.     • propranolol (INDERAL) 80 MG tablet Take 80 mg by mouth Daily.     • propranolol (INDERAL) 80 MG tablet Take 80 mg by mouth Daily.     • QUEtiapine (SEROquel) 400 MG tablet Take 400 mg by mouth Every Night. 1/2 tab     • risedronate (ACTONEL) 150 MG tablet risedronate 150 mg oral tablet take 1 tablet (150 mg) by oral route once a month on the same date with a full glass of water at least 30 min before first food or drink of the day; remain in an upright position for at least 30 min.   Active     • topiramate (TOPAMAX) 50 MG tablet topiramate 50 mg oral tablet take 1 tablet (50 mg) by oral route 2 times per day   Active     • zolpidem (AMBIEN) 10 MG tablet Take 10 mg by mouth At Night As Needed. 1/2 tab       Current Facility-Administered Medications on File Prior to Visit   Medication Dose Route Frequency Provider Last Rate Last Admin   • [COMPLETED] sodium chloride 0.9 % infusion 250 mL  250 mL Intravenous Once Princess Alvarez MD PhD 20 mL/hr at 10/28/21 1156 250 mL at 10/28/21 1156   • Trastuzumab (HERCEPTIN) 300 mg in sodium chloride 0.9 % 289.3 mL chemo IVPB  300 mg Intravenous Once Kim  Princess SUAREZ MD PhD   300 mg at 10/28/21 1156       No Known Allergies  Past Medical History:   Diagnosis Date   • Breast cancer (HCC)    • Breast cancer screening by mammogram 2020   • Depression    • Encounter for Hemoccult screening 2019   • GERD (gastroesophageal reflux disease)    • Insomnia    • Malignant neoplasm of upper-outer quadrant of left female breast (HCC)    • Migraine headache    • Osteoporosis      Past Surgical History:   Procedure Laterality Date   • BREAST LUMPECTOMY     • BREAST LUMPECTOMY WITH SENTINEL NODE BIOPSY Left    • BUNIONECTOMY Bilateral    • CATARACT EXTRACTION     • COLONOSCOPY  2019   • ENDOSCOPY N/A 10/1/2021    Procedure: ESOPHAGOGASTRODUODENOSCOPY WITH BIOPSY;  Surgeon: Hue Meyer MD;  Location: [facility] ENDOSCOPY;  Service: Gastroenterology;  Laterality: N/A;  ESOPHAGITIS/GASTRITIS   • HYSTERECTOMY      PARTIAL   • OTHER SURGICAL HISTORY      BIOPSY   • SKIN CANCER EXCISION     • TONSILLECTOMY       Social History     Socioeconomic History   • Marital status:    • Number of children: 2   Tobacco Use   • Smoking status: Never Smoker   • Smokeless tobacco: Never Used   Vaping Use   • Vaping Use: Never used   Substance and Sexual Activity   • Alcohol use: Yes     Comment: OCCASIONAL   • Drug use: Never   • Sexual activity: Defer     Family History   Problem Relation Age of Onset   • Kidney cancer Father    • Skin cancer Father    • Other Father         MYELOMA   • No Known Problems Mother    • Malig Hyperthermia Neg Hx      Immunization History   Administered Date(s) Administered   • COVID-19 (MODERNA) 03/09/2021, 04/06/2021   • Influenza, Unspecified 11/02/2020, 11/02/2020       Objective   Physical Exam  Vitals and nursing note reviewed.   Constitutional:       Appearance: Normal appearance.   HENT:      Head: Normocephalic.      Nose: Nose normal.      Mouth/Throat:      Mouth: Mucous membranes are moist.      Pharynx: Oropharynx is clear.   Eyes:      Pupils:  Pupils are equal, round, and reactive to light.   Cardiovascular:      Rate and Rhythm: Normal rate and regular rhythm.      Pulses: Normal pulses.      Heart sounds: Normal heart sounds. No murmur heard.      Pulmonary:      Effort: Pulmonary effort is normal. No respiratory distress.      Breath sounds: Normal breath sounds.   Abdominal:      General: Bowel sounds are normal. There is no distension.      Tenderness: There is no abdominal tenderness.   Musculoskeletal:         General: Normal range of motion.      Cervical back: Normal range of motion and neck supple.      Right lower leg: No edema.      Left lower leg: No edema.   Skin:     General: Skin is warm and dry.      Capillary Refill: Capillary refill takes less than 2 seconds.      Coloration: Skin is not pale.   Neurological:      General: No focal deficit present.      Mental Status: She is alert and oriented to person, place, and time. Mental status is at baseline.   Psychiatric:         Mood and Affect: Mood normal.         Behavior: Behavior normal.         Thought Content: Thought content normal.         Judgment: Judgment normal.         Vitals:    10/28/21 1037   BP: 120/70   Pulse: 64   Resp: 16   Temp: 97.2 °F (36.2 °C)   SpO2: 99%   Weight: 51.7 kg (113 lb 15.7 oz)   PainSc: 0-No pain     ECOG score: 0         ECOG: (0) Fully Active - Able to Carry On All Pre-disease Performance Without Restriction  Fall Risk Assessment was completed, and patient is at low risk for falls.  PHQ-9 Total Score:         The patient is  experiencing fatigue. Fatigue score: 4    PT/OT Functional Screening: PT fx screen: No needs identified  Speech Functional Screening: Speech fx screen: No needs identified  Rehab to be ordered: Rehab to be ordered: No needs identified        Result Review :   The following data was reviewed by: ANGELICA Quijano on 10/28/2021:  Lab Results   Component Value Date    HGB 10.6 (L) 10/28/2021    HCT 31.0 (L) 10/28/2021    MCV  95.4 10/28/2021     10/28/2021    WBC 5.53 10/28/2021    NEUTROABS 4.11 10/28/2021    LYMPHSABS 0.75 10/28/2021    MONOSABS 0.37 10/28/2021    EOSABS 0.25 10/28/2021    BASOSABS 0.05 10/28/2021     Lab Results   Component Value Date    GLUCOSE 105 (H) 09/07/2021    BUN 21 (H) 09/07/2021    CREATININE 1.05 (H) 09/07/2021     (L) 09/07/2021    K 3.4 (L) 09/07/2021     09/07/2021    CO2 17.3 (L) 09/07/2021    CALCIUM 9.0 09/07/2021    PROTEINTOT 7.1 09/07/2021    ALBUMIN 3.80 09/07/2021    BILITOT 0.4 09/07/2021    ALKPHOS 93 09/07/2021    AST 26 09/07/2021    ALT 22 09/07/2021          Assessment and Plan          Diagnoses and all orders for this visit:    1. Anemia associated with chemotherapy (Primary)  -     Ferritin; Future  -     Iron Profile; Future    2. Malignant neoplasm of upper-outer quadrant of left breast in female, estrogen receptor positive (HCC)    Proceed with cycle 6 Herceptin treatment. She has echocardiogram scheduled for around the end of November she reports.     We will evaluate anemia with iron profile and ferritin to see if she is iron deficient. If so, can proceed with oral iron 1-2 tabs daily.         Patient Follow Up: Follow up for next scheduled Herceptin treatment with Dr. Alvarez.   Patient was given instructions and counseling regarding her condition or for health maintenance advice. Please see specific information pulled into the AVS if appropriate.     Bobbi Christianson, APRN    10/28/2021

## 2021-11-04 ENCOUNTER — TELEPHONE (OUTPATIENT)
Dept: ONCOLOGY | Facility: HOSPITAL | Age: 61
End: 2021-11-04

## 2021-11-04 NOTE — TELEPHONE ENCOUNTER
Returned patient call and informed her that since she had a DEXA in Jan it will not be necessary to repeat.  Asked patient to bring in a copy so we can use it as a baseline since she has started taking the letrozole. Patient verbalized understanding.

## 2021-11-04 NOTE — TELEPHONE ENCOUNTER
Caller: Ragini Dozier    Relationship: Self    Best call back number: [phone]    What is the best time to reach you: ANYTIME    Who are you requesting to speak with (clinical staff, provider,  specific staff member): DR NASSAR    Do you know the name of the person who called: RAGINI    What was the call regarding:     DR LUKE HAD ORDERED TO HAVE A DEXA SCAN COMPLETED, RAGINI  RECENTLY SPOKE WITH [facility] AND WAS TOLD INSURANCE   WILL NOT PAY FOR ANOTHER DEXA SCAN WILL ONLY PAY FOR ONE EVERY TWO YEARS, WANTING TO KNOW IF DEXA SCAN JAN 21 OF THIS YEAR COMPLETED AT [facility]  WOULD BE SUFFICIENT,  AND IF NOT WHAT THE PLAN WOULD BE FROM THERE.       Do you require a callback: YES

## 2021-11-05 ENCOUNTER — HOSPITAL ENCOUNTER (OUTPATIENT)
Dept: CARDIOLOGY | Facility: HOSPITAL | Age: 61
Discharge: HOME | End: 2021-11-05
Admitting: INTERNAL MEDICINE

## 2021-11-05 DIAGNOSIS — Z79.899 HIGH RISK MEDICATION USE: ICD-10-CM

## 2021-11-05 PROCEDURE — 93306 TTE W/DOPPLER COMPLETE: CPT | Performed by: SPECIALIST

## 2021-11-05 PROCEDURE — 93306 TTE W/DOPPLER COMPLETE: CPT

## 2021-11-06 LAB
BH CV ECHO MEAS - AO ROOT DIAM: 3.1 CM
BH CV ECHO MEAS - EF(MOD-BP): 65 %
BH CV ECHO MEAS - IVSD: 0.9 CM
BH CV ECHO MEAS - LA DIMENSION(2D): 2.7 CM
BH CV ECHO MEAS - LAT PEAK E' VEL: 6 CM/SEC
BH CV ECHO MEAS - LVIDD: 4 CM
BH CV ECHO MEAS - LVIDS: 2.7 CM
BH CV ECHO MEAS - LVPWD: 0.7 CM
BH CV ECHO MEAS - MED PEAK E' VEL: 5 CM/SEC
BH CV ECHO MEAS - MV A MAX VEL: 55 CM/SEC
BH CV ECHO MEAS - MV DEC TIME: 221 MSEC
BH CV ECHO MEAS - MV E MAX VEL: 64 CM/SEC
BH CV ECHO MEAS - MV E/A: 1.2
BH CV ECHO MEASUREMENTS AVERAGE E/E' RATIO: 11.64
IVRT: 77 MSEC
LEFT ATRIUM VOLUME INDEX: 21 ML/M2
MAXIMAL PREDICTED HEART RATE: 161 BPM
STRESS TARGET HR: 137 BPM

## 2021-11-16 ENCOUNTER — TELEPHONE (OUTPATIENT)
Dept: ONCOLOGY | Facility: HOSPITAL | Age: 61
End: 2021-11-16

## 2021-11-16 DIAGNOSIS — Z17.0 MALIGNANT NEOPLASM OF UPPER-OUTER QUADRANT OF LEFT BREAST IN FEMALE, ESTROGEN RECEPTOR POSITIVE (HCC): Primary | ICD-10-CM

## 2021-11-16 DIAGNOSIS — C50.412 MALIGNANT NEOPLASM OF UPPER-OUTER QUADRANT OF LEFT BREAST IN FEMALE, ESTROGEN RECEPTOR POSITIVE (HCC): Primary | ICD-10-CM

## 2021-11-16 NOTE — TELEPHONE ENCOUNTER
Caller: CARLOS     Relationship: [facility] [employer]     Best call back number: [phone]  FAX: [phone]    What test/procedure requested:  REEVALUATION AT [facility]--OCCUPATIONAL THERAPY RE-EVAL BIO-IMPEDENCE     When is it needed: NEEDS PHYSICIAN SIGNED ORDER BY 3:00 P.M. TODAY     Where is the test/procedure going to be performed: [facility]     Additional information or concerns: PLEASE FAX TO CARLOS ASAP OR CALL WITH ANY QUESTIONS OR CONCERNS.

## 2021-11-18 ENCOUNTER — HOSPITAL ENCOUNTER (OUTPATIENT)
Dept: ONCOLOGY | Facility: HOSPITAL | Age: 61
Setting detail: INFUSION SERIES
Discharge: HOME | End: 2021-11-18

## 2021-11-18 ENCOUNTER — APPOINTMENT (OUTPATIENT)
Dept: ONCOLOGY | Facility: HOSPITAL | Age: 61
End: 2021-11-18

## 2021-11-18 VITALS
HEART RATE: 62 BPM | RESPIRATION RATE: 20 BRPM | DIASTOLIC BLOOD PRESSURE: 70 MMHG | OXYGEN SATURATION: 100 % | BODY MASS INDEX: 22.51 KG/M2 | WEIGHT: 114.64 LBS | TEMPERATURE: 98.4 F | HEIGHT: 60 IN | SYSTOLIC BLOOD PRESSURE: 113 MMHG

## 2021-11-18 DIAGNOSIS — Z17.0 MALIGNANT NEOPLASM OF UPPER-OUTER QUADRANT OF LEFT BREAST IN FEMALE, ESTROGEN RECEPTOR POSITIVE (HCC): Primary | ICD-10-CM

## 2021-11-18 DIAGNOSIS — Z45.2 ADJUSTMENT AND MANAGEMENT OF VASCULAR ACCESS DEVICE: ICD-10-CM

## 2021-11-18 DIAGNOSIS — C50.412 MALIGNANT NEOPLASM OF UPPER-OUTER QUADRANT OF LEFT BREAST IN FEMALE, ESTROGEN RECEPTOR POSITIVE (HCC): Primary | ICD-10-CM

## 2021-11-18 LAB
BASOPHILS # BLD AUTO: 0.02 10*3/MM3 (ref 0–0.2)
BASOPHILS NFR BLD AUTO: 0.4 % (ref 0–1.5)
DEPRECATED RDW RBC AUTO: 44.6 FL (ref 37–54)
EOSINOPHIL # BLD AUTO: 0.16 10*3/MM3 (ref 0–0.4)
EOSINOPHIL NFR BLD AUTO: 2.8 % (ref 0.3–6.2)
ERYTHROCYTE [DISTWIDTH] IN BLOOD BY AUTOMATED COUNT: 12.8 % (ref 12.3–15.4)
HCT VFR BLD AUTO: 33.7 % (ref 34–46.6)
HGB BLD-MCNC: 11.3 G/DL (ref 12–15.9)
IMM GRANULOCYTES # BLD AUTO: 0.01 10*3/MM3 (ref 0–0.05)
IMM GRANULOCYTES NFR BLD AUTO: 0.2 % (ref 0–0.5)
LYMPHOCYTES # BLD AUTO: 0.87 10*3/MM3 (ref 0.7–3.1)
LYMPHOCYTES NFR BLD AUTO: 15.4 % (ref 19.6–45.3)
MCH RBC QN AUTO: 31.5 PG (ref 26.6–33)
MCHC RBC AUTO-ENTMCNC: 33.5 G/DL (ref 31.5–35.7)
MCV RBC AUTO: 93.9 FL (ref 79–97)
MONOCYTES # BLD AUTO: 0.55 10*3/MM3 (ref 0.1–0.9)
MONOCYTES NFR BLD AUTO: 9.8 % (ref 5–12)
NEUTROPHILS NFR BLD AUTO: 4.03 10*3/MM3 (ref 1.7–7)
NEUTROPHILS NFR BLD AUTO: 71.4 % (ref 42.7–76)
PLATELET # BLD AUTO: 221 10*3/MM3 (ref 140–450)
PMV BLD AUTO: 8.4 FL (ref 6–12)
RBC # BLD AUTO: 3.59 10*6/MM3 (ref 3.77–5.28)
WBC NRBC COR # BLD: 5.64 10*3/MM3 (ref 3.4–10.8)

## 2021-11-18 PROCEDURE — 25010000002 HEPARIN LOCK FLUSH PER 10 UNITS: Performed by: INTERNAL MEDICINE

## 2021-11-18 PROCEDURE — 25010000002 TRASTUZUMAB PER 10 MG: Performed by: INTERNAL MEDICINE

## 2021-11-18 PROCEDURE — 85025 COMPLETE CBC W/AUTO DIFF WBC: CPT | Performed by: INTERNAL MEDICINE

## 2021-11-18 PROCEDURE — 96413 CHEMO IV INFUSION 1 HR: CPT

## 2021-11-18 RX ORDER — HEPARIN SODIUM (PORCINE) LOCK FLUSH IV SOLN 100 UNIT/ML 100 UNIT/ML
500 SOLUTION INTRAVENOUS AS NEEDED
Status: DISCONTINUED | OUTPATIENT
Start: 2021-11-18 | End: 2021-11-19 | Stop reason: HOSPADM

## 2021-11-18 RX ORDER — SODIUM CHLORIDE 0.9 % (FLUSH) 0.9 %
20 SYRINGE (ML) INJECTION AS NEEDED
Status: CANCELLED | OUTPATIENT
Start: 2021-11-18

## 2021-11-18 RX ORDER — HEPARIN SODIUM (PORCINE) LOCK FLUSH IV SOLN 100 UNIT/ML 100 UNIT/ML
500 SOLUTION INTRAVENOUS AS NEEDED
Status: CANCELLED | OUTPATIENT
Start: 2021-11-18

## 2021-11-18 RX ORDER — SODIUM CHLORIDE 9 MG/ML
250 INJECTION, SOLUTION INTRAVENOUS ONCE
Status: COMPLETED | OUTPATIENT
Start: 2021-11-18 | End: 2021-11-18

## 2021-11-18 RX ORDER — SODIUM CHLORIDE 0.9 % (FLUSH) 0.9 %
20 SYRINGE (ML) INJECTION AS NEEDED
Status: DISCONTINUED | OUTPATIENT
Start: 2021-11-18 | End: 2021-11-19 | Stop reason: HOSPADM

## 2021-11-18 RX ADMIN — SODIUM CHLORIDE, PRESERVATIVE FREE 20 ML: 5 INJECTION INTRAVENOUS at 11:31

## 2021-11-18 RX ADMIN — TRASTUZUMAB 300 MG: 150 INJECTION, POWDER, LYOPHILIZED, FOR SOLUTION INTRAVENOUS at 10:59

## 2021-11-18 RX ADMIN — SODIUM CHLORIDE 250 ML: 9 INJECTION, SOLUTION INTRAVENOUS at 10:41

## 2021-11-18 RX ADMIN — HEPARIN SODIUM (PORCINE) LOCK FLUSH IV SOLN 100 UNIT/ML 500 UNITS: 100 SOLUTION at 11:31

## 2021-11-22 ENCOUNTER — HOSPITAL ENCOUNTER (OUTPATIENT)
Dept: ULTRASOUND IMAGING | Facility: HOSPITAL | Age: 61
Discharge: HOME | End: 2021-11-22

## 2021-11-22 ENCOUNTER — HOSPITAL ENCOUNTER (OUTPATIENT)
Dept: MAMMOGRAPHY | Facility: HOSPITAL | Age: 61
Discharge: HOME | End: 2021-11-22

## 2021-11-22 ENCOUNTER — APPOINTMENT (OUTPATIENT)
Dept: OCCUPATIONAL THERAPY | Facility: HOSPITAL | Age: 61
End: 2021-11-22

## 2021-11-22 DIAGNOSIS — Z85.3 HX OF BREAST CANCER: ICD-10-CM

## 2021-11-22 PROCEDURE — 77066 DX MAMMO INCL CAD BI: CPT

## 2021-11-22 PROCEDURE — G0279 TOMOSYNTHESIS, MAMMO: HCPCS

## 2021-12-09 ENCOUNTER — HOSPITAL ENCOUNTER (OUTPATIENT)
Dept: ONCOLOGY | Facility: HOSPITAL | Age: 61
Setting detail: INFUSION SERIES
Discharge: HOME | End: 2021-12-09

## 2021-12-09 ENCOUNTER — OFFICE VISIT (OUTPATIENT)
Dept: ONCOLOGY | Facility: HOSPITAL | Age: 61
End: 2021-12-09

## 2021-12-09 VITALS
HEART RATE: 66 BPM | TEMPERATURE: 98.4 F | OXYGEN SATURATION: 98 % | DIASTOLIC BLOOD PRESSURE: 68 MMHG | BODY MASS INDEX: 22.82 KG/M2 | WEIGHT: 116.84 LBS | SYSTOLIC BLOOD PRESSURE: 111 MMHG | RESPIRATION RATE: 16 BRPM

## 2021-12-09 VITALS
SYSTOLIC BLOOD PRESSURE: 111 MMHG | DIASTOLIC BLOOD PRESSURE: 68 MMHG | WEIGHT: 116.84 LBS | OXYGEN SATURATION: 98 % | HEART RATE: 66 BPM | BODY MASS INDEX: 22.82 KG/M2 | TEMPERATURE: 98.4 F

## 2021-12-09 DIAGNOSIS — Z79.899 HIGH RISK MEDICATION USE: ICD-10-CM

## 2021-12-09 DIAGNOSIS — Z17.0 MALIGNANT NEOPLASM OF UPPER-OUTER QUADRANT OF LEFT BREAST IN FEMALE, ESTROGEN RECEPTOR POSITIVE (HCC): Primary | ICD-10-CM

## 2021-12-09 DIAGNOSIS — M81.0 AGE-RELATED OSTEOPOROSIS WITHOUT CURRENT PATHOLOGICAL FRACTURE: ICD-10-CM

## 2021-12-09 DIAGNOSIS — Z45.2 ADJUSTMENT AND MANAGEMENT OF VASCULAR ACCESS DEVICE: ICD-10-CM

## 2021-12-09 DIAGNOSIS — C50.412 MALIGNANT NEOPLASM OF UPPER-OUTER QUADRANT OF LEFT BREAST IN FEMALE, ESTROGEN RECEPTOR POSITIVE (HCC): Primary | ICD-10-CM

## 2021-12-09 LAB
BASOPHILS # BLD AUTO: 0.02 10*3/MM3 (ref 0–0.2)
BASOPHILS NFR BLD AUTO: 0.3 % (ref 0–1.5)
DEPRECATED RDW RBC AUTO: 45.4 FL (ref 37–54)
EOSINOPHIL # BLD AUTO: 0.08 10*3/MM3 (ref 0–0.4)
EOSINOPHIL NFR BLD AUTO: 1.3 % (ref 0.3–6.2)
ERYTHROCYTE [DISTWIDTH] IN BLOOD BY AUTOMATED COUNT: 12.8 % (ref 12.3–15.4)
HCT VFR BLD AUTO: 31.4 % (ref 34–46.6)
HGB BLD-MCNC: 10.5 G/DL (ref 12–15.9)
IMM GRANULOCYTES # BLD AUTO: 0.01 10*3/MM3 (ref 0–0.05)
IMM GRANULOCYTES NFR BLD AUTO: 0.2 % (ref 0–0.5)
LYMPHOCYTES # BLD AUTO: 1.42 10*3/MM3 (ref 0.7–3.1)
LYMPHOCYTES NFR BLD AUTO: 22.2 % (ref 19.6–45.3)
MCH RBC QN AUTO: 32 PG (ref 26.6–33)
MCHC RBC AUTO-ENTMCNC: 33.4 G/DL (ref 31.5–35.7)
MCV RBC AUTO: 95.7 FL (ref 79–97)
MONOCYTES # BLD AUTO: 0.73 10*3/MM3 (ref 0.1–0.9)
MONOCYTES NFR BLD AUTO: 11.4 % (ref 5–12)
NEUTROPHILS NFR BLD AUTO: 4.14 10*3/MM3 (ref 1.7–7)
NEUTROPHILS NFR BLD AUTO: 64.6 % (ref 42.7–76)
PLATELET # BLD AUTO: 187 10*3/MM3 (ref 140–450)
PMV BLD AUTO: 8.3 FL (ref 6–12)
RBC # BLD AUTO: 3.28 10*6/MM3 (ref 3.77–5.28)
WBC NRBC COR # BLD: 6.4 10*3/MM3 (ref 3.4–10.8)

## 2021-12-09 PROCEDURE — 85025 COMPLETE CBC W/AUTO DIFF WBC: CPT | Performed by: INTERNAL MEDICINE

## 2021-12-09 PROCEDURE — 96413 CHEMO IV INFUSION 1 HR: CPT

## 2021-12-09 PROCEDURE — 25010000002 HEPARIN LOCK FLUSH PER 10 UNITS: Performed by: INTERNAL MEDICINE

## 2021-12-09 PROCEDURE — 99214 OFFICE O/P EST MOD 30 MIN: CPT | Performed by: INTERNAL MEDICINE

## 2021-12-09 PROCEDURE — 25010000002 TRASTUZUMAB PER 10 MG: Performed by: INTERNAL MEDICINE

## 2021-12-09 RX ORDER — HYDROCODONE BITARTRATE AND ACETAMINOPHEN 5; 325 MG/1; MG/1
5-325 TABLET ORAL
COMMUNITY
Start: 2021-12-08 | End: 2021-12-23

## 2021-12-09 RX ORDER — HEPARIN SODIUM (PORCINE) LOCK FLUSH IV SOLN 100 UNIT/ML 100 UNIT/ML
500 SOLUTION INTRAVENOUS AS NEEDED
Status: CANCELLED | OUTPATIENT
Start: 2021-12-09

## 2021-12-09 RX ORDER — SODIUM CHLORIDE 9 MG/ML
250 INJECTION, SOLUTION INTRAVENOUS ONCE
Status: COMPLETED | OUTPATIENT
Start: 2021-12-09 | End: 2021-12-09

## 2021-12-09 RX ORDER — GABAPENTIN 100 MG/1
100 CAPSULE ORAL
COMMUNITY
Start: 2021-12-08 | End: 2021-12-23

## 2021-12-09 RX ORDER — LETROZOLE 2.5 MG/1
2.5 TABLET, FILM COATED ORAL DAILY
Qty: 90 TABLET | Refills: 1 | Status: SHIPPED | OUTPATIENT
Start: 2021-12-09 | End: 2022-02-07

## 2021-12-09 RX ORDER — SODIUM CHLORIDE 0.9 % (FLUSH) 0.9 %
20 SYRINGE (ML) INJECTION AS NEEDED
Status: DISCONTINUED | OUTPATIENT
Start: 2021-12-09 | End: 2021-12-10 | Stop reason: HOSPADM

## 2021-12-09 RX ORDER — SODIUM CHLORIDE 0.9 % (FLUSH) 0.9 %
20 SYRINGE (ML) INJECTION AS NEEDED
Status: CANCELLED | OUTPATIENT
Start: 2021-12-09

## 2021-12-09 RX ORDER — HEPARIN SODIUM (PORCINE) LOCK FLUSH IV SOLN 100 UNIT/ML 100 UNIT/ML
500 SOLUTION INTRAVENOUS AS NEEDED
Status: DISCONTINUED | OUTPATIENT
Start: 2021-12-09 | End: 2021-12-10 | Stop reason: HOSPADM

## 2021-12-09 RX ADMIN — TRASTUZUMAB 300 MG: 150 INJECTION, POWDER, LYOPHILIZED, FOR SOLUTION INTRAVENOUS at 11:57

## 2021-12-09 RX ADMIN — SODIUM CHLORIDE 250 ML: 9 INJECTION, SOLUTION INTRAVENOUS at 11:52

## 2021-12-09 RX ADMIN — HEPARIN SODIUM (PORCINE) LOCK FLUSH IV SOLN 100 UNIT/ML 500 UNITS: 100 SOLUTION at 12:50

## 2021-12-09 RX ADMIN — SODIUM CHLORIDE, PRESERVATIVE FREE 20 ML: 5 INJECTION INTRAVENOUS at 12:50

## 2021-12-09 NOTE — PROGRESS NOTES
Patient  Ragini Dozier    Location  [facility] HEMATOLOGY & ONCOLOGY    Chief Complaint  Breast Cancer (-F1)    Referring Provider: TRISTON Pavon  PCP: Ashlee Dubose PA    Subjective          Oncology/Hematology History Overview Note   HR positive, HER-2 positive breast cancer:  -Found on routine screening mammogram  -Diagnostic mammogram on 11/10/2020: Calcifications in the left breast  -11/10/2020 left breast biopsy: Pathology positive for DCIS, high-grade, estrogen receptor positive (5%, strong), progesterone receptor positive (10%, weak-moderate)  -11/19/2020 MRI of the breast: 1.8 cm hematoma at the site of the recent biopsy showing DCIS.  There is a 1.1 cm nonfocal mass enhancement at the superior lateral margin of the hematoma and a separate 1.2 cm suspicious mass along the inferior medial margin.  These correspond the masses seen on ultrasound on 10/20/2020  -12/23/2020 MRI guided breast biopsy: Left lower inner quadrant pathology positive for invasive ductal carcinoma, grade 2, estrogen receptor positive (3%, moderate), progesterone receptor positive (1%, weak), HER-2 positive (3+ by IHC), Ki-67 approximately 60%.  Associated with high-grade ductal carcinoma in situ.  -Cycle 1 of TCH P on 12/18/2020.  Echocardiogram on 12/14/2020 shows an EF of 55%. C3 on 1/29/21  -C5 of TCHP on 3/12/21. Held C6 due to side effects.  -Left lumpectomy on 5/11/2021: No residual invasive carcinoma found, 0/8 lymph nodes involved, ypT0N0    Osteoporosis:  -Patient reports osteoporosis diagnosed on DEXA scan in January 2021.  -Her primary care provider has treated her with Boniva as well as calcium/vitamin D     Malignant neoplasm of upper-outer quadrant of left breast in female, estrogen receptor positive (HCC)   6/23/2021 Initial Diagnosis    Malignant neoplasm of upper-outer quadrant of left female breast (CMS/HCC)     6/24/2021 -  Chemotherapy    OP BREAST Trastuzumab Q21D (maintenance)      6/29/2021 -  Radiation    RADIATION THERAPY Treatment Details (Noted on 6/23/2021)  Site: Left Breast  Technique: 3D CRT  Goal: No goal specified  Planned Treatment Start Date: 6/29/2021      Chemotherapy    TCHP: 12/18/2020-3/12/21 (5 cycles)  OP BREAST Trastuzumab-anns Q21D (maintenance)   - 6/3/21-present         History of Present Illness  Patient comes in today for her next cycle of Herceptin.  I reviewed her CBC and CMP and she has no evidence of toxicity.  She is tolerating the medication well.  Her energy is good.  She is also tolerating letrozole which she was started on at the last appointment with our nurse practitioner.    Review of Systems   Constitutional: Positive for fatigue. Negative for appetite change, diaphoresis, fever, unexpected weight gain and unexpected weight loss.   HENT: Negative for hearing loss, sore throat and voice change.    Eyes: Negative for blurred vision, double vision, pain, redness and visual disturbance.   Respiratory: Negative for cough, shortness of breath and wheezing.    Cardiovascular: Positive for chest pain (Pain from brest surgery yesterday ). Negative for palpitations and leg swelling.   Endocrine: Negative for cold intolerance, heat intolerance, polydipsia and polyuria.   Genitourinary: Negative for decreased urine volume, difficulty urinating, frequency and urinary incontinence.   Musculoskeletal: Negative for arthralgias, back pain, joint swelling and myalgias.   Skin: Negative for color change, rash, skin lesions and bruise.   Neurological: Negative for dizziness, seizures, numbness and headache.   Hematological: Negative for adenopathy. Does not bruise/bleed easily.   Psychiatric/Behavioral: Negative for depressed mood. The patient is not nervous/anxious.    All other systems reviewed and are negative.      Past Medical History:   Diagnosis Date   • Breast cancer (HCC)    • Breast cancer screening by mammogram 2020   • Depression    • Encounter for Hemoccult  screening 2019   • GERD (gastroesophageal reflux disease)    • Insomnia    • Malignant neoplasm of upper-outer quadrant of left female breast (HCC)    • Migraine headache    • Osteoporosis      Past Surgical History:   Procedure Laterality Date   • BREAST LUMPECTOMY     • BREAST LUMPECTOMY WITH SENTINEL NODE BIOPSY Left    • BUNIONECTOMY Bilateral    • CATARACT EXTRACTION     • COLONOSCOPY  2019   • ENDOSCOPY N/A 10/1/2021    Procedure: ESOPHAGOGASTRODUODENOSCOPY WITH BIOPSY;  Surgeon: Hue Meyer MD;  Location: [facility] ENDOSCOPY;  Service: Gastroenterology;  Laterality: N/A;  ESOPHAGITIS/GASTRITIS   • HYSTERECTOMY      PARTIAL   • OTHER SURGICAL HISTORY      BIOPSY   • SKIN CANCER EXCISION     • TONSILLECTOMY       Social History     Socioeconomic History   • Marital status:    • Number of children: 2   Tobacco Use   • Smoking status: Never Smoker   • Smokeless tobacco: Never Used   Vaping Use   • Vaping Use: Never used   Substance and Sexual Activity   • Alcohol use: Yes     Comment: OCCASIONAL   • Drug use: Never   • Sexual activity: Defer     Family History   Problem Relation Age of Onset   • Kidney cancer Father    • Skin cancer Father    • Other Father         MYELOMA   • No Known Problems Mother    • Malig Hyperthermia Neg Hx        Objective   Physical Exam  General: Alert, cooperative, no acute distress  Eyes: Anicteric sclera, PERRLA  Respiratory: normal respiratory effort  Cardiovascular: no lower extremity edema  Skin: Normal tone, no rash, no lesions  Psychiatric: Appropriate affect, intact judgment  Neurologic: No focal sensory or motor deficits, normal cognition   Musculoskeletal: Normal muscle strength and tone  Extremities: No clubbing, cyanosis, or deformities    Vitals:    12/09/21 1029   BP: 111/68   Pulse: 66   Resp: 16   Temp: 98.4 °F (36.9 °C)   SpO2: 98%   Weight: 53 kg (116 lb 13.5 oz)   PainSc:   8   PainLoc: Breast     ECOG score: 1         PHQ-9 Total Score: 0        Result Review :   The following data was reviewed by: Princess Alvarez MD PhD on 12/09/2021:  Lab Results   Component Value Date    HGB 10.5 (L) 12/09/2021    HCT 31.4 (L) 12/09/2021    MCV 95.7 12/09/2021     12/09/2021    WBC 6.40 12/09/2021    NEUTROABS 4.14 12/09/2021    LYMPHSABS 1.42 12/09/2021    MONOSABS 0.73 12/09/2021    EOSABS 0.08 12/09/2021    BASOSABS 0.02 12/09/2021     Lab Results   Component Value Date    GLUCOSE 105 (H) 09/07/2021    BUN 21 (H) 09/07/2021    CREATININE 1.05 (H) 09/07/2021     (L) 09/07/2021    K 3.4 (L) 09/07/2021     09/07/2021    CO2 17.3 (L) 09/07/2021    CALCIUM 9.0 09/07/2021    PROTEINTOT 7.1 09/07/2021    ALBUMIN 3.80 09/07/2021    BILITOT 0.4 09/07/2021    ALKPHOS 93 09/07/2021    AST 26 09/07/2021    ALT 22 09/07/2021          Assessment and Plan    Diagnoses and all orders for this visit:    1. Malignant neoplasm of upper-outer quadrant of left breast in female, estrogen receptor positive (HCC) (Primary)  -     Adult Transthoracic Echo Limited W/ Cont if Necessary Per Protocol; Future  -     letrozole (FEMARA) 2.5 MG tablet; Take 1 tablet by mouth Daily for 60 days.  Dispense: 90 tablet; Refill: 1    2. Age-related osteoporosis without current pathological fracture    3. High risk medication use  -     Adult Transthoracic Echo Limited W/ Cont if Necessary Per Protocol; Future      HR+/HER2+ breast cancer: Patient is here today for cycle #8 of adjuvant Herceptin.  I reviewed her labs as well as her echocardiogram which shows no evidence of toxicity.  Her ejection fraction is 65%.  She will get a total of 12 cycles.  She is also on letrozole and is tolerating it well.  I will refill this medication today.  Her next DEXA scan will be due in January 2022.  I will schedule her next echocardiogram in 3 months.    Osteoporosis: The patient was diagnosed with osteoporosis by her primary care provider in January 2021.  She is on Boniva as well as  calcium/vitamin D.  I encouraged her to remain active and keep up with DEXA scans every other year.    Patient was given instructions and counseling regarding her condition or for health maintenance advice. Please see specific information pulled into the AVS if appropriate.     Princess Alvarez MD PhD    12/9/2021

## 2021-12-13 ENCOUNTER — TELEPHONE (OUTPATIENT)
Dept: ONCOLOGY | Facility: HOSPITAL | Age: 61
End: 2021-12-13

## 2021-12-13 NOTE — TELEPHONE ENCOUNTER
Contacted [facility] office requesting popeye georges. I have notified [facility]ymphedema clinic and let them know.

## 2021-12-13 NOTE — TELEPHONE ENCOUNTER
Caller: Ragini Dozier    Relationship to patient: Self    Best call back number: [phone]    Chief complaint: PATIENT IS SCHEDULED FOR AN INFUSION ON 12/30/21 AND WOULD LIKE TO RESCHEDULE TO THE FIRST WEEK OF January.      Type of visit: INFUSION     Additional notes: PLEASE CALL PATIENT TO ADVISE AND LEAVE VM IF NO ANSWER.

## 2021-12-14 ENCOUNTER — APPOINTMENT (OUTPATIENT)
Dept: OCCUPATIONAL THERAPY | Facility: HOSPITAL | Age: 61
End: 2021-12-14

## 2021-12-21 NOTE — TELEPHONE ENCOUNTER
MURPHY IS CALLING BACK ABOUT CANCELING HER INFUSION ON 12-30 AND HAVING IT R/S TO THE NEXT WEEK.     PLEASE CALL PT TO ADVISE.

## 2021-12-22 ENCOUNTER — HOSPITAL ENCOUNTER (OUTPATIENT)
Dept: OCCUPATIONAL THERAPY | Facility: HOSPITAL | Age: 61
Discharge: HOME | End: 2021-12-22
Admitting: SURGERY

## 2021-12-22 DIAGNOSIS — C50.412 MALIGNANT NEOPLASM OF UPPER-OUTER QUADRANT OF LEFT BREAST IN FEMALE, ESTROGEN RECEPTOR POSITIVE (HCC): ICD-10-CM

## 2021-12-22 DIAGNOSIS — Z98.890 S/P LUMPECTOMY, LEFT BREAST: ICD-10-CM

## 2021-12-22 DIAGNOSIS — Z91.89 AT RISK FOR LYMPHEDEMA: Primary | ICD-10-CM

## 2021-12-22 DIAGNOSIS — Z17.0 MALIGNANT NEOPLASM OF UPPER-OUTER QUADRANT OF LEFT BREAST IN FEMALE, ESTROGEN RECEPTOR POSITIVE (HCC): ICD-10-CM

## 2021-12-22 DIAGNOSIS — L90.5 SCAR CONDITION AND FIBROSIS OF SKIN: ICD-10-CM

## 2021-12-22 PROCEDURE — 97535 SELF CARE MNGMENT TRAINING: CPT

## 2021-12-22 PROCEDURE — 93702 BIS XTRACELL FLUID ANALYSIS: CPT

## 2021-12-22 NOTE — THERAPY RE-EVALUATION
Outpatient Occupational Therapy Lymphedema Re-Evaluation  [facility]     Patient Name: Ragini Dozier  : 1960  MRN: [id number]  Today's Date: 2021      Visit Date: 2021    Patient Active Problem List   Diagnosis   • Anxiety   • Cataract   • Depression   • Migraine   • Esophageal dysphagia   • Globus sensation   • Malignant neoplasm of upper-outer quadrant of left breast in female, estrogen receptor positive (HCC)   • Adjustment and management of vascular access device   • Anemia associated with chemotherapy   • Age-related osteoporosis without current pathological fracture        Past Medical History:   Diagnosis Date   • Breast cancer (HCC)    • Breast cancer screening by mammogram    • Depression    • Encounter for Hemoccult screening    • GERD (gastroesophageal reflux disease)    • Insomnia    • Malignant neoplasm of upper-outer quadrant of left female breast (HCC)    • Migraine headache    • Osteoporosis         Past Surgical History:   Procedure Laterality Date   • BREAST LUMPECTOMY     • BREAST LUMPECTOMY WITH SENTINEL NODE BIOPSY Left    • BUNIONECTOMY Bilateral    • CATARACT EXTRACTION     • COLONOSCOPY     • ENDOSCOPY N/A 10/1/2021    Procedure: ESOPHAGOGASTRODUODENOSCOPY WITH BIOPSY;  Surgeon: Hue Meyer MD;  Location: [facility] ENDOSCOPY;  Service: Gastroenterology;  Laterality: N/A;  ESOPHAGITIS/GASTRITIS   • HYSTERECTOMY      PARTIAL   • OTHER SURGICAL HISTORY      BIOPSY   • SKIN CANCER EXCISION     • TONSILLECTOMY           Visit Dx:     ICD-10-CM ICD-9-CM   1. At risk for lymphedema  Z91.89 V49.89   2. Scar condition and fibrosis of skin  L90.5 709.2   3. Malignant neoplasm of upper-outer quadrant of left breast in female, estrogen receptor positive (HCC)  C50.412 174.4    Z17.0 V86.0   4. S/P lumpectomy, left breast  Z98.890 V45.89            Lymphedema     Row Name 21 0800             Subjective Pain    Able to rate subjective pain? yes  -CH       "Pre-Treatment Pain Level 0  -CH      Post-Treatment Pain Level 0  -CH              Subjective Comments    Subjective Comments Pt. had reconstruction surgery (12/8/21) to reduce scar tissue and restore symmetry and balance in chest wall appearance  -CH              Lymphedema Assessment    Lymphedema Classification LUE:; at risk/stage 0  -CH      Lymphedema Cancer Related Sx left; lumpectomy; sentinel node biopsy  -CH      Lymph Nodes Removed # 8  -CH      Positive Lymph Nodes # 0  -CH      Chemo Received yes  -CH      Chemo Treatments #/Timeframe Neoadjuvant: TCHP, current: Letrozole monthly infusion of the Herceptin (4 treatments left)  -CH              Physical Concerns    The amount of pain associated with my lymphedema is: 0  -CH      The amount of limb heaviness associated with my lymphedema is: 0  -CH      The amount of skin tightness associated with my lymphedema is: 0  -CH      The size of my swollen limb(s) seems: 0  -CH      Lymphedema affects the movement of my swollen limb(s): 0  -CH      The strength in my swollen limb(s) is: 0  -CH              Psychosocial Concerns    Lymphedema affects my body image (i.e., \"how I think I look\"). 0  -CH      Lymphedema affects my socializing with others. 0  -CH      Lymphedema affects my intimate relations with spouse or partner (rate 0 if not applicable 0  -CH      Lymphedema \"gets me down\" (i.e., depression, frustration, or anger) 0  -CH      I must rely on others for help due to my lymphedema. 0  -CH      I know what to do to manage my lymphedema 1  -CH              Functional Concerns    Lymphedema affects my ability to perform self-care activities (i.e. eating, dressing, hygiene) 0  -CH      Lymphedema affects my ability to perform routine home or work-related activities. 0  -CH      Lymphedema affects my performance of preferred leisure activities. 0  -CH      Lymphedema affects proper fit of clothing/shoes 0  -CH      Lymphedema affects my sleep 0  -CH        "       Posture/Observations    Posture- WNL Posture is WNL  -CH              General ROM    GENERAL ROM COMMENTS B UE WFL  -CH              MMT (Manual Muscle Testing)    General MMT Comments No Tested due to recent surgery  -CH              Skin Changes/Observations    Skin Observations Comment Patient is noted with intact surgical incisions with mild ecchymosis present bilaterally.  -CH              Lymphedema Measurements    Measurement Type(s) Circumferential  -CH              L-Dex Bioimpedence Screening    L-Dex Measurement Extremity LUE  -CH      L-Dex Patient Position Standing  -CH      L-Dex UE Dominate Side Right  -CH      L-Dex UE At Risk Side Left  -CH      L-Dex UE Pre Surgical Value No  -CH      L-Dex UE Score -1.1  -CH      L-Dex UE Baseline Score -1.1  -CH      L-Dex UE Value Change 0  -CH      $ L-Dex Charge yes  -CH              Lymphedema Life Impact Scale Totals    A.  Total Q1 - Q17 (Do not include Q18) 1  -CH      B.  Total number of questions answered (Q1-Q17) 17  -CH      C. Divide A by B 0.06  -CH      D. Multiple C by 25 1.5  -CH            User Key  (r) = Recorded By, (t) = Taken By, (c) = Cosigned By    Initials Name Provider Type    CH Cassidy Grigsby OT Occupational Therapist                        Patient is showing a -9.24% difference between the right upper extremity and the left upper extremity indicating normalize lymphatic functioning in the left upper quadrant    Therapy Education  73539 - OT Self Care/Mgmt Minutes: 15         OT Goals     Row Name 12/22/21 0842          Time Calculation    OT Goal Re-Cert Due Date 01/21/22  -           User Key  (r) = Recorded By, (t) = Taken By, (c) = Cosigned By    Initials Name Provider Type     Cassidy Grigsby OT Occupational Therapist              Goals  1. Self-care Functional Limitation                     LTG 1: 12 weeks:  As an indicator of no exacerbation of lymphedema staging, the patient will present with no greater than 10%  increase in total UE lymph volumetric from baseline of non-affected upper extremity.                          STATUS: Met: Ongoing  STG 1a: 4 weeks:  As an indicator of no exacerbation of lymphedema staging, the patient will present with no greater than 10% increase in total UE lymph volumetric from baseline of non-affected upper extremity.              STATUS: Met: Ongoing    LTG 1a: 90 days:  As an indicator of no exacerbation of lymphedema staging, the patient will present with an L-Dex score less than 7 points from preoperative baseline.    STATUS: New  STG1b: 4 weeks: To prevent exacerbation of mixed edema to lymphedema, patient will utilize the 2 (15075) Daya compression bra  daily.                                                 STATUS: Met              STG 1c: 4 weeks: Patient will be independent with self-manual lymphatic massage.                          STATUS: Met: Ongoing              STG 1d: 4 weeks: Patient will be independent with identification of signs and symptoms of lymphedema exasperation per stoplight to recovery education sheet.                          STATUS: Met: Ongoing  TREATMENT:  Self Care/ADL retraining, Therapeutic Activity, Neuromuscular Re-education, Therapeutic Exercise, Bioimpedence Fluid Analysis, Post-Surgical compression x [id number] Daya Zip-ST-High, Orthotic Management and training,  and Manual Therapy.     OT Assessment/Plan     Row Name 12/22/21 0837          OT Assessment    Functional Limitations Other (comment); Performance in self-care ADL  At risk for lymphedema  -CH     Impairments Impaired lymphatic circulation  -CH     Assessment Comments Patient is demonstrating normalized lymphatic functioning this date.  Patient did have reconstruction surgery on 12 8 but is not demonstrating swelling in either breast.  Patient's incisions are intact with stitches present.  Patient will benefit from continued evaluation of lymphatic functioning to prevent advancement in lymphedema  staging.  -     OT Rehab Potential Excellent  -     Patient/caregiver participated in establishment of treatment plan and goals Yes  -            OT Plan    OT Frequency Other (comment)  See duration  -     Predicted Duration of Therapy Intervention (OT) Patient to be reevaluated every 6 months for years 2 through 5  -     Planned CPT's? OT RE-EVAL: 15397; OT SELF CARE/MGMT/TRAIN 15 MIN: 24573; OT NEUROMUSC RE EDUCATION EA 15 MIN: 20076; OT MANUAL THERAPY EA 15 MIN: 69310; OT BIS XTRACELL FLUID ANALYSIS: 35422; OT ORTHOTIC MGMT/TRAIN EA 15 MIN: 89319; OT ORTHO/PROSTHET CHECKOUT EA 15 MIN: 97249  -           User Key  (r) = Recorded By, (t) = Taken By, (c) = Cosigned By    Initials Name Provider Type     Cassidy Grigsby OT Occupational Therapist                          Time Calculation:   Timed Charges  00851 - OT Self Care/Mgmt Minutes: 15  Total Minutes  Timed Charges Total Minutes: 15   Total Minutes: 15     Therapy Charges for Today     Code Description Service Date Service Provider Modifiers Qty    34436014077 HC PT BIS XTRACELL FLUID ANALYSIS 12/22/2021 Cassidy Grigsby OT  1    47398935505 HC OT SELF CARE/MGMT/TRAIN EA 15 MIN 12/22/2021 Cassidy Grigsby OT GO 1                    Cassidy rGigsby OT  12/22/2021

## 2021-12-23 ENCOUNTER — OFFICE VISIT (OUTPATIENT)
Dept: GASTROENTEROLOGY | Facility: CLINIC | Age: 61
End: 2021-12-23

## 2021-12-23 VITALS
HEIGHT: 60 IN | WEIGHT: 156.97 LBS | HEART RATE: 67 BPM | SYSTOLIC BLOOD PRESSURE: 100 MMHG | BODY MASS INDEX: 30.82 KG/M2 | DIASTOLIC BLOOD PRESSURE: 72 MMHG

## 2021-12-23 DIAGNOSIS — K21.00 GASTROESOPHAGEAL REFLUX DISEASE WITH ESOPHAGITIS WITHOUT HEMORRHAGE: Primary | ICD-10-CM

## 2021-12-23 DIAGNOSIS — K44.9 HIATAL HERNIA: ICD-10-CM

## 2021-12-23 PROCEDURE — 99213 OFFICE O/P EST LOW 20 MIN: CPT | Performed by: NURSE PRACTITIONER

## 2021-12-23 RX ORDER — FAMOTIDINE 20 MG/1
20 TABLET, FILM COATED ORAL 2 TIMES DAILY
Qty: 180 TABLET | Refills: 1 | Status: SHIPPED | OUTPATIENT
Start: 2021-12-23 | End: 2022-02-28 | Stop reason: SDUPTHER

## 2021-12-23 NOTE — PATIENT INSTRUCTIONS

## 2021-12-23 NOTE — PROGRESS NOTES
Chief Complaint  Follow-up ([place] )    History of Present Illness  Ragini Dozier is a 61 y.o. who presents to [facility] for follow up of Follow-up ([place] )     Ms. Dozier presents today for follow-up EGD.  Biopsies consistent with reflux esophagitis.  Esophageal dysmotility noted also suspicious for esophageal spasms.  Patient continuing to take Protonix 40 mg once daily and occasionally having breakthrough heartburn approximately 1-2 times a week, mainly after dinner.  Feels that she is no longer having dysphagia.  Admits that she eats all foods and not following an antireflux diet.  Denies any nausea, vomiting, change in appetite, or weight loss.  Drinking 1-2 diet Cokes daily.  Denies frequent NSAID usage.    Labs Result Review Imaging    Past Medical History:   Diagnosis Date   • Breast cancer (HCC)    • Breast cancer screening by mammogram 2020   • Depression    • Encounter for Hemoccult screening 2019   • GERD (gastroesophageal reflux disease)    • Insomnia    • Malignant neoplasm of upper-outer quadrant of left female breast (HCC)    • Migraine headache    • Osteoporosis        Past Surgical History:   Procedure Laterality Date   • BREAST LUMPECTOMY     • BREAST LUMPECTOMY WITH SENTINEL NODE BIOPSY Left    • BUNIONECTOMY Bilateral    • CATARACT EXTRACTION     • COLONOSCOPY  2019   • ENDOSCOPY N/A 10/1/2021    Procedure: ESOPHAGOGASTRODUODENOSCOPY WITH BIOPSY;  Surgeon: Hue Meyer MD;  Location: [facility] ENDOSCOPY;  Service: Gastroenterology;  Laterality: N/A;  ESOPHAGITIS/GASTRITIS   • HYSTERECTOMY      PARTIAL   • OTHER SURGICAL HISTORY      BIOPSY   • SKIN CANCER EXCISION     • TONSILLECTOMY     • UPPER GASTROINTESTINAL ENDOSCOPY         Current Outpatient Medications on File Prior to Visit   Medication Sig Dispense Refill   • butalbital-acetaminophen-caffeine (FIORICET, ESGIC) -40 MG per tablet butalbital-acetaminophen-caff -40 mg oral tablet  take 1 tablet by oral route every 4 hours as needed not to exceed 6 tablets per 24hrs   Active     • Calcium Carbonate-Vitamin D (calcium-vitamin D) 500-200 MG-UNIT tablet per tablet 500 mg 2 (Two) Times a Day.     • diazePAM (VALIUM) 5 MG tablet Take 5 mg by mouth Every 8 (Eight) Hours As Needed.     • DULoxetine (CYMBALTA) 30 MG capsule Take 30 mg by mouth 2 (Two) Times a Day.     • letrozole (FEMARA) 2.5 MG tablet Take 1 tablet by mouth Daily for 60 days. 90 tablet 1   • OXcarbazepine (TRILEPTAL) 300 MG tablet Take 300 mg by mouth 2 (Two) Times a Day.     • propranolol (INDERAL) 80 MG tablet Take 80 mg by mouth Daily.     • QUEtiapine (SEROquel) 400 MG tablet Take 400 mg by mouth Every Night. 1/2 tab     • risedronate (ACTONEL) 150 MG tablet risedronate 150 mg oral tablet take 1 tablet (150 mg) by oral route once a month on the same date with a full glass of water at least 30 min before first food or drink of the day; remain in an upright position for at least 30 min.   Active     • topiramate (TOPAMAX) 50 MG tablet topiramate 50 mg oral tablet take 1 tablet (50 mg) by oral route 2 times per day   Active     • zolpidem (AMBIEN) 10 MG tablet Take 10 mg by mouth At Night As Needed. 1/2 tab     • [DISCONTINUED] esomeprazole (nexIUM) 20 MG capsule      • [DISCONTINUED] pantoprazole (PROTONIX) 40 MG EC tablet Take 40 mg by mouth Daily.     • [DISCONTINUED] gabapentin (NEURONTIN) 100 MG capsule 100 mg.     • [DISCONTINUED] HYDROcodone-acetaminophen (NORCO) 5-325 MG per tablet 5-325 tablets.     • [DISCONTINUED] propranolol (INDERAL) 80 MG tablet Take 80 mg by mouth Daily.       No current facility-administered medications on file prior to visit.       Social History     Social History Narrative   • Not on file         Objective     Review of Systems   Gastrointestinal: Positive for GERD.        Vital Signs:   /72 (BP Location: Left arm, Patient Position: Sitting, Cuff Size: Large Adult)   Pulse 67   Ht 152.4 cm  "(60\")   Wt 71.2 kg (156 lb 15.5 oz)   BMI 30.66 kg/m²       Physical Exam  Constitutional:       General: She is not in acute distress.     Appearance: Normal appearance. She is well-developed and normal weight.   HENT:      Head: Normocephalic and atraumatic.   Eyes:      Conjunctiva/sclera: Conjunctivae normal.      Pupils: Pupils are equal, round, and reactive to light.      Visual Fields: Right eye visual fields normal and left eye visual fields normal.   Cardiovascular:      Rate and Rhythm: Normal rate and regular rhythm.      Heart sounds: Normal heart sounds.   Pulmonary:      Effort: Pulmonary effort is normal. No retractions.      Breath sounds: Normal breath sounds and air entry.   Abdominal:      General: Bowel sounds are normal.      Palpations: Abdomen is soft.      Tenderness: There is no abdominal tenderness.      Comments: No appreciable hepatosplenomegaly or ascites   Musculoskeletal:         General: Normal range of motion.      Cervical back: Neck supple.      Right lower leg: No edema.      Left lower leg: No edema.   Lymphadenopathy:      Cervical: No cervical adenopathy.   Skin:     General: Skin is warm and dry.      Findings: No lesion.   Neurological:      General: No focal deficit present.      Mental Status: She is alert and oriented to person, place, and time.   Psychiatric:         Mood and Affect: Mood and affect normal.         Behavior: Behavior normal.         Result Review :   The following data was reviewed by: ANGELICA Castro on 12/23/2021:  CMP    CMP 4/13/21 6/1/21 9/7/21   Glucose 123 (A) 101 (A) 105 (A)   BUN 16 29 (A) 21 (A)   Creatinine 0.83 1.19 (A) 1.05 (A)   eGFR Non African Am   54 (A)   Sodium 139 136 133 (A)   Potassium 3.4 (A) 4.1 3.4 (A)   Chloride 110 103 102   Calcium 9.0 8.9 9.0   Albumin 3.5 4.1 3.80   Total Bilirubin <0.15 (A) 0.19 (A) 0.4   Alkaline Phosphatase 84 124 93   AST (SGOT) 13 (A) 16 26   ALT (SGPT) 7 (A) 8 (A) 22   (A) Abnormal value     "   Comments are available for some flowsheets but are not being displayed.           CBC w/diff    CBC w/Diff 10/28/21 11/18/21 12/9/21   WBC 5.53 5.64 6.40   RBC 3.25 (A) 3.59 (A) 3.28 (A)   Hemoglobin 10.6 (A) 11.3 (A) 10.5 (A)   Hematocrit 31.0 (A) 33.7 (A) 31.4 (A)   MCV 95.4 93.9 95.7   MCH 32.6 31.5 32.0   MCHC 34.2 33.5 33.4   RDW 13.1 12.8 12.8   Platelets 202 221 187   Neutrophil Rel % 74.3 71.4 64.6   Immature Granulocyte Rel % 0.0 0.2 0.2   Lymphocyte Rel % 13.6 (A) 15.4 (A) 22.2   Monocyte Rel % 6.7 9.8 11.4   Eosinophil Rel % 4.5 2.8 1.3   Basophil Rel % 0.9 0.4 0.3   (A) Abnormal value            Lab Results   Component Value Date    IRON 89 10/28/2021    TIBC 240 (L) 10/28/2021    FERRITIN 419.60 (H) 10/28/2021    LABIRON 37 10/28/2021         EGD 10/1/2021: Grade a esophagitis.  Abnormal esophageal motility suspicious for esophageal spasm.  Hiatal hernia was found at 34 cm from the incisors and narrowing at 36 cm.  Antrum biopsy-no significant pathologic change.  GE junction biopsy-changes consistent with reflux esophagitis.     Assessment and Plan    Diagnoses and all orders for this visit:    1. Gastroesophageal reflux disease with esophagitis without hemorrhage (Primary)    2. Hiatal hernia    Other orders  -     famotidine (PEPCID) 20 MG tablet; Take 1 tablet by mouth 2 (Two) Times a Day for 90 days.  Dispense: 180 tablet; Refill: 1      * Surgery not found *     Discussed with patient risk versus benefits of taking PPI.  Patient expresses that she actually does have osteoporosis.  The plan is now to discontinue Protonix and try famotidine 20 mg twice daily.  Patient also to follow a GERD friendly diet.  Patient would like to follow-up only as needed.    Follow Up   Return if symptoms worsen or fail to improve.  Patient was given instructions and counseling regarding her condition or for health maintenance advice. Please see specific information pulled into the AVS if appropriate.

## 2021-12-30 ENCOUNTER — APPOINTMENT (OUTPATIENT)
Dept: ONCOLOGY | Facility: HOSPITAL | Age: 61
End: 2021-12-30

## 2022-01-06 ENCOUNTER — HOSPITAL ENCOUNTER (OUTPATIENT)
Dept: ONCOLOGY | Facility: HOSPITAL | Age: 62
Setting detail: INFUSION SERIES
Discharge: HOME | End: 2022-01-06

## 2022-01-06 VITALS
DIASTOLIC BLOOD PRESSURE: 63 MMHG | HEIGHT: 60 IN | SYSTOLIC BLOOD PRESSURE: 109 MMHG | BODY MASS INDEX: 22.07 KG/M2 | HEART RATE: 60 BPM | TEMPERATURE: 97.2 F | WEIGHT: 112.43 LBS | OXYGEN SATURATION: 97 % | RESPIRATION RATE: 16 BRPM

## 2022-01-06 DIAGNOSIS — Z45.2 ADJUSTMENT AND MANAGEMENT OF VASCULAR ACCESS DEVICE: ICD-10-CM

## 2022-01-06 DIAGNOSIS — Z17.0 MALIGNANT NEOPLASM OF UPPER-OUTER QUADRANT OF LEFT BREAST IN FEMALE, ESTROGEN RECEPTOR POSITIVE: Primary | ICD-10-CM

## 2022-01-06 DIAGNOSIS — C50.412 MALIGNANT NEOPLASM OF UPPER-OUTER QUADRANT OF LEFT BREAST IN FEMALE, ESTROGEN RECEPTOR POSITIVE: Primary | ICD-10-CM

## 2022-01-06 LAB
BASOPHILS # BLD AUTO: 0.04 10*3/MM3 (ref 0–0.2)
BASOPHILS NFR BLD AUTO: 0.8 % (ref 0–1.5)
DEPRECATED RDW RBC AUTO: 45.7 FL (ref 37–54)
EOSINOPHIL # BLD AUTO: 0.1 10*3/MM3 (ref 0–0.4)
EOSINOPHIL NFR BLD AUTO: 2 % (ref 0.3–6.2)
ERYTHROCYTE [DISTWIDTH] IN BLOOD BY AUTOMATED COUNT: 12.9 % (ref 12.3–15.4)
HCT VFR BLD AUTO: 35.1 % (ref 34–46.6)
HGB BLD-MCNC: 11.9 G/DL (ref 12–15.9)
IMM GRANULOCYTES # BLD AUTO: 0 10*3/MM3 (ref 0–0.05)
IMM GRANULOCYTES NFR BLD AUTO: 0 % (ref 0–0.5)
LYMPHOCYTES # BLD AUTO: 0.82 10*3/MM3 (ref 0.7–3.1)
LYMPHOCYTES NFR BLD AUTO: 16.3 % (ref 19.6–45.3)
MCH RBC QN AUTO: 32.2 PG (ref 26.6–33)
MCHC RBC AUTO-ENTMCNC: 33.9 G/DL (ref 31.5–35.7)
MCV RBC AUTO: 95.1 FL (ref 79–97)
MONOCYTES # BLD AUTO: 0.49 10*3/MM3 (ref 0.1–0.9)
MONOCYTES NFR BLD AUTO: 9.7 % (ref 5–12)
NEUTROPHILS NFR BLD AUTO: 3.58 10*3/MM3 (ref 1.7–7)
NEUTROPHILS NFR BLD AUTO: 71.2 % (ref 42.7–76)
PLATELET # BLD AUTO: 212 10*3/MM3 (ref 140–450)
PMV BLD AUTO: 8.3 FL (ref 6–12)
RBC # BLD AUTO: 3.69 10*6/MM3 (ref 3.77–5.28)
WBC NRBC COR # BLD: 5.03 10*3/MM3 (ref 3.4–10.8)

## 2022-01-06 PROCEDURE — 96413 CHEMO IV INFUSION 1 HR: CPT

## 2022-01-06 PROCEDURE — 25010000002 HEPARIN LOCK FLUSH PER 10 UNITS: Performed by: INTERNAL MEDICINE

## 2022-01-06 PROCEDURE — 25010000002 TRASTUZUMAB PER 10 MG: Performed by: INTERNAL MEDICINE

## 2022-01-06 PROCEDURE — 85025 COMPLETE CBC W/AUTO DIFF WBC: CPT | Performed by: INTERNAL MEDICINE

## 2022-01-06 RX ORDER — SODIUM CHLORIDE 9 MG/ML
250 INJECTION, SOLUTION INTRAVENOUS ONCE
Status: COMPLETED | OUTPATIENT
Start: 2022-01-06 | End: 2022-01-06

## 2022-01-06 RX ORDER — SODIUM CHLORIDE 0.9 % (FLUSH) 0.9 %
20 SYRINGE (ML) INJECTION AS NEEDED
Status: CANCELLED | OUTPATIENT
Start: 2022-01-06

## 2022-01-06 RX ORDER — HEPARIN SODIUM (PORCINE) LOCK FLUSH IV SOLN 100 UNIT/ML 100 UNIT/ML
500 SOLUTION INTRAVENOUS AS NEEDED
Status: DISCONTINUED | OUTPATIENT
Start: 2022-01-06 | End: 2022-01-07 | Stop reason: HOSPADM

## 2022-01-06 RX ORDER — HEPARIN SODIUM (PORCINE) LOCK FLUSH IV SOLN 100 UNIT/ML 100 UNIT/ML
500 SOLUTION INTRAVENOUS AS NEEDED
Status: CANCELLED | OUTPATIENT
Start: 2022-01-06

## 2022-01-06 RX ORDER — SODIUM CHLORIDE 0.9 % (FLUSH) 0.9 %
20 SYRINGE (ML) INJECTION AS NEEDED
Status: DISCONTINUED | OUTPATIENT
Start: 2022-01-06 | End: 2022-01-07 | Stop reason: HOSPADM

## 2022-01-06 RX ADMIN — HEPARIN SODIUM (PORCINE) LOCK FLUSH IV SOLN 100 UNIT/ML 500 UNITS: 100 SOLUTION at 11:18

## 2022-01-06 RX ADMIN — SODIUM CHLORIDE 250 ML: 9 INJECTION, SOLUTION INTRAVENOUS at 10:34

## 2022-01-06 RX ADMIN — SODIUM CHLORIDE, PRESERVATIVE FREE 20 ML: 5 INJECTION INTRAVENOUS at 11:18

## 2022-01-06 RX ADMIN — TRASTUZUMAB 300 MG: 150 INJECTION, POWDER, LYOPHILIZED, FOR SOLUTION INTRAVENOUS at 10:44

## 2022-01-25 RX ORDER — SODIUM CHLORIDE 9 MG/ML
250 INJECTION, SOLUTION INTRAVENOUS ONCE
Status: CANCELLED | OUTPATIENT
Start: 2022-02-17

## 2022-01-27 ENCOUNTER — HOSPITAL ENCOUNTER (OUTPATIENT)
Dept: ONCOLOGY | Facility: HOSPITAL | Age: 62
Setting detail: INFUSION SERIES
Discharge: HOME | End: 2022-01-27

## 2022-01-27 ENCOUNTER — OFFICE VISIT (OUTPATIENT)
Dept: ONCOLOGY | Facility: HOSPITAL | Age: 62
End: 2022-01-27

## 2022-01-27 VITALS
OXYGEN SATURATION: 100 % | RESPIRATION RATE: 18 BRPM | SYSTOLIC BLOOD PRESSURE: 104 MMHG | TEMPERATURE: 97.3 F | BODY MASS INDEX: 21.96 KG/M2 | HEART RATE: 60 BPM | WEIGHT: 112.43 LBS | DIASTOLIC BLOOD PRESSURE: 65 MMHG

## 2022-01-27 VITALS — WEIGHT: 113.1 LBS | BODY MASS INDEX: 22.09 KG/M2

## 2022-01-27 DIAGNOSIS — Z17.0 MALIGNANT NEOPLASM OF UPPER-OUTER QUADRANT OF LEFT BREAST IN FEMALE, ESTROGEN RECEPTOR POSITIVE: Primary | ICD-10-CM

## 2022-01-27 DIAGNOSIS — C50.412 MALIGNANT NEOPLASM OF UPPER-OUTER QUADRANT OF LEFT BREAST IN FEMALE, ESTROGEN RECEPTOR POSITIVE: Primary | ICD-10-CM

## 2022-01-27 DIAGNOSIS — Z45.2 ADJUSTMENT AND MANAGEMENT OF VASCULAR ACCESS DEVICE: ICD-10-CM

## 2022-01-27 LAB
BASOPHILS # BLD AUTO: 0.03 10*3/MM3 (ref 0–0.2)
BASOPHILS NFR BLD AUTO: 0.7 % (ref 0–1.5)
DEPRECATED RDW RBC AUTO: 43.6 FL (ref 37–54)
EOSINOPHIL # BLD AUTO: 0.12 10*3/MM3 (ref 0–0.4)
EOSINOPHIL NFR BLD AUTO: 2.9 % (ref 0.3–6.2)
ERYTHROCYTE [DISTWIDTH] IN BLOOD BY AUTOMATED COUNT: 12.7 % (ref 12.3–15.4)
HCT VFR BLD AUTO: 33.4 % (ref 34–46.6)
HGB BLD-MCNC: 11.6 G/DL (ref 12–15.9)
IMM GRANULOCYTES # BLD AUTO: 0 10*3/MM3 (ref 0–0.05)
IMM GRANULOCYTES NFR BLD AUTO: 0 % (ref 0–0.5)
LYMPHOCYTES # BLD AUTO: 0.72 10*3/MM3 (ref 0.7–3.1)
LYMPHOCYTES NFR BLD AUTO: 17.1 % (ref 19.6–45.3)
MCH RBC QN AUTO: 32.3 PG (ref 26.6–33)
MCHC RBC AUTO-ENTMCNC: 34.7 G/DL (ref 31.5–35.7)
MCV RBC AUTO: 93 FL (ref 79–97)
MONOCYTES # BLD AUTO: 0.49 10*3/MM3 (ref 0.1–0.9)
MONOCYTES NFR BLD AUTO: 11.7 % (ref 5–12)
NEUTROPHILS NFR BLD AUTO: 2.84 10*3/MM3 (ref 1.7–7)
NEUTROPHILS NFR BLD AUTO: 67.6 % (ref 42.7–76)
PLATELET # BLD AUTO: 160 10*3/MM3 (ref 140–450)
PMV BLD AUTO: 8.5 FL (ref 6–12)
RBC # BLD AUTO: 3.59 10*6/MM3 (ref 3.77–5.28)
WBC NRBC COR # BLD: 4.2 10*3/MM3 (ref 3.4–10.8)

## 2022-01-27 PROCEDURE — 99214 OFFICE O/P EST MOD 30 MIN: CPT | Performed by: INTERNAL MEDICINE

## 2022-01-27 PROCEDURE — 85025 COMPLETE CBC W/AUTO DIFF WBC: CPT | Performed by: INTERNAL MEDICINE

## 2022-01-27 PROCEDURE — 96413 CHEMO IV INFUSION 1 HR: CPT

## 2022-01-27 PROCEDURE — 25010000002 TRASTUZUMAB PER 10 MG: Performed by: INTERNAL MEDICINE

## 2022-01-27 PROCEDURE — 25010000002 HEPARIN LOCK FLUSH PER 10 UNITS: Performed by: INTERNAL MEDICINE

## 2022-01-27 RX ORDER — SODIUM CHLORIDE 0.9 % (FLUSH) 0.9 %
20 SYRINGE (ML) INJECTION AS NEEDED
Status: DISCONTINUED | OUTPATIENT
Start: 2022-01-27 | End: 2022-01-28 | Stop reason: HOSPADM

## 2022-01-27 RX ORDER — PREDNISOLN SP/MOXIFLOX/BROMFEN 1 %-0.5 %
DROPS OPHTHALMIC (EYE)
COMMUNITY
Start: 2021-12-31 | End: 2022-05-09

## 2022-01-27 RX ORDER — HEPARIN SODIUM (PORCINE) LOCK FLUSH IV SOLN 100 UNIT/ML 100 UNIT/ML
500 SOLUTION INTRAVENOUS AS NEEDED
Status: CANCELLED | OUTPATIENT
Start: 2022-02-15

## 2022-01-27 RX ORDER — BROMFENAC 0.76 MG/ML
SOLUTION/ DROPS OPHTHALMIC
COMMUNITY
Start: 2021-12-09 | End: 2022-05-05

## 2022-01-27 RX ORDER — SODIUM CHLORIDE 0.9 % (FLUSH) 0.9 %
20 SYRINGE (ML) INJECTION AS NEEDED
Status: CANCELLED | OUTPATIENT
Start: 2022-02-15

## 2022-01-27 RX ORDER — HEPARIN SODIUM (PORCINE) LOCK FLUSH IV SOLN 100 UNIT/ML 100 UNIT/ML
500 SOLUTION INTRAVENOUS AS NEEDED
Status: DISCONTINUED | OUTPATIENT
Start: 2022-01-27 | End: 2022-01-28 | Stop reason: HOSPADM

## 2022-01-27 RX ORDER — SODIUM CHLORIDE 9 MG/ML
250 INJECTION, SOLUTION INTRAVENOUS ONCE
Status: COMPLETED | OUTPATIENT
Start: 2022-01-27 | End: 2022-01-27

## 2022-01-27 RX ORDER — PROCHLORPERAZINE MALEATE 10 MG
10 TABLET ORAL EVERY 8 HOURS PRN
COMMUNITY
End: 2022-05-09

## 2022-01-27 RX ORDER — MOXIFLOXACIN 5 MG/ML
SOLUTION/ DROPS OPHTHALMIC
COMMUNITY
Start: 2021-12-09 | End: 2022-05-05

## 2022-01-27 RX ADMIN — SODIUM CHLORIDE 250 ML: 9 INJECTION, SOLUTION INTRAVENOUS at 11:38

## 2022-01-27 RX ADMIN — TRASTUZUMAB 300 MG: 150 INJECTION, POWDER, LYOPHILIZED, FOR SOLUTION INTRAVENOUS at 11:42

## 2022-01-27 RX ADMIN — HEPARIN SODIUM (PORCINE) LOCK FLUSH IV SOLN 100 UNIT/ML 500 UNITS: 100 SOLUTION at 12:20

## 2022-01-27 RX ADMIN — SODIUM CHLORIDE, PRESERVATIVE FREE 20 ML: 5 INJECTION INTRAVENOUS at 12:19

## 2022-02-09 ENCOUNTER — APPOINTMENT (OUTPATIENT)
Dept: CARDIOLOGY | Facility: HOSPITAL | Age: 62
End: 2022-02-09

## 2022-02-15 ENCOUNTER — HOSPITAL ENCOUNTER (OUTPATIENT)
Dept: ONCOLOGY | Facility: HOSPITAL | Age: 62
Setting detail: INFUSION SERIES
Discharge: HOME | End: 2022-02-15

## 2022-02-15 DIAGNOSIS — Z45.2 ADJUSTMENT AND MANAGEMENT OF VASCULAR ACCESS DEVICE: ICD-10-CM

## 2022-02-15 DIAGNOSIS — Z17.0 MALIGNANT NEOPLASM OF UPPER-OUTER QUADRANT OF LEFT BREAST IN FEMALE, ESTROGEN RECEPTOR POSITIVE: Primary | ICD-10-CM

## 2022-02-15 DIAGNOSIS — C50.412 MALIGNANT NEOPLASM OF UPPER-OUTER QUADRANT OF LEFT BREAST IN FEMALE, ESTROGEN RECEPTOR POSITIVE: Primary | ICD-10-CM

## 2022-02-15 LAB
BASOPHILS # BLD AUTO: 0.03 10*3/MM3 (ref 0–0.2)
BASOPHILS NFR BLD AUTO: 0.6 % (ref 0–1.5)
DEPRECATED RDW RBC AUTO: 43 FL (ref 37–54)
EOSINOPHIL # BLD AUTO: 0.17 10*3/MM3 (ref 0–0.4)
EOSINOPHIL NFR BLD AUTO: 3.2 % (ref 0.3–6.2)
ERYTHROCYTE [DISTWIDTH] IN BLOOD BY AUTOMATED COUNT: 12.2 % (ref 12.3–15.4)
HCT VFR BLD AUTO: 33.1 % (ref 34–46.6)
HGB BLD-MCNC: 11.3 G/DL (ref 12–15.9)
IMM GRANULOCYTES # BLD AUTO: 0 10*3/MM3 (ref 0–0.05)
IMM GRANULOCYTES NFR BLD AUTO: 0 % (ref 0–0.5)
LYMPHOCYTES # BLD AUTO: 0.93 10*3/MM3 (ref 0.7–3.1)
LYMPHOCYTES NFR BLD AUTO: 17.6 % (ref 19.6–45.3)
MCH RBC QN AUTO: 32.4 PG (ref 26.6–33)
MCHC RBC AUTO-ENTMCNC: 34.1 G/DL (ref 31.5–35.7)
MCV RBC AUTO: 94.8 FL (ref 79–97)
MONOCYTES # BLD AUTO: 0.41 10*3/MM3 (ref 0.1–0.9)
MONOCYTES NFR BLD AUTO: 7.8 % (ref 5–12)
NEUTROPHILS NFR BLD AUTO: 3.73 10*3/MM3 (ref 1.7–7)
NEUTROPHILS NFR BLD AUTO: 70.8 % (ref 42.7–76)
PLATELET # BLD AUTO: 198 10*3/MM3 (ref 140–450)
PMV BLD AUTO: 8.3 FL (ref 6–12)
RBC # BLD AUTO: 3.49 10*6/MM3 (ref 3.77–5.28)
WBC NRBC COR # BLD: 5.27 10*3/MM3 (ref 3.4–10.8)

## 2022-02-15 PROCEDURE — 25010000002 HEPARIN LOCK FLUSH PER 10 UNITS: Performed by: INTERNAL MEDICINE

## 2022-02-15 PROCEDURE — 36591 DRAW BLOOD OFF VENOUS DEVICE: CPT

## 2022-02-15 PROCEDURE — 85025 COMPLETE CBC W/AUTO DIFF WBC: CPT | Performed by: INTERNAL MEDICINE

## 2022-02-15 RX ORDER — HEPARIN SODIUM (PORCINE) LOCK FLUSH IV SOLN 100 UNIT/ML 100 UNIT/ML
500 SOLUTION INTRAVENOUS AS NEEDED
Status: CANCELLED | OUTPATIENT
Start: 2022-02-17

## 2022-02-15 RX ORDER — SODIUM CHLORIDE 0.9 % (FLUSH) 0.9 %
20 SYRINGE (ML) INJECTION AS NEEDED
Status: DISCONTINUED | OUTPATIENT
Start: 2022-02-15 | End: 2022-02-17 | Stop reason: HOSPADM

## 2022-02-15 RX ORDER — HEPARIN SODIUM (PORCINE) LOCK FLUSH IV SOLN 100 UNIT/ML 100 UNIT/ML
500 SOLUTION INTRAVENOUS AS NEEDED
Status: DISCONTINUED | OUTPATIENT
Start: 2022-02-15 | End: 2022-02-17 | Stop reason: HOSPADM

## 2022-02-15 RX ORDER — SODIUM CHLORIDE 0.9 % (FLUSH) 0.9 %
20 SYRINGE (ML) INJECTION AS NEEDED
Status: CANCELLED | OUTPATIENT
Start: 2022-02-17

## 2022-02-15 RX ADMIN — SODIUM CHLORIDE, PRESERVATIVE FREE 20 ML: 5 INJECTION INTRAVENOUS at 09:30

## 2022-02-15 RX ADMIN — HEPARIN SODIUM (PORCINE) LOCK FLUSH IV SOLN 100 UNIT/ML 500 UNITS: 100 SOLUTION at 09:30

## 2022-02-16 ENCOUNTER — HOSPITAL ENCOUNTER (OUTPATIENT)
Dept: CARDIOLOGY | Facility: HOSPITAL | Age: 62
Discharge: HOME | End: 2022-02-16
Admitting: INTERNAL MEDICINE

## 2022-02-16 DIAGNOSIS — C50.412 MALIGNANT NEOPLASM OF UPPER-OUTER QUADRANT OF LEFT BREAST IN FEMALE, ESTROGEN RECEPTOR POSITIVE: ICD-10-CM

## 2022-02-16 DIAGNOSIS — Z79.899 HIGH RISK MEDICATION USE: ICD-10-CM

## 2022-02-16 DIAGNOSIS — Z17.0 MALIGNANT NEOPLASM OF UPPER-OUTER QUADRANT OF LEFT BREAST IN FEMALE, ESTROGEN RECEPTOR POSITIVE: ICD-10-CM

## 2022-02-16 LAB
BH CV ECHO MEAS - AO ROOT DIAM: 3.3 CM
BH CV ECHO MEAS - EF(MOD-BP): 65 %
BH CV ECHO MEAS - IVSD: 0.8 CM
BH CV ECHO MEAS - LA DIMENSION(2D): 3.2 CM
BH CV ECHO MEAS - LAT PEAK E' VEL: 5 CM/SEC
BH CV ECHO MEAS - LVIDD: 4.2 CM
BH CV ECHO MEAS - LVIDS: 2.6 CM
BH CV ECHO MEAS - LVPWD: 0.7 CM
BH CV ECHO MEAS - MED PEAK E' VEL: 8 CM/SEC
BH CV ECHO MEAS - MV A MAX VEL: 56 CM/SEC
BH CV ECHO MEAS - MV DEC TIME: 183 MSEC
BH CV ECHO MEAS - MV E MAX VEL: 51 CM/SEC
BH CV ECHO MEAS - MV E/A: 0.9
BH CV ECHO MEASUREMENTS AVERAGE E/E' RATIO: 7.85
IVRT: 61 MSEC
LEFT ATRIUM VOLUME INDEX: 37 ML/M2
MAXIMAL PREDICTED HEART RATE: 161 BPM
STRESS TARGET HR: 137 BPM

## 2022-02-16 PROCEDURE — 93306 TTE W/DOPPLER COMPLETE: CPT | Performed by: INTERNAL MEDICINE

## 2022-02-16 PROCEDURE — 93306 TTE W/DOPPLER COMPLETE: CPT

## 2022-02-17 ENCOUNTER — HOSPITAL ENCOUNTER (OUTPATIENT)
Dept: ONCOLOGY | Facility: HOSPITAL | Age: 62
Setting detail: INFUSION SERIES
Discharge: HOME | End: 2022-02-17

## 2022-02-17 VITALS
BODY MASS INDEX: 22.46 KG/M2 | TEMPERATURE: 97.6 F | RESPIRATION RATE: 17 BRPM | HEART RATE: 62 BPM | WEIGHT: 114.42 LBS | SYSTOLIC BLOOD PRESSURE: 105 MMHG | HEIGHT: 60 IN | DIASTOLIC BLOOD PRESSURE: 67 MMHG | OXYGEN SATURATION: 97 %

## 2022-02-17 DIAGNOSIS — C50.412 MALIGNANT NEOPLASM OF UPPER-OUTER QUADRANT OF LEFT BREAST IN FEMALE, ESTROGEN RECEPTOR POSITIVE: ICD-10-CM

## 2022-02-17 DIAGNOSIS — Z45.2 ADJUSTMENT AND MANAGEMENT OF VASCULAR ACCESS DEVICE: Primary | ICD-10-CM

## 2022-02-17 DIAGNOSIS — Z17.0 MALIGNANT NEOPLASM OF UPPER-OUTER QUADRANT OF LEFT BREAST IN FEMALE, ESTROGEN RECEPTOR POSITIVE: ICD-10-CM

## 2022-02-17 PROCEDURE — 25010000002 TRASTUZUMAB PER 10 MG: Performed by: INTERNAL MEDICINE

## 2022-02-17 PROCEDURE — 25010000002 HEPARIN LOCK FLUSH PER 10 UNITS: Performed by: INTERNAL MEDICINE

## 2022-02-17 PROCEDURE — 96413 CHEMO IV INFUSION 1 HR: CPT

## 2022-02-17 RX ORDER — SODIUM CHLORIDE 9 MG/ML
250 INJECTION, SOLUTION INTRAVENOUS ONCE
Status: COMPLETED | OUTPATIENT
Start: 2022-02-17 | End: 2022-02-17

## 2022-02-17 RX ORDER — HEPARIN SODIUM (PORCINE) LOCK FLUSH IV SOLN 100 UNIT/ML 100 UNIT/ML
500 SOLUTION INTRAVENOUS AS NEEDED
Status: DISCONTINUED | OUTPATIENT
Start: 2022-02-17 | End: 2022-02-18 | Stop reason: HOSPADM

## 2022-02-17 RX ORDER — SODIUM CHLORIDE 0.9 % (FLUSH) 0.9 %
20 SYRINGE (ML) INJECTION AS NEEDED
Status: DISCONTINUED | OUTPATIENT
Start: 2022-02-17 | End: 2022-02-18 | Stop reason: HOSPADM

## 2022-02-17 RX ORDER — HEPARIN SODIUM (PORCINE) LOCK FLUSH IV SOLN 100 UNIT/ML 100 UNIT/ML
500 SOLUTION INTRAVENOUS AS NEEDED
Status: CANCELLED | OUTPATIENT
Start: 2022-02-17

## 2022-02-17 RX ORDER — SODIUM CHLORIDE 0.9 % (FLUSH) 0.9 %
20 SYRINGE (ML) INJECTION AS NEEDED
Status: CANCELLED | OUTPATIENT
Start: 2022-02-17

## 2022-02-17 RX ADMIN — SODIUM CHLORIDE, PRESERVATIVE FREE 20 ML: 5 INJECTION INTRAVENOUS at 09:46

## 2022-02-17 RX ADMIN — SODIUM CHLORIDE 250 ML: 9 INJECTION, SOLUTION INTRAVENOUS at 09:10

## 2022-02-17 RX ADMIN — HEPARIN SODIUM (PORCINE) LOCK FLUSH IV SOLN 100 UNIT/ML 500 UNITS: 100 SOLUTION at 09:46

## 2022-02-17 RX ADMIN — TRASTUZUMAB 300 MG: 150 INJECTION, POWDER, LYOPHILIZED, FOR SOLUTION INTRAVENOUS at 09:10

## 2022-02-22 ENCOUNTER — TELEPHONE (OUTPATIENT)
Dept: ONCOLOGY | Facility: HOSPITAL | Age: 62
End: 2022-02-22

## 2022-02-28 ENCOUNTER — OFFICE VISIT (OUTPATIENT)
Dept: INTERNAL MEDICINE | Facility: CLINIC | Age: 62
End: 2022-02-28

## 2022-02-28 VITALS
BODY MASS INDEX: 22.74 KG/M2 | HEART RATE: 89 BPM | TEMPERATURE: 97.4 F | HEIGHT: 60 IN | SYSTOLIC BLOOD PRESSURE: 104 MMHG | RESPIRATION RATE: 18 BRPM | WEIGHT: 115.8 LBS | OXYGEN SATURATION: 99 % | DIASTOLIC BLOOD PRESSURE: 64 MMHG

## 2022-02-28 DIAGNOSIS — K21.9 GASTROESOPHAGEAL REFLUX DISEASE WITHOUT ESOPHAGITIS: ICD-10-CM

## 2022-02-28 DIAGNOSIS — G43.809 OTHER MIGRAINE WITHOUT STATUS MIGRAINOSUS, NOT INTRACTABLE: Primary | ICD-10-CM

## 2022-02-28 DIAGNOSIS — F41.9 ANXIETY: ICD-10-CM

## 2022-02-28 DIAGNOSIS — F32.A DEPRESSION, UNSPECIFIED DEPRESSION TYPE: ICD-10-CM

## 2022-02-28 DIAGNOSIS — M81.0 AGE-RELATED OSTEOPOROSIS WITHOUT CURRENT PATHOLOGICAL FRACTURE: ICD-10-CM

## 2022-02-28 DIAGNOSIS — C50.412 MALIGNANT NEOPLASM OF UPPER-OUTER QUADRANT OF LEFT BREAST IN FEMALE, ESTROGEN RECEPTOR POSITIVE: ICD-10-CM

## 2022-02-28 DIAGNOSIS — Z17.0 MALIGNANT NEOPLASM OF UPPER-OUTER QUADRANT OF LEFT BREAST IN FEMALE, ESTROGEN RECEPTOR POSITIVE: ICD-10-CM

## 2022-02-28 PROCEDURE — 99214 OFFICE O/P EST MOD 30 MIN: CPT | Performed by: NURSE PRACTITIONER

## 2022-02-28 RX ORDER — PANTOPRAZOLE SODIUM 40 MG/1
40 TABLET, DELAYED RELEASE ORAL DAILY
COMMUNITY
Start: 2022-02-15 | End: 2022-02-28

## 2022-02-28 RX ORDER — FAMOTIDINE 20 MG/1
20 TABLET, FILM COATED ORAL 2 TIMES DAILY
Qty: 180 TABLET | Refills: 1 | Status: ON HOLD | OUTPATIENT
Start: 2022-02-28 | End: 2022-08-09

## 2022-02-28 NOTE — PROGRESS NOTES
Chief Complaint  Establish Care ([place] last seen on 2/14/2019), Anxiety, Headache, and Heartburn    Subjective          Ragini Dozier presents to [facility]  History of Present Illness   The patient is being seen today for anxiety, headache and heartburn.  She patient states she had breast cancer and cataract surgery. She mentions she is on close to having her last immunotherapy. The patient states she had lumpectomies and restorative surgery. She relays she has no more cancer adding she has had chemotherapy and radiation. The patient states she was under the care of Dr. Ramos.     Anxiety  She mentions her father passed away while she was battling cancer stating he also had cancer. She agrees that she has a combination of anxiety and depression. She also acknowledges she experiences depression and anxiety intermittently depending on the day. The patient is still handling her fathers estate which causes her to feel anxious. She sees Ms. Fernández on [facility] who treats her for depression and anxiety. The patient states she is not pleased with Ms. Fernández but does not want to switch because she already sees to many doctors. She mentions she can not tell if the medications are actively working for her but does admit she gets rest at night. The patient states she on takes the Valium at night. The patient denies having seizures but states she gets migraines.     Headaches  The patient states she takes Topamax for her migraines. She admits she still experiences migraines and may wake up with one or have one throughout the day but it is not as debilitating as she use to experience. She also adds she may have a severe migraine once of every couple of months.     Heartburn  She states she still taking the Protonix daily. She states she does not feel her heartburn is controlled and needs to continue taking Protonix. She states she saw Dr. Meyer about a month who was going to  "send her a different prescription. The patient relays she can not take Maalox because it causes her to vomit.    Objective   Vital Signs:   /64 (BP Location: Left arm, Patient Position: Sitting, Cuff Size: Adult)   Pulse 89   Temp 97.4 °F (36.3 °C)   Resp 18   Ht 152.4 cm (60\")   Wt 52.5 kg (115 lb 12.8 oz)   SpO2 99%   BMI 22.62 kg/m²       Physical Exam  Vitals and nursing note reviewed.   Constitutional:       General: She is not in acute distress.     Appearance: Normal appearance.   HENT:      Head: Normocephalic and atraumatic.      Right Ear: External ear normal.      Left Ear: External ear normal.      Nose: Nose normal.      Mouth/Throat:      Mouth: Mucous membranes are moist.   Eyes:      Conjunctiva/sclera: Conjunctivae normal.   Cardiovascular:      Rate and Rhythm: Normal rate and regular rhythm.      Pulses: Normal pulses.      Heart sounds: Normal heart sounds. No murmur heard.  No friction rub. No gallop.    Pulmonary:      Effort: Pulmonary effort is normal. No respiratory distress.      Breath sounds: No wheezing, rhonchi or rales.   Abdominal:      General: Bowel sounds are normal.      Palpations: Abdomen is soft.      Tenderness: There is no abdominal tenderness.   Musculoskeletal:      Cervical back: Neck supple.      Right lower leg: No edema.      Left lower leg: No edema.   Lymphadenopathy:      Cervical: No cervical adenopathy.   Skin:     General: Skin is warm and dry.   Neurological:      General: No focal deficit present.      Mental Status: She is alert and oriented to person, place, and time.   Psychiatric:         Mood and Affect: Mood normal.         Behavior: Behavior normal.          Result Review :            Procedures      Assessment and Plan    Diagnoses and all orders for this visit:    1. Other migraine without status migrainosus, not intractable (Primary)  Comments:  Well-controlled at present.    2. Anxiety  Comments:  She will continue seeing psych on [place].  Denies SI.  Patient will call if she changes her mind about seeing a different specialist    3. Depression, unspecified depression type    4. Gastroesophageal reflux disease without esophagitis  Comments:  She will switch from Protonix due to osteoporosis to Pepcid twice daily.  She will call with new or worsening symptoms.    5. Malignant neoplasm of upper-outer quadrant of left breast in female, estrogen receptor positive (HCC)    6. Age-related osteoporosis without current pathological fracture    Other orders  -     famotidine (PEPCID) 20 MG tablet; Take 1 tablet by mouth 2 (Two) Times a Day.  Dispense: 180 tablet; Refill: 1              Follow Up   Return in about 6 months (around 8/28/2022).  Patient was given instructions and counseling regarding her condition or for health maintenance advice. Please see specific information pulled into the AVS if appropriate.

## 2022-03-03 ENCOUNTER — PATIENT ROUNDING (BHMG ONLY) (OUTPATIENT)
Dept: INTERNAL MEDICINE | Facility: CLINIC | Age: 62
End: 2022-03-03

## 2022-03-03 NOTE — TELEPHONE ENCOUNTER
Caller: Ragini Dozier    Relationship: Self        What was the call regarding: CALLED COUPLE WEEKS AGO TO GET INFUSION 03/10 RESCHEDULED ON SAME DAY AS F/U AND LAB WITH DR LUKE 03/8     [facility] TRANSFERRED RAGINI TO [facility] WHO OFFERED TO GET HER OVER TO [facility] TO R/S INFUSION

## 2022-03-03 NOTE — PROGRESS NOTES
March 3, 2022    Hello, may I speak with Ragini Dozier?    My name is Mercedes      I am  with [facility]  [address] [place] [phone].    Before we get started may I verify your date of birth? 1960    Left message for Ragini Dozier to return call if there were any questions or concerns from her new patient apt.

## 2022-03-07 RX ORDER — SODIUM CHLORIDE 9 MG/ML
250 INJECTION, SOLUTION INTRAVENOUS ONCE
Status: CANCELLED | OUTPATIENT
Start: 2022-03-10

## 2022-03-08 ENCOUNTER — HOSPITAL ENCOUNTER (OUTPATIENT)
Dept: ONCOLOGY | Facility: HOSPITAL | Age: 62
Setting detail: INFUSION SERIES
Discharge: HOME | End: 2022-03-08

## 2022-03-08 ENCOUNTER — OFFICE VISIT (OUTPATIENT)
Dept: ONCOLOGY | Facility: HOSPITAL | Age: 62
End: 2022-03-08

## 2022-03-08 VITALS
DIASTOLIC BLOOD PRESSURE: 75 MMHG | RESPIRATION RATE: 16 BRPM | SYSTOLIC BLOOD PRESSURE: 115 MMHG | BODY MASS INDEX: 22.73 KG/M2 | HEART RATE: 68 BPM | WEIGHT: 116.4 LBS | OXYGEN SATURATION: 99 % | TEMPERATURE: 97.2 F

## 2022-03-08 DIAGNOSIS — Z45.2 ADJUSTMENT AND MANAGEMENT OF VASCULAR ACCESS DEVICE: ICD-10-CM

## 2022-03-08 DIAGNOSIS — Z17.0 MALIGNANT NEOPLASM OF UPPER-OUTER QUADRANT OF LEFT BREAST IN FEMALE, ESTROGEN RECEPTOR POSITIVE: Primary | ICD-10-CM

## 2022-03-08 DIAGNOSIS — C50.412 MALIGNANT NEOPLASM OF UPPER-OUTER QUADRANT OF LEFT BREAST IN FEMALE, ESTROGEN RECEPTOR POSITIVE: Primary | ICD-10-CM

## 2022-03-08 DIAGNOSIS — Z12.31 ENCOUNTER FOR SCREENING MAMMOGRAM FOR MALIGNANT NEOPLASM OF BREAST: ICD-10-CM

## 2022-03-08 LAB
BASOPHILS # BLD AUTO: 0.04 10*3/MM3 (ref 0–0.2)
BASOPHILS NFR BLD AUTO: 1 % (ref 0–1.5)
DEPRECATED RDW RBC AUTO: 43.1 FL (ref 37–54)
EOSINOPHIL # BLD AUTO: 0.16 10*3/MM3 (ref 0–0.4)
EOSINOPHIL NFR BLD AUTO: 3.9 % (ref 0.3–6.2)
ERYTHROCYTE [DISTWIDTH] IN BLOOD BY AUTOMATED COUNT: 12.1 % (ref 12.3–15.4)
HCT VFR BLD AUTO: 31.7 % (ref 34–46.6)
HGB BLD-MCNC: 10.8 G/DL (ref 12–15.9)
IMM GRANULOCYTES # BLD AUTO: 0.01 10*3/MM3 (ref 0–0.05)
IMM GRANULOCYTES NFR BLD AUTO: 0.2 % (ref 0–0.5)
LYMPHOCYTES # BLD AUTO: 0.88 10*3/MM3 (ref 0.7–3.1)
LYMPHOCYTES NFR BLD AUTO: 21.6 % (ref 19.6–45.3)
MCH RBC QN AUTO: 32.7 PG (ref 26.6–33)
MCHC RBC AUTO-ENTMCNC: 34.1 G/DL (ref 31.5–35.7)
MCV RBC AUTO: 96.1 FL (ref 79–97)
MONOCYTES # BLD AUTO: 0.44 10*3/MM3 (ref 0.1–0.9)
MONOCYTES NFR BLD AUTO: 10.8 % (ref 5–12)
NEUTROPHILS NFR BLD AUTO: 2.55 10*3/MM3 (ref 1.7–7)
NEUTROPHILS NFR BLD AUTO: 62.5 % (ref 42.7–76)
PLATELET # BLD AUTO: 193 10*3/MM3 (ref 140–450)
PMV BLD AUTO: 8.1 FL (ref 6–12)
RBC # BLD AUTO: 3.3 10*6/MM3 (ref 3.77–5.28)
WBC NRBC COR # BLD: 4.08 10*3/MM3 (ref 3.4–10.8)

## 2022-03-08 PROCEDURE — 25010000002 HEPARIN LOCK FLUSH PER 10 UNITS: Performed by: INTERNAL MEDICINE

## 2022-03-08 PROCEDURE — G0463 HOSPITAL OUTPT CLINIC VISIT: HCPCS | Performed by: INTERNAL MEDICINE

## 2022-03-08 PROCEDURE — 85025 COMPLETE CBC W/AUTO DIFF WBC: CPT | Performed by: INTERNAL MEDICINE

## 2022-03-08 PROCEDURE — 99214 OFFICE O/P EST MOD 30 MIN: CPT | Performed by: INTERNAL MEDICINE

## 2022-03-08 PROCEDURE — 36591 DRAW BLOOD OFF VENOUS DEVICE: CPT

## 2022-03-08 RX ORDER — SODIUM CHLORIDE 0.9 % (FLUSH) 0.9 %
20 SYRINGE (ML) INJECTION AS NEEDED
Status: DISCONTINUED | OUTPATIENT
Start: 2022-03-08 | End: 2022-03-09 | Stop reason: HOSPADM

## 2022-03-08 RX ORDER — HEPARIN SODIUM (PORCINE) LOCK FLUSH IV SOLN 100 UNIT/ML 100 UNIT/ML
500 SOLUTION INTRAVENOUS AS NEEDED
Status: CANCELLED | OUTPATIENT
Start: 2022-03-08

## 2022-03-08 RX ORDER — HEPARIN SODIUM (PORCINE) LOCK FLUSH IV SOLN 100 UNIT/ML 100 UNIT/ML
500 SOLUTION INTRAVENOUS AS NEEDED
Status: DISCONTINUED | OUTPATIENT
Start: 2022-03-08 | End: 2022-03-09 | Stop reason: HOSPADM

## 2022-03-08 RX ORDER — SODIUM CHLORIDE 0.9 % (FLUSH) 0.9 %
20 SYRINGE (ML) INJECTION AS NEEDED
Status: CANCELLED | OUTPATIENT
Start: 2022-03-08

## 2022-03-08 RX ADMIN — Medication 500 UNITS: at 08:46

## 2022-03-08 RX ADMIN — Medication 20 ML: at 08:45

## 2022-03-10 ENCOUNTER — HOSPITAL ENCOUNTER (OUTPATIENT)
Dept: ONCOLOGY | Facility: HOSPITAL | Age: 62
Setting detail: INFUSION SERIES
Discharge: HOME | End: 2022-03-10

## 2022-03-10 VITALS
TEMPERATURE: 97.5 F | OXYGEN SATURATION: 100 % | SYSTOLIC BLOOD PRESSURE: 119 MMHG | HEIGHT: 60 IN | HEART RATE: 72 BPM | WEIGHT: 117.73 LBS | BODY MASS INDEX: 23.11 KG/M2 | RESPIRATION RATE: 17 BRPM | DIASTOLIC BLOOD PRESSURE: 70 MMHG

## 2022-03-10 DIAGNOSIS — C50.412 MALIGNANT NEOPLASM OF UPPER-OUTER QUADRANT OF LEFT BREAST IN FEMALE, ESTROGEN RECEPTOR POSITIVE: Primary | ICD-10-CM

## 2022-03-10 DIAGNOSIS — Z17.0 MALIGNANT NEOPLASM OF UPPER-OUTER QUADRANT OF LEFT BREAST IN FEMALE, ESTROGEN RECEPTOR POSITIVE: Primary | ICD-10-CM

## 2022-03-10 DIAGNOSIS — Z45.2 ADJUSTMENT AND MANAGEMENT OF VASCULAR ACCESS DEVICE: ICD-10-CM

## 2022-03-10 PROCEDURE — 25010000002 TRASTUZUMAB PER 10 MG: Performed by: INTERNAL MEDICINE

## 2022-03-10 PROCEDURE — 96413 CHEMO IV INFUSION 1 HR: CPT

## 2022-03-10 RX ORDER — SODIUM CHLORIDE 0.9 % (FLUSH) 0.9 %
20 SYRINGE (ML) INJECTION AS NEEDED
Status: DISCONTINUED | OUTPATIENT
Start: 2022-03-10 | End: 2022-03-11 | Stop reason: HOSPADM

## 2022-03-10 RX ORDER — HEPARIN SODIUM (PORCINE) LOCK FLUSH IV SOLN 100 UNIT/ML 100 UNIT/ML
500 SOLUTION INTRAVENOUS AS NEEDED
Status: CANCELLED | OUTPATIENT
Start: 2022-03-10

## 2022-03-10 RX ORDER — HEPARIN SODIUM (PORCINE) LOCK FLUSH IV SOLN 100 UNIT/ML 100 UNIT/ML
500 SOLUTION INTRAVENOUS AS NEEDED
Status: DISCONTINUED | OUTPATIENT
Start: 2022-03-10 | End: 2022-03-11 | Stop reason: HOSPADM

## 2022-03-10 RX ORDER — SODIUM CHLORIDE 9 MG/ML
250 INJECTION, SOLUTION INTRAVENOUS ONCE
Status: COMPLETED | OUTPATIENT
Start: 2022-03-10 | End: 2022-03-10

## 2022-03-10 RX ORDER — SODIUM CHLORIDE 0.9 % (FLUSH) 0.9 %
20 SYRINGE (ML) INJECTION AS NEEDED
Status: CANCELLED | OUTPATIENT
Start: 2022-03-10

## 2022-03-10 RX ADMIN — TRASTUZUMAB 300 MG: 150 INJECTION, POWDER, LYOPHILIZED, FOR SOLUTION INTRAVENOUS at 10:27

## 2022-03-10 RX ADMIN — SODIUM CHLORIDE 250 ML: 9 INJECTION, SOLUTION INTRAVENOUS at 10:25

## 2022-03-21 ENCOUNTER — APPOINTMENT (OUTPATIENT)
Dept: CARDIOLOGY | Facility: HOSPITAL | Age: 62
End: 2022-03-21

## 2022-03-28 ENCOUNTER — APPOINTMENT (OUTPATIENT)
Dept: ONCOLOGY | Facility: HOSPITAL | Age: 62
End: 2022-03-28

## 2022-03-31 ENCOUNTER — TELEPHONE (OUTPATIENT)
Dept: GASTROENTEROLOGY | Facility: CLINIC | Age: 62
End: 2022-03-31

## 2022-03-31 ENCOUNTER — HOSPITAL ENCOUNTER (OUTPATIENT)
Dept: ONCOLOGY | Facility: HOSPITAL | Age: 62
Setting detail: INFUSION SERIES
End: 2022-03-31

## 2022-03-31 ENCOUNTER — PREP FOR SURGERY (OUTPATIENT)
Dept: OTHER | Facility: HOSPITAL | Age: 62
End: 2022-03-31

## 2022-03-31 ENCOUNTER — CLINICAL SUPPORT (OUTPATIENT)
Dept: GASTROENTEROLOGY | Facility: CLINIC | Age: 62
End: 2022-03-31

## 2022-03-31 DIAGNOSIS — Z12.11 COLON CANCER SCREENING: Primary | ICD-10-CM

## 2022-03-31 RX ORDER — PEG-3350, SODIUM SULFATE, SODIUM CHLORIDE, POTASSIUM CHLORIDE, SODIUM ASCORBATE AND ASCORBIC ACID 7.5-2.691G
1000 KIT ORAL TAKE AS DIRECTED
Qty: 1000 ML | Refills: 0 | Status: SHIPPED | OUTPATIENT
Start: 2022-03-31 | End: 2022-07-19 | Stop reason: SDUPTHER

## 2022-03-31 NOTE — PROGRESS NOTES
SPOKE WITH PT ON A DATE FOR COLONOSCOPY OF 8/9/22 . MADE SURE CHART WAS UP TO DATE. WENT OVER PREP AND MAILED OUT INSTRUCTIONS. PUT IN ORDER FOR COLON AND SENT PREP TO PHARMACY

## 2022-03-31 NOTE — TELEPHONE ENCOUNTER
Ragini Dozier  REASON FOR CALL encounter for colon screening  SENT IN PREP moviprep  Past Medical History:   Diagnosis Date   • Breast cancer (HCC)    • Breast cancer screening by mammogram 2020   • Depression    • Encounter for Hemoccult screening 2019   • GERD (gastroesophageal reflux disease)    • Insomnia    • Malignant neoplasm of upper-outer quadrant of left female breast (HCC)    • Migraine headache    • Osteoporosis      No Known Allergies  Past Surgical History:   Procedure Laterality Date   • BREAST LUMPECTOMY     • BREAST LUMPECTOMY WITH SENTINEL NODE BIOPSY Left    • BUNIONECTOMY Bilateral    • CATARACT EXTRACTION     • COLONOSCOPY  2019   • ENDOSCOPY N/A 10/1/2021    Procedure: ESOPHAGOGASTRODUODENOSCOPY WITH BIOPSY;  Surgeon: Hue Meyer MD;  Location: [facility];  Service: Gastroenterology;  Laterality: N/A;  ESOPHAGITIS/GASTRITIS   • HYSTERECTOMY      PARTIAL   • OTHER SURGICAL HISTORY      BIOPSY   • SKIN CANCER EXCISION     • TONSILLECTOMY     • UPPER GASTROINTESTINAL ENDOSCOPY       Social History     Socioeconomic History   • Marital status:    • Number of children: 2   Tobacco Use   • Smoking status: Never Smoker   • Smokeless tobacco: Never Used   Vaping Use   • Vaping Use: Never used   Substance and Sexual Activity   • Alcohol use: Yes     Comment: OCCASIONAL   • Drug use: Never   • Sexual activity: Defer     Family History   Problem Relation Age of Onset   • Kidney cancer Father    • Skin cancer Father    • Other Father         MYELOMA   • No Known Problems Mother    • Malig Hyperthermia Neg Hx        Current Outpatient Medications:   •  Bromfenac Sodium (BromSite) 0.075 % solution, Compounded drop, also includes Prednisilone Sodium Phosphate 1% and Moxifloxacin 0.5%, Disp: , Rfl:   •  butalbital-acetaminophen-caffeine (FIORICET, ESGIC) -40 MG per tablet, butalbital-acetaminophen-caff -40 mg oral tablet take 1 tablet by oral route every 4 hours as needed  not to exceed 6 tablets per 24hrs   Active, Disp: , Rfl:   •  Calcium Carbonate-Vitamin D (calcium-vitamin D) 500-200 MG-UNIT tablet per tablet, 500 mg 2 (Two) Times a Day., Disp: , Rfl:   •  calcium citrate-vitamin D (CITRACAL+D) 315-200 MG-UNIT per tablet, , Disp: , Rfl:   •  diazePAM (VALIUM) 5 MG tablet, Take 5 mg by mouth Every 8 (Eight) Hours As Needed., Disp: , Rfl:   •  DULoxetine (CYMBALTA) 30 MG capsule, Take 30 mg by mouth 2 (Two) Times a Day., Disp: , Rfl:   •  famotidine (PEPCID) 20 MG tablet, Take 1 tablet by mouth 2 (Two) Times a Day., Disp: 180 tablet, Rfl: 1  •  moxifloxacin (VIGAMOX) 0.5 % ophthalmic solution, Compounded drop, also includes Prednisilone Sodium Phosphate 1% and Bromfenac 0.075%, Disp: , Rfl:   •  OXcarbazepine (TRILEPTAL) 300 MG tablet, Take 300 mg by mouth 2 (Two) Times a Day., Disp: , Rfl:   •  Prednisolon-Moxiflox-Bromfenac 1-0.5-0.075 % solution, FOLLOW PACKAGE DIRECTIONS (1/20 ONLY), Disp: , Rfl:   •  prochlorperazine (COMPAZINE) 10 MG tablet, , Disp: , Rfl:   •  propranolol (INDERAL) 80 MG tablet, Take 80 mg by mouth Daily., Disp: , Rfl:   •  QUEtiapine (SEROquel) 400 MG tablet, Take 400 mg by mouth Every Night. 1/2 tab, Disp: , Rfl:   •  risedronate (ACTONEL) 150 MG tablet, risedronate 150 mg oral tablet take 1 tablet (150 mg) by oral route once a month on the same date with a full glass of water at least 30 min before first food or drink of the day; remain in an upright position for at least 30 min.   Active, Disp: , Rfl:   •  topiramate (TOPAMAX) 50 MG tablet, topiramate 50 mg oral tablet take 1 tablet (50 mg) by oral route 2 times per day   Active, Disp: , Rfl:   •  zolpidem (AMBIEN) 10 MG tablet, Take 10 mg by mouth At Night As Needed. 1/2 tab, Disp: , Rfl:

## 2022-04-12 NOTE — PROGRESS NOTES
CONSULT BREAST RECONSTRUCTION           History of Present Illness  Ragini Dozier is a 61 y.o. female who presents to [facility] as a consult from Dr. Alvarez to discuss breast reconstructive options.  She wears 34 A  bra size and would like to be / wants to be more symmetric.     She had previously seen plastics but was not a candidate at that time for implants due to radiation so just had right mastopexy. She has completed radiation July 2021. Is having port removed and would like to have reconstruction at the same time because breast do not match and the scar sucks in. There is little breast tissue on left and needs implant or something. Would  Like breast to fill out a bra and be symmetrical if possible.    Subjective      Patient has no known allergies.  Allergies Reconciled.    Review of Systems  All system were reviewed and were negative, except the ones noted above.     @Objective     /81 (BP Location: Left arm)   Pulse 59   Temp 98.4 °F (36.9 °C)     There is no height or weight on file to calculate BMI.    Physical Exam  Physical exam:  Patient awake, alert, oriented  Respirations are non elaborated  Patient is not tachycardic    SUBMIT FOR : LEFT BREST RECON REVISION WITH IMPLANT AND SCAR REVISION WITH RIGHT IMPLANT      Breast exam:     Bilateral breast with healed left breast lumpectomy incision with tethering and dimpling, right  breast larger than left, grade 1  nipple ptosis, no palpable masses or tenderness  Axillary Lymphadenopathy: No axillary lymphadenopathy  SN-N (Right Breast): 20  SN-N (Left Breast): 18  N-IMF (Right Breast): 4 cm   N-IMF (Left Breast): 3 cm  Base Width (Right Breast): 10  Base Width (Left Breast): 10      Result Review :              Assessment and Plan      Diagnoses and all orders for this visit:    1. Malignant neoplasm of upper-outer quadrant of left breast in female, estrogen receptor positive (HCC)  (Primary)        Additional Order(s):       Plan:  • Breast reconstruction options (Tissue Expander/Implant versus Autologous) were all discussed with the patient at length.  In addition, we discussed options for symmetry procedures and nipple reconstruction.  The patient understands that her reconstructed breast will not have the same sensation as a natural breast and that visible incision lines will always be present.  In addition, patient understand that breast reconstruction is a multi-stage procedure that can take months to years to complete.   • We will send information for prior authorization.  Photos were obtained.   • Patient was given the breast reconstruction pamphlet as well as directed to the ASPS website for additional information.   • The patient was made aware that the FDA has discovered rare occurrences between an uncommon form of lymphoma (anaplastic large cell) and women with breast implants.  While the incidence is quite low, the risk of development is real; therefore, any unusual symptoms or signs (most commonly a late fluid collection years later) should be brought to the attention of your physician.  Patient also counseled on risks and benefits of saline versus silicone implants, lifting restrictions, scar placement, risk of capsular contracture, animation deformity, need for likely revision of breast implants at some point in her life, FDA recommendation of MRI every three years to monitor for rupture, and continued mammography for breast cancer surveillance.   • We had a long discussion with the patient and her family today regarding her reconstructive options.  These include no reconstruction, reconstruction at the time of mastectomy or lumpectomy, and finally delayed reconstruction.  We discussed specific types of reconstruction, including: tissue expander and/or implants, and autologous reconstruction with either pedicled or free flap.  We also covered the later stages of reconstruction,  including nipple reconstruction and areola tattooing, fat grafting, and symmetry procedures if indicated or desired.   • I described the typical vandana-operative course of each procedure, including the nature of the procedure, hospital stay, necessity for drains, post-operative activity restriction, and postoperative visits (including those for tissue expansion).  We also discussed the time frame for reconstruction.  I shared information about saline vs. silicone implants, including their differences, risk of capsular contracture, rippling, or leak/rupture, and safety/monitoring issues.  I described Alloderm and its use for implant reconstruction. We discussed that radiation therapy, if she ultimately requires it, may alter the reconstructive options.     • We reviewed surgical risks including, but not limited to, infection, bleeding, hematoma, seroma, pain, scarring, numbness, skin or nipple loss, wound healing problems, flap failures including partial or complete flap loss, asymmetry, need for revisions or further surgeries,  and risks associated with anesthesia.   • Additional risks with autologous reconstruction include donor wound morbidities, such as hernias or bulges, wound healing problems, muscle weakness, partial or complete flap loss, and typical surgical risks as listed above.   • The use of biological mesh is not for soft tissue reinforcement. The use of mesh is necessary because the mastectomy dissection pocket is larger than the implant itself. The intention of the mesh is to restrain the displacement of the implant to the axilla, which is a very common complication requiring revision. In my experience, the use of mesh avoids that specific complication and very often avoids a second surgery. The use of mesh also allows me to perform a safer procedure since it holds the implant in place and alleviates the pressure over the skin that depends only on the subdermal blood supply. Without the mesh, I believe  I wouldn't be able to perform direct implant reconstruction and give the optimal result that patient desires and deserves.   •  Specifically on her case, we discussed  Bilateral implants for symmetry and  scar revision on left   • Consent: Bilateral implants with mesh and scar revision of left breast  • Target implant size: 300 cc  •   •   • CPT codes:   •   • 01488-10  implantation of biologic implant  • 92378- intravenous injection of agent (SPY)  • 19318- breast reduction  · 15771 - fat graft to the breast  · 19342- delayed insertion of breast prosthesis following reconstruction  •   Implants Sientra:  • Sientra Smooth round gel implant  HP 29520-737, 535, 565 x2  • Expander: [id number] x2  • Single use sizer [id number] x1    Implants Allergan   · Allergan [id number], 520, 560 x 3  · Expander: [id number] x2  · Single use sizer [id number] x1    Mesh:  • Mesh: galaflex [id number] x2  ( two boxes with only one mesh each)  • Mesh: galaflex [id number]   (one box with two meshes inside)  • Mesh: alloderm select contour perforated [id number] x2 (For implants larger than 500 cc)  • Mesh: galaflex [id number] x2 (for implants larger than 500cc and medicare)  •   OR time: 3 hours     • Liu Chirinos MD, PhD  • NPI: [id number]    Women's Health and Cancer Rights Act (WHCRA)  The Women's Health and Cancer Rights Act of 1998 (WHCRA) is a federal law that provides protections to patients who choose to have breast reconstruction in connection with a mastectomy.  Coverage must be provided for:    1.All stages of reconstruction of the breast on which the mastectomy has been performed;  2.Surgery and reconstruction of the other breast to produce a symmetrical appearance; and  Prostheses and treatment of physical complications of all stages of the mastectomy, including lymphedema.    This law applies to two different types of coverage:    1.Group health plans (provided by an employer or union);  2.Individual health insurance policies (not  based on employment).      I spent 60 minutes caring for Ragini on this date of service. This time includes time spent by me in the following activities:performing a medically appropriate examination and/or evaluation , counseling and educating the patient/family/caregiver, documenting information in the medical record, and care coordination    Follow Up     No follow-ups on file.    Patient was given instructions and counseling regarding her condition. Please see specific information pulled into the AVS if appropriate.     Samia Ceja, APRN  04/18/2022

## 2022-04-15 NOTE — PROGRESS NOTES
Chief Complaint: Advice Only    Subjective         History of Present Illness  Ragini Dozier is a 61 y.o. female presents to [facility] to be seen for port removal.     Objective     Past Medical History:   Diagnosis Date   • Breast cancer (HCC)    • Breast cancer screening by mammogram 2020   • Depression    • Encounter for Hemoccult screening 2019   • GERD (gastroesophageal reflux disease)    • Insomnia    • Malignant neoplasm of upper-outer quadrant of left female breast (HCC)    • Migraine headache    • Osteoporosis        Past Surgical History:   Procedure Laterality Date   • BREAST LUMPECTOMY     • BREAST LUMPECTOMY WITH SENTINEL NODE BIOPSY Left    • BUNIONECTOMY Bilateral    • CATARACT EXTRACTION     • COLONOSCOPY  2019   • ENDOSCOPY N/A 10/1/2021    Procedure: ESOPHAGOGASTRODUODENOSCOPY WITH BIOPSY;  Surgeon: Hue Meyer MD;  Location: [facility] ENDOSCOPY;  Service: Gastroenterology;  Laterality: N/A;  ESOPHAGITIS/GASTRITIS   • HYSTERECTOMY      PARTIAL   • OTHER SURGICAL HISTORY      BIOPSY   • SKIN CANCER EXCISION     • TONSILLECTOMY     • UPPER GASTROINTESTINAL ENDOSCOPY           Current Outpatient Medications:   •  Bromfenac Sodium (BromSite) 0.075 % solution, Compounded drop, also includes Prednisilone Sodium Phosphate 1% and Moxifloxacin 0.5%, Disp: , Rfl:   •  butalbital-acetaminophen-caffeine (FIORICET, ESGIC) -40 MG per tablet, butalbital-acetaminophen-caff -40 mg oral tablet take 1 tablet by oral route every 4 hours as needed not to exceed 6 tablets per 24hrs   Active, Disp: , Rfl:   •  Calcium Carbonate-Vitamin D (calcium-vitamin D) 500-200 MG-UNIT tablet per tablet, 500 mg 2 (Two) Times a Day., Disp: , Rfl:   •  calcium citrate-vitamin D (CITRACAL+D) 315-200 MG-UNIT per tablet, , Disp: , Rfl:   •  diazePAM (VALIUM) 5 MG tablet, Take 5 mg by mouth Every 8 (Eight) Hours As Needed., Disp: , Rfl:   •  DULoxetine (CYMBALTA) 30 MG capsule, Take  30 mg by mouth 2 (Two) Times a Day., Disp: , Rfl:   •  famotidine (PEPCID) 20 MG tablet, Take 1 tablet by mouth 2 (Two) Times a Day., Disp: 180 tablet, Rfl: 1  •  moxifloxacin (VIGAMOX) 0.5 % ophthalmic solution, Compounded drop, also includes Prednisilone Sodium Phosphate 1% and Bromfenac 0.075%, Disp: , Rfl:   •  OXcarbazepine (TRILEPTAL) 300 MG tablet, Take 300 mg by mouth 2 (Two) Times a Day., Disp: , Rfl:   •  PEG-KCl-NaCl-NaSulf-Na Asc-C (MoviPrep) 100 g reconstituted solution powder, Take 1,000 mL by mouth Take As Directed. Take as directed by prescriber's office, Disp: 1000 mL, Rfl: 0  •  Prednisolon-Moxiflox-Bromfenac 1-0.5-0.075 % solution, FOLLOW PACKAGE DIRECTIONS (1/20 ONLY), Disp: , Rfl:   •  prochlorperazine (COMPAZINE) 10 MG tablet, , Disp: , Rfl:   •  propranolol (INDERAL) 80 MG tablet, Take 80 mg by mouth Daily., Disp: , Rfl:   •  QUEtiapine (SEROquel) 400 MG tablet, Take 400 mg by mouth Every Night. 1/2 tab, Disp: , Rfl:   •  risedronate (ACTONEL) 150 MG tablet, risedronate 150 mg oral tablet take 1 tablet (150 mg) by oral route once a month on the same date with a full glass of water at least 30 min before first food or drink of the day; remain in an upright position for at least 30 min.   Active, Disp: , Rfl:   •  topiramate (TOPAMAX) 50 MG tablet, topiramate 50 mg oral tablet take 1 tablet (50 mg) by oral route 2 times per day   Active, Disp: , Rfl:   •  zolpidem (AMBIEN) 10 MG tablet, Take 10 mg by mouth At Night As Needed. 1/2 tab, Disp: , Rfl:     No Known Allergies     Family History   Problem Relation Age of Onset   • Kidney cancer Father    • Skin cancer Father    • Other Father         MYELOMA   • No Known Problems Mother    • Malig Hyperthermia Neg Hx        Social History     Socioeconomic History   • Marital status:    • Number of children: 2   Tobacco Use   • Smoking status: Never Smoker   • Smokeless tobacco: Never Used   Vaping Use   • Vaping Use: Never used   Substance and  "Sexual Activity   • Alcohol use: Yes     Comment: OCCASIONAL   • Drug use: Never   • Sexual activity: Defer       Vital Signs:   Resp 15   Ht 152.4 cm (60\")   Wt 53.1 kg (117 lb)   BMI 22.85 kg/m²    Review of Systems    Physical Exam  Vitals and nursing note reviewed.   Constitutional:       General: She is not in acute distress.     Appearance: Normal appearance. She is well-developed.   HENT:      Head: Normocephalic and atraumatic.   Eyes:      Extraocular Movements: Extraocular movements intact.      Pupils: Pupils are equal, round, and reactive to light.   Cardiovascular:      Pulses: Normal pulses.   Pulmonary:      Effort: Pulmonary effort is normal. No retractions.      Breath sounds: Normal air entry. No wheezing.   Abdominal:      General: There is no distension.      Palpations: Abdomen is soft.      Tenderness: There is no abdominal tenderness.      Hernia: No hernia is present.   Musculoskeletal:         General: No swelling or deformity.      Cervical back: Neck supple.   Skin:     General: Skin is warm and dry.      Findings: No erythema.      Comments: Port in place   Neurological:      General: No focal deficit present.      Mental Status: She is alert and oriented to person, place, and time.      Motor: Motor function is intact.   Psychiatric:         Mood and Affect: Mood normal.         Thought Content: Thought content normal.          Result Review :               Assessment and Plan    Diagnoses and all orders for this visit:    1. Adjustment and management of vascular access device (Primary)  -     Case Request; Standing  -     Case Request    Will plan for port removal on 4/22/22      Follow Up   Return for Next scheduled followup after surgery.  Patient was given instructions and counseling regarding her condition or for health maintenance advice. Please see specific information pulled into the AVS if appropriate.         This document has been electronically signed by Lizbeth NINO" MD Chela  April 18, 2022 09:04 EDT

## 2022-04-18 ENCOUNTER — TELEPHONE (OUTPATIENT)
Dept: PLASTIC SURGERY | Facility: CLINIC | Age: 62
End: 2022-04-18

## 2022-04-18 ENCOUNTER — OFFICE VISIT (OUTPATIENT)
Dept: SURGERY | Facility: CLINIC | Age: 62
End: 2022-04-18

## 2022-04-18 ENCOUNTER — PATIENT ROUNDING (BHMG ONLY) (OUTPATIENT)
Dept: PLASTIC SURGERY | Facility: CLINIC | Age: 62
End: 2022-04-18

## 2022-04-18 ENCOUNTER — PREP FOR SURGERY (OUTPATIENT)
Dept: OTHER | Facility: HOSPITAL | Age: 62
End: 2022-04-18

## 2022-04-18 ENCOUNTER — OFFICE VISIT (OUTPATIENT)
Dept: PLASTIC SURGERY | Facility: CLINIC | Age: 62
End: 2022-04-18

## 2022-04-18 VITALS — BODY MASS INDEX: 22.97 KG/M2 | HEIGHT: 60 IN | RESPIRATION RATE: 15 BRPM | WEIGHT: 117 LBS

## 2022-04-18 VITALS — SYSTOLIC BLOOD PRESSURE: 129 MMHG | TEMPERATURE: 98.4 F | DIASTOLIC BLOOD PRESSURE: 81 MMHG | HEART RATE: 59 BPM

## 2022-04-18 DIAGNOSIS — C50.412 MALIGNANT NEOPLASM OF UPPER-OUTER QUADRANT OF LEFT BREAST IN FEMALE, ESTROGEN RECEPTOR POSITIVE: Primary | ICD-10-CM

## 2022-04-18 DIAGNOSIS — Z45.2 ADJUSTMENT AND MANAGEMENT OF VASCULAR ACCESS DEVICE: Primary | ICD-10-CM

## 2022-04-18 DIAGNOSIS — Z17.0 MALIGNANT NEOPLASM OF UPPER-OUTER QUADRANT OF LEFT BREAST IN FEMALE, ESTROGEN RECEPTOR POSITIVE: Primary | ICD-10-CM

## 2022-04-18 PROCEDURE — 99215 OFFICE O/P EST HI 40 MIN: CPT | Performed by: NURSE PRACTITIONER

## 2022-04-18 PROCEDURE — 99213 OFFICE O/P EST LOW 20 MIN: CPT | Performed by: SURGERY

## 2022-04-18 NOTE — TELEPHONE ENCOUNTER
Spoke to Dr. York office and we can both do combo surgery on 05/10/22.    Left voicemail for patient to return call regarding this. (patient was hoping to do both surgeries at one time)   Cardiology

## 2022-04-18 NOTE — PROGRESS NOTES
A [employer] message has been sent to the patient for PATIENT ROUNDING with [facility].

## 2022-04-19 NOTE — TELEPHONE ENCOUNTER
Patient is aware that we are able to do the surgery with Dr. York on 05/10/22. Patient is aware of location for surgery as well.

## 2022-05-09 NOTE — PRE-PROCEDURE INSTRUCTIONS
PATIENT INSTRUCTED TO BE:    - NPO AFTER MIDNIGHT EXCEPT CAN HAVE CLEAR LIQUIDS 2 HOURS PRIOR TO SURGERY ARRIVAL TIME     - TO HOLD ALL VITAMINS, SUPPLEMENTS, NSAIDS FOR ONE WEEK PRIOR TO THEIR SURGICAL PROCEDURE    - INSTRUCTED PT TO USE SURGICAL SOAP 1 TIME THE NIGHT PRIOR TO SURGERY OR THE AM OF SURGERY.   USE SOAP FROM NECK TO TOES AVOID THEIR FACE, HAIR, AND PRIVATE PARTS. INSTRUCTED NO LOTIONS, JEWELRY, PIERCINGS, OR DEODORANT DAY OF SURGERY    - IF DIABETIC, CHECK BLOOD GLUCOSE IF LESS THAN 70 CALL PREOP AREA -AM OF SURGERY     - INSTRUCTED TO TAKE THE FOLLOWING MEDICATIONS THE DAY OF SURGERY:      FIORICET PRN, VALIUM PRN, CYMBALTA, PEPCID, TRILEPTAL, TOPAMAX     PATIENT VERBALIZED UNDERSTANDING

## 2022-05-10 ENCOUNTER — HOSPITAL ENCOUNTER (OUTPATIENT)
Facility: HOSPITAL | Age: 62
Setting detail: HOSPITAL OUTPATIENT SURGERY
Discharge: HOME | End: 2022-05-10
Attending: SURGERY | Admitting: SURGERY

## 2022-05-10 ENCOUNTER — ANESTHESIA (OUTPATIENT)
Dept: PERIOP | Facility: HOSPITAL | Age: 62
End: 2022-05-10

## 2022-05-10 ENCOUNTER — ANESTHESIA EVENT (OUTPATIENT)
Dept: PERIOP | Facility: HOSPITAL | Age: 62
End: 2022-05-10

## 2022-05-10 VITALS
OXYGEN SATURATION: 95 % | SYSTOLIC BLOOD PRESSURE: 110 MMHG | HEIGHT: 60 IN | WEIGHT: 113.1 LBS | DIASTOLIC BLOOD PRESSURE: 67 MMHG | RESPIRATION RATE: 20 BRPM | BODY MASS INDEX: 22.2 KG/M2 | TEMPERATURE: 97.1 F | HEART RATE: 82 BPM

## 2022-05-10 DIAGNOSIS — N65.0 ACQUIRED CONTOUR DEFORMITY OF BREAST: Primary | ICD-10-CM

## 2022-05-10 DIAGNOSIS — Z45.2 ADJUSTMENT AND MANAGEMENT OF VASCULAR ACCESS DEVICE: ICD-10-CM

## 2022-05-10 PROCEDURE — 25010000002 HYDROMORPHONE 1 MG/ML SOLUTION: Performed by: NURSE ANESTHETIST, CERTIFIED REGISTERED

## 2022-05-10 PROCEDURE — 88302 TISSUE EXAM BY PATHOLOGIST: CPT | Performed by: SURGERY

## 2022-05-10 PROCEDURE — 25010000002 MIDAZOLAM PER 1 MG: Performed by: ANESTHESIOLOGY

## 2022-05-10 PROCEDURE — 25010000002 DEXAMETHASONE PER 1 MG: Performed by: NURSE ANESTHETIST, CERTIFIED REGISTERED

## 2022-05-10 PROCEDURE — 19342 INSJ/RPLCMT BRST IMPLT SEP D: CPT | Performed by: SURGERY

## 2022-05-10 PROCEDURE — 25010000002 PROPOFOL 10 MG/ML EMULSION: Performed by: NURSE ANESTHETIST, CERTIFIED REGISTERED

## 2022-05-10 PROCEDURE — 25010000002 GENTAMICIN PER 80 MG: Performed by: SURGERY

## 2022-05-10 PROCEDURE — 25010000002 ONDANSETRON PER 1 MG: Performed by: NURSE ANESTHETIST, CERTIFIED REGISTERED

## 2022-05-10 PROCEDURE — C1789 PROSTHESIS, BREAST, IMP: HCPCS | Performed by: SURGERY

## 2022-05-10 PROCEDURE — 25010000002 CEFAZOLIN PER 500 MG: Performed by: SURGERY

## 2022-05-10 PROCEDURE — 36590 REMOVAL TUNNELED CV CATH: CPT | Performed by: SURGERY

## 2022-05-10 DEVICE — IMPLANTABLE DEVICE: Type: IMPLANTABLE DEVICE | Site: BREAST | Status: FUNCTIONAL

## 2022-05-10 RX ORDER — EPHEDRINE SULFATE 50 MG/ML
INJECTION, SOLUTION INTRAVENOUS AS NEEDED
Status: DISCONTINUED | OUTPATIENT
Start: 2022-05-10 | End: 2022-05-10 | Stop reason: SURG

## 2022-05-10 RX ORDER — PHENYLEPHRINE HCL IN 0.9% NACL 1 MG/10 ML
SYRINGE (ML) INTRAVENOUS AS NEEDED
Status: DISCONTINUED | OUTPATIENT
Start: 2022-05-10 | End: 2022-05-10 | Stop reason: SURG

## 2022-05-10 RX ORDER — CEPHALEXIN 500 MG/1
500 CAPSULE ORAL 4 TIMES DAILY
Qty: 12 CAPSULE | Refills: 0 | Status: SHIPPED | OUTPATIENT
Start: 2022-05-10 | End: 2022-05-13

## 2022-05-10 RX ORDER — PROMETHAZINE HYDROCHLORIDE 12.5 MG/1
25 TABLET ORAL ONCE AS NEEDED
Status: DISCONTINUED | OUTPATIENT
Start: 2022-05-10 | End: 2022-05-10 | Stop reason: HOSPADM

## 2022-05-10 RX ORDER — PROMETHAZINE HYDROCHLORIDE 12.5 MG/1
12.5 TABLET ORAL EVERY 6 HOURS PRN
Qty: 20 TABLET | Refills: 0 | Status: SHIPPED | OUTPATIENT
Start: 2022-05-10 | End: 2022-05-17

## 2022-05-10 RX ORDER — PROMETHAZINE HYDROCHLORIDE 25 MG/1
25 SUPPOSITORY RECTAL ONCE AS NEEDED
Status: DISCONTINUED | OUTPATIENT
Start: 2022-05-10 | End: 2022-05-10 | Stop reason: HOSPADM

## 2022-05-10 RX ORDER — GLYCOPYRROLATE 0.2 MG/ML
INJECTION INTRAMUSCULAR; INTRAVENOUS AS NEEDED
Status: DISCONTINUED | OUTPATIENT
Start: 2022-05-10 | End: 2022-05-10 | Stop reason: SURG

## 2022-05-10 RX ORDER — OXYCODONE HYDROCHLORIDE AND ACETAMINOPHEN 5; 325 MG/1; MG/1
1 TABLET ORAL EVERY 6 HOURS PRN
Qty: 30 TABLET | Refills: 0 | Status: SHIPPED | OUTPATIENT
Start: 2022-05-10 | End: 2022-05-17

## 2022-05-10 RX ORDER — DEXAMETHASONE SODIUM PHOSPHATE 4 MG/ML
INJECTION, SOLUTION INTRA-ARTICULAR; INTRALESIONAL; INTRAMUSCULAR; INTRAVENOUS; SOFT TISSUE AS NEEDED
Status: DISCONTINUED | OUTPATIENT
Start: 2022-05-10 | End: 2022-05-10 | Stop reason: SURG

## 2022-05-10 RX ORDER — MAGNESIUM HYDROXIDE 1200 MG/15ML
LIQUID ORAL AS NEEDED
Status: DISCONTINUED | OUTPATIENT
Start: 2022-05-10 | End: 2022-05-10 | Stop reason: HOSPADM

## 2022-05-10 RX ORDER — LIDOCAINE HYDROCHLORIDE 20 MG/ML
INJECTION, SOLUTION EPIDURAL; INFILTRATION; INTRACAUDAL; PERINEURAL AS NEEDED
Status: DISCONTINUED | OUTPATIENT
Start: 2022-05-10 | End: 2022-05-10 | Stop reason: SURG

## 2022-05-10 RX ORDER — ONDANSETRON 2 MG/ML
4 INJECTION INTRAMUSCULAR; INTRAVENOUS ONCE AS NEEDED
Status: DISCONTINUED | OUTPATIENT
Start: 2022-05-10 | End: 2022-05-10 | Stop reason: HOSPADM

## 2022-05-10 RX ORDER — MEPERIDINE HYDROCHLORIDE 25 MG/ML
12.5 INJECTION INTRAMUSCULAR; INTRAVENOUS; SUBCUTANEOUS
Status: DISCONTINUED | OUTPATIENT
Start: 2022-05-10 | End: 2022-05-10 | Stop reason: HOSPADM

## 2022-05-10 RX ORDER — ONDANSETRON 2 MG/ML
INJECTION INTRAMUSCULAR; INTRAVENOUS AS NEEDED
Status: DISCONTINUED | OUTPATIENT
Start: 2022-05-10 | End: 2022-05-10 | Stop reason: SDUPTHER

## 2022-05-10 RX ORDER — ACETAMINOPHEN 500 MG
1000 TABLET ORAL ONCE
Status: COMPLETED | OUTPATIENT
Start: 2022-05-10 | End: 2022-05-10

## 2022-05-10 RX ORDER — OXYCODONE HYDROCHLORIDE 5 MG/1
5 TABLET ORAL
Status: DISCONTINUED | OUTPATIENT
Start: 2022-05-10 | End: 2022-05-10 | Stop reason: HOSPADM

## 2022-05-10 RX ORDER — MIDAZOLAM HYDROCHLORIDE 1 MG/ML
2 INJECTION INTRAMUSCULAR; INTRAVENOUS ONCE
Status: COMPLETED | OUTPATIENT
Start: 2022-05-10 | End: 2022-05-10

## 2022-05-10 RX ORDER — PROPOFOL 10 MG/ML
VIAL (ML) INTRAVENOUS AS NEEDED
Status: DISCONTINUED | OUTPATIENT
Start: 2022-05-10 | End: 2022-05-10 | Stop reason: SURG

## 2022-05-10 RX ORDER — CLINDAMYCIN PHOSPHATE 900 MG/50ML
900 INJECTION INTRAVENOUS ONCE
Status: COMPLETED | OUTPATIENT
Start: 2022-05-10 | End: 2022-05-10

## 2022-05-10 RX ORDER — SODIUM CHLORIDE, SODIUM LACTATE, POTASSIUM CHLORIDE, CALCIUM CHLORIDE 600; 310; 30; 20 MG/100ML; MG/100ML; MG/100ML; MG/100ML
9 INJECTION, SOLUTION INTRAVENOUS CONTINUOUS PRN
Status: DISCONTINUED | OUTPATIENT
Start: 2022-05-10 | End: 2022-05-10 | Stop reason: HOSPADM

## 2022-05-10 RX ADMIN — HYDROMORPHONE HYDROCHLORIDE 0.25 MG: 1 INJECTION, SOLUTION INTRAMUSCULAR; INTRAVENOUS; SUBCUTANEOUS at 11:07

## 2022-05-10 RX ADMIN — SODIUM CHLORIDE, POTASSIUM CHLORIDE, SODIUM LACTATE AND CALCIUM CHLORIDE 9 ML/HR: 600; 310; 30; 20 INJECTION, SOLUTION INTRAVENOUS at 06:57

## 2022-05-10 RX ADMIN — SODIUM CHLORIDE, POTASSIUM CHLORIDE, SODIUM LACTATE AND CALCIUM CHLORIDE: 600; 310; 30; 20 INJECTION, SOLUTION INTRAVENOUS at 10:22

## 2022-05-10 RX ADMIN — HYDROMORPHONE HYDROCHLORIDE 0.25 MG: 1 INJECTION, SOLUTION INTRAMUSCULAR; INTRAVENOUS; SUBCUTANEOUS at 10:20

## 2022-05-10 RX ADMIN — MIDAZOLAM HYDROCHLORIDE 2 MG: 1 INJECTION, SOLUTION INTRAMUSCULAR; INTRAVENOUS at 09:01

## 2022-05-10 RX ADMIN — ACETAMINOPHEN 1000 MG: 500 TABLET ORAL at 06:57

## 2022-05-10 RX ADMIN — PROPOFOL 100 MG: 10 INJECTION, EMULSION INTRAVENOUS at 09:10

## 2022-05-10 RX ADMIN — LIDOCAINE HYDROCHLORIDE 100 MG: 20 INJECTION, SOLUTION EPIDURAL; INFILTRATION; INTRACAUDAL; PERINEURAL at 09:10

## 2022-05-10 RX ADMIN — HYDROMORPHONE HYDROCHLORIDE 0.5 MG: 1 INJECTION, SOLUTION INTRAMUSCULAR; INTRAVENOUS; SUBCUTANEOUS at 09:29

## 2022-05-10 RX ADMIN — EPHEDRINE SULFATE 25 MG: 50 INJECTION INTRAVENOUS at 09:18

## 2022-05-10 RX ADMIN — DEXAMETHASONE SODIUM PHOSPHATE 8 MG: 4 INJECTION, SOLUTION INTRA-ARTICULAR; INTRALESIONAL; INTRAMUSCULAR; INTRAVENOUS; SOFT TISSUE at 09:14

## 2022-05-10 RX ADMIN — Medication 200 MCG: at 09:38

## 2022-05-10 RX ADMIN — CLINDAMYCIN IN 5 PERCENT DEXTROSE 900 MG: 18 INJECTION, SOLUTION INTRAVENOUS at 09:10

## 2022-05-10 RX ADMIN — OXYCODONE HYDROCHLORIDE 5 MG: 5 TABLET ORAL at 10:54

## 2022-05-10 RX ADMIN — ONDANSETRON 4 MG: 2 INJECTION INTRAMUSCULAR; INTRAVENOUS at 09:14

## 2022-05-10 RX ADMIN — GLYCOPYRROLATE 0.2 MG: 0.2 INJECTION INTRAMUSCULAR; INTRAVENOUS at 09:06

## 2022-05-10 RX ADMIN — HYDROMORPHONE HYDROCHLORIDE 0.5 MG: 1 INJECTION, SOLUTION INTRAMUSCULAR; INTRAVENOUS; SUBCUTANEOUS at 10:54

## 2022-05-10 RX ADMIN — Medication 200 MCG: at 09:18

## 2022-05-10 RX ADMIN — EPHEDRINE SULFATE 25 MG: 50 INJECTION INTRAVENOUS at 09:38

## 2022-05-10 RX ADMIN — HYDROMORPHONE HYDROCHLORIDE 0.25 MG: 1 INJECTION, SOLUTION INTRAMUSCULAR; INTRAVENOUS; SUBCUTANEOUS at 10:46

## 2022-05-10 NOTE — DISCHARGE INSTRUCTIONS
Ok for regular diet  Do not drive for next 1 week  Ok to shower but be aware you are a fall risk . It is ok to wet the breast. Do not exercise for the next 2 weeks.  Sleep in an upright position  No bra for 3 days After that, wear a comfortable soft bra  Ok to move arms slowly to the shoulder level (brushing hair movement)     DISCHARGE INSTRUCTIONS  SURGICAL / AMBULATORY  PROCEDURES      For your surgery you had:  General anesthesia (you may have a sore throat for the first 24 hours)  You may experience dizziness, drowsiness, or light-headedness for several hours following surgery/procedure.  Do not stay alone today or tonight.  Limit your activity for 24 hours.  Resume your diet slowly.  Follow whatever special dietary instructions you may have been given by your doctor.  You should not drive or operate machinery, drink alcohol, or sign legally binding documents for 24 hours or while you are taking pain medication.  Last dose of pain medication was given at:   .  NOTIFY YOUR DOCTOR IF YOU EXPERIENCE ANY OF THE FOLLOWING:  Temperature greater than 101 degrees Fahrenheit  Shaking Chills  Redness or excessive drainage from incision  Nausea, vomiting and/or pain that is not controlled by prescribed medications  Increase in bleeding or bleeding that is excessive  Unable to urinate in 6 hours after surgery  If unable to reach your doctor, please go to the closest Emergency Room    You may shower  .  Medications per physician instructions as indicated on Discharge Medication Information Sheet.  YSPECIAL INSTRUCTIONS:

## 2022-05-10 NOTE — INTERVAL H&P NOTE
H&P reviewed. The patient was examined and there are no changes to the H&P.    Patient is not tachycardic  Respirations are non elaborated  Vitals:    05/10/22 0616   BP: 115/74   Pulse: 64   Resp: 20   Temp: 97.7 °F (36.5 °C)   SpO2: 100%     Risks and benefits reminded to patient who agrees to proceed

## 2022-05-10 NOTE — ANESTHESIA PREPROCEDURE EVALUATION
Anesthesia Evaluation     Patient summary reviewed and Nursing notes reviewed   no history of anesthetic complications:  NPO Solid Status: > 8 hours  NPO Liquid Status: > 2 hours           Airway   Mallampati: II  TM distance: >3 FB  Neck ROM: full  No difficulty expected  Dental      Pulmonary - negative pulmonary ROS and normal exam    breath sounds clear to auscultation  Cardiovascular - negative cardio ROS and normal exam  Exercise tolerance: good (4-7 METS)    Rhythm: regular  Rate: normal        Neuro/Psych  (+) headaches,    GI/Hepatic/Renal/Endo    (+)  GERD well controlled,      Musculoskeletal (-) negative ROS    Abdominal    Substance History - negative use     OB/GYN negative ob/gyn ROS         Other      history of cancer                    Anesthesia Plan    ASA 2     general   (Patient understands anesthesia not responsible for dental damage.)  intravenous induction     Anesthetic plan, all risks, benefits, and alternatives have been provided, discussed and informed consent has been obtained with: patient.    Plan discussed with CRNA.        CODE STATUS:

## 2022-05-10 NOTE — OP NOTE
REMOVAL VENOUS ACCESS DEVICE  Procedure Report    Patient Name:  Ragini Dozier  YOB: 1960    Date of Surgery:  5/10/2022     Indications:  port in place    Pre-op Diagnosis:   Adjustment and management of vascular access device [Z45.2]  Malignant neoplasm of left breast [Z50.412]       Post-Op Diagnosis Codes:     * Adjustment and management of vascular access device [Z45.2]    Procedure/CPT® Codes:  Procedure(s):  REMOVAL VENOUS ACCESS DEVICE      Staff:  Surgeon(s):  Lizbeth York MD    Assistant: Nella Walker    Anesthesia: General    Estimated Blood Loss: minimal    Implants:    Nothing was implanted during the procedure    Specimen:          A: port        Findings: none    Complications: none    Description of Procedure:   After informed consent was obtained, the patient was taken to the OR and placed supine on the table and, after adequate anesthesia, prepped and draped in the usual sterile fashion. An incision was made over the port and after local anesthetic had been injected we dissected down to the level of the port. The port was grasped, the capsule was incised, and the port was handed off as a specimen. Hemostasis was achieved by holding pressure over the insertion site, and then a figure-of-eight suture was placed in the catheter tunnel. The wound was copiously irrigated. The deeper layers were closed with 2-0 Vicryl. The skin was closed with running 4-0 subcuticular stitch. The patient tolerated the procedure well and will follow up with me in 1-2 weeks.  Assistant: Nella Walker  was responsible for performing the following activities: Retraction and Suturing and their skilled assistance was necessary for the success of this case.    Lizbeth York MD     Date: 5/10/2022  Time: 09:38 EDT

## 2022-05-10 NOTE — OP NOTE
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    Anesthesia: General    Staff:   Circulator: Joy Blanco RN  Scrub Person: Hue Mandujano  Assistant: Nella Walker    Surgeon: Dr Chirinos    Estimated Blood Loss: 50 mL    Specimens:   Order Name Source Comment Collection Info Order Time   TISSUE PATHOLOGY EXAM Breast, Left  Collected By: Liu Chirinos MD 5/10/2022  9:45 AM     Release to patient   Immediate              Drains: * No LDAs found *    Findings:     Left breast scar tissue from radiation at the lumpectomy and axillary scars     Implants:      Right:   Sientra Smooth round gel implant [id number]      Left:  Sientra Smooth round gel implant [id number]    Complications: none      Date: 5/10/2022  Time: 10:36 EDT  After risks and benefits were discussed with patient and informed consent was signed, patient was taken to the OR and positioned supine. The surgical site was then prepped in a sterile fashion way and a time out was performed confirming patient's name and procedure to be performed. All surgical team was introduced by name.   I started with incision on the left breast on the previous lumpectomy incision. At that area, some scar tissue was encountered and removed, this material was sent to pathology. Then, I elevated the breast tissue off the pectoral muscle until I achieved a pocket with 11 cm in diameter. A sizer was placed and additional breast tissue was removed. A lot of cords from scarring at the axilla were released. Then, the pocket was irrigated and the implant was placed with a  conroy funnel. Then I approached the right breast. The incision was done at the previous vertical incision at the midline. The breast was elevated off the chest wall and the sizer was placed. This was removed, hemostasis was obtained, the cavity was irrigated and the implant placed with a conroy funnel. The incisions were closed with 3-0 vicryl followed by surgical glue.   Patient tolerated procedure well, there were no complications, all instruments were checked and patient was awakened and taken to PACU in stable condition.  I was present for the entire procedure.   Liu Chirinos MD  05/10/22  10:36 EDT

## 2022-05-10 NOTE — ANESTHESIA POSTPROCEDURE EVALUATION
Patient: Ragini Dozier    Procedure Summary     Date: 05/10/22 Room / Location: [facility] OR  / [facility]    Anesthesia Start: 0906 Anesthesia Stop: 1036    Procedures:       REMOVAL VENOUS ACCESS DEVICE (N/A )      Bilateral implants with mesh and scar revision of left breast (Bilateral Breast) Diagnosis:       Adjustment and management of vascular access device      (Adjustment and management of vascular access device [Z45.2])      (Malignant neoplasm of left breast [Z50.412])    Surgeons: Lizbeth York MD; Liu Chirinos MD Provider: Kai Fernández MD    Anesthesia Type: general ASA Status: 2          Anesthesia Type: general    Vitals  Vitals Value Taken Time   /71 05/10/22 1106   Temp 36.3 °C (97.3 °F) 05/10/22 1051   Pulse 76 05/10/22 1105   Resp 20 05/10/22 1051   SpO2 94 % 05/10/22 1105   Vitals shown include unvalidated device data.        Post Anesthesia Care and Evaluation    Patient location during evaluation: bedside  Patient participation: complete - patient participated  Level of consciousness: awake and alert  Pain management: adequate  Airway patency: patent  Anesthetic complications: No anesthetic complications  PONV Status: none  Cardiovascular status: acceptable  Respiratory status: acceptable  Hydration status: acceptable    Comments: An Anesthesiologist personally participated in the most demanding procedures (including induction and emergence if applicable) in the anesthesia plan, monitored the course of anesthesia administration at frequent intervals and remained physically present and available for immediate diagnosis and treatment of emergencies.

## 2022-05-10 NOTE — PROGRESS NOTES
"Post-op Follow-up (1wk post op )            History of Present Illness  Ragini Dozier is a 62 y.o. female who presents to [facility] for post op  Bilateral implants with mesh and scar revision of left breast done on 05/10/22.     No concerns today, she is wearing a regular bra today.      Patient has no known allergies.  Allergies Reconciled.    Review of Systems   All review of system has been reviewed and it  is negative except the ones note above.     Objective     /70 (BP Location: Left arm)   Pulse 59   Temp 98.4 °F (36.9 °C) (Temporal)   Ht 152.4 cm (60\")   Wt 53.2 kg (117 lb 3.2 oz)   SpO2 96%   BMI 22.89 kg/m²     Body mass index is 22.89 kg/m².    Physical Exam:   Breast: Left breast feels good and soft, minimal swelling, incision healing well, no redness, drainage or warmth,      Result Review :              Assessment and Plan      Diagnoses and all orders for this visit:    1. Postoperative follow-up (Primary)        Additional Order(s):       Plan: Can drive if not taking pain meds, 4-6wks compression and keep arms T-Luc 4-6 weeks post op, RTC 1 month        Follow Up  3wks for 1m post op     Return for 3wks for 1m post op .    Patient was given instructions and counseling regarding her condition. Please see specific information pulled into the AVS if appropriate.     Samia Ceja, APRN  05/17/2022      "

## 2022-05-17 ENCOUNTER — OFFICE VISIT (OUTPATIENT)
Dept: PLASTIC SURGERY | Facility: CLINIC | Age: 62
End: 2022-05-17

## 2022-05-17 VITALS
DIASTOLIC BLOOD PRESSURE: 70 MMHG | WEIGHT: 117.2 LBS | OXYGEN SATURATION: 96 % | BODY MASS INDEX: 23.01 KG/M2 | HEART RATE: 59 BPM | TEMPERATURE: 98.4 F | HEIGHT: 60 IN | SYSTOLIC BLOOD PRESSURE: 103 MMHG

## 2022-05-17 DIAGNOSIS — Z09 POSTOPERATIVE FOLLOW-UP: Primary | ICD-10-CM

## 2022-05-17 PROCEDURE — 99024 POSTOP FOLLOW-UP VISIT: CPT | Performed by: NURSE PRACTITIONER

## 2022-06-02 NOTE — PROGRESS NOTES
"Post-op Follow-up (1m post op )            History of Present Illness  Ragini Dozier is a 62 y.o. female who presents to [facility] for post op bilateral implants with mesh and scar revision of left breast done on 05/10/22.     No concerns today, she is wearing a regular bra. She denies redness, swelling, drainage and pain.       Patient has no known allergies.  Allergies Reconciled.    Review of Systems   All review of system has been reviewed and it  is negative except the ones note above.     Objective     /73 (BP Location: Left arm)   Pulse 61   Temp 98.6 °F (37 °C) (Temporal)   Ht 152.4 cm (60\")   Wt 51.8 kg (114 lb 3.2 oz)   SpO2 99%   BMI 22.30 kg/m²     Body mass index is 22.3 kg/m².    Physical Exam:   Breast:  Implants in good position (left still slightly high), breast soft, minimal swelling, incision healing well, no redness, drainage or warmth, right open area healing and closed      Result Review :              Assessment and Plan      Diagnoses and all orders for this visit:    1. Postoperative follow-up (Primary)        Additional Order(s):       Plan: Aquaphor or lotion to incisions, will monitor right incision for healing and left implant for position, RTC 3 months post op        Follow Up     Return for 2m for 3m post op .    Patient was given instructions and counseling regarding her condition. Please see specific information pulled into the AVS if appropriate.     Samai Ceja, ANGELICA  06/07/2022      "

## 2022-06-07 ENCOUNTER — OFFICE VISIT (OUTPATIENT)
Dept: PLASTIC SURGERY | Facility: CLINIC | Age: 62
End: 2022-06-07

## 2022-06-07 VITALS
HEART RATE: 61 BPM | TEMPERATURE: 98.6 F | SYSTOLIC BLOOD PRESSURE: 114 MMHG | HEIGHT: 60 IN | OXYGEN SATURATION: 99 % | WEIGHT: 114.2 LBS | DIASTOLIC BLOOD PRESSURE: 73 MMHG | BODY MASS INDEX: 22.42 KG/M2

## 2022-06-07 DIAGNOSIS — Z09 POSTOPERATIVE FOLLOW-UP: Primary | ICD-10-CM

## 2022-06-07 PROCEDURE — 99024 POSTOP FOLLOW-UP VISIT: CPT | Performed by: NURSE PRACTITIONER

## 2022-06-07 RX ORDER — QUETIAPINE FUMARATE 100 MG/1
TABLET, FILM COATED ORAL
Status: ON HOLD | COMMUNITY
Start: 2022-06-02 | End: 2022-08-09

## 2022-06-22 ENCOUNTER — APPOINTMENT (OUTPATIENT)
Dept: OCCUPATIONAL THERAPY | Facility: HOSPITAL | Age: 62
End: 2022-06-22

## 2022-07-07 ENCOUNTER — APPOINTMENT (OUTPATIENT)
Dept: OCCUPATIONAL THERAPY | Facility: HOSPITAL | Age: 62
End: 2022-07-07

## 2022-07-19 ENCOUNTER — TELEPHONE (OUTPATIENT)
Dept: GASTROENTEROLOGY | Facility: CLINIC | Age: 62
End: 2022-07-19

## 2022-07-19 RX ORDER — PEG-3350, SODIUM SULFATE, SODIUM CHLORIDE, POTASSIUM CHLORIDE, SODIUM ASCORBATE AND ASCORBIC ACID 7.5-2.691G
1000 KIT ORAL TAKE AS DIRECTED
Qty: 1000 ML | Refills: 0 | Status: ON HOLD | OUTPATIENT
Start: 2022-07-19 | End: 2022-08-09

## 2022-07-19 NOTE — TELEPHONE ENCOUNTER
Patient has called the office and left a vm stating that she needs prep and instructions sent.   Attempted to contact pt, left a vm requesting a return call.   Prep resent and instructions mailed to pharmacy.

## 2022-07-26 ENCOUNTER — TELEPHONE (OUTPATIENT)
Dept: GASTROENTEROLOGY | Facility: CLINIC | Age: 62
End: 2022-07-26

## 2022-08-05 ENCOUNTER — HOSPITAL ENCOUNTER (OUTPATIENT)
Dept: OCCUPATIONAL THERAPY | Facility: HOSPITAL | Age: 62
Setting detail: THERAPIES SERIES
Discharge: HOME | End: 2022-08-05

## 2022-08-05 DIAGNOSIS — L90.5 SCAR CONDITION AND FIBROSIS OF SKIN: ICD-10-CM

## 2022-08-05 DIAGNOSIS — C50.412 MALIGNANT NEOPLASM OF UPPER-OUTER QUADRANT OF LEFT BREAST IN FEMALE, ESTROGEN RECEPTOR POSITIVE: ICD-10-CM

## 2022-08-05 DIAGNOSIS — Z17.0 MALIGNANT NEOPLASM OF UPPER-OUTER QUADRANT OF LEFT BREAST IN FEMALE, ESTROGEN RECEPTOR POSITIVE: ICD-10-CM

## 2022-08-05 DIAGNOSIS — Z98.890 S/P LUMPECTOMY, LEFT BREAST: ICD-10-CM

## 2022-08-05 DIAGNOSIS — Z91.89 AT RISK FOR LYMPHEDEMA: Primary | ICD-10-CM

## 2022-08-05 PROCEDURE — 97535 SELF CARE MNGMENT TRAINING: CPT

## 2022-08-05 NOTE — THERAPY RE-EVALUATION
Outpatient Occupational Therapy Lymphedema Re-Evaluation   [place]     Patient Name: Ragini Dozier  : 1960  MRN: [id number]  Today's Date: 2022      Visit Date: 2022    Patient Active Problem List   Diagnosis   • Anxiety   • Cataract   • Depression   • Migraine   • Esophageal dysphagia   • Globus sensation   • Malignant neoplasm of upper-outer quadrant of left breast in female, estrogen receptor positive (HCC)   • Adjustment and management of vascular access device   • Anemia associated with chemotherapy   • Age-related osteoporosis without current pathological fracture   • Gastroesophageal reflux disease without esophagitis        Past Medical History:   Diagnosis Date   • Breast cancer (HCC)     LEFT   • Breast cancer screening by mammogram    • Depression    • Encounter for Hemoccult screening    • GERD (gastroesophageal reflux disease)    • Insomnia    • Malignant neoplasm of upper-outer quadrant of left female breast (HCC)    • Migraine headache    • Osteoporosis         Past Surgical History:   Procedure Laterality Date   • BREAST AUGMENTATION Bilateral 5/10/2022    Procedure: Bilateral implants with mesh and scar revision of left breast;  Surgeon: Liu Chirinos MD;  Location: [facility] [place];  Service: Plastics;  Laterality: Bilateral;   • BREAST LUMPECTOMY     • BREAST LUMPECTOMY WITH SENTINEL NODE BIOPSY Left    • BUNIONECTOMY Bilateral    • CATARACT EXTRACTION     • COLONOSCOPY     • ENDOSCOPY N/A 10/1/2021    Procedure: ESOPHAGOGASTRODUODENOSCOPY WITH BIOPSY;  Surgeon: Hue Meyer MD;  Location: [facility] ENDOSCOPY;  Service: Gastroenterology;  Laterality: N/A;  ESOPHAGITIS/GASTRITIS   • HYSTERECTOMY      PARTIAL   • OTHER SURGICAL HISTORY      BIOPSY   • SKIN CANCER EXCISION     • TONSILLECTOMY     • UPPER GASTROINTESTINAL ENDOSCOPY     • VENOUS ACCESS DEVICE (PORT) REMOVAL N/A 5/10/2022    Procedure: REMOVAL VENOUS ACCESS DEVICE;  Surgeon: Chela  "Lizbeth CHADWICK MD;  Location: [facility];  Service: General;  Laterality: N/A;         Visit Dx:     ICD-10-CM ICD-9-CM   1. At risk for lymphedema  Z91.89 V49.89   2. Scar condition and fibrosis of skin  L90.5 709.2   3. Malignant neoplasm of upper-outer quadrant of left breast in female, estrogen receptor positive (HCC)  C50.412 174.4    Z17.0 V86.0   4. S/P lumpectomy, left breast  Z98.890 V45.89        Lymphedema     Row Name 08/05/22 1700             Subjective Pain    Able to rate subjective pain? yes  -MP      Pre-Treatment Pain Level 0  -MP      Post-Treatment Pain Level 0  -MP              Lymphedema Assessment    Lymphedema Classification LUE:;at risk/stage 0  -MP      Lymphedema Cancer Related Sx left;lumpectomy;sentinel node biopsy  -MP      Lymph Nodes Removed # 8  -MP      Positive Lymph Nodes # 0  -MP      Chemo Received yes  -MP      Chemo Treatments #/Timeframe TCHP  -MP      Radiation Therapy Received yes  -MP      Radiation Treatments #/Timeframe 16  -MP              LLIS - Physical Concerns    The amount of pain associated with my lymphedema is: 0  -MP      The amount of limb heaviness associated with my lymphedema is: 0  -MP      The amount of skin tightness associated with my lymphedema is: 0  -MP      The size of my swollen limb(s) seems: 0  -MP      Lymphedema affects the movement of my swollen limb(s): 0  -MP      The strength in my swollen limb(s) is: 0  -MP              LLIS - Psychosocial Concerns    Lymphedema affects my body image (i.e., \"how I think I look\"). 0  -MP      Lymphedema affects my socializing with others. 0  -MP      Lymphedema affects my intimate relations with spouse or partner (rate 0 if not applicable 0  -MP      Lymphedema \"gets me down\" (i.e., depression, frustration, or anger) 0  -MP      I must rely on others for help due to my lymphedema. 0  -MP      I know what to do to manage my lymphedema 1  -MP              LLIS - Functional Concerns    Lymphedema affects my " ability to perform self-care activities (i.e. eating, dressing, hygiene) 0  -MP      Lymphedema affects my ability to perform routine home or work-related activities. 0  -MP      Lymphedema affects my performance of preferred leisure activities. 0  -MP      Lymphedema affects proper fit of clothing/shoes 0  -MP      Lymphedema affects my sleep 0  -MP              Posture/Observations    Posture- WNL Posture is WNL  -MP              General ROM    GENERAL ROM COMMENTS B UEs WFL  -MP              MMT (Manual Muscle Testing)    General MMT Comments B UEs WFL  -MP              Lymphedema Edema Assessment    Edema Assessment Comment No concerns  -MP              Skin Changes/Observations    Location/Assessment Upper Extremity  -MP      Upper Extremity Conditions left:;normal  -MP      Upper Quadrant Conditions intact;normal;clean;dry  -MP              Lymphedema Measurements    Measurement Type(s) Circumferential  -MP      Circumferential Areas Upper extremities  -MP      Lymphedema Measurements Comments See attached  -MP              Lymphedema Life Impact Scale Totals    A.  Total Q1 - Q17 (Do not include Q18) 1  -MP      B.  Total number of questions answered (Q1-Q17) 17  -MP      C. Divide A by B 0.06  -MP      D. Multiple C by 25 1.5  -MP            User Key  (r) = Recorded By, (t) = Taken By, (c) = Cosigned By    Initials Name Provider Type    Cherrie Daley OT Occupational Therapist               Circumferential Measurements:         Patient is showing a -5.01% difference between the right upper extremity and the left upper extremity indicating normalize lymphatic functioning in the left upper quadrant  Therapy Education  95522 - OT Self Care/Mgmt Minutes: 35  Goals  1. Self-care Functional Limitation                     LTG 1: 12 weeks:  As an indicator of no exacerbation of lymphedema staging, the patient will present with no greater than 10% increase in total UE lymph volumetric from baseline of  non-affected upper extremity.                          STATUS: Met: Ongoing  STG 1a: 4 weeks:  As an indicator of no exacerbation of lymphedema staging, the patient will present with no greater than 10% increase in total UE lymph volumetric from baseline of non-affected upper extremity.              STATUS: Met: Ongoing     LTG 1a: 90 days:  As an indicator of no exacerbation of lymphedema staging, the patient will present with an L-Dex score less than 7 points from preoperative baseline.                          STATUS: New  STG1b: 4 weeks: To prevent exacerbation of mixed edema to lymphedema, patient will utilize the 2 (72066) Daya compression bra  daily.                                                 STATUS: Met              STG 1c: 4 weeks: Patient will be independent with self-manual lymphatic massage.                          STATUS: Met: Ongoing              STG 1d: 4 weeks: Patient will be independent with identification of signs and symptoms of lymphedema exasperation per stoplight to recovery education sheet.                          STATUS: Met: Ongoing  TREATMENT:  Self Care/ADL retraining, Therapeutic Activity, Neuromuscular Re-education, Therapeutic Exercise, Bioimpedence Fluid Analysis, Post-Surgical compression x 06150 Daya Zip-ST-High, Orthotic Management and training,  and Manual Therapy.        Time Calculation:   Timed Charges  40038 - OT Self Care/Mgmt Minutes: 35  Total Minutes  Timed Charges Total Minutes: 35   Total Minutes: 35     Therapy Charges for Today     Code Description Service Date Service Provider Modifiers Qty    [id number]  OT SELF CARE/MGMT/TRAIN EA 15 MIN 8/5/2022 Cherrie Pierre OT GO 2                    Cherrie Pierre OT  8/5/2022

## 2022-08-08 NOTE — PROGRESS NOTES
"Post-op Follow-up (3 months post op )      History of Present Illness  Ragini Dozier is a 62 y.o. female who presents to [facility] for post op bilateral implants with mesh and scar revision of left breast done on 05/10/22.     She's doing well, has no concerns. Her incisions are doing well and are healed. No drainage or fluid and has no swelling.          Patient has no known allergies.  Allergies Reconciled.    Review of Systems   All review of system has been reviewed and it  is negative except the ones note above.     Objective     /79 (BP Location: Right arm, Patient Position: Sitting)   Pulse 80   Ht 152.4 cm (60\")   Wt 51.1 kg (112 lb 9.6 oz)   SpO2 100%   BMI 21.99 kg/m²     Body mass index is 21.99 kg/m².    Physical Exam:   Breast:  Implants in good position (left still slightly high), breast soft, no swelling, incision healing well, no redness, drainage or warmth    Result Review :              Assessment and Plan      Diagnoses and all orders for this visit:    1. Malignant neoplasm of upper-outer quadrant of left breast in female, estrogen receptor positive (HCC) (Primary)    2. Disproportion of reconstructed breast        Additional Order(s):       Plan: Aquaphor or lotion to incisions, will monitor  left implant for position, inward and downward massage. RTC 3 months at 6 months post op        Follow Up     No follow-ups on file.    Patient was given instructions and counseling regarding her condition. Please see specific information pulled into the AVS if appropriate.     Samia Ceja, APRN  08/15/2022      "

## 2022-08-09 ENCOUNTER — ANESTHESIA (OUTPATIENT)
Dept: GASTROENTEROLOGY | Facility: HOSPITAL | Age: 62
End: 2022-08-09

## 2022-08-09 ENCOUNTER — HOSPITAL ENCOUNTER (OUTPATIENT)
Facility: HOSPITAL | Age: 62
Setting detail: HOSPITAL OUTPATIENT SURGERY
Discharge: HOME | End: 2022-08-09
Attending: INTERNAL MEDICINE | Admitting: INTERNAL MEDICINE

## 2022-08-09 ENCOUNTER — ANESTHESIA EVENT (OUTPATIENT)
Dept: GASTROENTEROLOGY | Facility: HOSPITAL | Age: 62
End: 2022-08-09

## 2022-08-09 VITALS
DIASTOLIC BLOOD PRESSURE: 70 MMHG | BODY MASS INDEX: 21.79 KG/M2 | RESPIRATION RATE: 16 BRPM | HEART RATE: 74 BPM | WEIGHT: 111.55 LBS | SYSTOLIC BLOOD PRESSURE: 102 MMHG | OXYGEN SATURATION: 100 % | TEMPERATURE: 97.2 F

## 2022-08-09 PROCEDURE — G0121 COLON CA SCRN NOT HI RSK IND: HCPCS | Performed by: INTERNAL MEDICINE

## 2022-08-09 PROCEDURE — 25010000002 PROPOFOL 10 MG/ML EMULSION: Performed by: NURSE ANESTHETIST, CERTIFIED REGISTERED

## 2022-08-09 RX ORDER — PROPOFOL 10 MG/ML
VIAL (ML) INTRAVENOUS AS NEEDED
Status: DISCONTINUED | OUTPATIENT
Start: 2022-08-09 | End: 2022-08-09 | Stop reason: SURG

## 2022-08-09 RX ORDER — SODIUM CHLORIDE 0.9 % (FLUSH) 0.9 %
10 SYRINGE (ML) INJECTION AS NEEDED
Status: DISCONTINUED | OUTPATIENT
Start: 2022-08-09 | End: 2022-08-09 | Stop reason: HOSPADM

## 2022-08-09 RX ORDER — LIDOCAINE HYDROCHLORIDE 20 MG/ML
INJECTION, SOLUTION EPIDURAL; INFILTRATION; INTRACAUDAL; PERINEURAL AS NEEDED
Status: DISCONTINUED | OUTPATIENT
Start: 2022-08-09 | End: 2022-08-09 | Stop reason: SURG

## 2022-08-09 RX ORDER — SODIUM CHLORIDE, SODIUM LACTATE, POTASSIUM CHLORIDE, CALCIUM CHLORIDE 600; 310; 30; 20 MG/100ML; MG/100ML; MG/100ML; MG/100ML
30 INJECTION, SOLUTION INTRAVENOUS CONTINUOUS
Status: DISCONTINUED | OUTPATIENT
Start: 2022-08-09 | End: 2022-08-09 | Stop reason: HOSPADM

## 2022-08-09 RX ORDER — SODIUM CHLORIDE, SODIUM LACTATE, POTASSIUM CHLORIDE, CALCIUM CHLORIDE 600; 310; 30; 20 MG/100ML; MG/100ML; MG/100ML; MG/100ML
1000 INJECTION, SOLUTION INTRAVENOUS CONTINUOUS
Status: DISCONTINUED | OUTPATIENT
Start: 2022-08-09 | End: 2022-08-09 | Stop reason: HOSPADM

## 2022-08-09 RX ADMIN — PROPOFOL 175 MCG/KG/MIN: 10 INJECTION, EMULSION INTRAVENOUS at 07:24

## 2022-08-09 RX ADMIN — PROPOFOL 100 MG: 10 INJECTION, EMULSION INTRAVENOUS at 07:24

## 2022-08-09 RX ADMIN — LIDOCAINE HYDROCHLORIDE 50 MG: 20 INJECTION, SOLUTION EPIDURAL; INFILTRATION; INTRACAUDAL; PERINEURAL at 07:24

## 2022-08-09 RX ADMIN — SODIUM CHLORIDE, POTASSIUM CHLORIDE, SODIUM LACTATE AND CALCIUM CHLORIDE 1000 ML: 600; 310; 30; 20 INJECTION, SOLUTION INTRAVENOUS at 06:40

## 2022-08-09 NOTE — ANESTHESIA PREPROCEDURE EVALUATION
Anesthesia Evaluation     Patient summary reviewed and Nursing notes reviewed   no history of anesthetic complications:  NPO Solid Status: > 8 hours  NPO Liquid Status: > 2 hours           Airway   Mallampati: II  TM distance: >3 FB  Neck ROM: full  No difficulty expected  Dental    (+) implants    Pulmonary - negative pulmonary ROS and normal exam    breath sounds clear to auscultation  Cardiovascular - negative cardio ROS and normal exam  Exercise tolerance: good (4-7 METS)    Rhythm: regular  Rate: normal        Neuro/Psych  (+) psychiatric history Anxiety and Depression,    GI/Hepatic/Renal/Endo    (+)  GERD,      Musculoskeletal (-) negative ROS    Abdominal    Substance History - negative use     OB/GYN negative ob/gyn ROS         Other - negative ROS                       Anesthesia Plan    ASA 2     general       Anesthetic plan, risks, benefits, and alternatives have been provided, discussed and informed consent has been obtained with: patient and spouse/significant other.        CODE STATUS:

## 2022-08-09 NOTE — ANESTHESIA POSTPROCEDURE EVALUATION
Patient: Ragini Dozier    Procedure Summary     Date: 08/09/22 Room / Location: [facility] ENDOSCOPY 3 / [facility]COPY    Anesthesia Start: 0722 Anesthesia Stop: 0741    Procedure: COLONOSCOPY (N/A ) Diagnosis:       Colon cancer screening      (Colon cancer screening [Z12.11])    Surgeons: Hue Meyer MD Provider: Ascencion Márquez MD    Anesthesia Type: general ASA Status: 2          Anesthesia Type: general    Vitals  Vitals Value Taken Time   /70 08/09/22 0755   Temp 36.2 °C (97.2 °F) 08/09/22 0755   Pulse 74 08/09/22 0757   Resp     SpO2 100 % 08/09/22 0757   Vitals shown include unvalidated device data.        Post Anesthesia Care and Evaluation    Patient location during evaluation: bedside  Patient participation: complete - patient participated  Level of consciousness: awake  Pain management: adequate    Airway patency: patent  Anesthetic complications: No anesthetic complications  PONV Status: none  Cardiovascular status: acceptable and stable  Respiratory status: acceptable  Hydration status: acceptable    Comments: An Anesthesiologist personally participated in the most demanding procedures (including induction and emergence if applicable) in the anesthesia plan, monitored the course of anesthesia administration at frequent intervals and remained physically present and available for immediate diagnosis and treatment of emergencies.

## 2022-08-09 NOTE — H&P
Pre Procedure History & Physical    Chief Complaint:   Screening colonoscopy    Subjective     HPI:   61 yo F here for screening colonoscopy.    Past Medical History:   Past Medical History:   Diagnosis Date   • Breast cancer (HCC) 2020    LEFT   • Breast cancer screening by mammogram 2020   • Depression    • Encounter for Hemoccult screening 2019   • GERD (gastroesophageal reflux disease)    • Insomnia    • Malignant neoplasm of upper-outer quadrant of left female breast (HCC)    • Migraine headache    • Osteoporosis        Past Surgical History:  Past Surgical History:   Procedure Laterality Date   • BREAST AUGMENTATION Bilateral 5/10/2022    Procedure: Bilateral implants with mesh and scar revision of left breast;  Surgeon: Liu Chirinos MD;  Location[place] [facility] [place];  Service: Plastics;  Laterality: Bilateral;   • BREAST LUMPECTOMY     • BREAST LUMPECTOMY WITH SENTINEL NODE BIOPSY Left    • BUNIONECTOMY Bilateral    • CATARACT EXTRACTION     • COLONOSCOPY  2019   • ENDOSCOPY N/A 10/1/2021    Procedure: ESOPHAGOGASTRODUODENOSCOPY WITH BIOPSY;  Surgeon: Hue Meyer MD;  Location: [facility] ENDOSCOPY;  Service: Gastroenterology;  Laterality: N/A;  ESOPHAGITIS/GASTRITIS   • HYSTERECTOMY      PARTIAL   • OTHER SURGICAL HISTORY      BIOPSY   • SKIN CANCER EXCISION     • TONSILLECTOMY     • UPPER GASTROINTESTINAL ENDOSCOPY     • VENOUS ACCESS DEVICE (PORT) REMOVAL N/A 5/10/2022    Procedure: REMOVAL VENOUS ACCESS DEVICE;  Surgeon: Lizbeth York MD;  Location: [facility] [place];  Service: General;  Laterality: N/A;       Family History:  Family History   Problem Relation Age of Onset   • Kidney cancer Father    • Skin cancer Father    • Other Father         MYELOMA   • No Known Problems Mother    • Malig Hyperthermia Neg Hx        Social History:   reports that she has never smoked. She has never used smokeless tobacco. She reports current alcohol use. She reports that she does not use  drugs.    Medications:   Medications Prior to Admission   Medication Sig Dispense Refill Last Dose   • Calcium Carbonate-Vitamin D (calcium-vitamin D) 500-200 MG-UNIT tablet per tablet 500 mg 2 (Two) Times a Day.   8/8/2022 at Unknown time   • zolpidem (AMBIEN) 10 MG tablet Take 5 mg by mouth At Night As Needed. Takes 1/2 tab (5 mg)   8/8/2022 at Unknown time   • risedronate (ACTONEL) 150 MG tablet risedronate 150 mg oral tablet take 1 tablet (150 mg) by oral route once a month on the same date with a full glass of water at least 30 min before first food or drink of the day; remain in an upright position for at least 30 min.   Active   8/1/2022       Allergies:  Patient has no known allergies.    ROS:    Pertinent items are noted in HPI     Objective     Blood pressure 104/70, pulse 74, temperature 98.1 °F (36.7 °C), temperature source Temporal, resp. rate 16, weight 50.6 kg (111 lb 8.8 oz), SpO2 97 %.    Physical Exam   Constitutional: Pt is oriented to person, place, and time and well-developed, well-nourished, and in no distress.   Mouth/Throat: Oropharynx is clear and moist.   Neck: Normal range of motion.   Cardiovascular: Normal rate, regular rhythm and normal heart sounds.    Pulmonary/Chest: Effort normal and breath sounds normal.   Abdominal: Soft. Nontender  Skin: Skin is warm and dry.   Psychiatric: Mood, memory, affect and judgment normal.     Assessment & Plan     Diagnosis:  Screening colonoscopy    Anticipated Surgical Procedure:  Colonoscopy    The risks, benefits, and alternatives of this procedure have been discussed with the patient or the responsible party- the patient understands and agrees to proceed.

## 2022-08-10 ENCOUNTER — TELEPHONE (OUTPATIENT)
Dept: GASTROENTEROLOGY | Facility: CLINIC | Age: 62
End: 2022-08-10

## 2022-08-10 NOTE — TELEPHONE ENCOUNTER
Please reschedule patient for repeat colonoscopy anytime within the next 6 months due to poor bowel preparation.  Patient will need an extended 2-day bowel prep.

## 2022-08-15 ENCOUNTER — OFFICE VISIT (OUTPATIENT)
Dept: PLASTIC SURGERY | Facility: CLINIC | Age: 62
End: 2022-08-15

## 2022-08-15 ENCOUNTER — PREP FOR SURGERY (OUTPATIENT)
Dept: OTHER | Facility: HOSPITAL | Age: 62
End: 2022-08-15

## 2022-08-15 VITALS
SYSTOLIC BLOOD PRESSURE: 114 MMHG | BODY MASS INDEX: 22.1 KG/M2 | HEIGHT: 60 IN | OXYGEN SATURATION: 100 % | DIASTOLIC BLOOD PRESSURE: 79 MMHG | WEIGHT: 112.6 LBS | HEART RATE: 80 BPM

## 2022-08-15 DIAGNOSIS — C50.412 MALIGNANT NEOPLASM OF UPPER-OUTER QUADRANT OF LEFT BREAST IN FEMALE, ESTROGEN RECEPTOR POSITIVE: Primary | ICD-10-CM

## 2022-08-15 DIAGNOSIS — Z12.11 COLON CANCER SCREENING: Primary | ICD-10-CM

## 2022-08-15 DIAGNOSIS — Z17.0 MALIGNANT NEOPLASM OF UPPER-OUTER QUADRANT OF LEFT BREAST IN FEMALE, ESTROGEN RECEPTOR POSITIVE: Primary | ICD-10-CM

## 2022-08-15 DIAGNOSIS — N65.1 DISPROPORTION OF RECONSTRUCTED BREAST: ICD-10-CM

## 2022-08-15 PROCEDURE — 99212 OFFICE O/P EST SF 10 MIN: CPT | Performed by: NURSE PRACTITIONER

## 2022-08-15 RX ORDER — LETROZOLE 2.5 MG/1
2.5 TABLET, FILM COATED ORAL DAILY
COMMUNITY
Start: 2022-07-27

## 2022-08-15 NOTE — TELEPHONE ENCOUNTER
Colonoscopy scheduled on 01/09/2022 arrival time 0830.  Educated pt on clear liquids starting the two days before and extended bowel prep; one bottle of magnesium citrate and four dulcolax tablets at noon on the day before. Educated and mailed pt on clear liquids, bowel prep, and colonoscopy instructions. Pt verified understanding.

## 2022-08-29 ENCOUNTER — OFFICE VISIT (OUTPATIENT)
Dept: INTERNAL MEDICINE | Facility: CLINIC | Age: 62
End: 2022-08-29

## 2022-08-29 VITALS
WEIGHT: 110.8 LBS | BODY MASS INDEX: 21.75 KG/M2 | HEIGHT: 60 IN | OXYGEN SATURATION: 100 % | HEART RATE: 54 BPM | SYSTOLIC BLOOD PRESSURE: 106 MMHG | DIASTOLIC BLOOD PRESSURE: 62 MMHG | RESPIRATION RATE: 18 BRPM | TEMPERATURE: 96.5 F

## 2022-08-29 DIAGNOSIS — Z00.00 ANNUAL PHYSICAL EXAM: Primary | ICD-10-CM

## 2022-08-29 DIAGNOSIS — F32.A DEPRESSION, UNSPECIFIED DEPRESSION TYPE: ICD-10-CM

## 2022-08-29 DIAGNOSIS — F41.9 ANXIETY: ICD-10-CM

## 2022-08-29 DIAGNOSIS — Z23 NEED FOR SHINGLES VACCINE: ICD-10-CM

## 2022-08-29 DIAGNOSIS — Z23 NEED FOR PNEUMOCOCCAL VACCINATION: ICD-10-CM

## 2022-08-29 DIAGNOSIS — G47.00 INSOMNIA, UNSPECIFIED TYPE: ICD-10-CM

## 2022-08-29 LAB
ALBUMIN SERPL-MCNC: 4.7 G/DL (ref 3.5–5.2)
ALBUMIN/GLOB SERPL: 1.8 G/DL
ALP SERPL-CCNC: 107 U/L (ref 39–117)
ALT SERPL W P-5'-P-CCNC: 18 U/L (ref 1–33)
ANION GAP SERPL CALCULATED.3IONS-SCNC: 8.4 MMOL/L (ref 5–15)
AST SERPL-CCNC: 31 U/L (ref 1–32)
BILIRUB SERPL-MCNC: 0.4 MG/DL (ref 0–1.2)
BUN SERPL-MCNC: 13 MG/DL (ref 8–23)
BUN/CREAT SERPL: 12.9 (ref 7–25)
CALCIUM SPEC-SCNC: 10.1 MG/DL (ref 8.6–10.5)
CHLORIDE SERPL-SCNC: 103 MMOL/L (ref 98–107)
CHOLEST SERPL-MCNC: 191 MG/DL (ref 0–200)
CO2 SERPL-SCNC: 28.6 MMOL/L (ref 22–29)
CREAT SERPL-MCNC: 1.01 MG/DL (ref 0.57–1)
EGFRCR SERPLBLD CKD-EPI 2021: 63.9 ML/MIN/1.73
GLOBULIN UR ELPH-MCNC: 2.6 GM/DL
GLUCOSE SERPL-MCNC: 90 MG/DL (ref 65–99)
HDLC SERPL-MCNC: 86 MG/DL (ref 40–60)
LDLC SERPL CALC-MCNC: 90 MG/DL (ref 0–100)
LDLC/HDLC SERPL: 1.02 {RATIO}
POTASSIUM SERPL-SCNC: 4 MMOL/L (ref 3.5–5.2)
PROT SERPL-MCNC: 7.3 G/DL (ref 6–8.5)
SODIUM SERPL-SCNC: 140 MMOL/L (ref 136–145)
TRIGL SERPL-MCNC: 85 MG/DL (ref 0–150)
TSH SERPL DL<=0.05 MIU/L-ACNC: 1.24 UIU/ML (ref 0.27–4.2)
VLDLC SERPL-MCNC: 15 MG/DL (ref 5–40)

## 2022-08-29 PROCEDURE — 80053 COMPREHEN METABOLIC PANEL: CPT | Performed by: NURSE PRACTITIONER

## 2022-08-29 PROCEDURE — 90471 IMMUNIZATION ADMIN: CPT | Performed by: NURSE PRACTITIONER

## 2022-08-29 PROCEDURE — 90677 PCV20 VACCINE IM: CPT | Performed by: NURSE PRACTITIONER

## 2022-08-29 PROCEDURE — 36415 COLL VENOUS BLD VENIPUNCTURE: CPT | Performed by: NURSE PRACTITIONER

## 2022-08-29 PROCEDURE — 80061 LIPID PANEL: CPT | Performed by: NURSE PRACTITIONER

## 2022-08-29 PROCEDURE — 84443 ASSAY THYROID STIM HORMONE: CPT | Performed by: NURSE PRACTITIONER

## 2022-08-29 PROCEDURE — 99396 PREV VISIT EST AGE 40-64: CPT | Performed by: NURSE PRACTITIONER

## 2022-08-29 NOTE — PROGRESS NOTES
"Chief Complaint  Annual physical    Subjective          Ragini Dozier presents to [facility]  History of Present Illness  Annual physical  GERD-well controlled without medications  Anxiety/depression- anxiety is bad currently but she is still seeing psych on [place]. Has started counseling.  Insomnia-not sleeping well with ambien any more.   Asking about pneumonia vaccine      Objective   Vital Signs:   /62 (BP Location: Left arm, Patient Position: Sitting, Cuff Size: Adult)   Pulse 54   Temp 96.5 °F (35.8 °C)   Resp 18   Ht 152.4 cm (60\")   Wt 50.3 kg (110 lb 12.8 oz)   SpO2 100%   BMI 21.64 kg/m²     Physical Exam  Vitals and nursing note reviewed.   Constitutional:       General: She is not in acute distress.     Appearance: Normal appearance.   HENT:      Head: Normocephalic and atraumatic.      Right Ear: External ear normal.      Left Ear: External ear normal.      Nose: Nose normal.      Mouth/Throat:      Mouth: Mucous membranes are moist.   Eyes:      Conjunctiva/sclera: Conjunctivae normal.   Cardiovascular:      Rate and Rhythm: Normal rate and regular rhythm.      Pulses: Normal pulses.      Heart sounds: Normal heart sounds. No murmur heard.    No friction rub. No gallop.   Pulmonary:      Effort: Pulmonary effort is normal. No respiratory distress.      Breath sounds: No wheezing, rhonchi or rales.   Abdominal:      Tenderness: There is no abdominal tenderness.   Musculoskeletal:      Cervical back: Neck supple.      Right lower leg: No edema.      Left lower leg: No edema.   Skin:     General: Skin is warm and dry.   Neurological:      General: No focal deficit present.      Mental Status: She is alert and oriented to person, place, and time.   Psychiatric:         Mood and Affect: Mood normal.         Behavior: Behavior normal.        Result Review :          Procedures      Assessment and Plan    Diagnoses and all orders for this " visit:    1. Annual physical exam (Primary)  -     Comprehensive Metabolic Panel  -     TSH  -     Lipid Panel    2. Anxiety  Comments:  she will continue to work with psych and counseling    3. Depression, unspecified depression type  Comments:  well controlled at present    4. Insomnia, unspecified type  Comments:  discussed sleep hygeine at length, she will discuss medication management with psych    5. Need for shingles vaccine    6. Need for pneumococcal vaccination    Other orders  -     Zoster Vac Recomb Adjuvanted 50 MCG/0.5ML reconstituted suspension; Inject 0.5 mL into the appropriate muscle as directed by prescriber 1 (One) Time for 1 dose. Repeat vaccine in 2-6 mths  Dispense: 0.5 mL; Refill: 1  -     Pneumococcal Conjugate Vaccine 20-Valent (PCV20)        40 to 64: Counseling/Anticipatory Guidance Discussed: nutrition, physical activity, healthy weight, injury prevention, immunizations and screenings      Follow Up   Return in about 6 months (around 2/28/2023).  Patient was given instructions and counseling regarding her condition or for health maintenance advice. Please see specific information pulled into the AVS if appropriate.

## 2022-09-16 ENCOUNTER — APPOINTMENT (OUTPATIENT)
Dept: RADIATION ONCOLOGY | Facility: HOSPITAL | Age: 62
End: 2022-09-16

## 2022-09-16 ENCOUNTER — OFFICE VISIT (OUTPATIENT)
Dept: RADIATION ONCOLOGY | Facility: HOSPITAL | Age: 62
End: 2022-09-16

## 2022-09-16 VITALS
TEMPERATURE: 98.4 F | HEART RATE: 74 BPM | DIASTOLIC BLOOD PRESSURE: 72 MMHG | BODY MASS INDEX: 22.26 KG/M2 | WEIGHT: 113.98 LBS | SYSTOLIC BLOOD PRESSURE: 114 MMHG | OXYGEN SATURATION: 99 % | RESPIRATION RATE: 16 BRPM

## 2022-09-16 DIAGNOSIS — C50.412 MALIGNANT NEOPLASM OF UPPER-OUTER QUADRANT OF LEFT BREAST IN FEMALE, ESTROGEN RECEPTOR POSITIVE: Primary | ICD-10-CM

## 2022-09-16 DIAGNOSIS — Z92.3 PERSONAL HISTORY OF RADIATION THERAPY: ICD-10-CM

## 2022-09-16 DIAGNOSIS — Z17.0 MALIGNANT NEOPLASM OF UPPER-OUTER QUADRANT OF LEFT BREAST IN FEMALE, ESTROGEN RECEPTOR POSITIVE: Primary | ICD-10-CM

## 2022-09-16 PROCEDURE — 99213 OFFICE O/P EST LOW 20 MIN: CPT | Performed by: NURSE PRACTITIONER

## 2022-09-16 NOTE — PROGRESS NOTES
Follow Up Office Visit      Encounter Date: 09/16/2022   Patient Name: Ragini Dozier  YOB: 1960   Medical Record Number: [id number]   Primary Diagnosis: Malignant neoplasm of upper-outer quadrant of left breast in female, estrogen receptor positive (HCC) [C50.412, Z17.0]     Radiation Completion Date:  7/21/2021    Chief Complaint:    Chief Complaint   Patient presents with   • Follow-up   • Breast Cancer       Oncology/Hematology History Overview Note   HR positive, HER-2 positive breast cancer:  -Found on routine screening mammogram  -Diagnostic mammogram on 11/10/2020: Calcifications in the left breast  -11/10/2020 left breast biopsy: Pathology positive for DCIS, high-grade, estrogen receptor positive (5%, strong), progesterone receptor positive (10%, weak-moderate)  -11/19/2020 MRI of the breast: 1.8 cm hematoma at the site of the recent biopsy showing DCIS.  There is a 1.1 cm nonfocal mass enhancement at the superior lateral margin of the hematoma and a separate 1.2 cm suspicious mass along the inferior medial margin.  These correspond the masses seen on ultrasound on 10/20/2020  -12/23/2020 MRI guided breast biopsy: Left lower inner quadrant pathology positive for invasive ductal carcinoma, grade 2, estrogen receptor positive (3%, moderate), progesterone receptor positive (1%, weak), HER-2 positive (3+ by IHC), Ki-67 approximately 60%.  Associated with high-grade ductal carcinoma in situ.  -Cycle 1 of TCH P on 12/18/2020.  Echocardiogram on 12/14/2020 shows an EF of 55%. C3 on 1/29/21  -C5 of TCHP on 3/12/21. Held C6 due to side effects.  -Left lumpectomy on 5/11/2021: No residual invasive carcinoma found, 0/8 lymph nodes involved, [id number]    Osteoporosis:  -Patient reports osteoporosis diagnosed on DEXA scan in January 2021.  -Her primary care provider has treated her with Boniva as well as calcium/vitamin D     Malignant neoplasm of upper-outer quadrant of left breast in female, estrogen  receptor positive (HCC)   6/23/2021 Initial Diagnosis    Malignant neoplasm of upper-outer quadrant of left female breast (CMS/HCC)     6/24/2021 -  Chemotherapy    OP BREAST Trastuzumab Q21D (maintenance)      Chemotherapy    TCHP: 12/18/2020-3/12/21 (5 cycles)  OP BREAST Trastuzumab-anns Q21D (maintenance)   - 6/3/21-present     6/29/2021 - 7/21/2021 Radiation    Radiation OncologyTreatment Course:  Ragini Dozier received 4,256 cGy in 16  fractions to left breast.       History of Present Illness: Ragini Dozier is a 62 y.o. female who returns to [facility] for routine follow-up. She presents today with her . She reports feeling well overall but she continues to experience slight left breast tenderness. She is followed by lymphedema therapy and is doing home stretches. Her mammogram performed on 11/22/2021 demonstrated expected post-surgical changes after lumpectomy within the upper-outer quadrant of left breast with no evidence of residual recurrent disease. No mammographic evidence of malignancy within the right breast. She is currently followed by Plastic Surgery as she is status post bilateral implants with mesh and scar revision of left breast done on 5/10/2022. Left implant remains slightly higher than right. She is taking letrozole and does report experiencing hot flashes and night sweats. She is currently has a fracture in her left foot and is wearing a walking boot. Her next DEXA scan is due in February 2023.     Subjective      Review of Systems: Review of Systems   Constitutional: Positive for fatigue (3/10). Negative for activity change, appetite change and unexpected weight change.   HENT: Negative.  Negative for congestion, sinus pressure, sinus pain, sore throat and trouble swallowing.    Eyes: Negative.  Negative for visual disturbance.   Respiratory: Negative.  Negative for cough, chest tightness, shortness of breath and wheezing.    Cardiovascular: Negative.  Negative for  chest pain, palpitations and leg swelling.   Gastrointestinal: Negative for abdominal pain, blood in stool, constipation, diarrhea and nausea.   Genitourinary: Negative.  Negative for difficulty urinating, frequency and urgency.   Musculoskeletal: Negative for back pain and neck pain.        Right foot in walking boot 2/2 recent fracture     Skin: Negative.    Neurological: Negative.  Negative for dizziness, weakness, light-headedness and headaches.   Psychiatric/Behavioral: Negative.        The following portions of the patient's history were reviewed and updated as appropriate: allergies, current medications, past family history, past medical history, past social history, past surgical history and problem list.    Medications:     Current Outpatient Medications:   •  Calcium Carbonate-Vitamin D (calcium-vitamin D) 500-200 MG-UNIT tablet per tablet, 500 mg 2 (Two) Times a Day., Disp: , Rfl:   •  letrozole (FEMARA) 2.5 MG tablet, , Disp: , Rfl:   •  risedronate (ACTONEL) 150 MG tablet, risedronate 150 mg oral tablet take 1 tablet (150 mg) by oral route once a month on the same date with a full glass of water at least 30 min before first food or drink of the day; remain in an upright position for at least 30 min.   Active, Disp: , Rfl:   •  polyethylene glycol (GoLYTELY) 236 g solution, Starting at noon on day prior to procedure, drink 8 ounces every 30 minutes until all gone or stools are clear. May add flavor packet., Disp: 4000 mL, Rfl: 0  •  zolpidem (AMBIEN) 10 MG tablet, Take 5 mg by mouth At Night As Needed. Takes 1/2 tab (5 mg), Disp: , Rfl:     Allergies:   No Known Allergies    Measures:  Pain: (on a scale of 0-10)   Pain Score    09/16/22 1024   PainSc: 0-No pain       Quality of Life: 100 - Full Activity   ECOG: (0) Fully active, able to carry on all predisease performance without restriction      Objective     Physical Exam:   Vital Signs:   Vitals:    09/16/22 1024   BP: 114/72   Pulse: 74   Resp: 16    Temp: 98.4 °F (36.9 °C)   TempSrc: Temporal   SpO2: 99%   Weight: 51.7 kg (113 lb 15.7 oz)   PainSc: 0-No pain     Body mass index is 22.26 kg/m².     Physical Exam  Vitals reviewed. Exam conducted with a chaperone present.   Constitutional:       General: She is not in acute distress.     Appearance: Normal appearance. She is normal weight. She is not ill-appearing.   HENT:      Head: Normocephalic and atraumatic.      Mouth/Throat:      Mouth: Mucous membranes are moist.      Pharynx: Oropharynx is clear. No oropharyngeal exudate or posterior oropharyngeal erythema.   Eyes:      Conjunctiva/sclera: Conjunctivae normal.      Pupils: Pupils are equal, round, and reactive to light.   Cardiovascular:      Rate and Rhythm: Normal rate and regular rhythm.      Pulses: Normal pulses.      Heart sounds: Normal heart sounds.   Pulmonary:      Effort: Pulmonary effort is normal. No respiratory distress.      Breath sounds: Normal breath sounds.   Chest:   Breasts:      Right: No swelling, bleeding, mass, skin change, tenderness, axillary adenopathy or supraclavicular adenopathy.      Left: No swelling, bleeding, mass, skin change, axillary adenopathy or supraclavicular adenopathy.         Musculoskeletal:         General: Normal range of motion.      Cervical back: Normal range of motion.   Lymphadenopathy:      Upper Body:      Right upper body: No supraclavicular or axillary adenopathy.      Left upper body: No supraclavicular or axillary adenopathy.   Skin:     General: Skin is warm and dry.   Neurological:      General: No focal deficit present.      Mental Status: She is alert and oriented to person, place, and time. Mental status is at baseline.   Psychiatric:         Mood and Affect: Mood normal.         Behavior: Behavior normal.         Result Review: I personally reviewed the following data.  The pertinent findings are as above in the HPI.    Imaging: No radiology results for the last 90 days.   ** Reviewed  Mammogram dated 11/22/2021 demonstrating no evidence of disease. Mammo Diagnostic Digital Tomosynthesis Bilateral With CAD (11/22/2021 08:09)    Pathology:   Lab Results   Component Value Date    CLININFO  05/10/2022      Comment:      Adjustment and management of vascular access device      FINALDX  05/10/2022     Left breast tissue, excision:   - Benign breast tissue with scar       Assessment / Plan      Impression: Ragini Dozier is a pleasant 62 y.o. female with history of cT1 cN0 M0 invasive ductal carcinoma, grade 2, HR+/HER2+. She underwent neoadjuvant chemotherapy with TCHP and experienced pathologic complete response upon lumpectomy and sentinel lymph node biopsy performed on 5/25/2021. She is now status post external beam radiotherapy, completed on 7/21/2021. She is clinically doing well overall and her screening mammogram performed on 11/22/2021 revealed no evidence of disease; BI-RADS 2.  She is status post breast reconstruction with bilateral implants and scar revision of the left breast performed on 5/10/2022 by Dr. Chirinos.    Assessment/Plan:   Diagnoses and all orders for this visit:    1. Malignant neoplasm of upper-outer quadrant of left breast in female, estrogen receptor positive (HCC) (Primary)    2. Personal history of radiation therapy      Follow Up:   1.  I will see Mrs. Dozier for clinical follow-up in 12 months. Encouraged continued use of daily chest stretches and gentle breast massage to help alleviate chest wall tightness/tenderness.   2.  Continue annual screening mammogram; scheduled for 11/22/2022 and she will follow-up with Dr. Alvarez thereafter.   3.  She will continue letrozole under the care of Dr. Alvarez.   4.  Continue to follow-up with the [facility] as scheduled.  5.  Continue to follow-up with Plastic Surgery as scheduled.  6.  She is scheduled for screening colonoscopy on 1/9/2023.    Return in about 1 year (around 9/16/2023) for Office Visit.  Ragini Dozier  was encouraged to contact me in the interim with any questions or concerns regarding her care.        ANGELICA Wilson  Radiation Oncology  [facility]    This document has been signed by ANGELICA Horan on September 16, 2022 11:01 EDT

## 2022-09-28 ENCOUNTER — PATIENT MESSAGE (OUTPATIENT)
Dept: INTERNAL MEDICINE | Facility: CLINIC | Age: 62
End: 2022-09-28

## 2022-09-28 DIAGNOSIS — S82.92XA CLOSED FRACTURE OF LEFT LOWER LEG, INITIAL ENCOUNTER: Primary | ICD-10-CM

## 2022-09-30 ENCOUNTER — TELEPHONE (OUTPATIENT)
Dept: INTERNAL MEDICINE | Facility: CLINIC | Age: 62
End: 2022-09-30

## 2022-09-30 DIAGNOSIS — S82.892A CLOSED FRACTURE OF LEFT ANKLE, INITIAL ENCOUNTER: Primary | ICD-10-CM

## 2022-11-07 NOTE — PROGRESS NOTES
"Post-op Follow-up (6 month post op)      History of Present Illness  Ragini Dozier is a 62 y.o. female who presents to [facility] for post op bilateral implants with mesh and scar revision of left breast done on 05/10/22. Doing well. No concerns. Happy with results.        Patient has no known allergies.  Allergies Reconciled.    Review of Systems   All review of system has been reviewed and it  is negative except the ones note above.     Objective     /78 (BP Location: Right arm, Patient Position: Sitting, Cuff Size: Adult)   Pulse 85   Temp 99.6 °F (37.6 °C) (Temporal)   Ht 152.4 cm (60\")   Wt 54.4 kg (120 lb)   SpO2 98%   BMI 23.44 kg/m²     Body mass index is 23.44 kg/m².    Physical Exam   Cardiovascular: Normal rate.     Pulmonary/Chest  Effort normal.       Breast  Incisions healed well, left breast implant with rippling at edges, good placement implants     Result Review :         PROCEDURE:  Recon-bilateral breast fat grafting from abdomen.     Assessment and Plan      Diagnoses and all orders for this visit:    1. Malignant neoplasm of upper-outer quadrant of left breast in female, estrogen receptor positive (HCC) (Primary)        Additional Order(s):       Plan:   Patient will think about fat grafting over left breast from implant showing. Patient will let us know once she decides. We will put on surgery list. But patient is aware to call and cancel if she decides not to. RTC 1 year post op.    Discussed risk and benefits of surgery including bleeding, infection, swelling, bruising, pain, and scarring. Patient wishes to proceed. Additional risks with autologous reconstruction include donor wound morbidities, such as hernias or bulges, wound healing problems, muscle weakness, partial or complete flap loss, and typical surgical risks as listed above.  RTC for one year post op visit as well.       Time: 1 hour  Consent: Fat grafting left " breast  CPT:  39861, 96955              Scribed by Francheska Gilbert MA, acting as a scribe for ANGELICA Garces, 11/15/22 14:46 EST.  ANGELICA Garces's signature on the note affirms that the note adequately documents the care provided.        Patient was given instructions and counseling regarding her condition. Please see specific information pulled into the AVS if appropriate.     ANGELICA Garces  11/15/2022

## 2022-11-09 ENCOUNTER — APPOINTMENT (OUTPATIENT)
Dept: OCCUPATIONAL THERAPY | Facility: HOSPITAL | Age: 62
End: 2022-11-09

## 2022-11-14 ENCOUNTER — TRANSCRIBE ORDERS (OUTPATIENT)
Dept: ADMINISTRATIVE | Facility: HOSPITAL | Age: 62
End: 2022-11-14

## 2022-11-14 DIAGNOSIS — Z12.31 ENCOUNTER FOR SCREENING MAMMOGRAM FOR MALIGNANT NEOPLASM OF BREAST: Primary | ICD-10-CM

## 2022-11-15 ENCOUNTER — OFFICE VISIT (OUTPATIENT)
Dept: PLASTIC SURGERY | Facility: CLINIC | Age: 62
End: 2022-11-15

## 2022-11-15 VITALS
HEART RATE: 85 BPM | BODY MASS INDEX: 23.56 KG/M2 | SYSTOLIC BLOOD PRESSURE: 121 MMHG | OXYGEN SATURATION: 98 % | DIASTOLIC BLOOD PRESSURE: 78 MMHG | WEIGHT: 120 LBS | TEMPERATURE: 99.6 F | HEIGHT: 60 IN

## 2022-11-15 DIAGNOSIS — Z17.0 MALIGNANT NEOPLASM OF UPPER-OUTER QUADRANT OF LEFT BREAST IN FEMALE, ESTROGEN RECEPTOR POSITIVE: Primary | ICD-10-CM

## 2022-11-15 DIAGNOSIS — C50.412 MALIGNANT NEOPLASM OF UPPER-OUTER QUADRANT OF LEFT BREAST IN FEMALE, ESTROGEN RECEPTOR POSITIVE: Primary | ICD-10-CM

## 2022-11-15 PROCEDURE — 99214 OFFICE O/P EST MOD 30 MIN: CPT | Performed by: NURSE PRACTITIONER

## 2022-11-15 RX ORDER — TRAZODONE HYDROCHLORIDE 100 MG/1
100 TABLET ORAL NIGHTLY
COMMUNITY

## 2022-11-17 ENCOUNTER — HOSPITAL ENCOUNTER (OUTPATIENT)
Dept: OCCUPATIONAL THERAPY | Facility: HOSPITAL | Age: 62
Setting detail: THERAPIES SERIES
Discharge: HOME | End: 2022-11-17

## 2022-11-21 ENCOUNTER — TELEPHONE (OUTPATIENT)
Dept: ONCOLOGY | Facility: HOSPITAL | Age: 62
End: 2022-11-21

## 2022-11-21 NOTE — TELEPHONE ENCOUNTER
Caller: Ragini Dozier    Relationship: Self    Best call back number: [phone]    What is the best time to reach you: ANYTIME    Who are you requesting to speak with (clinical staff, provider,  specific staff member): SCHEDULING    What was the call regarding: PT NEEDS TO R/S HER 11/30 F/U APPT - HER MAMMO WAS R/S TO 12/22. PLEASE PUSH OUT TO AFTER MAMMO.    Do you require a callback: YES

## 2022-11-22 ENCOUNTER — APPOINTMENT (OUTPATIENT)
Dept: MAMMOGRAPHY | Facility: HOSPITAL | Age: 62
End: 2022-11-22

## 2022-12-22 ENCOUNTER — HOSPITAL ENCOUNTER (OUTPATIENT)
Dept: MAMMOGRAPHY | Facility: HOSPITAL | Age: 62
Discharge: HOME | End: 2022-12-22
Admitting: INTERNAL MEDICINE

## 2022-12-22 ENCOUNTER — TELEPHONE (OUTPATIENT)
Dept: GASTROENTEROLOGY | Facility: CLINIC | Age: 62
End: 2022-12-22

## 2022-12-22 DIAGNOSIS — Z12.31 ENCOUNTER FOR SCREENING MAMMOGRAM FOR MALIGNANT NEOPLASM OF BREAST: ICD-10-CM

## 2022-12-22 PROCEDURE — 77063 BREAST TOMOSYNTHESIS BI: CPT

## 2022-12-22 PROCEDURE — 77067 SCR MAMMO BI INCL CAD: CPT

## 2023-01-05 ENCOUNTER — OFFICE VISIT (OUTPATIENT)
Dept: ONCOLOGY | Facility: HOSPITAL | Age: 63
End: 2023-01-05
Payer: OTHER GOVERNMENT

## 2023-01-05 VITALS
HEIGHT: 60 IN | TEMPERATURE: 97.8 F | WEIGHT: 114.42 LBS | HEART RATE: 74 BPM | SYSTOLIC BLOOD PRESSURE: 110 MMHG | BODY MASS INDEX: 22.46 KG/M2 | OXYGEN SATURATION: 100 % | DIASTOLIC BLOOD PRESSURE: 70 MMHG | RESPIRATION RATE: 16 BRPM

## 2023-01-05 DIAGNOSIS — R23.2 HOT FLASHES: ICD-10-CM

## 2023-01-05 DIAGNOSIS — C50.412 MALIGNANT NEOPLASM OF UPPER-OUTER QUADRANT OF LEFT BREAST IN FEMALE, ESTROGEN RECEPTOR POSITIVE: ICD-10-CM

## 2023-01-05 DIAGNOSIS — R23.2 HOT FLASHES: Primary | ICD-10-CM

## 2023-01-05 DIAGNOSIS — Z17.0 MALIGNANT NEOPLASM OF UPPER-OUTER QUADRANT OF LEFT BREAST IN FEMALE, ESTROGEN RECEPTOR POSITIVE: ICD-10-CM

## 2023-01-05 PROBLEM — M85.80 OSTEOPENIA AFTER MENOPAUSE: Status: ACTIVE | Noted: 2023-01-05

## 2023-01-05 PROBLEM — Z78.0 OSTEOPENIA AFTER MENOPAUSE: Status: ACTIVE | Noted: 2023-01-05

## 2023-01-05 PROCEDURE — G0463 HOSPITAL OUTPT CLINIC VISIT: HCPCS | Performed by: NURSE PRACTITIONER

## 2023-01-05 PROCEDURE — 99214 OFFICE O/P EST MOD 30 MIN: CPT | Performed by: NURSE PRACTITIONER

## 2023-01-05 RX ORDER — IBUPROFEN 800 MG/1
TABLET ORAL
COMMUNITY
Start: 2022-10-27

## 2023-01-05 RX ORDER — ONDANSETRON 4 MG/1
TABLET, ORALLY DISINTEGRATING ORAL
COMMUNITY
Start: 2022-11-15 | End: 2023-02-27

## 2023-01-05 RX ORDER — OXYBUTYNIN CHLORIDE 5 MG/1
TABLET ORAL
Qty: 60 TABLET | Refills: 1 | Status: SHIPPED | OUTPATIENT
Start: 2023-01-05 | End: 2023-02-09

## 2023-01-05 NOTE — PROGRESS NOTES
Chief Complaint/Reason for Referral:  Malignant neoplasm of upper-outer quadrant of left breast i    Princess Alvarez MD P*  Aure Jacques, ANGELICA      Subjective    History of Present Illness   Ms. Ragini Dozier presents for 6 month follow up for breast cancer. Reports she is having hot flashes from the Letrozole treatments. Describes these has night sweats. Reports otherwise tolerating the Letrozole. Reports she did have a bone density done at the [facility] recently and reports she was told the BMD was about the same. Patient report she is taking Boniva weekly and has been on this medication for years. We discussed the Letrozole causes bone loss as well. We discussed the initation of Prolia. We will go ahead and order medication and get PA with insurance. She continues with Calcium and Vitamin D supplementation.     Cancer Staging   No matching staging information was found for the patient.     Treatment intent: curative    Oncology/Hematology History Overview Note   HR positive, HER-2 positive breast cancer:  -Found on routine screening mammogram  -Diagnostic mammogram on 11/10/2020: Calcifications in the left breast  -11/10/2020 left breast biopsy: Pathology positive for DCIS, high-grade, estrogen receptor positive (5%, strong), progesterone receptor positive (10%, weak-moderate)  -11/19/2020 MRI of the breast: 1.8 cm hematoma at the site of the recent biopsy showing DCIS.  There is a 1.1 cm nonfocal mass enhancement at the superior lateral margin of the hematoma and a separate 1.2 cm suspicious mass along the inferior medial margin.  These correspond the masses seen on ultrasound on 10/20/2020  -12/23/2020 MRI guided breast biopsy: Left lower inner quadrant pathology positive for invasive ductal carcinoma, grade 2, estrogen receptor positive (3%, moderate), progesterone receptor positive (1%, weak), HER-2 positive (3+ by IHC), Ki-67 approximately 60%.  Associated with high-grade ductal carcinoma in  situ.  -Cycle 1 of TCH P on 12/18/2020.  Echocardiogram on 12/14/2020 shows an EF of 55%. C3 on 1/29/21  -C5 of TCHP on 3/12/21. Held C6 due to side effects.  -Left lumpectomy on 5/11/2021: No residual invasive carcinoma found, 0/8 lymph nodes involved, [id number]    Osteoporosis:  -Patient reports osteoporosis diagnosed on DEXA scan in January 2021.  -Her primary care provider has treated her with Boniva as well as calcium/vitamin D     Malignant neoplasm of upper-outer quadrant of left breast in female, estrogen receptor positive (HCC)   6/23/2021 Initial Diagnosis    Malignant neoplasm of upper-outer quadrant of left female breast (CMS/HCC)     6/24/2021 -  Chemotherapy    OP BREAST Trastuzumab Q21D (maintenance)      Chemotherapy    TCHP: 12/18/2020-3/12/21 (5 cycles)  OP BREAST Trastuzumab-anns Q21D (maintenance)   - 6/3/21-present     6/29/2021 - 7/21/2021 Radiation    Radiation OncologyTreatment Course:  Ragini Dozier received 4,256 cGy in 16  fractions to left breast.         Review of Systems   Constitutional: Negative for appetite change, diaphoresis, fatigue, fever, unexpected weight gain and unexpected weight loss.   HENT: Negative for hearing loss, mouth sores, sore throat, swollen glands, trouble swallowing and voice change.    Eyes: Negative for blurred vision.   Respiratory: Negative for cough, shortness of breath and wheezing.    Cardiovascular: Negative for chest pain and palpitations.   Gastrointestinal: Negative for abdominal pain, blood in stool, constipation, diarrhea, nausea and vomiting.   Endocrine: Negative for cold intolerance and heat intolerance.   Genitourinary: Negative for difficulty urinating, dysuria, frequency, hematuria and urinary incontinence.   Musculoskeletal: Negative for arthralgias, back pain and myalgias.   Skin: Negative for rash, skin lesions and wound.   Neurological: Negative for dizziness, seizures, weakness, numbness and headache.   Hematological: Does not bruise/bleed  easily.   Psychiatric/Behavioral: Negative for depressed mood. The patient is not nervous/anxious.    All other systems reviewed and are negative.      Current Outpatient Medications on File Prior to Visit   Medication Sig Dispense Refill   • ASHWAGANDHA PO Take 6,000 mg by mouth Daily. 2-  3000 mg     • Calcium Carbonate-Vitamin D (calcium-vitamin D) 500-200 MG-UNIT tablet per tablet 500 mg 2 (Two) Times a Day.     • ibuprofen (ADVIL,MOTRIN) 800 MG tablet      • letrozole (FEMARA) 2.5 MG tablet Take 2.5 mg by mouth Daily.     • ondansetron ODT (ZOFRAN-ODT) 4 MG disintegrating tablet      • polyethylene glycol (GoLYTELY) 236 g solution Starting at noon on day prior to procedure, drink 8 ounces every 30 minutes until all gone or stools are clear. May add flavor packet. 4000 mL 0   • risedronate (ACTONEL) 150 MG tablet risedronate 150 mg oral tablet take 1 tablet (150 mg) by oral route once a month on the same date with a full glass of water at least 30 min before first food or drink of the day; remain in an upright position for at least 30 min.   Active     • traZODone (DESYREL) 100 MG tablet Take 100 mg by mouth Every Night.     • zolpidem (AMBIEN) 10 MG tablet Take 5 mg by mouth At Night As Needed. Takes 1/2 tab (5 mg)       No current facility-administered medications on file prior to visit.       No Known Allergies  Past Medical History:   Diagnosis Date   • Breast cancer (HCC) 2020    LEFT   • Breast cancer screening by mammogram 2020   • Depression    • Encounter for Hemoccult screening 2019   • GERD (gastroesophageal reflux disease)    • Hx of radiation therapy    • Insomnia    • Malignant neoplasm of upper-outer quadrant of left female breast (HCC)    • Migraine headache    • Osteoporosis      Past Surgical History:   Procedure Laterality Date   • AUGMENTATION MAMMAPLASTY     • BREAST AUGMENTATION Bilateral 05/10/2022    Procedure: Bilateral implants with mesh and scar revision of left breast;  Surgeon:  Liu Chirinos MD;  Location: [facility];  Service: Plastics;  Laterality: Bilateral;   • BREAST LUMPECTOMY     • BREAST LUMPECTOMY WITH SENTINEL NODE BIOPSY Left    • BUNIONECTOMY Bilateral    • CATARACT EXTRACTION     • COLONOSCOPY  2019   • COLONOSCOPY N/A 08/09/2022    Procedure: COLONOSCOPY;  Surgeon: Hue Meyer MD;  Location: [facility] ENDOSCOPY;  Service: Gastroenterology;  Laterality: N/A;  HEMORRHOIDS, POOR PREP   • ENDOSCOPY N/A 10/01/2021    Procedure: ESOPHAGOGASTRODUODENOSCOPY WITH BIOPSY;  Surgeon: Hue Meyer MD;  Location: [facility] ENDOSCOPY;  Service: Gastroenterology;  Laterality: N/A;  ESOPHAGITIS/GASTRITIS   • HYSTERECTOMY      PARTIAL   • OTHER SURGICAL HISTORY      BIOPSY   • SKIN CANCER EXCISION     • TONSILLECTOMY     • UPPER GASTROINTESTINAL ENDOSCOPY     • VENOUS ACCESS DEVICE (PORT) REMOVAL N/A 05/10/2022    Procedure: REMOVAL VENOUS ACCESS DEVICE;  Surgeon: Lizbeth York MD;  Location: [facility];  Service: General;  Laterality: N/A;     Social History     Socioeconomic History   • Marital status:    • Number of children: 2   Tobacco Use   • Smoking status: Never   • Smokeless tobacco: Never   Vaping Use   • Vaping Use: Never used   Substance and Sexual Activity   • Alcohol use: Yes     Comment: OCCASIONAL   • Drug use: Never   • Sexual activity: Defer     Family History   Problem Relation Age of Onset   • Kidney cancer Father    • Skin cancer Father    • Other Father         MYELOMA   • No Known Problems Mother    • Malig Hyperthermia Neg Hx      Immunization History   Administered Date(s) Administered   • COVID-19 (MODERNA) 1st, 2nd, 3rd Dose Only 03/09/2021, 04/06/2021, 10/05/2021   • Influenza, Unspecified 11/02/2020, 11/02/2020   • Pneumococcal Conjugate 20-Valent (PCV20) 08/29/2022       Tobacco Use: Low Risk    • Smoking Tobacco Use: Never   • Smokeless Tobacco Use: Never   • Passive Exposure: Not on file       Objective     Physical  Exam  Vitals and nursing note reviewed.   Constitutional:       Appearance: Normal appearance. She is normal weight.   HENT:      Head: Normocephalic.      Nose: Nose normal.      Mouth/Throat:      Mouth: Mucous membranes are moist.   Eyes:      Pupils: Pupils are equal, round, and reactive to light.   Cardiovascular:      Rate and Rhythm: Normal rate and regular rhythm.      Heart sounds: Normal heart sounds.   Pulmonary:      Effort: Pulmonary effort is normal.      Breath sounds: Normal breath sounds.   Abdominal:      General: Bowel sounds are normal. There is no distension.      Palpations: Abdomen is soft.   Musculoskeletal:         General: Normal range of motion.      Cervical back: Normal range of motion and neck supple.   Skin:     General: Skin is warm and dry.      Capillary Refill: Capillary refill takes less than 2 seconds.   Neurological:      Mental Status: She is alert and oriented to person, place, and time.   Psychiatric:         Mood and Affect: Mood normal.         Behavior: Behavior normal.         Thought Content: Thought content normal.         Judgment: Judgment normal.         Vitals:    01/05/23 1449   BP: 110/70   Pulse: 74   Resp: 16   Temp: 97.8 °F (36.6 °C)   SpO2: 100%   Weight: 51.9 kg (114 lb 6.7 oz)   Height: 152.4 cm (60\")   PainSc: 0-No pain       Wt Readings from Last 3 Encounters:   01/05/23 51.9 kg (114 lb 6.7 oz)   11/15/22 54.4 kg (120 lb)   09/16/22 51.7 kg (113 lb 15.7 oz)        BMI is within normal parameters. No other follow-up for BMI required.    ECOG score: 0         ECOG: (0) Fully Active - Able to Carry On All Pre-disease Performance Without Restriction  Fall Risk Assessment was completed, and patient is at low risk for falls.  PHQ-9 Total Score: 0       The patient is  experiencing fatigue. Fatigue score: 0    PT/OT Functional Screening: PT fx screen: No needs identified  Speech Functional Screening: Speech fx screen: No needs identified  Rehab to be ordered:          Result Review :  The following data was reviewed by: ANGELICA Qujiano on 01/05/2023:  Lab Results   Component Value Date    HGB 10.8 (L) 03/08/2022    HCT 31.7 (L) 03/08/2022    MCV 96.1 03/08/2022     03/08/2022    WBC 4.08 03/08/2022    NEUTROABS 2.55 03/08/2022    LYMPHSABS 0.88 03/08/2022    MONOSABS 0.44 03/08/2022    EOSABS 0.16 03/08/2022    BASOSABS 0.04 03/08/2022     Lab Results   Component Value Date    GLUCOSE 90 08/29/2022    BUN 13 08/29/2022    CREATININE 1.01 (H) 08/29/2022     08/29/2022    K 4.0 08/29/2022     08/29/2022    CO2 28.6 08/29/2022    CALCIUM 10.1 08/29/2022    PROTEINTOT 7.3 08/29/2022    ALBUMIN 4.70 08/29/2022    BILITOT 0.4 08/29/2022    ALKPHOS 107 08/29/2022    AST 31 08/29/2022    ALT 18 08/29/2022       Mammo Screening Digital Tomosynthesis Bilateral With CAD    Result Date: 12/23/2022  Benign mammogram. Suggest routine mammographic screening.  RECOMMENDATION(S):  ROUTINE MAMMOGRAM AND CLINICAL EVALUATION IN 12 MONTHS.   BIRADS:  DIAGNOSTIC CATEGORY 1--NEGATIVE.   BREAST COMPOSITION: Heterogeneously dense,which may obscure small masses.  PLEASE NOTE:  A NORMAL MAMMOGRAM DOES NOT EXCLUDE THE POSSIBILITY OF BREAST CANCER. ANY CLINICALLY SUSPICIOUS PALPABLE LUMP SHOULD BE BIOPSIED.      KRISTEL WILSON MD       Electronically Signed and Approved By: KRISTEL WILSON MD on 12/23/2022 at 14:14                Assessment and Plan:  Diagnoses and all orders for this visit:    1. Hot flashes (Primary)  -     oxybutynin (DITROPAN) 5 MG tablet; 2 times a day as needed for hot flashes  Dispense: 60 tablet; Refill: 1    2. Malignant neoplasm of upper-outer quadrant of left breast in female, estrogen receptor positive (HCC)    Ms. Dozier is doing well in the interim except for the hot flashes. Continue Letrozole. We will initiate Oxybutynin 5 mg once or twice a day to see if this helps with the hot flashes. I have asked patient to call me if the hot flashes  do not improve.     Will request last bone density from the [facility] center. Will PA Prolia injection. If approved, patient will stop the Boniva in February and not take med and will start Prolia in March.     She will follow up in 6 months with Dr. Alvarez.           Patient was given instructions and counseling regarding her condition or for health maintenance advice. Please see specific information pulled into the AVS if appropriate.     Bobbi Christianson, APRN    1/5/2023    .tob

## 2023-01-06 RX ORDER — OXYBUTYNIN CHLORIDE 5 MG/1
TABLET ORAL
Qty: 180 TABLET | OUTPATIENT
Start: 2023-01-06

## 2023-01-09 ENCOUNTER — HOSPITAL ENCOUNTER (OUTPATIENT)
Facility: HOSPITAL | Age: 63
Setting detail: HOSPITAL OUTPATIENT SURGERY
Discharge: HOME | End: 2023-01-09
Attending: INTERNAL MEDICINE | Admitting: INTERNAL MEDICINE
Payer: OTHER GOVERNMENT

## 2023-01-09 ENCOUNTER — ANESTHESIA (OUTPATIENT)
Dept: GASTROENTEROLOGY | Facility: HOSPITAL | Age: 63
End: 2023-01-09
Payer: OTHER GOVERNMENT

## 2023-01-09 ENCOUNTER — ANESTHESIA EVENT (OUTPATIENT)
Dept: GASTROENTEROLOGY | Facility: HOSPITAL | Age: 63
End: 2023-01-09
Payer: OTHER GOVERNMENT

## 2023-01-09 VITALS
SYSTOLIC BLOOD PRESSURE: 101 MMHG | DIASTOLIC BLOOD PRESSURE: 74 MMHG | HEART RATE: 90 BPM | WEIGHT: 109.35 LBS | RESPIRATION RATE: 16 BRPM | HEIGHT: 60 IN | BODY MASS INDEX: 21.47 KG/M2 | OXYGEN SATURATION: 99 % | TEMPERATURE: 97.2 F

## 2023-01-09 PROCEDURE — 25010000002 PROPOFOL 10 MG/ML EMULSION: Performed by: NURSE ANESTHETIST, CERTIFIED REGISTERED

## 2023-01-09 PROCEDURE — 45378 DIAGNOSTIC COLONOSCOPY: CPT | Performed by: INTERNAL MEDICINE

## 2023-01-09 RX ORDER — SODIUM CHLORIDE, SODIUM LACTATE, POTASSIUM CHLORIDE, CALCIUM CHLORIDE 600; 310; 30; 20 MG/100ML; MG/100ML; MG/100ML; MG/100ML
30 INJECTION, SOLUTION INTRAVENOUS CONTINUOUS
Status: DISCONTINUED | OUTPATIENT
Start: 2023-01-09 | End: 2023-01-09 | Stop reason: HOSPADM

## 2023-01-09 RX ORDER — LIDOCAINE HYDROCHLORIDE 20 MG/ML
INJECTION, SOLUTION EPIDURAL; INFILTRATION; INTRACAUDAL; PERINEURAL AS NEEDED
Status: DISCONTINUED | OUTPATIENT
Start: 2023-01-09 | End: 2023-01-09 | Stop reason: SURG

## 2023-01-09 RX ADMIN — SODIUM CHLORIDE, POTASSIUM CHLORIDE, SODIUM LACTATE AND CALCIUM CHLORIDE 30 ML/HR: 600; 310; 30; 20 INJECTION, SOLUTION INTRAVENOUS at 09:15

## 2023-01-09 RX ADMIN — PROPOFOL 250 MCG/KG/MIN: 10 INJECTION, EMULSION INTRAVENOUS at 10:03

## 2023-01-09 RX ADMIN — LIDOCAINE HYDROCHLORIDE 40 MG: 20 INJECTION, SOLUTION EPIDURAL; INFILTRATION; INTRACAUDAL; PERINEURAL at 10:03

## 2023-01-09 NOTE — H&P
Pre Procedure History & Physical    Chief Complaint:   Screening colonoscopy    Subjective     HPI:   63 yo F here for screening colonoscopy.    Past Medical History:   Past Medical History:   Diagnosis Date   • Breast cancer (HCC) 2020    LEFT   • Breast cancer screening by mammogram 2020   • Depression    • Encounter for Hemoccult screening 2019   • GERD (gastroesophageal reflux disease)    • Hx of radiation therapy    • Insomnia    • Malignant neoplasm of upper-outer quadrant of left female breast (HCC)    • Migraine headache    • Osteoporosis        Past Surgical History:  Past Surgical History:   Procedure Laterality Date   • AUGMENTATION MAMMAPLASTY     • BREAST AUGMENTATION Bilateral 05/10/2022    Procedure: Bilateral implants with mesh and scar revision of left breast;  Surgeon: Liu Chirinos MD;  Location: [facility];  Service: Plastics;  Laterality: Bilateral;   • BREAST LUMPECTOMY     • BREAST LUMPECTOMY WITH SENTINEL NODE BIOPSY Left    • BUNIONECTOMY Bilateral    • CATARACT EXTRACTION     • COLONOSCOPY  2019   • COLONOSCOPY N/A 08/09/2022    Procedure: COLONOSCOPY;  Surgeon: Hue Meyer MD;  Location: [facility] ENDOSCOPY;  Service: Gastroenterology;  Laterality: N/A;  HEMORRHOIDS, POOR PREP   • ENDOSCOPY N/A 10/01/2021    Procedure: ESOPHAGOGASTRODUODENOSCOPY WITH BIOPSY;  Surgeon: Hue Meyer MD;  Location: [facility] ENDOSCOPY;  Service: Gastroenterology;  Laterality: N/A;  ESOPHAGITIS/GASTRITIS   • HYSTERECTOMY      PARTIAL   • OTHER SURGICAL HISTORY      BIOPSY   • SKIN CANCER EXCISION     • TONSILLECTOMY     • UPPER GASTROINTESTINAL ENDOSCOPY     • VENOUS ACCESS DEVICE (PORT) REMOVAL N/A 05/10/2022    Procedure: REMOVAL VENOUS ACCESS DEVICE;  Surgeon: Lizbeth York MD;  Location: [facility];  Service: General;  Laterality: N/A;       Family History:  Family History   Problem Relation Age of Onset   • Kidney cancer Father    • Skin cancer Father    • Other  "Father         MYELOMA   • No Known Problems Mother    • Malig Hyperthermia Neg Hx        Social History:   reports that she has never smoked. She has never used smokeless tobacco. She reports current alcohol use. She reports that she does not use drugs.    Medications:   Medications Prior to Admission   Medication Sig Dispense Refill Last Dose   • ASHWAGANDHA PO Take 6,000 mg by mouth Daily. 2-  3000 mg   Past Month   • Calcium Carbonate-Vitamin D (calcium-vitamin D) 500-200 MG-UNIT tablet per tablet 500 mg 2 (Two) Times a Day.   Past Month   • letrozole (FEMARA) 2.5 MG tablet Take 2.5 mg by mouth Daily.   1/8/2023   • risedronate (ACTONEL) 150 MG tablet risedronate 150 mg oral tablet take 1 tablet (150 mg) by oral route once a month on the same date with a full glass of water at least 30 min before first food or drink of the day; remain in an upright position for at least 30 min.   Active   1/1/2023   • traZODone (DESYREL) 100 MG tablet Take 100 mg by mouth Every Night.   1/7/2023   • ibuprofen (ADVIL,MOTRIN) 800 MG tablet    More than a month   • ondansetron ODT (ZOFRAN-ODT) 4 MG disintegrating tablet    Unknown   • oxybutynin (DITROPAN) 5 MG tablet 2 times a day as needed for hot flashes 60 tablet 1    • zolpidem (AMBIEN) 10 MG tablet Take 5 mg by mouth At Night As Needed. Takes 1/2 tab (5 mg)   1/7/2023       Allergies:  Patient has no known allergies.    ROS:    Pertinent items are noted in HPI     Objective     Blood pressure 108/74, pulse 80, temperature 98.4 °F (36.9 °C), temperature source Temporal, resp. rate 20, height 152.4 cm (60\"), weight 49.6 kg (109 lb 5.6 oz), SpO2 100 %.    Physical Exam   Constitutional: Pt is oriented to person, place, and time and well-developed, well-nourished, and in no distress.   Mouth/Throat: Oropharynx is clear and moist.   Neck: Normal range of motion.   Cardiovascular: Normal rate, regular rhythm and normal heart sounds.    Pulmonary/Chest: Effort normal and breath " sounds normal.   Abdominal: Soft. Nontender  Skin: Skin is warm and dry.   Psychiatric: Mood, memory, affect and judgment normal.     Assessment & Plan     Diagnosis:  Screening colonoscopy    Anticipated Surgical Procedure:  Colonoscopy    The risks, benefits, and alternatives of this procedure have been discussed with the patient or the responsible party- the patient understands and agrees to proceed.

## 2023-01-09 NOTE — ANESTHESIA PREPROCEDURE EVALUATION
Anesthesia Evaluation     Patient summary reviewed and Nursing notes reviewed   no history of anesthetic complications:  NPO Solid Status: > 8 hours  NPO Liquid Status: > 2 hours           Airway   Mallampati: I  TM distance: >3 FB  Neck ROM: full  No difficulty expected  Dental - normal exam     Pulmonary - negative pulmonary ROS and normal exam    breath sounds clear to auscultation  Cardiovascular - negative cardio ROS and normal exam  Exercise tolerance: good (4-7 METS)    Rhythm: regular  Rate: normal        Neuro/Psych  (+) headaches, psychiatric history Anxiety and Depression,    GI/Hepatic/Renal/Endo    (+)  GERD,      Musculoskeletal (-) negative ROS    Abdominal    Substance History - negative use     OB/GYN negative ob/gyn ROS         Other      history of cancer (breast s/p chemo last year last dose) remission    ROS/Med Hx Other: PAT Nursing Notes unavailable.                   Anesthesia Plan    ASA 2     general     (Patient understands anesthesia not responsible for dental damage.)  intravenous induction     Anesthetic plan, risks, benefits, and alternatives have been provided, discussed and informed consent has been obtained with: patient.  Pre-procedure education provided  Use of blood products discussed with patient .   Plan discussed with CRNA.        CODE STATUS:

## 2023-01-09 NOTE — ANESTHESIA POSTPROCEDURE EVALUATION
Patient: Ragini Dozier    Procedure Summary     Date: 01/09/23 Room / Location: [facility] ENDOSCOPY 5 / [facility] ENDOSCOPY    Anesthesia Start: 1000 Anesthesia Stop: 1029    Procedure: COLONOSCOPY FOR SCREENING Diagnosis:       Colon cancer screening      (Colon cancer screening [Z12.11])    Surgeons: Hue Meyer MD Provider: Reyes, Mirabelle, DO    Anesthesia Type: general ASA Status: 2          Anesthesia Type: general    Vitals  Vitals Value Taken Time   /74 01/09/23 1047   Temp 36.2 °C (97.2 °F) 01/09/23 1030   Pulse 77 01/09/23 1050   Resp 16 01/09/23 1045   SpO2 100 % 01/09/23 1048   Vitals shown include unvalidated device data.        Post Anesthesia Care and Evaluation    Patient location during evaluation: bedside  Patient participation: complete - patient participated  Level of consciousness: awake  Pain management: adequate    Airway patency: patent  Anesthetic complications: No anesthetic complications  PONV Status: none  Cardiovascular status: acceptable and stable  Respiratory status: acceptable  Hydration status: acceptable    Comments: An Anesthesiologist personally participated in the most demanding procedures (including induction and emergence if applicable) in the anesthesia plan, monitored the course of anesthesia administration at frequent intervals and remained physically present and available for immediate diagnosis and treatment of emergencies.

## 2023-01-10 ENCOUNTER — TELEPHONE (OUTPATIENT)
Dept: GASTROENTEROLOGY | Facility: CLINIC | Age: 63
End: 2023-01-10
Payer: OTHER GOVERNMENT

## 2023-02-07 ENCOUNTER — TELEPHONE (OUTPATIENT)
Dept: ONCOLOGY | Facility: HOSPITAL | Age: 63
End: 2023-02-07

## 2023-02-07 NOTE — TELEPHONE ENCOUNTER
Caller: Ragini Dozier    Relationship to patient: Self    Best call back number: [phone]    Type of visit: LAB & INJECTION    Requested date:3/2/2023 IN THE MORNING CALL TO R/S     If rescheduling, when is the original appointment: 3/1/2023

## 2023-02-09 DIAGNOSIS — R23.2 HOT FLASHES: ICD-10-CM

## 2023-02-09 RX ORDER — OXYBUTYNIN CHLORIDE 5 MG/1
TABLET ORAL
Qty: 180 TABLET | Refills: 0 | Status: SHIPPED | OUTPATIENT
Start: 2023-02-09 | End: 2023-02-14 | Stop reason: SDUPTHER

## 2023-02-14 DIAGNOSIS — R23.2 HOT FLASHES: ICD-10-CM

## 2023-02-16 RX ORDER — OXYBUTYNIN CHLORIDE 5 MG/1
TABLET ORAL
Qty: 180 TABLET | Refills: 0 | Status: SHIPPED | OUTPATIENT
Start: 2023-02-16 | End: 2023-02-21 | Stop reason: SDUPTHER

## 2023-02-17 ENCOUNTER — PREP FOR SURGERY (OUTPATIENT)
Dept: OTHER | Facility: HOSPITAL | Age: 63
End: 2023-02-17
Payer: OTHER GOVERNMENT

## 2023-02-17 ENCOUNTER — TELEPHONE (OUTPATIENT)
Dept: PLASTIC SURGERY | Facility: CLINIC | Age: 63
End: 2023-02-17
Payer: OTHER GOVERNMENT

## 2023-02-17 DIAGNOSIS — C50.412 MALIGNANT NEOPLASM OF UPPER-OUTER QUADRANT OF LEFT BREAST IN FEMALE, ESTROGEN RECEPTOR POSITIVE: Primary | ICD-10-CM

## 2023-02-17 DIAGNOSIS — N65.1 DISPROPORTION OF RECONSTRUCTED BREAST: ICD-10-CM

## 2023-02-17 DIAGNOSIS — Z17.0 MALIGNANT NEOPLASM OF UPPER-OUTER QUADRANT OF LEFT BREAST IN FEMALE, ESTROGEN RECEPTOR POSITIVE: Primary | ICD-10-CM

## 2023-02-17 RX ORDER — CEFAZOLIN SODIUM 2 G/100ML
2 INJECTION, SOLUTION INTRAVENOUS ONCE
Status: CANCELLED | OUTPATIENT
Start: 2023-02-17 | End: 2023-02-17

## 2023-02-17 NOTE — TELEPHONE ENCOUNTER
Called patient to schedule surgery. Patient stated she does not want to have this surgery. Removed from surgery list. Advised patient to call if she needs anything.

## 2023-02-21 DIAGNOSIS — R23.2 HOT FLASHES: ICD-10-CM

## 2023-02-21 RX ORDER — OXYBUTYNIN CHLORIDE 5 MG/1
TABLET ORAL
Qty: 180 TABLET | Refills: 1 | Status: SHIPPED | OUTPATIENT
Start: 2023-02-21

## 2023-02-27 ENCOUNTER — OFFICE VISIT (OUTPATIENT)
Dept: INTERNAL MEDICINE | Facility: CLINIC | Age: 63
End: 2023-02-27
Payer: OTHER GOVERNMENT

## 2023-02-27 VITALS
DIASTOLIC BLOOD PRESSURE: 60 MMHG | HEIGHT: 60 IN | SYSTOLIC BLOOD PRESSURE: 102 MMHG | OXYGEN SATURATION: 98 % | WEIGHT: 116 LBS | HEART RATE: 82 BPM | BODY MASS INDEX: 22.78 KG/M2 | TEMPERATURE: 97.6 F | RESPIRATION RATE: 18 BRPM

## 2023-02-27 DIAGNOSIS — J01.00 ACUTE NON-RECURRENT MAXILLARY SINUSITIS: Primary | ICD-10-CM

## 2023-02-27 DIAGNOSIS — M54.9 OTHER ACUTE BACK PAIN: ICD-10-CM

## 2023-02-27 PROCEDURE — 99213 OFFICE O/P EST LOW 20 MIN: CPT | Performed by: NURSE PRACTITIONER

## 2023-02-27 RX ORDER — AMOXICILLIN AND CLAVULANATE POTASSIUM 875; 125 MG/1; MG/1
1 TABLET, FILM COATED ORAL 2 TIMES DAILY
Qty: 20 TABLET | Refills: 0 | Status: SHIPPED | OUTPATIENT
Start: 2023-02-27 | End: 2023-03-09

## 2023-02-27 NOTE — PROGRESS NOTES
"Chief Complaint  Anxiety (6 month follow up ), Depression, Insomnia, Cough (3 weeks), Nasal Congestion (Drainage), Back Pain (Upper left sided pain- just started today), and Headache (Sinus pressure)    Subjective          Ragini Dozier presents to [facility]  History of Present Illness  Patient sees psych for anxiety, depression, insomnia  Reports doing well    She reports cough ongoing for the last 3 weeks.  She reports this started with drainage, congestion  Denies soa, fever, chills, body aches  Cough is mild, slight productive  Using affrin routinely x3 wks  Has also used mucinex  Sinus headache    Pain on the left side that started this morning.  Worse with taking a deep breath  Denies reproducible with movement    Objective   Vital Signs:   /60 (BP Location: Right arm, Patient Position: Sitting, Cuff Size: Adult)   Pulse 82   Temp 97.6 °F (36.4 °C)   Resp 18   Ht 152.4 cm (60\")   Wt 52.6 kg (116 lb)   SpO2 98%   BMI 22.65 kg/m²     Physical Exam  Vitals and nursing note reviewed.   Constitutional:       General: She is not in acute distress.     Appearance: Normal appearance.   HENT:      Head: Normocephalic and atraumatic.      Right Ear: Tympanic membrane, ear canal and external ear normal.      Left Ear: Tympanic membrane, ear canal and external ear normal.      Nose: Congestion and rhinorrhea present.      Comments: Maxillary sinus tenderness     Mouth/Throat:      Mouth: Mucous membranes are moist.      Pharynx: No posterior oropharyngeal erythema.   Eyes:      Conjunctiva/sclera: Conjunctivae normal.   Cardiovascular:      Rate and Rhythm: Normal rate and regular rhythm.      Pulses: Normal pulses.      Heart sounds: Normal heart sounds. No murmur heard.    No friction rub. No gallop.   Pulmonary:      Effort: Pulmonary effort is normal. No respiratory distress.      Breath sounds: No wheezing, rhonchi or rales.   Musculoskeletal:      " Cervical back: Neck supple.      Right lower leg: No edema.      Left lower leg: No edema.   Lymphadenopathy:      Cervical: No cervical adenopathy.   Skin:     General: Skin is warm and dry.   Neurological:      General: No focal deficit present.      Mental Status: She is alert and oriented to person, place, and time.   Psychiatric:         Mood and Affect: Mood normal.         Behavior: Behavior normal.        Result Review :          Procedures      Assessment and Plan    Diagnoses and all orders for this visit:    1. Acute non-recurrent maxillary sinusitis (Primary)  Comments:  will treat with augmentin, discontinue affrin use. call if not improving or with worsening    2. Other acute back pain  Comments:  discussed ddx muscle strain 2/2 coughing, pleurisy. treating with augment, nsaids prn. call if worsening    Other orders  -     amoxicillin-clavulanate (Augmentin) 875-125 MG per tablet; Take 1 tablet by mouth 2 (Two) Times a Day for 10 days.  Dispense: 20 tablet; Refill: 0              Follow Up   Return in about 6 months (around 8/27/2023) for Annual physical.  Patient was given instructions and counseling regarding her condition or for health maintenance advice. Please see specific information pulled into the AVS if appropriate.

## 2023-03-01 ENCOUNTER — HOSPITAL ENCOUNTER (OUTPATIENT)
Dept: ONCOLOGY | Facility: HOSPITAL | Age: 63
End: 2023-03-01

## 2023-03-01 DIAGNOSIS — Z78.0 OSTEOPENIA AFTER MENOPAUSE: Primary | ICD-10-CM

## 2023-03-01 DIAGNOSIS — M85.80 OSTEOPENIA AFTER MENOPAUSE: Primary | ICD-10-CM

## 2023-03-02 ENCOUNTER — HOSPITAL ENCOUNTER (OUTPATIENT)
Dept: ONCOLOGY | Facility: HOSPITAL | Age: 63
Discharge: HOME | End: 2023-03-02
Payer: OTHER GOVERNMENT

## 2023-03-02 DIAGNOSIS — Z78.0 OSTEOPENIA AFTER MENOPAUSE: Primary | ICD-10-CM

## 2023-03-02 DIAGNOSIS — M85.80 OSTEOPENIA AFTER MENOPAUSE: Primary | ICD-10-CM

## 2023-03-02 DIAGNOSIS — Z45.2 ADJUSTMENT AND MANAGEMENT OF VASCULAR ACCESS DEVICE: ICD-10-CM

## 2023-03-02 LAB
ALBUMIN SERPL-MCNC: 4.4 G/DL (ref 3.5–5.2)
ALBUMIN/GLOB SERPL: 1.8 G/DL
ALP SERPL-CCNC: 95 U/L (ref 39–117)
ALT SERPL W P-5'-P-CCNC: 12 U/L (ref 1–33)
ANION GAP SERPL CALCULATED.3IONS-SCNC: 11.5 MMOL/L (ref 5–15)
AST SERPL-CCNC: 22 U/L (ref 1–32)
BASOPHILS # BLD AUTO: 0.04 10*3/MM3 (ref 0–0.2)
BASOPHILS NFR BLD AUTO: 0.6 % (ref 0–1.5)
BILIRUB SERPL-MCNC: 0.2 MG/DL (ref 0–1.2)
BUN SERPL-MCNC: 19 MG/DL (ref 8–23)
BUN/CREAT SERPL: 20 (ref 7–25)
CALCIUM SPEC-SCNC: 9.6 MG/DL (ref 8.6–10.5)
CHLORIDE SERPL-SCNC: 101 MMOL/L (ref 98–107)
CO2 SERPL-SCNC: 26.5 MMOL/L (ref 22–29)
CREAT SERPL-MCNC: 0.95 MG/DL (ref 0.57–1)
DEPRECATED RDW RBC AUTO: 42.2 FL (ref 37–54)
EGFRCR SERPLBLD CKD-EPI 2021: 68.7 ML/MIN/1.73
EOSINOPHIL # BLD AUTO: 0.09 10*3/MM3 (ref 0–0.4)
EOSINOPHIL NFR BLD AUTO: 1.4 % (ref 0.3–6.2)
ERYTHROCYTE [DISTWIDTH] IN BLOOD BY AUTOMATED COUNT: 12.3 % (ref 12.3–15.4)
GLOBULIN UR ELPH-MCNC: 2.5 GM/DL
GLUCOSE SERPL-MCNC: 83 MG/DL (ref 65–99)
HCT VFR BLD AUTO: 38.3 % (ref 34–46.6)
HGB BLD-MCNC: 12.9 G/DL (ref 12–15.9)
IMM GRANULOCYTES # BLD AUTO: 0.01 10*3/MM3 (ref 0–0.05)
IMM GRANULOCYTES NFR BLD AUTO: 0.2 % (ref 0–0.5)
LYMPHOCYTES # BLD AUTO: 1.02 10*3/MM3 (ref 0.7–3.1)
LYMPHOCYTES NFR BLD AUTO: 16.1 % (ref 19.6–45.3)
MAGNESIUM SERPL-MCNC: 2.1 MG/DL (ref 1.6–2.4)
MCH RBC QN AUTO: 31.1 PG (ref 26.6–33)
MCHC RBC AUTO-ENTMCNC: 33.7 G/DL (ref 31.5–35.7)
MCV RBC AUTO: 92.3 FL (ref 79–97)
MONOCYTES # BLD AUTO: 0.59 10*3/MM3 (ref 0.1–0.9)
MONOCYTES NFR BLD AUTO: 9.3 % (ref 5–12)
NEUTROPHILS NFR BLD AUTO: 4.59 10*3/MM3 (ref 1.7–7)
NEUTROPHILS NFR BLD AUTO: 72.4 % (ref 42.7–76)
PHOSPHATE SERPL-MCNC: 3.7 MG/DL (ref 2.5–4.5)
PLATELET # BLD AUTO: 260 10*3/MM3 (ref 140–450)
PMV BLD AUTO: 8.7 FL (ref 6–12)
POTASSIUM SERPL-SCNC: 4 MMOL/L (ref 3.5–5.2)
PROT SERPL-MCNC: 6.9 G/DL (ref 6–8.5)
RBC # BLD AUTO: 4.15 10*6/MM3 (ref 3.77–5.28)
SODIUM SERPL-SCNC: 139 MMOL/L (ref 136–145)
WBC NRBC COR # BLD: 6.34 10*3/MM3 (ref 3.4–10.8)

## 2023-03-02 PROCEDURE — 96372 THER/PROPH/DIAG INJ SC/IM: CPT

## 2023-03-02 PROCEDURE — 84100 ASSAY OF PHOSPHORUS: CPT | Performed by: INTERNAL MEDICINE

## 2023-03-02 PROCEDURE — 83735 ASSAY OF MAGNESIUM: CPT | Performed by: INTERNAL MEDICINE

## 2023-03-02 PROCEDURE — 85025 COMPLETE CBC W/AUTO DIFF WBC: CPT | Performed by: INTERNAL MEDICINE

## 2023-03-02 PROCEDURE — 25010000002 DENOSUMAB 60 MG/ML SOLUTION PREFILLED SYRINGE: Performed by: NURSE PRACTITIONER

## 2023-03-02 PROCEDURE — 36415 COLL VENOUS BLD VENIPUNCTURE: CPT

## 2023-03-02 PROCEDURE — 80053 COMPREHEN METABOLIC PANEL: CPT | Performed by: INTERNAL MEDICINE

## 2023-03-02 RX ORDER — HEPARIN SODIUM (PORCINE) LOCK FLUSH IV SOLN 100 UNIT/ML 100 UNIT/ML
500 SOLUTION INTRAVENOUS AS NEEDED
Status: CANCELLED | OUTPATIENT
Start: 2023-03-02

## 2023-03-02 RX ORDER — SODIUM CHLORIDE 0.9 % (FLUSH) 0.9 %
20 SYRINGE (ML) INJECTION AS NEEDED
Status: DISCONTINUED | OUTPATIENT
Start: 2023-03-02 | End: 2023-03-03 | Stop reason: HOSPADM

## 2023-03-02 RX ORDER — HEPARIN SODIUM (PORCINE) LOCK FLUSH IV SOLN 100 UNIT/ML 100 UNIT/ML
500 SOLUTION INTRAVENOUS AS NEEDED
Status: DISCONTINUED | OUTPATIENT
Start: 2023-03-02 | End: 2023-03-03 | Stop reason: HOSPADM

## 2023-03-02 RX ORDER — SODIUM CHLORIDE 0.9 % (FLUSH) 0.9 %
20 SYRINGE (ML) INJECTION AS NEEDED
Status: CANCELLED | OUTPATIENT
Start: 2023-03-02

## 2023-03-02 RX ADMIN — DENOSUMAB 60 MG: 60 INJECTION SUBCUTANEOUS at 09:37

## 2023-05-08 NOTE — PROGRESS NOTES
"Post-op Follow-up (1 year follow up )      History of Present Illness  Ragini Dozier is a 63 y.o. female who presents to [facility] for post op bilateral implants with mesh and scar revision of left breast done on 05/10/22.    1 year follow up.  Doing ok.  Notes some numbness under left axilla area.  If she rubs her left breast a certain way she has some sensitivity. Happy with result and does not want any more surgery.      Patient has no known allergies.  Allergies Reconciled.    Review of Systems   All review of system has been reviewed and it  is negative except the ones note above.     Objective     /76 (BP Location: Left arm, Patient Position: Sitting)   Pulse 65   Temp 99.1 °F (37.3 °C) (Temporal)   Ht 152.4 cm (60\")   Wt 53.3 kg (117 lb 9.6 oz)   SpO2 96%   BMI 22.97 kg/m²     Body mass index is 22.97 kg/m².    Physical Exam   Cardiovascular: Normal rate.     Pulmonary/Chest  Effort normal.       Breast  Breasts soft, no capsular contracture present, incisions healed well, some tenderness on left breast, no open areas, implants sit slightly lateral    Result Review :     Assessment and Plan      Diagnoses and all orders for this visit:    1. Postoperative follow-up (Primary)        Additional Order(s):       Plan:   Photos obtained today. Patient is not wanting another surgery. No evidence of capsular contracture. Keep appts with oncology. RTC 6 month follow up with Dr. Chirinos to re-evaluate breast scar sensitivity and if happy can then follow up PRN.    Scribed by Francheska Gilbert MA, acting as a scribe for ANGELICA Garces, 05/15/23 08:34 EDT.  ANGELICA Garces's signature on the note affirms that the note adequately documents the care provided.      Patient was given instructions and counseling regarding her condition. Please see specific information pulled into the AVS if appropriate.     ANGELICA Garces  05/15/2023      "

## 2023-05-15 ENCOUNTER — OFFICE VISIT (OUTPATIENT)
Dept: PLASTIC SURGERY | Facility: CLINIC | Age: 63
End: 2023-05-15
Payer: OTHER GOVERNMENT

## 2023-05-15 VITALS
OXYGEN SATURATION: 96 % | BODY MASS INDEX: 23.09 KG/M2 | DIASTOLIC BLOOD PRESSURE: 76 MMHG | WEIGHT: 117.6 LBS | TEMPERATURE: 99.1 F | SYSTOLIC BLOOD PRESSURE: 112 MMHG | HEART RATE: 65 BPM | HEIGHT: 60 IN

## 2023-05-15 DIAGNOSIS — Z09 POSTOPERATIVE FOLLOW-UP: Primary | ICD-10-CM

## 2023-09-05 ENCOUNTER — OFFICE VISIT (OUTPATIENT)
Dept: INTERNAL MEDICINE | Facility: CLINIC | Age: 63
End: 2023-09-05
Payer: OTHER GOVERNMENT

## 2023-09-05 VITALS
WEIGHT: 115.8 LBS | RESPIRATION RATE: 18 BRPM | HEART RATE: 69 BPM | OXYGEN SATURATION: 96 % | TEMPERATURE: 97 F | SYSTOLIC BLOOD PRESSURE: 120 MMHG | BODY MASS INDEX: 22.74 KG/M2 | DIASTOLIC BLOOD PRESSURE: 78 MMHG | HEIGHT: 60 IN

## 2023-09-05 DIAGNOSIS — Z00.00 ANNUAL PHYSICAL EXAM: Primary | ICD-10-CM

## 2023-09-05 DIAGNOSIS — J06.9 ACUTE URI: ICD-10-CM

## 2023-09-05 DIAGNOSIS — Z78.0 OSTEOPENIA AFTER MENOPAUSE: ICD-10-CM

## 2023-09-05 DIAGNOSIS — M85.80 OSTEOPENIA AFTER MENOPAUSE: ICD-10-CM

## 2023-09-05 LAB
ALBUMIN SERPL-MCNC: 4.7 G/DL (ref 3.5–5.2)
ALBUMIN/GLOB SERPL: 2 G/DL
ALP SERPL-CCNC: 59 U/L (ref 39–117)
ALT SERPL W P-5'-P-CCNC: 14 U/L (ref 1–33)
ANION GAP SERPL CALCULATED.3IONS-SCNC: 11.7 MMOL/L (ref 5–15)
AST SERPL-CCNC: 23 U/L (ref 1–32)
BASOPHILS # BLD AUTO: 0.04 10*3/MM3 (ref 0–0.2)
BASOPHILS NFR BLD AUTO: 0.5 % (ref 0–1.5)
BILIRUB SERPL-MCNC: 0.3 MG/DL (ref 0–1.2)
BUN SERPL-MCNC: 17 MG/DL (ref 8–23)
BUN/CREAT SERPL: 17.7 (ref 7–25)
CALCIUM SPEC-SCNC: 9.8 MG/DL (ref 8.6–10.5)
CHLORIDE SERPL-SCNC: 102 MMOL/L (ref 98–107)
CHOLEST SERPL-MCNC: 220 MG/DL (ref 0–200)
CO2 SERPL-SCNC: 25.3 MMOL/L (ref 22–29)
CREAT SERPL-MCNC: 0.96 MG/DL (ref 0.57–1)
DEPRECATED RDW RBC AUTO: 43.1 FL (ref 37–54)
EGFRCR SERPLBLD CKD-EPI 2021: 67.5 ML/MIN/1.73
EOSINOPHIL # BLD AUTO: 0.12 10*3/MM3 (ref 0–0.4)
EOSINOPHIL NFR BLD AUTO: 1.4 % (ref 0.3–6.2)
ERYTHROCYTE [DISTWIDTH] IN BLOOD BY AUTOMATED COUNT: 12.4 % (ref 12.3–15.4)
GLOBULIN UR ELPH-MCNC: 2.3 GM/DL
GLUCOSE SERPL-MCNC: 84 MG/DL (ref 65–99)
HCT VFR BLD AUTO: 40.6 % (ref 34–46.6)
HDLC SERPL-MCNC: 81 MG/DL (ref 40–60)
HGB BLD-MCNC: 13.1 G/DL (ref 12–15.9)
IMM GRANULOCYTES # BLD AUTO: 0.03 10*3/MM3 (ref 0–0.05)
IMM GRANULOCYTES NFR BLD AUTO: 0.3 % (ref 0–0.5)
LDLC SERPL CALC-MCNC: 125 MG/DL (ref 0–100)
LDLC/HDLC SERPL: 1.52 {RATIO}
LYMPHOCYTES # BLD AUTO: 1.13 10*3/MM3 (ref 0.7–3.1)
LYMPHOCYTES NFR BLD AUTO: 13 % (ref 19.6–45.3)
MAGNESIUM SERPL-MCNC: 2.1 MG/DL (ref 1.6–2.4)
MCH RBC QN AUTO: 30.3 PG (ref 26.6–33)
MCHC RBC AUTO-ENTMCNC: 32.3 G/DL (ref 31.5–35.7)
MCV RBC AUTO: 94 FL (ref 79–97)
MONOCYTES # BLD AUTO: 0.73 10*3/MM3 (ref 0.1–0.9)
MONOCYTES NFR BLD AUTO: 8.4 % (ref 5–12)
NEUTROPHILS NFR BLD AUTO: 6.67 10*3/MM3 (ref 1.7–7)
NEUTROPHILS NFR BLD AUTO: 76.4 % (ref 42.7–76)
NRBC BLD AUTO-RTO: 0 /100 WBC (ref 0–0.2)
PHOSPHATE SERPL-MCNC: 3.4 MG/DL (ref 2.5–4.5)
PLATELET # BLD AUTO: 203 10*3/MM3 (ref 140–450)
PMV BLD AUTO: 9.5 FL (ref 6–12)
POTASSIUM SERPL-SCNC: 5 MMOL/L (ref 3.5–5.2)
PROT SERPL-MCNC: 7 G/DL (ref 6–8.5)
RBC # BLD AUTO: 4.32 10*6/MM3 (ref 3.77–5.28)
SODIUM SERPL-SCNC: 139 MMOL/L (ref 136–145)
TRIGL SERPL-MCNC: 79 MG/DL (ref 0–150)
TSH SERPL DL<=0.05 MIU/L-ACNC: 0.81 UIU/ML (ref 0.27–4.2)
VLDLC SERPL-MCNC: 14 MG/DL (ref 5–40)
WBC NRBC COR # BLD: 8.72 10*3/MM3 (ref 3.4–10.8)

## 2023-09-05 PROCEDURE — 84100 ASSAY OF PHOSPHORUS: CPT | Performed by: INTERNAL MEDICINE

## 2023-09-05 PROCEDURE — 99396 PREV VISIT EST AGE 40-64: CPT | Performed by: NURSE PRACTITIONER

## 2023-09-05 PROCEDURE — 84443 ASSAY THYROID STIM HORMONE: CPT | Performed by: NURSE PRACTITIONER

## 2023-09-05 PROCEDURE — 80053 COMPREHEN METABOLIC PANEL: CPT | Performed by: INTERNAL MEDICINE

## 2023-09-05 PROCEDURE — 36415 COLL VENOUS BLD VENIPUNCTURE: CPT | Performed by: NURSE PRACTITIONER

## 2023-09-05 PROCEDURE — 85025 COMPLETE CBC W/AUTO DIFF WBC: CPT | Performed by: INTERNAL MEDICINE

## 2023-09-05 PROCEDURE — 83735 ASSAY OF MAGNESIUM: CPT | Performed by: INTERNAL MEDICINE

## 2023-09-05 PROCEDURE — 80061 LIPID PANEL: CPT | Performed by: NURSE PRACTITIONER

## 2023-09-05 RX ORDER — BUTALBITAL, ACETAMINOPHEN AND CAFFEINE 50; 325; 40 MG/1; MG/1; MG/1
1 TABLET ORAL EVERY 4 HOURS PRN
COMMUNITY
Start: 2023-07-10

## 2023-09-05 RX ORDER — BUSPIRONE HYDROCHLORIDE 10 MG/1
20 TABLET ORAL 3 TIMES DAILY
COMMUNITY

## 2023-09-05 NOTE — PROGRESS NOTES
"Chief Complaint  Annual Exam, Cough (2 weeks ( home COVID test negative )), Shortness of Breath (When patient coughs), and Sore Throat    Subjective          Ragini Dozier presents to [facility]    Annual physical    She reports that she has been having cough/congestion x2 wks  Reports feeling somewhat fatigued  Denies fever, soa with activty  Objective   Vital Signs:   /78 (BP Location: Right arm, Patient Position: Sitting, Cuff Size: Adult)   Pulse 69   Temp 97 °F (36.1 °C)   Resp 18   Ht 152.4 cm (60\")   Wt 52.5 kg (115 lb 12.8 oz)   SpO2 96%   BMI 22.62 kg/m²     Physical Exam  Vitals and nursing note reviewed.   Constitutional:       General: She is not in acute distress.     Appearance: Normal appearance.   HENT:      Head: Normocephalic and atraumatic.      Right Ear: Tympanic membrane, ear canal and external ear normal.      Left Ear: Tympanic membrane, ear canal and external ear normal.      Nose: Nose normal. No rhinorrhea.      Mouth/Throat:      Mouth: Mucous membranes are moist.      Pharynx: No posterior oropharyngeal erythema.   Eyes:      Conjunctiva/sclera: Conjunctivae normal.   Cardiovascular:      Rate and Rhythm: Normal rate and regular rhythm.      Pulses: Normal pulses.      Heart sounds: Normal heart sounds. No murmur heard.    No friction rub. No gallop.   Pulmonary:      Effort: Pulmonary effort is normal. No respiratory distress.      Breath sounds: No wheezing, rhonchi or rales.   Musculoskeletal:      Cervical back: Neck supple.      Right lower leg: No edema.      Left lower leg: No edema.   Lymphadenopathy:      Cervical: No cervical adenopathy.   Skin:     General: Skin is warm and dry.   Neurological:      General: No focal deficit present.      Mental Status: She is alert and oriented to person, place, and time.   Psychiatric:         Mood and Affect: Mood normal.         Behavior: Behavior normal.      Result Review : "          Procedures      Assessment and Plan    Diagnoses and all orders for this visit:    1. Annual physical exam (Primary)  -     Cancel: Comprehensive Metabolic Panel  -     Cancel: CBC & Differential  -     TSH  -     Lipid Panel    2. Osteopenia after menopause  Comments:  labs today. prolia injection tomorrow.  Orders:  -     CBC and Differential  -     Comprehensive metabolic panel  -     Magnesium  -     Phosphorus    3. Acute URI  Comments:  improving. possible post viral cough. discussed re-eval if not resolved in 2-3 wks        40 to 64: Counseling/Anticipatory Guidance Discussed: nutrition, physical activity, healthy weight, injury prevention, mental health, immunizations, and screenings      Follow Up   No follow-ups on file.  Patient was given instructions and counseling regarding her condition or for health maintenance advice. Please see specific information pulled into the AVS if appropriate.

## 2023-09-06 ENCOUNTER — LAB (OUTPATIENT)
Dept: ONCOLOGY | Facility: HOSPITAL | Age: 63
End: 2023-09-06
Payer: OTHER GOVERNMENT

## 2023-09-06 ENCOUNTER — HOSPITAL ENCOUNTER (OUTPATIENT)
Dept: ONCOLOGY | Facility: HOSPITAL | Age: 63
Discharge: HOME | End: 2023-09-06
Admitting: INTERNAL MEDICINE
Payer: OTHER GOVERNMENT

## 2023-09-06 ENCOUNTER — OFFICE VISIT (OUTPATIENT)
Dept: ONCOLOGY | Facility: HOSPITAL | Age: 63
End: 2023-09-06
Payer: OTHER GOVERNMENT

## 2023-09-06 VITALS
DIASTOLIC BLOOD PRESSURE: 88 MMHG | HEART RATE: 77 BPM | OXYGEN SATURATION: 100 % | WEIGHT: 112.66 LBS | SYSTOLIC BLOOD PRESSURE: 118 MMHG | BODY MASS INDEX: 22.12 KG/M2 | TEMPERATURE: 97.5 F | HEIGHT: 60 IN | RESPIRATION RATE: 16 BRPM

## 2023-09-06 DIAGNOSIS — M85.80 OSTEOPENIA AFTER MENOPAUSE: Primary | ICD-10-CM

## 2023-09-06 DIAGNOSIS — Z78.0 OSTEOPENIA AFTER MENOPAUSE: Primary | ICD-10-CM

## 2023-09-06 DIAGNOSIS — Z17.0 MALIGNANT NEOPLASM OF UPPER-OUTER QUADRANT OF LEFT BREAST IN FEMALE, ESTROGEN RECEPTOR POSITIVE: Primary | ICD-10-CM

## 2023-09-06 DIAGNOSIS — C50.412 MALIGNANT NEOPLASM OF UPPER-OUTER QUADRANT OF LEFT BREAST IN FEMALE, ESTROGEN RECEPTOR POSITIVE: Primary | ICD-10-CM

## 2023-09-06 PROCEDURE — G0463 HOSPITAL OUTPT CLINIC VISIT: HCPCS | Performed by: INTERNAL MEDICINE

## 2023-09-06 PROCEDURE — 25010000002 DENOSUMAB 60 MG/ML SOLUTION PREFILLED SYRINGE: Performed by: INTERNAL MEDICINE

## 2023-09-06 PROCEDURE — 96372 THER/PROPH/DIAG INJ SC/IM: CPT

## 2023-09-06 RX ADMIN — DENOSUMAB 60 MG: 60 INJECTION SUBCUTANEOUS at 12:17

## 2023-09-06 NOTE — PROGRESS NOTES
Patient  Ragini Dozier    Location  [facility]    Chief Complaint  Malignant neoplasm of upper-outer quadrant of left breast i    Referring Provider: Princess Alvarez MD PhD  PCP: Aure Jacques APRN    Subjective          Oncology/Hematology History Overview Note   HR positive, HER-2 positive breast cancer:  -Found on routine screening mammogram  -Diagnostic mammogram on 11/10/2020: Calcifications in the left breast  -11/10/2020 left breast biopsy: Pathology positive for DCIS, high-grade, estrogen receptor positive (5%, strong), progesterone receptor positive (10%, weak-moderate)  -11/19/2020 MRI of the breast: 1.8 cm hematoma at the site of the recent biopsy showing DCIS.  There is a 1.1 cm nonfocal mass enhancement at the superior lateral margin of the hematoma and a separate 1.2 cm suspicious mass along the inferior medial margin.  These correspond the masses seen on ultrasound on 10/20/2020  -12/23/2020 MRI guided breast biopsy: Left lower inner quadrant pathology positive for invasive ductal carcinoma, grade 2, estrogen receptor positive (3%, moderate), progesterone receptor positive (1%, weak), HER-2 positive (3+ by IHC), Ki-67 approximately 60%.  Associated with high-grade ductal carcinoma in situ.  -Cycle 1 of TCH P on 12/18/2020.  Echocardiogram on 12/14/2020 shows an EF of 55%. C3 on 1/29/21  -C5 of TCHP on 3/12/21. Held C6 due to side effects.  -Left lumpectomy on 5/11/2021: No residual invasive carcinoma found, 0/8 lymph nodes involved, [id number]    Osteoporosis:  -Patient reports osteoporosis diagnosed on DEXA scan in January 2021.  -Her primary care provider has treated her with Boniva as well as calcium/vitamin D     Malignant neoplasm of upper-outer quadrant of left breast in female, estrogen receptor positive   6/23/2021 Initial Diagnosis    Malignant neoplasm of upper-outer quadrant of left female breast (CMS/HCC)     6/24/2021 - 3/10/2022 Chemotherapy    OP  BREAST Trastuzumab Q21D (maintenance)        Chemotherapy    TCHP: 12/18/2020-3/12/21 (5 cycles)  OP BREAST Trastuzumab-anns Q21D (maintenance)   - 6/3/21-present     6/29/2021 - 7/21/2021 Radiation    Radiation OncologyTreatment Course:  Ragini Dozier received 4,256 cGy in 16  fractions to left breast.         History of Present Illness  Patient comes in today for follow-up while on letrozole.  We discussed possible side effects of the medication.  The patient admits to having significant depression over the past year.  She was surprised to find out this could be due to letrozole.    Review of Systems   Constitutional:  Negative for appetite change, diaphoresis, fatigue, fever, unexpected weight gain and unexpected weight loss.   HENT:  Negative for hearing loss, mouth sores, sore throat, swollen glands, trouble swallowing and voice change.    Eyes:  Negative for blurred vision.   Respiratory:  Negative for cough, shortness of breath and wheezing.    Cardiovascular:  Negative for chest pain and palpitations.   Gastrointestinal:  Negative for abdominal pain, blood in stool, constipation, diarrhea, nausea and vomiting.   Endocrine: Negative for cold intolerance and heat intolerance.   Genitourinary:  Negative for difficulty urinating, dysuria, frequency, hematuria and urinary incontinence.   Musculoskeletal:  Negative for arthralgias, back pain and myalgias.   Skin:  Negative for rash, skin lesions and wound.   Neurological:  Negative for dizziness, seizures, weakness, numbness and headache.   Hematological:  Does not bruise/bleed easily.   Psychiatric/Behavioral:  Negative for depressed mood. The patient is not nervous/anxious.    All other systems reviewed and are negative.    Past Medical History:   Diagnosis Date    Breast cancer 2020    LEFT    Breast cancer screening by mammogram 2020    Depression     Encounter for Hemoccult screening 2019    GERD (gastroesophageal reflux disease)     Hx of radiation therapy      Insomnia     Malignant neoplasm of upper-outer quadrant of left female breast     Migraine headache     Osteoporosis      Past Surgical History:   Procedure Laterality Date    AUGMENTATION MAMMAPLASTY      BREAST AUGMENTATION Bilateral 05/10/2022    Procedure: Bilateral implants with mesh and scar revision of left breast;  Surgeon: Liu Chirinos MD;  Location: [facility];  Service: Plastics;  Laterality: Bilateral;    BREAST LUMPECTOMY      BREAST LUMPECTOMY WITH SENTINEL NODE BIOPSY Left     BUNIONECTOMY Bilateral     CATARACT EXTRACTION      COLONOSCOPY  2019    COLONOSCOPY N/A 08/09/2022    Procedure: COLONOSCOPY;  Surgeon: Hue Meyer MD;  Location: [facility] ENDOSCOPY;  Service: Gastroenterology;  Laterality: N/A;  HEMORRHOIDS, POOR PREP    COLONOSCOPY N/A 1/9/2023    Procedure: COLONOSCOPY FOR SCREENING;  Surgeon: Hue Meyer MD;  Location: [facility] ENDOSCOPY;  Service: Gastroenterology;  Laterality: N/A;  DIVERTICULOSIS HEMORRHOIDS    ENDOSCOPY N/A 10/01/2021    Procedure: ESOPHAGOGASTRODUODENOSCOPY WITH BIOPSY;  Surgeon: Hue Meyer MD;  Location: [facility] ENDOSCOPY;  Service: Gastroenterology;  Laterality: N/A;  ESOPHAGITIS/GASTRITIS    HYSTERECTOMY      PARTIAL    OTHER SURGICAL HISTORY      BIOPSY    SKIN CANCER EXCISION      TONSILLECTOMY      UPPER GASTROINTESTINAL ENDOSCOPY      VENOUS ACCESS DEVICE (PORT) REMOVAL N/A 05/10/2022    Procedure: REMOVAL VENOUS ACCESS DEVICE;  Surgeon: Lizbeth York MD;  Location: [facility] OR [facility];  Service: General;  Laterality: N/A;     Social History     Socioeconomic History    Marital status:     Number of children: 2   Tobacco Use    Smoking status: Never    Smokeless tobacco: Never   Vaping Use    Vaping Use: Never used   Substance and Sexual Activity    Alcohol use: Yes     Comment: OCCASIONAL    Drug use: Never    Sexual activity: Defer     Family History   Problem Relation Age of Onset    Kidney cancer Father  "    Skin cancer Father     Other Father         MYELOMA    No Known Problems Mother     Malig Hyperthermia Neg Hx        Objective   Physical Exam  General: Alert, cooperative, no acute distress  Eyes: Anicteric sclera, PERRLA  Respiratory: normal respiratory effort  Cardiovascular: no lower extremity edema  Skin: Normal tone, no rash, no lesions  Psychiatric: Appropriate affect, intact judgment  Neurologic: No focal sensory or motor deficits, normal cognition   Musculoskeletal: Normal muscle strength and tone  Extremities: No clubbing, cyanosis, or deformities    Vitals:    09/06/23 1115   BP: 118/88   Pulse: 77   Resp: 16   Temp: 97.5 °F (36.4 °C)   SpO2: 100%   Weight: 51.1 kg (112 lb 10.5 oz)   Height: 152.4 cm (60\")   PainSc: 0-No pain     ECOG score: 0         PHQ-9 Total Score:         Result Review :   The following data was reviewed by: Princess Alvarez MD PhD on 09/06/2023:  Lab Results   Component Value Date    HGB 13.1 09/05/2023    HCT 40.6 09/05/2023    MCV 94.0 09/05/2023     09/05/2023    WBC 8.72 09/05/2023    NEUTROABS 6.67 09/05/2023    LYMPHSABS 1.13 09/05/2023    MONOSABS 0.73 09/05/2023    EOSABS 0.12 09/05/2023    BASOSABS 0.04 09/05/2023     Lab Results   Component Value Date    GLUCOSE 84 09/05/2023    BUN 17 09/05/2023    CREATININE 0.96 09/05/2023     09/05/2023    K 5.0 09/05/2023     09/05/2023    CO2 25.3 09/05/2023    CALCIUM 9.8 09/05/2023    PROTEINTOT 7.0 09/05/2023    ALBUMIN 4.7 09/05/2023    BILITOT 0.3 09/05/2023    ALKPHOS 59 09/05/2023    AST 23 09/05/2023    ALT 14 09/05/2023     Lab Results   Component Value Date    IRON 89 10/28/2021    LABIRON 37 10/28/2021    TRANSFERRIN 161 (L) 10/28/2021    TIBC 240 (L) 10/28/2021     Lab Results   Component Value Date    FERRITIN 419.60 (H) 10/28/2021    CVVEUHKK75 >2000 (H) 03/24/2021    FOLATE 7.4 03/24/2021     No results found for: PSA, CEA, AFP, ,        Assessment and Plan    Diagnoses and all orders " for this visit:    1. Malignant neoplasm of upper-outer quadrant of left breast in female, estrogen receptor positive [C50.412, Z17.0 (ICD-10-CM)] (Primary)    Other orders  -     Cancel: denosumab (PROLIA) syringe 60 mg        HR+/HER2+ Breast Cancer: s/p adjuvant Herceptin.   She is on letrozole but having severe depression.  I instructed her to hold the medication for 2 weeks.  She will follow-up with Dr. Richmond.  If her symptoms have improved then she should switch to another aromatase inhibitor.  Alternatively, she may be candidate for tamoxifen.  She is also getting Prolia for osteoporosis.  [facility]    Patient was given instructions and counseling regarding her condition or for health maintenance advice. Please see specific information pulled into the AVS if appropriate.     Princess Alvarez MD PhD    9/18/2023

## 2023-09-19 ENCOUNTER — TELEPHONE (OUTPATIENT)
Dept: ONCOLOGY | Facility: HOSPITAL | Age: 63
End: 2023-09-19
Payer: OTHER GOVERNMENT

## 2023-09-19 NOTE — TELEPHONE ENCOUNTER
Caller: Ragini Dozier    Relationship: Self    Best call back number: [phone]      What was the call regarding: PT CALLED TO SPEAK TO YUSUF REGARDING A NEW MEDICATION THAT DR LUKE WANTED TO PUT HER ON BEFORE SHE LEFT.

## 2023-09-25 NOTE — TELEPHONE ENCOUNTER
Patient states she still has some depression symptoms, but feels they are a little better off Letrozole and she would like to try Anastrozole as discussed at her last visit with Dr. Alvarez.  Advised patient a new prescription would be sent to her pharmacy. Instructed patient to call back with any symptoms after she starts the new medication.  Patient voices understanding.

## 2023-09-28 RX ORDER — ANASTROZOLE 1 MG/1
1 TABLET ORAL DAILY
Qty: 90 TABLET | Refills: 1 | Status: SHIPPED | OUTPATIENT
Start: 2023-09-28

## 2023-10-10 ENCOUNTER — OFFICE VISIT (OUTPATIENT)
Dept: RADIATION ONCOLOGY | Facility: HOSPITAL | Age: 63
End: 2023-10-10
Payer: OTHER GOVERNMENT

## 2023-10-10 VITALS
BODY MASS INDEX: 23.64 KG/M2 | SYSTOLIC BLOOD PRESSURE: 107 MMHG | TEMPERATURE: 98.4 F | HEART RATE: 74 BPM | DIASTOLIC BLOOD PRESSURE: 68 MMHG | WEIGHT: 121.03 LBS | OXYGEN SATURATION: 98 % | RESPIRATION RATE: 17 BRPM

## 2023-10-10 DIAGNOSIS — C50.912 HER2-POSITIVE CARCINOMA OF LEFT BREAST: ICD-10-CM

## 2023-10-10 DIAGNOSIS — Z79.811 AROMATASE INHIBITOR USE: ICD-10-CM

## 2023-10-10 DIAGNOSIS — Z17.0 MALIGNANT NEOPLASM OF UPPER-OUTER QUADRANT OF LEFT BREAST IN FEMALE, ESTROGEN RECEPTOR POSITIVE: Primary | ICD-10-CM

## 2023-10-10 DIAGNOSIS — Z92.3 PERSONAL HISTORY OF RADIATION THERAPY: ICD-10-CM

## 2023-10-10 DIAGNOSIS — Z91.89 AT RISK FOR LYMPHEDEMA: ICD-10-CM

## 2023-10-10 DIAGNOSIS — C50.412 MALIGNANT NEOPLASM OF UPPER-OUTER QUADRANT OF LEFT BREAST IN FEMALE, ESTROGEN RECEPTOR POSITIVE: Primary | ICD-10-CM

## 2023-10-10 NOTE — PROGRESS NOTES
Follow Up Office Visit      Encounter Date: 10/10/2023   Patient Name: Ragini Dozier  YOB: 1960   Medical Record Number: [id number]   Primary Diagnosis: Malignant neoplasm of upper-outer quadrant of left breast in female, estrogen receptor positive [C50.412, Z17.0]     Radiation Completion Date:  7/21/2021    Chief Complaint:    Chief Complaint   Patient presents with    Follow-up    Breast Cancer       Oncology/Hematology History Overview Note   HR positive, HER-2 positive breast cancer:  -Found on routine screening mammogram  -Diagnostic mammogram on 11/10/2020: Calcifications in the left breast  -11/10/2020 left breast biopsy: Pathology positive for DCIS, high-grade, estrogen receptor positive (5%, strong), progesterone receptor positive (10%, weak-moderate)  -11/19/2020 MRI of the breast: 1.8 cm hematoma at the site of the recent biopsy showing DCIS.  There is a 1.1 cm nonfocal mass enhancement at the superior lateral margin of the hematoma and a separate 1.2 cm suspicious mass along the inferior medial margin.  These correspond the masses seen on ultrasound on 10/20/2020  -12/23/2020 MRI guided breast biopsy: Left lower inner quadrant pathology positive for invasive ductal carcinoma, grade 2, estrogen receptor positive (3%, moderate), progesterone receptor positive (1%, weak), HER-2 positive (3+ by IHC), Ki-67 approximately 60%.  Associated with high-grade ductal carcinoma in situ.  -Cycle 1 of TCH P on 12/18/2020.  Echocardiogram on 12/14/2020 shows an EF of 55%. C3 on 1/29/21  -C5 of TCHP on 3/12/21. Held C6 due to side effects.  -Left lumpectomy on 5/11/2021: No residual invasive carcinoma found, 0/8 lymph nodes involved, ypT0N0    Osteoporosis:  -Patient reports osteoporosis diagnosed on DEXA scan in January 2021.  -Her primary care provider has treated her with Boniva as well as calcium/vitamin D     Malignant neoplasm of upper-outer quadrant of left breast in female, estrogen  receptor positive   12/3/2020 Cancer Staged    Staging form: Breast, AJCC 8th Edition  - Clinical stage from 12/3/2020: Stage IA (cT1c, cN0, cM0, G2, ER+, CA+, HER2+) - Signed by Gia Davis APRN on 10/10/2023     5/11/2021 Cancer Staged    Staging form: Breast, AJCC 8th Edition  - Pathologic stage from 5/11/2021: ypT0, pN0(sn), cM0 - Signed by Gia Davis APRN on 10/10/2023     6/23/2021 Initial Diagnosis    Malignant neoplasm of upper-outer quadrant of left female breast (CMS/HCC)     6/24/2021 - 3/10/2022 Chemotherapy    OP BREAST Trastuzumab Q21D (maintenance)      Chemotherapy    TCHP: 12/18/2020-3/12/21 (5 cycles)  OP BREAST Trastuzumab-anns Q21D (maintenance)   - 6/3/21-present     6/29/2021 - 7/21/2021 Radiation    Radiation OncologyTreatment Course:  Ragini Dozier received 4,256 cGy in 16  fractions to left breast.         History of Present Illness: Ragini Dozier is a 63 y.o. female who returns to [facility] for routine annual follow-up of her breast cancer. She presents today with her . She reports feeling well overall with no new complaints or concerns. She occasionally experiences an intermittent discomfort within the left breast that is not terribly bothersome. She also continues to have some numbness in left axilla region. She denies any other breast pain, palpable masses, nipple changes or nipple discharge, pain or limited range of motion in upper extremity. She followed up with Dr. Alvarez on 9/18/23, note reviewed and due to patient experiencing significant depression over the past year  it was decided to switch her medication to anastrozole. This has been prescribed but patient states [place] pharmacy has not yet filled her prescription. She is followed by Psychiatry at [place]. She continues to follow-up with Lymphedema Therapy, note reviewed from recent visit on 7/12/23. She followed up with Plastic Surgery on 5/15/23, note reviewed. [facility] and [facility]. Her screening mammogram on 12/22/22 was negative. She underwent colonoscopy on 1/9/23 with Dr. Meyer; no specimens collected. Recommendation for repeat colonoscopy in 10 years.     Subjective      Review of Systems: Review of Systems   Constitutional:  Positive for fatigue (3/10). Negative for activity change, appetite change and unexpected weight change.   HENT: Negative.  Negative for congestion, sinus pressure, sinus pain, sore throat and trouble swallowing.    Eyes: Negative.  Negative for visual disturbance.   Respiratory: Negative.  Negative for cough, chest tightness, shortness of breath and wheezing.    Cardiovascular: Negative.  Negative for chest pain, palpitations and leg swelling.   Gastrointestinal:  Negative for abdominal pain, blood in stool, constipation, diarrhea and nausea.   Endocrine: Negative for cold intolerance and heat intolerance.   Genitourinary: Negative.  Negative for difficulty urinating, frequency and urgency.   Musculoskeletal:  Negative for back pain and neck pain.        Right foot in walking boot 2/2 recent fracture     Skin: Negative.    Neurological: Negative.  Negative for dizziness, weakness, light-headedness and headaches.   Psychiatric/Behavioral:  Positive for sleep disturbance (Trouble staying asleep, ongoing, has meds). Negative for self-injury and suicidal ideas.        The following portions of the patient's history were reviewed and updated as appropriate: allergies, current medications, past family history, past medical history, past social history, past surgical history and problem list.    Medications:     Current Outpatient Medications:     busPIRone (BUSPAR) 10 MG tablet, Take 2 tablets by mouth 3 (Three) Times a Day., Disp: , Rfl:     Calcium Carbonate-Vitamin D (calcium-vitamin D) 500-200 MG-UNIT tablet per tablet, 500 mg 2 (Two) Times a Day., Disp: , Rfl:     traZODone (DESYREL) 100 MG tablet, Take  1 tablet by mouth Every Night., Disp: , Rfl:     zolpidem (AMBIEN) 10 MG tablet, Take 0.5 tablets by mouth At Night As Needed. Takes 1/2 tab (5 mg), Disp: , Rfl:     anastrozole (ARIMIDEX) 1 MG tablet, Take 1 tablet by mouth Daily. (Patient not taking: Reported on 10/10/2023), Disp: 90 tablet, Rfl: 1    butalbital-acetaminophen-caffeine (FIORICET, ESGIC) -40 MG per tablet, Take 1 tablet by mouth Every 4 (Four) Hours As Needed. (Patient not taking: Reported on 10/10/2023), Disp: , Rfl:     ibuprofen (ADVIL,MOTRIN) 800 MG tablet, , Disp: , Rfl:     Allergies:   No Known Allergies    Patient Smoking History:   Social History     Tobacco Use   Smoking Status Never   Smokeless Tobacco Never     Measures:  PHQ-9 Total Score: 0   Quality of Life: 100 - Full Activity   ECOG score: 0  ECOG: (0) Fully active, able to carry on all predisease performance without restriction  Pain: (on a scale of 0-10)   Pain Score    10/10/23 1358   PainSc: 0-No pain         Objective     Physical Exam:   Vital Signs:   Vitals:    10/10/23 1358   BP: 107/68   Pulse: 74   Resp: 17   Temp: 98.4 øF (36.9 øC)   TempSrc: Temporal   SpO2: 98%   Weight: 54.9 kg (121 lb 0.5 oz)   PainSc: 0-No pain     Body mass index is 23.64 kg/mý.   Wt Readings from Last 3 Encounters:   10/10/23 54.9 kg (121 lb 0.5 oz)   09/06/23 51.1 kg (112 lb 10.5 oz)   09/05/23 52.5 kg (115 lb 12.8 oz)       Physical Exam  Vitals reviewed. Exam conducted with a chaperone present.   Constitutional:       General: She is not in acute distress.     Appearance: Normal appearance. She is normal weight. She is not ill-appearing.   HENT:      Head: Normocephalic and atraumatic.      Mouth/Throat:      Mouth: Mucous membranes are moist.      Pharynx: Oropharynx is clear. No oropharyngeal exudate or posterior oropharyngeal erythema.   Eyes:      Conjunctiva/sclera: Conjunctivae normal.      Pupils: Pupils are equal, round, and reactive to light.   Cardiovascular:      Rate and  Rhythm: Normal rate and regular rhythm.      Pulses: Normal pulses.      Heart sounds: Normal heart sounds.   Pulmonary:      Effort: Pulmonary effort is normal. No respiratory distress.      Breath sounds: Normal breath sounds.   Chest:   Breasts:     Right: No swelling, bleeding, inverted nipple, mass, nipple discharge, skin change or tenderness.      Left: Tenderness (mild) present. No swelling, bleeding, inverted nipple, mass, nipple discharge or skin change.       Musculoskeletal:         General: Normal range of motion.      Cervical back: Normal range of motion.   Lymphadenopathy:      Upper Body:      Right upper body: No supraclavicular or axillary adenopathy.      Left upper body: No supraclavicular or axillary adenopathy.   Skin:     General: Skin is warm and dry.   Neurological:      General: No focal deficit present.      Mental Status: She is alert and oriented to person, place, and time. Mental status is at baseline.   Psychiatric:         Mood and Affect: Mood normal.         Behavior: Behavior normal.       Result Review: I independently reviewed the following data.   -- Mammo Screening Digital Tomosynthesis Bilateral With CAD (12/22/2022 14:56)   -- COLONOSCOPY (01/09/2023 09:55)   -- SCANNED PATHOLOGY (05/11/2021)    -- SCANNED PATHOLOGY (12/03/2020)   -- SCANNED PATHOLOGY (11/11/2020)     Pathology:   Lab Results   Component Value Date    CLININFO  05/10/2022      Comment:      Adjustment and management of vascular access device      FINALDX  05/10/2022     Left breast tissue, excision:   - Benign breast tissue with scar       Imaging: No radiology results for the last 90 days.     Labs:   WBC   Date Value Ref Range Status   09/05/2023 8.72 3.40 - 10.80 10*3/mm3 Final   04/13/2021 9.55 4.80 - 10.80 10*3/uL Final     Hemoglobin   Date Value Ref Range Status   09/05/2023 13.1 12.0 - 15.9 g/dL Final     Hematocrit   Date Value Ref Range Status   09/05/2023 40.6 34.0 - 46.6 % Final     Platelets    Date Value Ref Range Status   09/05/2023 203 140 - 450 10*3/mm3 Final     TSH   Date Value Ref Range Status   09/05/2023 0.805 0.270 - 4.200 uIU/mL Final     Assessment / Plan      Impression: Ragini Dozier is a pleasant 63 y.o. female with cT1 cN0 M0 invasive ductal carcinoma of the left breast, grade 2, ER+RI+, HER2+. She was initially diagnosed with HR+ high-grade DCIS.  After breast MRI on 11/19/2020 she was found to have a second 1.2 cm lesion in the left lower inner quadrant that was also biopsy proven ER/RI+ and HER2+, Ki-67 60%. She underwent neoadjuvant chemotherapy with TCHP and experienced pathologic complete response upon lumpectomy and sentinel lymph node biopsy performed on 5/25/2021. No residual invasive carcinoma found, 0/8 lymph nodes involved, [id number]. She is status post external beam radiotherapy to the left breast, completed on 7/21/2021. She completed adjuvant Herceptin. She is status post breast reconstruction with bilateral implants for symmetry and scar revision of the left breast performed on 5/10/2022 by Dr. Chirinos. Her screening mammogram on 12/22/2022 revealed no evidence of disease; BI-RADS 1 (Negative). [facility] and [facility] already has one scheduled for [facility]. She is doing well overall and remains without evidence of disease clinically.  She was previously taking letrozole which was discontinued in 9/2023 due to severe depression. She is followed by Psychiatry at [place]. She is planned to start taking anastrozole; prescription has been sent to pharmacy but has not yet been filled by [place] pharmacy.     Assessment/Plan:   Diagnoses and all orders for this visit:    1. Malignant neoplasm of upper-outer quadrant of left breast in female, estrogen receptor positive (Primary)    2. HER2-positive carcinoma of left breast    3. Personal history of radiation therapy    4. At risk for lymphedema    5. Aromatase inhibitor use       Follow Up:    Return for follow-up in 12 months.   Recommend continuation of annual screening mammogram; next mamm[facility] due in 12/2023 [facility]. Patient will request results.   Follow-up with Dr. Richmond as scheduled on 11/8/23.  Follow-up with Dr. Chirinos as scheduled on 11/30/23.  Follow-up with Lymphedema Therapy as scheduled on 1/17/24.  Follow-up with Psychiatry at [place] as scheduled.      Return in about 1 year (around 10/10/2024) for Office Visit.  Ragini Dozier was encouraged to contact me in the interim with any questions or concerns regarding her care.        ANGELICA Wilson  Radiation Oncology  [facility]    This document has been signed by ANGELICA Horan on October 10, 2023 15:35 EDT

## 2023-11-07 ENCOUNTER — TELEPHONE (OUTPATIENT)
Dept: ONCOLOGY | Facility: HOSPITAL | Age: 63
End: 2023-11-07

## 2023-11-07 NOTE — TELEPHONE ENCOUNTER
The [facility] received a fax that requires your attention. The document has been indexed to the patient’s chart for your review.      Reason for sending: MEDICAL RECORDS REQUEST    Documents Description: INDEXED DEXA SCAN 12/13/22, MAMMO 11/22/21 INTO CHART.    Name of Sender: SAYRA CHAVIS [facility]    Date Indexed: 11/7/23 UNDER EXT MED REC

## 2023-11-08 ENCOUNTER — OFFICE VISIT (OUTPATIENT)
Dept: ONCOLOGY | Facility: HOSPITAL | Age: 63
End: 2023-11-08
Payer: OTHER GOVERNMENT

## 2023-11-08 ENCOUNTER — LAB (OUTPATIENT)
Dept: ONCOLOGY | Facility: HOSPITAL | Age: 63
End: 2023-11-08
Payer: OTHER GOVERNMENT

## 2023-11-08 VITALS
OXYGEN SATURATION: 100 % | DIASTOLIC BLOOD PRESSURE: 83 MMHG | WEIGHT: 117.5 LBS | RESPIRATION RATE: 18 BRPM | SYSTOLIC BLOOD PRESSURE: 116 MMHG | TEMPERATURE: 97.1 F | BODY MASS INDEX: 23.07 KG/M2 | HEIGHT: 60 IN | HEART RATE: 84 BPM

## 2023-11-08 DIAGNOSIS — M85.80 OSTEOPENIA AFTER MENOPAUSE: ICD-10-CM

## 2023-11-08 DIAGNOSIS — C50.412 MALIGNANT NEOPLASM OF UPPER-OUTER QUADRANT OF LEFT BREAST IN FEMALE, ESTROGEN RECEPTOR POSITIVE: ICD-10-CM

## 2023-11-08 DIAGNOSIS — Z17.0 MALIGNANT NEOPLASM OF UPPER-OUTER QUADRANT OF LEFT BREAST IN FEMALE, ESTROGEN RECEPTOR POSITIVE: Primary | ICD-10-CM

## 2023-11-08 DIAGNOSIS — Z78.0 OSTEOPENIA AFTER MENOPAUSE: ICD-10-CM

## 2023-11-08 DIAGNOSIS — C50.412 MALIGNANT NEOPLASM OF UPPER-OUTER QUADRANT OF LEFT BREAST IN FEMALE, ESTROGEN RECEPTOR POSITIVE: Primary | ICD-10-CM

## 2023-11-08 DIAGNOSIS — Z17.0 MALIGNANT NEOPLASM OF UPPER-OUTER QUADRANT OF LEFT BREAST IN FEMALE, ESTROGEN RECEPTOR POSITIVE: ICD-10-CM

## 2023-11-08 LAB
25(OH)D3 SERPL-MCNC: 48 NG/ML (ref 30–100)
ALBUMIN SERPL-MCNC: 4.8 G/DL (ref 3.5–5.2)
ALBUMIN/GLOB SERPL: 1.9 G/DL
ALP SERPL-CCNC: 56 U/L (ref 39–117)
ALT SERPL W P-5'-P-CCNC: 12 U/L (ref 1–33)
ANION GAP SERPL CALCULATED.3IONS-SCNC: 11.9 MMOL/L (ref 5–15)
AST SERPL-CCNC: 23 U/L (ref 1–32)
BASOPHILS # BLD AUTO: 0.06 10*3/MM3 (ref 0–0.2)
BASOPHILS NFR BLD AUTO: 0.7 % (ref 0–1.5)
BILIRUB SERPL-MCNC: 0.3 MG/DL (ref 0–1.2)
BUN SERPL-MCNC: 20 MG/DL (ref 8–23)
BUN/CREAT SERPL: 19.2 (ref 7–25)
CALCIUM SPEC-SCNC: 9.5 MG/DL (ref 8.6–10.5)
CHLORIDE SERPL-SCNC: 101 MMOL/L (ref 98–107)
CO2 SERPL-SCNC: 26.1 MMOL/L (ref 22–29)
CREAT SERPL-MCNC: 1.04 MG/DL (ref 0.57–1)
DEPRECATED RDW RBC AUTO: 44.9 FL (ref 37–54)
EGFRCR SERPLBLD CKD-EPI 2021: 61.3 ML/MIN/1.73
EOSINOPHIL # BLD AUTO: 0.12 10*3/MM3 (ref 0–0.4)
EOSINOPHIL NFR BLD AUTO: 1.4 % (ref 0.3–6.2)
ERYTHROCYTE [DISTWIDTH] IN BLOOD BY AUTOMATED COUNT: 13 % (ref 12.3–15.4)
GLOBULIN UR ELPH-MCNC: 2.5 GM/DL
GLUCOSE SERPL-MCNC: 85 MG/DL (ref 65–99)
HCT VFR BLD AUTO: 38.8 % (ref 34–46.6)
HGB BLD-MCNC: 12.6 G/DL (ref 12–15.9)
IMM GRANULOCYTES # BLD AUTO: 0.03 10*3/MM3 (ref 0–0.05)
IMM GRANULOCYTES NFR BLD AUTO: 0.3 % (ref 0–0.5)
LYMPHOCYTES # BLD AUTO: 1.23 10*3/MM3 (ref 0.7–3.1)
LYMPHOCYTES NFR BLD AUTO: 14.2 % (ref 19.6–45.3)
MCH RBC QN AUTO: 30.7 PG (ref 26.6–33)
MCHC RBC AUTO-ENTMCNC: 32.5 G/DL (ref 31.5–35.7)
MCV RBC AUTO: 94.4 FL (ref 79–97)
MONOCYTES # BLD AUTO: 0.6 10*3/MM3 (ref 0.1–0.9)
MONOCYTES NFR BLD AUTO: 6.9 % (ref 5–12)
NEUTROPHILS NFR BLD AUTO: 6.62 10*3/MM3 (ref 1.7–7)
NEUTROPHILS NFR BLD AUTO: 76.5 % (ref 42.7–76)
NRBC BLD AUTO-RTO: 0 /100 WBC (ref 0–0.2)
PLATELET # BLD AUTO: 241 10*3/MM3 (ref 140–450)
PMV BLD AUTO: 9.5 FL (ref 6–12)
POTASSIUM SERPL-SCNC: 4 MMOL/L (ref 3.5–5.2)
PROT SERPL-MCNC: 7.3 G/DL (ref 6–8.5)
RBC # BLD AUTO: 4.11 10*6/MM3 (ref 3.77–5.28)
SODIUM SERPL-SCNC: 139 MMOL/L (ref 136–145)
WBC NRBC COR # BLD: 8.66 10*3/MM3 (ref 3.4–10.8)

## 2023-11-08 PROCEDURE — 36415 COLL VENOUS BLD VENIPUNCTURE: CPT

## 2023-11-08 PROCEDURE — 80053 COMPREHEN METABOLIC PANEL: CPT

## 2023-11-08 PROCEDURE — 82306 VITAMIN D 25 HYDROXY: CPT

## 2023-11-08 PROCEDURE — 85025 COMPLETE CBC W/AUTO DIFF WBC: CPT

## 2023-11-08 PROCEDURE — G0463 HOSPITAL OUTPT CLINIC VISIT: HCPCS | Performed by: INTERNAL MEDICINE

## 2023-11-08 RX ORDER — ANASTROZOLE 1 MG/1
1 TABLET ORAL DAILY
Qty: 90 TABLET | Refills: 1 | Status: SHIPPED | OUTPATIENT
Start: 2023-11-08

## 2023-11-08 RX ORDER — CARBOXYMETHYLCELLULOSE SODIUM 5 MG/ML
SOLUTION/ DROPS OPHTHALMIC
COMMUNITY
Start: 2023-10-16

## 2023-11-08 NOTE — PROGRESS NOTES
Chief Complaint/Care Team   NEW PROVIDER - BREAST CA    Aure Jacques, Aure Ojeda, APRN    History of Present Illness     Diagnosis: Triple Positive Breast Cancer    Osteoporosis    Current Treatment: Letrozole and Prolia due in 3/2023    Previous Treatment:   HR positive, HER-2 positive breast cancer:  -Found on routine screening mammogram  -Diagnostic mammogram on 11/10/2020: Calcifications in the left breast  -11/10/2020 left breast biopsy: Pathology positive for DCIS, high-grade, estrogen receptor positive (5%, strong), progesterone receptor positive (10%, weak-moderate)  -11/19/2020 MRI of the breast: 1.8 cm hematoma at the site of the recent biopsy showing DCIS.  There is a 1.1 cm nonfocal mass enhancement at the superior lateral margin of the hematoma and a separate 1.2 cm suspicious mass along the inferior medial margin.  These correspond the masses seen on ultrasound on 10/20/2020  -12/23/2020 MRI guided breast biopsy: Left lower inner quadrant pathology positive for invasive ductal carcinoma, grade 2, estrogen receptor positive (3%, moderate), progesterone receptor positive (1%, weak), HER-2 positive (3+ by IHC), Ki-67 approximately 60%.  Associated with high-grade ductal carcinoma in situ.  -Cycle 1 of TCH P on 12/18/2020.  Echocardiogram on 12/14/2020 shows an EF of 55%. C3 on 1/29/21  -C5 of TCHP on 3/12/21. Held C6 due to side effects.  -Left lumpectomy on 5/11/2021: No residual invasive carcinoma found, 0/8 lymph nodes involved, ypT0N0  - pt was initially on letrozole started 10/28/2021 but switched to anstrazole due to depression symptoms from letrozole on 9/28/2023    Osteoporosis:  -Patient reports osteoporosis diagnosed on DEXA scan in January 2021.  -Her primary care provider has treated her with Boniva, but she is currently on prolia as well as calcium/vitamin D    Pt previously under the care of Dr. Princess Alvarez and transitioned care to Dr. Richmond on 11/8/2023.    Ragini Dozier is  a 63 y.o. female who presents to [facility]AT[facility] for follow up of triple positive breast cancer. Pt was on letrozole initially after completing radiation and chemotherapy.  Pt has been on AI since 10/28/2023, 2 week break in 9/2023 due to depression symptoms. Patient reports developing worsening depression for which she was switched to anastrozole and reports tolerating well since the switch. Pt denies any hot flashes, night sweats or worsening musculoskeletal pain.     Review of Systems     Oncology/Hematology History Overview Note   HR positive, HER-2 positive breast cancer:  -Found on routine screening mammogram  -Diagnostic mammogram on 11/10/2020: Calcifications in the left breast  -11/10/2020 left breast biopsy: Pathology positive for DCIS, high-grade, estrogen receptor positive (5%, strong), progesterone receptor positive (10%, weak-moderate)  -11/19/2020 MRI of the breast: 1.8 cm hematoma at the site of the recent biopsy showing DCIS.  There is a 1.1 cm nonfocal mass enhancement at the superior lateral margin of the hematoma and a separate 1.2 cm suspicious mass along the inferior medial margin.  These correspond the masses seen on ultrasound on 10/20/2020  -12/23/2020 MRI guided breast biopsy: Left lower inner quadrant pathology positive for invasive ductal carcinoma, grade 2, estrogen receptor positive (3%, moderate), progesterone receptor positive (1%, weak), HER-2 positive (3+ by IHC), Ki-67 approximately 60%.  Associated with high-grade ductal carcinoma in situ.  -Cycle 1 of TCH P on 12/18/2020.  Echocardiogram on 12/14/2020 shows an EF of 55%. C3 on 1/29/21  -C5 of TCHP on 3/12/21. Held C6 due to side effects.  -Left lumpectomy on 5/11/2021: No residual invasive carcinoma found, 0/8 lymph nodes involved, ypT0N0    Osteoporosis:  -Patient reports osteoporosis diagnosed on DEXA scan in January 2021.  -Her primary care provider has treated her with Boniva as well as  "calcium/vitamin D     Malignant neoplasm of upper-outer quadrant of left breast in female, estrogen receptor positive   12/3/2020 Cancer Staged    Staging form: Breast, AJCC 8th Edition  - Clinical stage from 12/3/2020: Stage IA (cT1c, cN0, cM0, G2, ER+, OR+, HER2+) - Signed by Gia Davis APRN on 10/10/2023     5/11/2021 Cancer Staged    Staging form: Breast, AJCC 8th Edition  - Pathologic stage from 5/11/2021: ypT0, pN0(sn), cM0 - Signed by Gia Davis APRN on 10/10/2023     6/23/2021 Initial Diagnosis    Malignant neoplasm of upper-outer quadrant of left female breast (CMS/HCC)     6/24/2021 - 3/10/2022 Chemotherapy    OP BREAST Trastuzumab Q21D (maintenance)      Chemotherapy    TCHP: 12/18/2020-3/12/21 (5 cycles)  OP BREAST Trastuzumab-anns Q21D (maintenance)   - 6/3/21-present     6/29/2021 - 7/21/2021 Radiation    Radiation OncologyTreatment Course:  Ragini Dozier received 4,256 cGy in 16  fractions to left breast.         Objective     Vitals:    11/08/23 1353   BP: 116/83   Pulse: 84   Resp: 18   Temp: 97.1 °F (36.2 °C)   TempSrc: Temporal   SpO2: 100%   Weight: 53.3 kg (117 lb 8.1 oz)   Height: 152.4 cm (60\")   PainSc:   6   PainLoc: Back     ECOG score: 0         PHQ-9 Total Score:         Physical Exam  Vitals reviewed. Exam conducted with a chaperone present.   Constitutional:       General: She is not in acute distress.     Appearance: Normal appearance.   HENT:      Head: Normocephalic and atraumatic.   Eyes:      Extraocular Movements: Extraocular movements intact.      Conjunctiva/sclera: Conjunctivae normal.   Pulmonary:      Effort: Pulmonary effort is normal.   Musculoskeletal:      Cervical back: Normal range of motion and neck supple.   Skin:     General: Skin is warm and dry.      Findings: No bruising.   Neurological:      Mental Status: She is oriented to person, place, and time.           Past Medical History     Past Medical History:   Diagnosis Date    Breast cancer 2020    LEFT    " Breast cancer screening by mammogram 2020    Depression     Encounter for Hemoccult screening 2019    GERD (gastroesophageal reflux disease)     Hx of radiation therapy     Insomnia     Malignant neoplasm of upper-outer quadrant of left female breast     Migraine headache     Osteoporosis      Current Outpatient Medications on File Prior to Visit   Medication Sig Dispense Refill    busPIRone (BUSPAR) 10 MG tablet Take 2 tablets by mouth 3 (Three) Times a Day.      Calcium Carbonate-Vitamin D (calcium-vitamin D) 500-200 MG-UNIT tablet per tablet 500 mg 2 (Two) Times a Day.      carboxymethylcellulose (REFRESH PLUS) 0.5 % solution       traZODone (DESYREL) 100 MG tablet Take 1 tablet by mouth Every Night.      zolpidem (AMBIEN) 10 MG tablet Take 0.5 tablets by mouth At Night As Needed. Takes 1/2 tab (5 mg)      [DISCONTINUED] anastrozole (ARIMIDEX) 1 MG tablet Take 1 tablet by mouth Daily. 90 tablet 1    butalbital-acetaminophen-caffeine (FIORICET, ESGIC) -40 MG per tablet Take 1 tablet by mouth Every 4 (Four) Hours As Needed. (Patient not taking: Reported on 10/10/2023)      ibuprofen (ADVIL,MOTRIN) 800 MG tablet  (Patient not taking: Reported on 10/10/2023)       No current facility-administered medications on file prior to visit.      No Known Allergies  Past Surgical History:   Procedure Laterality Date    AUGMENTATION MAMMAPLASTY      BREAST AUGMENTATION Bilateral 05/10/2022    Procedure: Bilateral implants with mesh and scar revision of left breast;  Surgeon: Liu Chirinos MD;  [place] [facility] [place] [facility] [place]  Service: [place];  Laterality: Bilateral;    BREAST LUMPECTOMY      BREAST LUMPECTOMY WITH SENTINEL NODE BIOPSY Left     BUNIONECTOMY Bilateral     CATARACT EXTRACTION      COLONOSCOPY  2019    COLONOSCOPY N/A 08/09/2022    Procedure: COLONOSCOPY;  Surgeon: Hue Meyer MD;  Location: [facility] ENDOSCOPY;  Service: Gastroenterology;  Laterality: N/A;  HEMORRHOIDS, POOR PREP    COLONOSCOPY  N/A 1/9/2023    Procedure: COLONOSCOPY FOR SCREENING;  Surgeon: Hue Meyer MD;  Location: [facility] ENDOSCOPY;  Service: Gastroenterology;  Laterality: N/A;  DIVERTICULOSIS HEMORRHOIDS    ENDOSCOPY N/A 10/01/2021    Procedure: ESOPHAGOGASTRODUODENOSCOPY WITH BIOPSY;  Surgeon: Hue Meyer MD;  Location: [facility] ENDOSCOPY;  Service: Gastroenterology;  Laterality: N/A;  ESOPHAGITIS/GASTRITIS    HYSTERECTOMY      PARTIAL    OTHER SURGICAL HISTORY      BIOPSY    SKIN CANCER EXCISION      TONSILLECTOMY      UPPER GASTROINTESTINAL ENDOSCOPY      VENOUS ACCESS DEVICE (PORT) REMOVAL N/A 05/10/2022    Procedure: REMOVAL VENOUS ACCESS DEVICE;  Surgeon: Lizbeth York MD;  [place] [facility] [place] [facility] [place]  Service: General;  Laterality: N/A;     Social History     Socioeconomic History    Marital status:     Number of children: 2   Tobacco Use    Smoking status: Never    Smokeless tobacco: Never   Vaping Use    Vaping Use: Never used   Substance and Sexual Activity    Alcohol use: Yes     Comment: OCCASIONAL    Drug use: Never    Sexual activity: Defer     Family History   Problem Relation Age of Onset    Kidney cancer Father     Skin cancer Father     Other Father         MYELOMA    No Known Problems Mother     Malig Hyperthermia Neg Hx        Results     Result Review   The following data was reviewed by: Josue Richmond MD on 11/08/2023:  Lab Results   Component Value Date    HGB 13.1 09/05/2023    HCT 40.6 09/05/2023    MCV 94.0 09/05/2023     09/05/2023    WBC 8.72 09/05/2023    NEUTROABS 6.67 09/05/2023    LYMPHSABS 1.13 09/05/2023    MONOSABS 0.73 09/05/2023    EOSABS 0.12 09/05/2023    BASOSABS 0.04 09/05/2023     Lab Results   Component Value Date    GLUCOSE 84 09/05/2023    BUN 17 09/05/2023    CREATININE 0.96 09/05/2023     09/05/2023    K 5.0 09/05/2023     09/05/2023    CO2 25.3 09/05/2023    CALCIUM 9.8 09/05/2023    PROTEINTOT 7.0 09/05/2023    ALBUMIN 4.7 09/05/2023     BILITOT 0.3 09/05/2023    ALKPHOS 59 09/05/2023    AST 23 09/05/2023    ALT 14 09/05/2023     Lab Results   Component Value Date    MG 2.1 09/05/2023    PHOS 3.4 09/05/2023    TSH 0.805 09/05/2023           No radiology results for the last day       Assessment & Plan     Diagnoses and all orders for this visit:    1. Malignant neoplasm of upper-outer quadrant of left breast in female, estrogen receptor positive (Primary)  -     CBC & Differential; Future  -     Comprehensive Metabolic Panel; Future  -     Vitamin D,25-Hydroxy; Future  -     CBC & Differential; Future  -     Comprehensive Metabolic Panel; Future  -     anastrozole (ARIMIDEX) 1 MG tablet; Take 1 tablet by mouth Daily.  Dispense: 90 tablet; Refill: 1    2. Osteopenia after menopause  -     CBC & Differential; Future  -     Comprehensive Metabolic Panel; Future  -     Vitamin D,25-Hydroxy; Future        Ragini Dozier is a 63 y.o. female who presents to [facility]ATOLOG[facility] ONCOLOGY for follow up of triple positive breast cancer. Pt was on letrozole initially after completing radiation and chemotherapy.  Pt has been on AI since 10/28/2023, 2 week break in 9/2023 due to depression symptoms. Patient reports developing worsening depression for which she was switched to anastrozole and reports tolerating well since the switch.     -recommend pt continue with annual mammogram, due in 12/2023, obtained via [facility]  -pt due for DEXA scan in 1/2021 showed osteoporosis for which pt is receiving prolia, next DEXA due in 12/2023 obtained via [facility]  -pt due for next prolia in 3/2024  -will obtain CBC, CMP and Vit D today  -recommend pt continue Anastrazole, provided refill today    Will have pt follow up with me in 4-5 weeks with DEXA scan.    Please note that portions of this note were completed with a voice recognition program.    Electronically signed by Josue Richmond MD, 11/08/23, 4:28 PM EST.      Follow Up     I spent 60 minutes caring for  Ragini on this date of service. This time includes time spent by me in the following activities:preparing for the visit, reviewing tests, obtaining and/or reviewing a separately obtained history, performing a medically appropriate examination and/or evaluation , counseling and educating the patient/family/caregiver, ordering medications, tests, or procedures, referring and communicating with other health care professionals , documenting information in the medical record, independently interpreting results and communicating that information with the patient/family/caregiver, and care coordination.    This is an acute or chronic illness that poses a threat to life or bodily function. The above treatment plan involves a high risk of complications and/or mortality of patient management.    The patient was seen and examined. Work by the provider also included review and/or ordering of lab tests, review and/or ordering of radiology tests, review and/or ordering of medicine tests, discussion with other physicians or providers, independent review of data, obtaining old records, review/summation of old records, and/or other review.    I have reviewed the family history, social history, and past medical history for this patient. Previous information and data has been reviewed and updated as needed. I have reviewed and verified the chief complaint, history, and other documentation. The patient was interviewed and examined in the clinic and the chart reviewed. The previous observations, recommendations, and conclusions were reviewed including those of other providers.     The plan was discussed with the patient and/or family. The patient was given time to ask questions and these questions were answered. At the conclusion of their visit they had no additional questions or concerns and all questions were answered to their satisfaction.    Patient was given instructions and counseling regarding her condition or for health  maintenance advice. Please see specific information pulled into the AVS if appropriate.

## 2023-11-14 ENCOUNTER — TELEPHONE (OUTPATIENT)
Dept: PLASTIC SURGERY | Facility: CLINIC | Age: 63
End: 2023-11-14
Payer: OTHER GOVERNMENT

## 2023-11-14 NOTE — TELEPHONE ENCOUNTER
LVM FOR PATIENT TO CALL AND DISCUSS APPOINTMENT ON 11/30.    PLEASE TRANSFER TO OFFICE WHEN PATIENT RETURNS CALL.

## 2023-11-17 NOTE — PROGRESS NOTES
"Post-op Follow-up (Discuss left breast options)      History of Present Illness  Ragini Dozier is a 63 y.o. female who presents to [facility] for post op bilateral implants with mesh and scar revision of left breast done on 05/10/22.    Pt presents today to discuss left breast options. Still having some numbness  Is not wanting another surgery   Is wondering if sensation will ever get any better    Patient has no known allergies.  Allergies Reconciled.    Review of Systems   All review of system has been reviewed and it  is negative except the ones note above.     Objective     /69 (BP Location: Left arm, Patient Position: Sitting, Cuff Size: Adult)   Pulse 82   Temp 98.6 °F (37 °C) (Temporal)   Ht 152.4 cm (60\")   Wt 52.6 kg (116 lb)   SpO2 97%   BMI 22.65 kg/m²     Body mass index is 22.65 kg/m².    Physical Exam   Cardiovascular: Normal rate.     Pulmonary/Chest  Effort normal.       Breast: both breast soft implants. Left breast lumpectomy scar, sentinel lymph node scar.    Result Review :     Assessment and Plan      Diagnoses and all orders for this visit:    1. Breast reconstruction deformity (Primary)          Additional Order(s):       Plan: bilateral breast reconstruction revision with fat graft, capsulotomy and left mastopexy-2hrs  35208-31 revision of reconstructed breast  19370- capsulotomy     RTC for pre op once surgery is scheduled    Scribed by Carlie Spain, acting as a scribe for Liu Chirinos MD, 11/21/23 11:11 EST.  Liu Chirinos MD's signature on the note affirms that the note adequately documents the care provided.      Patient was given instructions and counseling regarding her condition. Please see specific information pulled into the AVS if appropriate.     Liu Chirinos MD  11/21/2023      "

## 2023-11-21 ENCOUNTER — OFFICE VISIT (OUTPATIENT)
Dept: PLASTIC SURGERY | Facility: CLINIC | Age: 63
End: 2023-11-21
Payer: OTHER GOVERNMENT

## 2023-11-21 VITALS
BODY MASS INDEX: 22.78 KG/M2 | SYSTOLIC BLOOD PRESSURE: 120 MMHG | WEIGHT: 116 LBS | TEMPERATURE: 98.6 F | HEIGHT: 60 IN | DIASTOLIC BLOOD PRESSURE: 69 MMHG | HEART RATE: 82 BPM | OXYGEN SATURATION: 97 %

## 2023-11-21 DIAGNOSIS — N65.0 BREAST RECONSTRUCTION DEFORMITY: Primary | ICD-10-CM

## 2023-11-21 PROCEDURE — 99214 OFFICE O/P EST MOD 30 MIN: CPT | Performed by: SURGERY

## 2023-11-27 ENCOUNTER — PREP FOR SURGERY (OUTPATIENT)
Dept: OTHER | Facility: HOSPITAL | Age: 63
End: 2023-11-27
Payer: OTHER GOVERNMENT

## 2023-11-27 DIAGNOSIS — N65.0 BREAST RECONSTRUCTION DEFORMITY: Primary | ICD-10-CM

## 2023-11-27 RX ORDER — CEFAZOLIN SODIUM 2 G/100ML
2000 INJECTION, SOLUTION INTRAVENOUS ONCE
OUTPATIENT
Start: 2023-11-27 | End: 2023-11-27

## 2023-11-27 RX ORDER — ACETAMINOPHEN 500 MG
1000 TABLET ORAL ONCE
OUTPATIENT
Start: 2023-11-27 | End: 2023-11-27

## 2023-11-27 RX ORDER — SCOLOPAMINE TRANSDERMAL SYSTEM 1 MG/1
1 PATCH, EXTENDED RELEASE TRANSDERMAL CONTINUOUS
OUTPATIENT
Start: 2023-11-27 | End: 2023-11-30

## 2023-12-18 ENCOUNTER — TELEPHONE (OUTPATIENT)
Dept: ONCOLOGY | Facility: HOSPITAL | Age: 63
End: 2023-12-18

## 2023-12-18 NOTE — TELEPHONE ENCOUNTER
Caller: Ragini Dozier    Relationship to patient: Self    Best call back number: [phone]    Chief complaint: PT NEEDS TO RESCHEDULE     Type of visit: PATIENT NEEDS TO RESCHEDULE LAB AND FOLLOW UP    Requested date: AFTER MAMMOGRAM ON 1-22-24      If rescheduling, when is the original appointment: 1-9-24

## 2024-01-05 ENCOUNTER — TELEPHONE (OUTPATIENT)
Dept: ONCOLOGY | Facility: HOSPITAL | Age: 64
End: 2024-01-05

## 2024-01-05 NOTE — TELEPHONE ENCOUNTER
Caller: Ragini Dozier    Relationship to patient: Self    Best call back number: [phone]    Chief complaint: PATIENT NEEDS TO RESCHEDULE     Type of visit: LAB AND FOLLOW UP    Requested date: 3-6-24 OR ANYTIME AFTER 2-15-24    If rescheduling, when is the original appointment: 1-25-24

## 2024-01-17 ENCOUNTER — APPOINTMENT (OUTPATIENT)
Dept: OCCUPATIONAL THERAPY | Facility: HOSPITAL | Age: 64
End: 2024-01-17
Payer: OTHER GOVERNMENT

## 2024-01-26 ENCOUNTER — APPOINTMENT (OUTPATIENT)
Dept: OCCUPATIONAL THERAPY | Facility: HOSPITAL | Age: 64
End: 2024-01-26
Payer: OTHER GOVERNMENT

## 2024-02-15 ENCOUNTER — HOSPITAL ENCOUNTER (OUTPATIENT)
Dept: OCCUPATIONAL THERAPY | Facility: HOSPITAL | Age: 64
Setting detail: THERAPIES SERIES
Discharge: HOME | End: 2024-02-15
Payer: OTHER GOVERNMENT

## 2024-02-15 DIAGNOSIS — Z17.0 MALIGNANT NEOPLASM OF UPPER-OUTER QUADRANT OF LEFT BREAST IN FEMALE, ESTROGEN RECEPTOR POSITIVE: ICD-10-CM

## 2024-02-15 DIAGNOSIS — Z98.890 S/P LUMPECTOMY, LEFT BREAST: ICD-10-CM

## 2024-02-15 DIAGNOSIS — C50.412 MALIGNANT NEOPLASM OF UPPER-OUTER QUADRANT OF LEFT BREAST IN FEMALE, ESTROGEN RECEPTOR POSITIVE: ICD-10-CM

## 2024-02-15 DIAGNOSIS — Z91.89 AT RISK FOR LYMPHEDEMA: Primary | ICD-10-CM

## 2024-02-15 DIAGNOSIS — L90.5 SCAR CONDITION AND FIBROSIS OF SKIN: ICD-10-CM

## 2024-02-15 PROCEDURE — 97535 SELF CARE MNGMENT TRAINING: CPT

## 2024-02-21 ENCOUNTER — HOSPITAL ENCOUNTER (OUTPATIENT)
Dept: OTHER | Facility: HOSPITAL | Age: 64
Discharge: HOME | End: 2024-02-21

## 2024-03-05 ENCOUNTER — OFFICE VISIT (OUTPATIENT)
Dept: INTERNAL MEDICINE | Facility: CLINIC | Age: 64
End: 2024-03-05
Payer: OTHER GOVERNMENT

## 2024-03-05 VITALS
TEMPERATURE: 98 F | OXYGEN SATURATION: 99 % | DIASTOLIC BLOOD PRESSURE: 78 MMHG | RESPIRATION RATE: 18 BRPM | HEIGHT: 60 IN | WEIGHT: 115 LBS | HEART RATE: 59 BPM | BODY MASS INDEX: 22.58 KG/M2 | SYSTOLIC BLOOD PRESSURE: 118 MMHG

## 2024-03-05 DIAGNOSIS — R05.9 COUGH, UNSPECIFIED TYPE: Primary | ICD-10-CM

## 2024-03-05 DIAGNOSIS — J06.9 ACUTE URI: ICD-10-CM

## 2024-03-05 LAB
EXPIRATION DATE: NORMAL
EXPIRATION DATE: NORMAL
FLUAV AG UPPER RESP QL IA.RAPID: NOT DETECTED
FLUBV AG UPPER RESP QL IA.RAPID: NOT DETECTED
INTERNAL CONTROL: NORMAL
INTERNAL CONTROL: NORMAL
Lab: 8725
Lab: 9133
S PYO AG THROAT QL: NEGATIVE
SARS-COV-2 AG UPPER RESP QL IA.RAPID: NOT DETECTED

## 2024-03-05 PROCEDURE — 99213 OFFICE O/P EST LOW 20 MIN: CPT | Performed by: NURSE PRACTITIONER

## 2024-03-05 PROCEDURE — 87428 SARSCOV & INF VIR A&B AG IA: CPT | Performed by: NURSE PRACTITIONER

## 2024-03-05 PROCEDURE — 87880 STREP A ASSAY W/OPTIC: CPT | Performed by: NURSE PRACTITIONER

## 2024-03-05 NOTE — PROGRESS NOTES
"Chief Complaint  Joint Pain (Achy joints- 2 weeks), Sore Throat (4 day covid test at home negative), Headache (2 weeks), Cough (3-4 days), Chills (Patient did go on a cruise last week), and Shortness of Breath (At night- on and off )    Subjective          Ragini Dozier presents to [facility]  History of Present Illness  She reports leaving for a cruise about 10 days ago and had some viral symptoms--joint aches, headache. She reports that symptoms improved after a few days. She reports having decreased appetite while on cruise. She reports cough started about 4 days ago. No upper resp symptoms initially. Sore throat in the last few days. Negative covid test at home. Feeling soa in the last few days when lying down and with activity.   Denies fever  She has prolia scheduled for tomorrow  She sees hem/onc tomorrow for follow up  Objective   Vital Signs:   /78 (BP Location: Left arm, Patient Position: Sitting, Cuff Size: Adult)   Pulse 59   Temp 98 °F (36.7 °C)   Resp 18   Ht 152.4 cm (60\")   Wt 52.2 kg (115 lb)   SpO2 99%   BMI 22.46 kg/m²     Physical Exam  Vitals and nursing note reviewed.   Constitutional:       General: She is not in acute distress.     Appearance: Normal appearance.   HENT:      Head: Normocephalic and atraumatic.      Right Ear: External ear normal.      Left Ear: External ear normal.      Nose: Congestion and rhinorrhea present.      Mouth/Throat:      Mouth: Mucous membranes are moist.      Pharynx: No posterior oropharyngeal erythema.   Eyes:      Conjunctiva/sclera: Conjunctivae normal.   Cardiovascular:      Rate and Rhythm: Normal rate and regular rhythm.      Pulses: Normal pulses.      Heart sounds: Normal heart sounds. No murmur heard.     No friction rub. No gallop.   Pulmonary:      Effort: Pulmonary effort is normal. No respiratory distress.      Breath sounds: No wheezing, rhonchi or rales.   Musculoskeletal:      " Cervical back: Neck supple.      Right lower leg: No edema.      Left lower leg: No edema.   Lymphadenopathy:      Cervical: No cervical adenopathy.   Skin:     General: Skin is warm and dry.   Neurological:      General: No focal deficit present.      Mental Status: She is alert and oriented to person, place, and time.   Psychiatric:         Mood and Affect: Mood normal.         Behavior: Behavior normal.        Result Review :          Procedures      Assessment and Plan    Diagnoses and all orders for this visit:    1. Cough, unspecified type (Primary)  -     POCT SARS-CoV-2 Antigen IKE + Flu  -     POCT rapid strep A    2. Acute URI    Discussed reassuring exam. Negative covid, flu and strep in office. Discussed course and supportive care for viral illness. Continue to push fluids, rest. She will call with new or worsening symptoms.          Follow Up   Return in about 6 months (around 9/5/2024), or if symptoms worsen or fail to improve.  Patient was given instructions and counseling regarding her condition or for health maintenance advice. Please see specific information pulled into the AVS if appropriate.

## 2024-03-06 ENCOUNTER — HOSPITAL ENCOUNTER (OUTPATIENT)
Dept: ONCOLOGY | Facility: HOSPITAL | Age: 64
Discharge: HOME | End: 2024-03-06
Admitting: INTERNAL MEDICINE
Payer: OTHER GOVERNMENT

## 2024-03-06 ENCOUNTER — OFFICE VISIT (OUTPATIENT)
Dept: ONCOLOGY | Facility: HOSPITAL | Age: 64
End: 2024-03-06
Payer: OTHER GOVERNMENT

## 2024-03-06 ENCOUNTER — LAB (OUTPATIENT)
Dept: ONCOLOGY | Facility: HOSPITAL | Age: 64
End: 2024-03-06
Payer: OTHER GOVERNMENT

## 2024-03-06 VITALS
HEIGHT: 60 IN | BODY MASS INDEX: 22.38 KG/M2 | DIASTOLIC BLOOD PRESSURE: 83 MMHG | SYSTOLIC BLOOD PRESSURE: 119 MMHG | TEMPERATURE: 97.8 F | WEIGHT: 113.98 LBS | OXYGEN SATURATION: 97 % | HEART RATE: 85 BPM | RESPIRATION RATE: 18 BRPM

## 2024-03-06 DIAGNOSIS — M85.80 OSTEOPENIA AFTER MENOPAUSE: Primary | ICD-10-CM

## 2024-03-06 DIAGNOSIS — Z17.0 MALIGNANT NEOPLASM OF UPPER-OUTER QUADRANT OF LEFT BREAST IN FEMALE, ESTROGEN RECEPTOR POSITIVE: ICD-10-CM

## 2024-03-06 DIAGNOSIS — Z78.0 OSTEOPENIA AFTER MENOPAUSE: Primary | ICD-10-CM

## 2024-03-06 DIAGNOSIS — C50.412 MALIGNANT NEOPLASM OF UPPER-OUTER QUADRANT OF LEFT BREAST IN FEMALE, ESTROGEN RECEPTOR POSITIVE: ICD-10-CM

## 2024-03-06 LAB
ALBUMIN SERPL-MCNC: 4.6 G/DL (ref 3.5–5.2)
ALBUMIN/GLOB SERPL: 2 G/DL
ALP SERPL-CCNC: 72 U/L (ref 39–117)
ALT SERPL W P-5'-P-CCNC: 13 U/L (ref 1–33)
ANION GAP SERPL CALCULATED.3IONS-SCNC: 4.2 MMOL/L (ref 5–15)
AST SERPL-CCNC: 22 U/L (ref 1–32)
BASOPHILS # BLD AUTO: 0.04 10*3/MM3 (ref 0–0.2)
BASOPHILS NFR BLD AUTO: 0.6 % (ref 0–1.5)
BILIRUB SERPL-MCNC: 0.3 MG/DL (ref 0–1.2)
BUN SERPL-MCNC: 27 MG/DL (ref 8–23)
BUN/CREAT SERPL: 29.3 (ref 7–25)
CALCIUM SPEC-SCNC: 9.6 MG/DL (ref 8.6–10.5)
CHLORIDE SERPL-SCNC: 102 MMOL/L (ref 98–107)
CO2 SERPL-SCNC: 30.8 MMOL/L (ref 22–29)
CREAT SERPL-MCNC: 0.92 MG/DL (ref 0.57–1)
DEPRECATED RDW RBC AUTO: 44.1 FL (ref 37–54)
EGFRCR SERPLBLD CKD-EPI 2021: 71 ML/MIN/1.73
EOSINOPHIL # BLD AUTO: 0.09 10*3/MM3 (ref 0–0.4)
EOSINOPHIL NFR BLD AUTO: 1.2 % (ref 0.3–6.2)
ERYTHROCYTE [DISTWIDTH] IN BLOOD BY AUTOMATED COUNT: 12.9 % (ref 12.3–15.4)
GLOBULIN UR ELPH-MCNC: 2.3 GM/DL
GLUCOSE SERPL-MCNC: 68 MG/DL (ref 65–99)
HCT VFR BLD AUTO: 38.6 % (ref 34–46.6)
HGB BLD-MCNC: 13.1 G/DL (ref 12–15.9)
IMM GRANULOCYTES # BLD AUTO: 0 10*3/MM3 (ref 0–0.05)
IMM GRANULOCYTES NFR BLD AUTO: 0 % (ref 0–0.5)
LYMPHOCYTES # BLD AUTO: 1.1 10*3/MM3 (ref 0.7–3.1)
LYMPHOCYTES NFR BLD AUTO: 15.2 % (ref 19.6–45.3)
MAGNESIUM SERPL-MCNC: 1.9 MG/DL (ref 1.6–2.4)
MCH RBC QN AUTO: 31.2 PG (ref 26.6–33)
MCHC RBC AUTO-ENTMCNC: 33.9 G/DL (ref 31.5–35.7)
MCV RBC AUTO: 91.9 FL (ref 79–97)
MONOCYTES # BLD AUTO: 0.6 10*3/MM3 (ref 0.1–0.9)
MONOCYTES NFR BLD AUTO: 8.3 % (ref 5–12)
NEUTROPHILS NFR BLD AUTO: 5.43 10*3/MM3 (ref 1.7–7)
NEUTROPHILS NFR BLD AUTO: 74.7 % (ref 42.7–76)
PHOSPHATE SERPL-MCNC: 3.1 MG/DL (ref 2.5–4.5)
PLATELET # BLD AUTO: 204 10*3/MM3 (ref 140–450)
PMV BLD AUTO: 9.1 FL (ref 6–12)
POTASSIUM SERPL-SCNC: 3.8 MMOL/L (ref 3.5–5.2)
PROT SERPL-MCNC: 6.9 G/DL (ref 6–8.5)
RBC # BLD AUTO: 4.2 10*6/MM3 (ref 3.77–5.28)
SODIUM SERPL-SCNC: 137 MMOL/L (ref 136–145)
WBC NRBC COR # BLD AUTO: 7.26 10*3/MM3 (ref 3.4–10.8)

## 2024-03-06 PROCEDURE — 25010000002 DENOSUMAB 60 MG/ML SOLUTION PREFILLED SYRINGE: Performed by: INTERNAL MEDICINE

## 2024-03-06 PROCEDURE — 85025 COMPLETE CBC W/AUTO DIFF WBC: CPT

## 2024-03-06 PROCEDURE — 80053 COMPREHEN METABOLIC PANEL: CPT

## 2024-03-06 PROCEDURE — 83735 ASSAY OF MAGNESIUM: CPT

## 2024-03-06 PROCEDURE — 96372 THER/PROPH/DIAG INJ SC/IM: CPT

## 2024-03-06 PROCEDURE — G0463 HOSPITAL OUTPT CLINIC VISIT: HCPCS | Performed by: INTERNAL MEDICINE

## 2024-03-06 PROCEDURE — 84100 ASSAY OF PHOSPHORUS: CPT

## 2024-03-06 PROCEDURE — 36415 COLL VENOUS BLD VENIPUNCTURE: CPT

## 2024-03-06 RX ORDER — ANASTROZOLE 1 MG/1
1 TABLET ORAL DAILY
Qty: 90 TABLET | Refills: 1 | Status: SHIPPED | OUTPATIENT
Start: 2024-03-06

## 2024-03-06 RX ADMIN — DENOSUMAB 60 MG: 60 INJECTION SUBCUTANEOUS at 11:32

## 2024-03-06 NOTE — PROGRESS NOTES
Chief Complaint/Care Team   FOLLOW UP 1 - Breast Ca    Aure Jacques, APRN  Aure Jacques, APRN    History of Present Illness     Diagnosis: Triple Positive Breast Cancer    Osteoporosis    Current Treatment: Anastrozole and Prolia due in 3/2023    Previous Treatment:   HR positive, HER-2 positive breast cancer:  -Found on routine screening mammogram  -Diagnostic mammogram on 11/10/2020: Calcifications in the left breast  -11/10/2020 left breast biopsy: Pathology positive for DCIS, high-grade, estrogen receptor positive (5%, strong), progesterone receptor positive (10%, weak-moderate)  -11/19/2020 MRI of the breast: 1.8 cm hematoma at the site of the recent biopsy showing DCIS.  There is a 1.1 cm nonfocal mass enhancement at the superior lateral margin of the hematoma and a separate 1.2 cm suspicious mass along the inferior medial margin.  These correspond the masses seen on ultrasound on 10/20/2020  -12/23/2020 MRI guided breast biopsy: Left lower inner quadrant pathology positive for invasive ductal carcinoma, grade 2, estrogen receptor positive (3%, moderate), progesterone receptor positive (1%, weak), HER-2 positive (3+ by IHC), Ki-67 approximately 60%.  Associated with high-grade ductal carcinoma in situ.  -Cycle 1 of TCH P on 12/18/2020.  Echocardiogram on 12/14/2020 shows an EF of 55%. C3 on 1/29/21  -C5 of TCHP on 3/12/21. Held C6 due to side effects.  -Left lumpectomy on 5/11/2021: No residual invasive carcinoma found, 0/8 lymph nodes involved, ypT0N0  - pt was initially on letrozole started 10/28/2021 but switched to anstrazole due to depression symptoms from letrozole on 9/28/2023    Osteoporosis:  -Patient reports osteoporosis diagnosed on DEXA scan in January 2021.  -Her primary care provider has treated her with Boniva, but she is currently on prolia as well as calcium/vitamin D    Pt previously under the care of Dr. Princess Alvarez and transitioned care to Dr. Richmond on 11/8/2023.    Ragini Dozier  is a 63 y.o. female who presents to [facility] HEMATOLOGY & ONCOLOGY for follow up of triple positive breast cancer. Pt was on letrozole initially after completing radiation and chemotherapy.  Pt has been on AI since 10/28/2023, 2 week break in 9/2023 due to depression symptoms. Patient reports developing worsening depression for which she was switched to anastrozole and reports tolerating well since the switch. Pt again denies any hot flashes, night sweats or worsening musculoskeletal pain.  Patient with mammogram on February 21, 2024 at [facility] and here to discuss those results.     Review of Systems   Constitutional:  Negative for appetite change, diaphoresis, fatigue, fever, unexpected weight gain and unexpected weight loss.   HENT:  Negative for hearing loss, mouth sores, sore throat, swollen glands, trouble swallowing and voice change.    Eyes:  Negative for blurred vision.   Respiratory:  Negative for cough, shortness of breath and wheezing.    Cardiovascular:  Negative for chest pain and palpitations.   Gastrointestinal:  Negative for abdominal pain, blood in stool, constipation, diarrhea, nausea and vomiting.   Endocrine: Negative for cold intolerance and heat intolerance.   Genitourinary:  Negative for difficulty urinating, dysuria, frequency, hematuria and urinary incontinence.   Musculoskeletal:  Negative for arthralgias, back pain and myalgias.   Skin:  Negative for rash, skin lesions and wound.   Neurological:  Negative for dizziness, seizures, weakness, numbness and headache.   Hematological:  Does not bruise/bleed easily.   Psychiatric/Behavioral:  Negative for depressed mood. The patient is not nervous/anxious.    All other systems reviewed and are negative.       Oncology/Hematology History Overview Note   HR positive, HER-2 positive breast cancer:  -Found on routine screening mammogram  -Diagnostic mammogram on 11/10/2020: Calcifications in the left breast  -11/10/2020  left breast biopsy: Pathology positive for DCIS, high-grade, estrogen receptor positive (5%, strong), progesterone receptor positive (10%, weak-moderate)  -11/19/2020 MRI of the breast: 1.8 cm hematoma at the site of the recent biopsy showing DCIS.  There is a 1.1 cm nonfocal mass enhancement at the superior lateral margin of the hematoma and a separate 1.2 cm suspicious mass along the inferior medial margin.  These correspond the masses seen on ultrasound on 10/20/2020  -12/23/2020 MRI guided breast biopsy: Left lower inner quadrant pathology positive for invasive ductal carcinoma, grade 2, estrogen receptor positive (3%, moderate), progesterone receptor positive (1%, weak), HER-2 positive (3+ by IHC), Ki-67 approximately 60%.  Associated with high-grade ductal carcinoma in situ.  -Cycle 1 of TCH P on 12/18/2020.  Echocardiogram on 12/14/2020 shows an EF of 55%. C3 on 1/29/21  -C5 of TCHP on 3/12/21. Held C6 due to side effects.  -Left lumpectomy on 5/11/2021: No residual invasive carcinoma found, 0/8 lymph nodes involved, ypT0N0    Osteoporosis:  -Patient reports osteoporosis diagnosed on DEXA scan in January 2021.  -Her primary care provider has treated her with Boniva as well as calcium/vitamin D     Malignant neoplasm of upper-outer quadrant of left breast in female, estrogen receptor positive   12/3/2020 Cancer Staged    Staging form: Breast, AJCC 8th Edition  - Clinical stage from 12/3/2020: Stage IA (cT1c, cN0, cM0, G2, ER+, VA+, HER2+) - Signed by Gia Davis APRN on 10/10/2023     5/11/2021 Cancer Staged    Staging form: Breast, AJCC 8th Edition  - Pathologic stage from 5/11/2021: ypT0, pN0(sn), cM0 - Signed by Gia Davis APRN on 10/10/2023     6/23/2021 Initial Diagnosis    Malignant neoplasm of upper-outer quadrant of left female breast (CMS/HCC)     6/24/2021 - 3/10/2022 Chemotherapy    OP BREAST Trastuzumab Q21D (maintenance)      Chemotherapy    TCHP: 12/18/2020-3/12/21 (5 cycles)  OP BREAST  "Trastuzumab-anns Q21D (maintenance)   - 6/3/21-present     6/29/2021 - 7/21/2021 Radiation    Radiation OncologyTreatment Course:  Ragini Dozier received 4,256 cGy in 16  fractions to left breast.         Objective     Vitals:    03/06/24 1047   BP: 119/83   Pulse: 85   Resp: 18   Temp: 97.8 °F (36.6 °C)   TempSrc: Temporal   SpO2: 97%   Weight: 51.7 kg (113 lb 15.7 oz)   Height: 152.4 cm (60\")   PainSc: 0-No pain       ECOG score: 0         PHQ-9 Total Score:         Physical Exam  Vitals reviewed. Exam conducted with a chaperone present.   Constitutional:       General: She is not in acute distress.     Appearance: Normal appearance.   HENT:      Head: Normocephalic and atraumatic.   Eyes:      Extraocular Movements: Extraocular movements intact.      Conjunctiva/sclera: Conjunctivae normal.   Pulmonary:      Effort: Pulmonary effort is normal.   Musculoskeletal:      Cervical back: Normal range of motion and neck supple.   Skin:     General: Skin is warm and dry.      Findings: No bruising.   Neurological:      Mental Status: She is oriented to person, place, and time.           Past Medical History     Past Medical History:   Diagnosis Date    Breast cancer 2020    LEFT    Breast cancer screening by mammogram 2020    Depression     Encounter for Hemoccult screening 2019    GERD (gastroesophageal reflux disease)     Hx of radiation therapy     Insomnia     Malignant neoplasm of upper-outer quadrant of left female breast     Migraine headache     Osteoporosis      Current Outpatient Medications on File Prior to Visit   Medication Sig Dispense Refill    busPIRone (BUSPAR) 10 MG tablet Take 2 tablets by mouth 3 (Three) Times a Day.      butalbital-acetaminophen-caffeine (FIORICET, ESGIC) -40 MG per tablet Take 1 tablet by mouth Every 4 (Four) Hours As Needed.      Calcium Carbonate-Vitamin D (calcium-vitamin D) 500-200 MG-UNIT tablet per tablet 500 mg 2 (Two) Times a Day.      carboxymethylcellulose (REFRESH " PLUS) 0.5 % solution       ibuprofen (ADVIL,MOTRIN) 800 MG tablet       traZODone (DESYREL) 100 MG tablet Take 1 tablet by mouth Every Night.      zolpidem (AMBIEN) 10 MG tablet Take 0.5 tablets by mouth At Night As Needed. Takes 1/2 tab (5 mg)      [DISCONTINUED] anastrozole (ARIMIDEX) 1 MG tablet Take 1 tablet by mouth Daily. 90 tablet 1     Current Facility-Administered Medications on File Prior to Visit   Medication Dose Route Frequency Provider Last Rate Last Admin    [COMPLETED] denosumab (PROLIA) syringe 60 mg  60 mg Subcutaneous Once Josue Richmond MD   60 mg at 03/06/24 1132      No Known Allergies  Past Surgical History:   Procedure Laterality Date    AUGMENTATION MAMMAPLASTY      BREAST AUGMENTATION Bilateral 05/10/2022    Procedure: Bilateral implants with mesh and scar revision of left breast;  Surgeon: Liu Chirinos MD;  Location: [facility] OR [facility] [place];  Service: Plastics;  Laterality: Bilateral;    BREAST LUMPECTOMY      BREAST LUMPECTOMY WITH SENTINEL NODE BIOPSY Left     BUNIONECTOMY Bilateral     CATARACT EXTRACTION      COLONOSCOPY  2019    COLONOSCOPY N/A 08/09/2022    Procedure: COLONOSCOPY;  Surgeon: Hue Meyer MD;  Location: [facility] ENDOSCOPY;  Service: Gastroenterology;  Laterality: N/A;  HEMORRHOIDS, POOR PREP    COLONOSCOPY N/A 1/9/2023    Procedure: COLONOSCOPY FOR SCREENING;  Surgeon: Hue Meyer MD;  Location: [facility] ENDOSCOPY;  Service: Gastroenterology;  Laterality: N/A;  DIVERTICULOSIS HEMORRHOIDS    ENDOSCOPY N/A 10/01/2021    Procedure: ESOPHAGOGASTRODUODENOSCOPY WITH BIOPSY;  Surgeon: Hue Meyer MD;  Location: [facility] ENDOSCOPY;  Service: Gastroenterology;  Laterality: N/A;  ESOPHAGITIS/GASTRITIS    HYSTERECTOMY      PARTIAL    OTHER SURGICAL HISTORY      BIOPSY    SKIN CANCER EXCISION      TONSILLECTOMY      UPPER GASTROINTESTINAL ENDOSCOPY      VENOUS ACCESS DEVICE (PORT) REMOVAL N/A 05/10/2022    Procedure: REMOVAL VENOUS ACCESS DEVICE;   Surgeon: Lizbeth York MD;  [place] [facility] [place] [facility] [place];  Service: General;  Laterality: N/A;     Social History     Socioeconomic History    Marital status:     Number of children: 2   Tobacco Use    Smoking status: Never     Passive exposure: Never    Smokeless tobacco: Never   Vaping Use    Vaping status: Never Used   Substance and Sexual Activity    Alcohol use: Yes     Comment: OCCASIONAL    Drug use: Never    Sexual activity: Defer     Family History   Problem Relation Age of Onset    Kidney cancer Father     Skin cancer Father     Other Father         MYELOMA    No Known Problems Mother     Malig Hyperthermia Neg Hx        Results     Result Review   The following data was reviewed by: Josue Richmond MD on 11/08/2023:  Lab Results   Component Value Date    HGB 13.1 03/06/2024    HCT 38.6 03/06/2024    MCV 91.9 03/06/2024     03/06/2024    WBC 7.26 03/06/2024    NEUTROABS 5.43 03/06/2024    LYMPHSABS 1.10 03/06/2024    MONOSABS 0.60 03/06/2024    EOSABS 0.09 03/06/2024    BASOSABS 0.04 03/06/2024     Lab Results   Component Value Date    GLUCOSE 68 03/06/2024    BUN 27 (H) 03/06/2024    CREATININE 0.92 03/06/2024     03/06/2024    K 3.8 03/06/2024     03/06/2024    CO2 30.8 (H) 03/06/2024    CALCIUM 9.6 03/06/2024    PROTEINTOT 6.9 03/06/2024    ALBUMIN 4.6 03/06/2024    BILITOT 0.3 03/06/2024    ALKPHOS 72 03/06/2024    AST 22 03/06/2024    ALT 13 03/06/2024     Lab Results   Component Value Date    MG 1.9 03/06/2024    PHOS 3.1 03/06/2024    TSH 0.805 09/05/2023           No radiology results for the last day       Assessment & Plan     Diagnoses and all orders for this visit:    1. Osteopenia after menopause (Primary)  -     DEXA Bone Density Axial; Future    2. Malignant neoplasm of upper-outer quadrant of left breast in female, estrogen receptor positive  -     anastrozole (ARIMIDEX) 1 MG tablet; Take 1 tablet by mouth Daily.  Dispense: 90 tablet; Refill: 1  -     DEXA Bone  Density Axial; Future          Ragini Dozier is a 63 y.o. female who presents to [facility] HEMATOLOGY & ONCOLOGY for follow up of triple positive breast cancer. Pt was on letrozole initially after completing radiation and chemotherapy.  Pt has been on AI since 10/28/2023, 2 week break in 9/2023 due to depression symptoms. Patient reports developing worsening depression for which she was switched to anastrozole and reports tolerating well since the switch. Pt currently on anastrozole.    -recommend pt continue with annual mammogram, most recent mammogram from 2/20/2024 was BI-RADS 2 benign, obtained via [facility], due in February 2025  -pt due for DEXA scan in 1/2021 showed osteoporosis for which pt is receiving prolia, next DEXA due in 12/2023 obtained via [facility]  -pt due for Prolia today, last reviewed plan to proceed as scheduled today, plan to continue treatment every 3 months  -recommend pt continue Anastrazole, provided refill today    Will have pt follow up with me in 6 months with Prolia treatment.    Please note that portions of this note were completed with a voice recognition program.    Electronically signed by Josue Richmond MD, 03/06/24, 5:03 PM EST.        Follow Up     I spent 30 minutes caring for Ragini on this date of service. This time includes time spent by me in the following activities:preparing for the visit, reviewing tests, obtaining and/or reviewing a separately obtained history, performing a medically appropriate examination and/or evaluation , counseling and educating the patient/family/caregiver, ordering medications, tests, or procedures, referring and communicating with other health care professionals , documenting information in the medical record, independently interpreting results and communicating that information with the patient/family/caregiver, and care coordination.    This is an acute or chronic illness that poses a threat to life or bodily function. The above  treatment plan involves a high risk of complications and/or mortality of patient management.    The patient was seen and examined. Work by the provider also included review and/or ordering of lab tests, review and/or ordering of radiology tests, review and/or ordering of medicine tests, discussion with other physicians or providers, independent review of data, obtaining old records, review/summation of old records, and/or other review.    I have reviewed the family history, social history, and past medical history for this patient. Previous information and data has been reviewed and updated as needed. I have reviewed and verified the chief complaint, history, and other documentation. The patient was interviewed and examined in the clinic and the chart reviewed. The previous observations, recommendations, and conclusions were reviewed including those of other providers.     The plan was discussed with the patient and/or family. The patient was given time to ask questions and these questions were answered. At the conclusion of their visit they had no additional questions or concerns and all questions were answered to their satisfaction.    Patient was given instructions and counseling regarding her condition or for health maintenance advice. Please see specific information pulled into the AVS if appropriate.

## 2024-03-26 ENCOUNTER — TELEPHONE (OUTPATIENT)
Dept: PLASTIC SURGERY | Facility: CLINIC | Age: 64
End: 2024-03-26
Payer: OTHER GOVERNMENT

## 2024-04-08 NOTE — H&P (VIEW-ONLY)
"Pre-op Exam            History of Present Illness  Ragini Dozier is a 63 y.o. female who presents to [facility] PLASTIC & RECONSTRUCTIVE SURGERY for Pre-Operative Examination for BREAST RECONSTRUCTION REVISION, bilateral breast reconstruction revision with fat graft, capsulotomy and bilateral mastopexy on 4/29/24.      Pt presents today for pre op.    Subjective        Patient has no known allergies.  Allergies Reconciled.    Review of Systems   All review of system has been reviewed and it  is negative except the ones note above.     Objective     /83 (BP Location: Left arm, Patient Position: Sitting, Cuff Size: Adult)   Pulse 86   Ht 152.4 cm (60\")   Wt 52.3 kg (115 lb 6.4 oz)   SpO2 96%   BMI 22.54 kg/m²     Body mass index is 22.54 kg/m².    Physical Exam    Breast:  Left breast lumpectomy scar, sentinel lymph node scar.       Result Review :                Assessment and Plan      Diagnoses and all orders for this visit:    1. Pre-operative examination (Primary)  -     Nicotine Screen, Urine - Urine, Clean Catch; Future  -     cephalexin (KEFLEX) 500 MG capsule; Take 1 capsule by mouth 4 (Four) Times a Day for 5 days.  Dispense: 20 capsule; Refill: 0  -     naproxen (NAPROSYN) 250 MG tablet; Take 1 tablet by mouth 2 (Two) Times a Day.  Dispense: 12 tablet; Refill: 0  -     gabapentin (Neurontin) 300 MG capsule; Take 1 capsule by mouth 3 (Three) Times a Day for 18 doses.  Dispense: 18 capsule; Refill: 0  -     acetaminophen (TYLENOL) 500 MG tablet; Take 2 tablets by mouth Every 6 (Six) Hours As Needed for Moderate Pain for up to 18 doses.  Dispense: 18 tablet; Refill: 0  -     ondansetron (Zofran) 4 MG tablet; Take 1 tablet by mouth Daily As Needed for Nausea or Vomiting for up to 18 doses.  Dispense: 18 tablet; Refill: 0    2. Breast reconstruction deformity        Plan:  Photos obtained  Given these options, the patient has verbally expressed an understanding of the risks of " surgery and finds these risks acceptable. We will proceed with surgery as soon as possible.    Medications sent to pharmacy  Urine nicotine ordered      Scribed by Carlie Spain, acting as a scribe for Liu Chirinos MD, 04/18/24 10:09 EDT.  Liu Chirinos MD's signature on the note affirms that the note adequately documents the care provided.          Follow Up     Return RTC 1 week after surgery.    Patient was given instructions and counseling regarding her condition. Please see specific information pulled into the AVS if appropriate.     Liu Chirinos MD  04/18/2024

## 2024-04-08 NOTE — PROGRESS NOTES
"Pre-op Exam            History of Present Illness  Ragini Dozier is a 63 y.o. female who presents to [facility] PLASTIC & RECONSTRUCTIVE SURGERY for Pre-Operative Examination for BREAST RECONSTRUCTION REVISION, bilateral breast reconstruction revision with fat graft, capsulotomy and bilateral mastopexy on 4/29/24.      Pt presents today for pre op.    Subjective        Patient has no known allergies.  Allergies Reconciled.    Review of Systems   All review of system has been reviewed and it  is negative except the ones note above.     Objective     /83 (BP Location: Left arm, Patient Position: Sitting, Cuff Size: Adult)   Pulse 86   Ht 152.4 cm (60\")   Wt 52.3 kg (115 lb 6.4 oz)   SpO2 96%   BMI 22.54 kg/m²     Body mass index is 22.54 kg/m².    Physical Exam    Breast:  Left breast lumpectomy scar, sentinel lymph node scar.       Result Review :                Assessment and Plan      Diagnoses and all orders for this visit:    1. Pre-operative examination (Primary)  -     Nicotine Screen, Urine - Urine, Clean Catch; Future  -     cephalexin (KEFLEX) 500 MG capsule; Take 1 capsule by mouth 4 (Four) Times a Day for 5 days.  Dispense: 20 capsule; Refill: 0  -     naproxen (NAPROSYN) 250 MG tablet; Take 1 tablet by mouth 2 (Two) Times a Day.  Dispense: 12 tablet; Refill: 0  -     gabapentin (Neurontin) 300 MG capsule; Take 1 capsule by mouth 3 (Three) Times a Day for 18 doses.  Dispense: 18 capsule; Refill: 0  -     acetaminophen (TYLENOL) 500 MG tablet; Take 2 tablets by mouth Every 6 (Six) Hours As Needed for Moderate Pain for up to 18 doses.  Dispense: 18 tablet; Refill: 0  -     ondansetron (Zofran) 4 MG tablet; Take 1 tablet by mouth Daily As Needed for Nausea or Vomiting for up to 18 doses.  Dispense: 18 tablet; Refill: 0    2. Breast reconstruction deformity        Plan:  Photos obtained  Given these options, the patient has verbally expressed an understanding of the risks of " surgery and finds these risks acceptable. We will proceed with surgery as soon as possible.    Medications sent to pharmacy  Urine nicotine ordered      Scribed by Carlie Spain, acting as a scribe for Liu Chirinos MD, 04/18/24 10:09 EDT.  Liu Chirinos MD's signature on the note affirms that the note adequately documents the care provided.          Follow Up     Return RTC 1 week after surgery.    Patient was given instructions and counseling regarding her condition. Please see specific information pulled into the AVS if appropriate.     Liu Chirinos MD  04/18/2024

## 2024-04-18 ENCOUNTER — OFFICE VISIT (OUTPATIENT)
Dept: PLASTIC SURGERY | Facility: CLINIC | Age: 64
End: 2024-04-18
Payer: OTHER GOVERNMENT

## 2024-04-18 ENCOUNTER — PREP FOR SURGERY (OUTPATIENT)
Dept: OTHER | Facility: HOSPITAL | Age: 64
End: 2024-04-18
Payer: OTHER GOVERNMENT

## 2024-04-18 ENCOUNTER — TELEPHONE (OUTPATIENT)
Dept: INTERNAL MEDICINE | Facility: CLINIC | Age: 64
End: 2024-04-18
Payer: OTHER GOVERNMENT

## 2024-04-18 ENCOUNTER — LAB (OUTPATIENT)
Dept: LAB | Facility: HOSPITAL | Age: 64
End: 2024-04-18
Payer: OTHER GOVERNMENT

## 2024-04-18 VITALS
BODY MASS INDEX: 22.65 KG/M2 | HEART RATE: 86 BPM | WEIGHT: 115.4 LBS | SYSTOLIC BLOOD PRESSURE: 121 MMHG | DIASTOLIC BLOOD PRESSURE: 83 MMHG | OXYGEN SATURATION: 96 % | HEIGHT: 60 IN

## 2024-04-18 DIAGNOSIS — Z01.818 PRE-OPERATIVE EXAMINATION: Primary | ICD-10-CM

## 2024-04-18 DIAGNOSIS — N65.0 BREAST RECONSTRUCTION DEFORMITY: ICD-10-CM

## 2024-04-18 DIAGNOSIS — Z01.818 PRE-OPERATIVE EXAMINATION: ICD-10-CM

## 2024-04-18 LAB — COTININE UR-MCNC: NEGATIVE NG/ML

## 2024-04-18 PROCEDURE — G0480 DRUG TEST DEF 1-7 CLASSES: HCPCS

## 2024-04-18 PROCEDURE — 99212 OFFICE O/P EST SF 10 MIN: CPT | Performed by: SURGERY

## 2024-04-18 RX ORDER — ACETAMINOPHEN 500 MG
1000 TABLET ORAL EVERY 6 HOURS PRN
Qty: 18 TABLET | Refills: 0 | Status: SHIPPED | OUTPATIENT
Start: 2024-04-18

## 2024-04-18 RX ORDER — ONDANSETRON 4 MG/1
4 TABLET, FILM COATED ORAL DAILY PRN
Qty: 18 TABLET | Refills: 0 | Status: SHIPPED | OUTPATIENT
Start: 2024-04-18

## 2024-04-18 RX ORDER — GABAPENTIN 300 MG/1
300 CAPSULE ORAL 3 TIMES DAILY
Qty: 18 CAPSULE | Refills: 0 | Status: SHIPPED | OUTPATIENT
Start: 2024-04-18 | End: 2024-04-24

## 2024-04-18 RX ORDER — NAPROXEN 250 MG/1
250 TABLET ORAL 2 TIMES DAILY
Qty: 12 TABLET | Refills: 0 | Status: SHIPPED | OUTPATIENT
Start: 2024-04-18

## 2024-04-18 RX ORDER — CEPHALEXIN 500 MG/1
500 CAPSULE ORAL 4 TIMES DAILY
Qty: 20 CAPSULE | Refills: 0 | Status: SHIPPED | OUTPATIENT
Start: 2024-04-18 | End: 2024-04-23

## 2024-04-24 NOTE — PRE-PROCEDURE INSTRUCTIONS
IMPORTANT INSTRUCTIONS - PRE-ADMISSION TESTING  DO NOT EAT OR CHEW anything after midnight the night before your procedure.    You may have CLEAR liquids up to _2____ hours prior to ARRIVAL time.   Take the following medications the morning of your procedure with JUST A SIP OF WATER:  ANASTROZOLE, BUSPAR, REFRESH , GABAPENTIN, TYLENOL, NAPROXEN ______________________________________________________________________________________________________________________________________________________________________________________    DO NOT BRING your medications to the hospital with you, UNLESS something has changed since your PRE-Admission Testing appointment.  Hold all vitamins, supplements, and NSAIDS (Non- steroidal anti-inflammatory meds) for one week prior to surgery (you MAY take Tylenol or Acetaminophen).  If you are diabetic, check your blood sugar the morning of your procedure. If it is less than 70 or if you are feeling symptomatic, call the following number for further instructions: [phone]  Use your inhalers/nebulizers as usual, the morning of your procedure. BRING YOUR INHALERS with you.   Bring your CPAP or BIPAP to hospital, ONLY IF YOU WILL BE SPENDING THE NIGHT.   Make sure you have a ride home and have someone who will stay with you the day of your procedure after you go home.  If you have any questions, please call your Pre-Admission Testing Nurse, __SAMUEL_ at [phone]____.   Per anesthesia request, do not smoke for 24 hours before your procedure or as instructed by your surgeon.      Clear Liquid Diet        Find out when you need to start a clear liquid diet.   Think of “clear liquids” as anything you could read a newspaper through. This includes things like water, broth, sports drinks, or tea WITHOUT any kind of milk or cream.           Once you are told to start a clear liquid diet, only drink these things until 2 hours before arrival to the hospital or when the hospital says to stop.  Total volume limitation: 8 oz.       Clear liquids you CAN drink:   Water   Clear broth: beef, chicken, vegetable, or bone broth with nothing in it   Gatorade   Lemonade or Willy-aid   Soda   Tea, coffee (NO cream or honey)   Jell-O (without fruit)   Popsicles (without fruit or cream)   Italian ices   Juice without pulp: apple, white, grape   You may use salt, pepper, and sugar  NO RED LIQUIDS     Do NOT drink:   Milk or cream   Soy milk, almond milk, coconut milk, or other non-dairy drinks and   creamers   Milkshakes or smoothies   Tomato juice   Orange juice   Grapefruit juice   Cream soups or any other than broth         Clear Liquid Diet:  Do NOT eat any solid food.  Do NOT eat or suck on mints or candy.  Do NOT chew gum.  Do NOT drink thick liquids like milk or juice with pulp in it.  Do NOT add milk, cream, or anything like soy milk or almond milk to coffee or tea.       PREOPERATIVE (BEFORE SURGERY)              BATHING INSTRUCTIONS  Instructions:    You will need to shower 1 time utilizing the soap provided; at the times indicated   below:     MORNING OF SURGERY    Wash your hair and face with normal shampoo and soap, rinse it well before using the surgical soap.      In the shower, wet the skin completely with water from your neck to your feet. Apply the cleanser to your   body ONLY FROM THE NECK TO YOUR FEET.     Do NOT USE THE CLEANSER ON YOUR FACE, HEAD, OR GENITAL (PRIVATE) AREAS.   Keep it out of your eyes, ears, and mouth because of the risk of injury to those areas.      Scrub with a clean washcloth for each bath utilizing the soap provided from the top of your body to the   bottom starting at the neck area.      Pay close attention to your armpits, groin area, and the site of surgery.      Wash your body gently for 5 minutes. Stand outside the stream or turn off the water while scrubbing your   body. Do NOT wash with your regular soap after the surgical cleanser is used.      RINSE THE CLEANSER OFF  COMPLETELY with plenty of water. Rinse the area again thoroughly.      Dry off with a clean towel. The surgical soap can cause dryness; however do NOT APPLY LOTION,   CREAM, POWDER, and/or DEODORANT AFTER SHOWERING.     Be sure to where clean clothes after showering.      Ensure CLEAN BED LINENS AFTER FIRST wash with the surgical soap.      NO PETS ALLOWED IN THE BED with you after utilizing the surgical soap.

## 2024-04-29 ENCOUNTER — ANESTHESIA EVENT (OUTPATIENT)
Dept: PERIOP | Facility: HOSPITAL | Age: 64
End: 2024-04-29
Payer: OTHER GOVERNMENT

## 2024-04-29 ENCOUNTER — HOSPITAL ENCOUNTER (OUTPATIENT)
Facility: HOSPITAL | Age: 64
Setting detail: HOSPITAL OUTPATIENT SURGERY
Discharge: HOME | End: 2024-04-29
Attending: SURGERY | Admitting: SURGERY
Payer: OTHER GOVERNMENT

## 2024-04-29 ENCOUNTER — ANESTHESIA (OUTPATIENT)
Dept: PERIOP | Facility: HOSPITAL | Age: 64
End: 2024-04-29
Payer: OTHER GOVERNMENT

## 2024-04-29 VITALS
RESPIRATION RATE: 18 BRPM | SYSTOLIC BLOOD PRESSURE: 93 MMHG | TEMPERATURE: 98.2 F | OXYGEN SATURATION: 96 % | WEIGHT: 116.62 LBS | DIASTOLIC BLOOD PRESSURE: 67 MMHG | HEART RATE: 80 BPM | BODY MASS INDEX: 22.9 KG/M2 | HEIGHT: 60 IN

## 2024-04-29 DIAGNOSIS — N65.0 BREAST RECONSTRUCTION DEFORMITY: ICD-10-CM

## 2024-04-29 LAB — COTININE UR-MCNC: NEGATIVE NG/ML

## 2024-04-29 PROCEDURE — 25010000002 MIDAZOLAM PER 1MG: Performed by: ANESTHESIOLOGY

## 2024-04-29 PROCEDURE — 25010000002 DEXAMETHASONE PER 1 MG: Performed by: NURSE ANESTHETIST, CERTIFIED REGISTERED

## 2024-04-29 PROCEDURE — 25010000002 EPINEPHRINE (ANAPHYLAXIS) 1 MG/ML SOLUTION: Performed by: SURGERY

## 2024-04-29 PROCEDURE — 25010000002 PROPOFOL 10 MG/ML EMULSION: Performed by: NURSE ANESTHETIST, CERTIFIED REGISTERED

## 2024-04-29 PROCEDURE — 25010000002 DEXAMETHASONE PER 1 MG: Performed by: SURGERY

## 2024-04-29 PROCEDURE — 93005 ELECTROCARDIOGRAM TRACING: CPT | Performed by: ANESTHESIOLOGY

## 2024-04-29 PROCEDURE — G0480 DRUG TEST DEF 1-7 CLASSES: HCPCS | Performed by: SURGERY

## 2024-04-29 PROCEDURE — 25010000002 CEFAZOLIN PER 500 MG: Performed by: SURGERY

## 2024-04-29 PROCEDURE — 25010000002 BUPIVACAINE (PF) 0.5 % SOLUTION: Performed by: SURGERY

## 2024-04-29 PROCEDURE — 25810000003 LACTATED RINGERS PER 1000 ML: Performed by: ANESTHESIOLOGY

## 2024-04-29 PROCEDURE — 25010000002 HYDROMORPHONE 1 MG/ML SOLUTION: Performed by: NURSE ANESTHETIST, CERTIFIED REGISTERED

## 2024-04-29 PROCEDURE — 25010000002 SUGAMMADEX 200 MG/2ML SOLUTION: Performed by: NURSE ANESTHETIST, CERTIFIED REGISTERED

## 2024-04-29 PROCEDURE — 25010000002 FENTANYL CITRATE (PF) 50 MCG/ML SOLUTION: Performed by: NURSE ANESTHETIST, CERTIFIED REGISTERED

## 2024-04-29 PROCEDURE — 25010000002 ONDANSETRON PER 1 MG: Performed by: NURSE ANESTHETIST, CERTIFIED REGISTERED

## 2024-04-29 RX ORDER — LIDOCAINE HYDROCHLORIDE 20 MG/ML
INJECTION, SOLUTION EPIDURAL; INFILTRATION; INTRACAUDAL; PERINEURAL AS NEEDED
Status: DISCONTINUED | OUTPATIENT
Start: 2024-04-29 | End: 2024-04-29 | Stop reason: SURG

## 2024-04-29 RX ORDER — MEPERIDINE HYDROCHLORIDE 25 MG/ML
12.5 INJECTION INTRAMUSCULAR; INTRAVENOUS; SUBCUTANEOUS
Status: DISCONTINUED | OUTPATIENT
Start: 2024-04-29 | End: 2024-04-29 | Stop reason: HOSPADM

## 2024-04-29 RX ORDER — SODIUM CHLORIDE, SODIUM LACTATE, POTASSIUM CHLORIDE, AND CALCIUM CHLORIDE .6; .31; .03; .02 G/100ML; G/100ML; G/100ML; G/100ML
INJECTION, SOLUTION INTRAVENOUS AS NEEDED
Status: DISCONTINUED | OUTPATIENT
Start: 2024-04-29 | End: 2024-04-29 | Stop reason: HOSPADM

## 2024-04-29 RX ORDER — PROMETHAZINE HYDROCHLORIDE 12.5 MG/1
25 TABLET ORAL ONCE AS NEEDED
Status: DISCONTINUED | OUTPATIENT
Start: 2024-04-29 | End: 2024-04-29 | Stop reason: HOSPADM

## 2024-04-29 RX ORDER — ROCURONIUM BROMIDE 10 MG/ML
INJECTION, SOLUTION INTRAVENOUS AS NEEDED
Status: DISCONTINUED | OUTPATIENT
Start: 2024-04-29 | End: 2024-04-29 | Stop reason: SURG

## 2024-04-29 RX ORDER — ACETAMINOPHEN 500 MG
1000 TABLET ORAL ONCE
Status: DISCONTINUED | OUTPATIENT
Start: 2024-04-29 | End: 2024-04-29 | Stop reason: HOSPADM

## 2024-04-29 RX ORDER — DEXAMETHASONE SODIUM PHOSPHATE 4 MG/ML
INJECTION, SOLUTION INTRA-ARTICULAR; INTRALESIONAL; INTRAMUSCULAR; INTRAVENOUS; SOFT TISSUE AS NEEDED
Status: DISCONTINUED | OUTPATIENT
Start: 2024-04-29 | End: 2024-04-29 | Stop reason: HOSPADM

## 2024-04-29 RX ORDER — MAGNESIUM HYDROXIDE 1200 MG/15ML
LIQUID ORAL AS NEEDED
Status: DISCONTINUED | OUTPATIENT
Start: 2024-04-29 | End: 2024-04-29 | Stop reason: HOSPADM

## 2024-04-29 RX ORDER — BUPIVACAINE HCL/0.9 % NACL/PF 0.1 %
2000 PLASTIC BAG, INJECTION (ML) EPIDURAL ONCE
Status: COMPLETED | OUTPATIENT
Start: 2024-04-29 | End: 2024-04-29

## 2024-04-29 RX ORDER — OXYCODONE HYDROCHLORIDE 5 MG/1
5 TABLET ORAL
Status: COMPLETED | OUTPATIENT
Start: 2024-04-29 | End: 2024-04-29

## 2024-04-29 RX ORDER — SCOLOPAMINE TRANSDERMAL SYSTEM 1 MG/1
1 PATCH, EXTENDED RELEASE TRANSDERMAL ONCE
Status: DISCONTINUED | OUTPATIENT
Start: 2024-04-29 | End: 2024-04-29

## 2024-04-29 RX ORDER — FENTANYL CITRATE 50 UG/ML
INJECTION, SOLUTION INTRAMUSCULAR; INTRAVENOUS AS NEEDED
Status: DISCONTINUED | OUTPATIENT
Start: 2024-04-29 | End: 2024-04-29 | Stop reason: SURG

## 2024-04-29 RX ORDER — PROPOFOL 10 MG/ML
VIAL (ML) INTRAVENOUS AS NEEDED
Status: DISCONTINUED | OUTPATIENT
Start: 2024-04-29 | End: 2024-04-29 | Stop reason: SURG

## 2024-04-29 RX ORDER — PROMETHAZINE HYDROCHLORIDE 25 MG/1
25 SUPPOSITORY RECTAL ONCE AS NEEDED
Status: DISCONTINUED | OUTPATIENT
Start: 2024-04-29 | End: 2024-04-29 | Stop reason: HOSPADM

## 2024-04-29 RX ORDER — BUPIVACAINE HYDROCHLORIDE 5 MG/ML
INJECTION, SOLUTION EPIDURAL; INTRACAUDAL AS NEEDED
Status: DISCONTINUED | OUTPATIENT
Start: 2024-04-29 | End: 2024-04-29 | Stop reason: HOSPADM

## 2024-04-29 RX ORDER — EPINEPHRINE 1 MG/ML
INJECTION, SOLUTION INTRAMUSCULAR; SUBCUTANEOUS AS NEEDED
Status: DISCONTINUED | OUTPATIENT
Start: 2024-04-29 | End: 2024-04-29 | Stop reason: HOSPADM

## 2024-04-29 RX ORDER — PHENYLEPHRINE HCL IN 0.9% NACL 1 MG/10 ML
SYRINGE (ML) INTRAVENOUS AS NEEDED
Status: DISCONTINUED | OUTPATIENT
Start: 2024-04-29 | End: 2024-04-29 | Stop reason: SURG

## 2024-04-29 RX ORDER — SCOLOPAMINE TRANSDERMAL SYSTEM 1 MG/1
1 PATCH, EXTENDED RELEASE TRANSDERMAL CONTINUOUS
Status: DISCONTINUED | OUTPATIENT
Start: 2024-04-29 | End: 2024-04-29 | Stop reason: HOSPADM

## 2024-04-29 RX ORDER — ONDANSETRON 2 MG/ML
4 INJECTION INTRAMUSCULAR; INTRAVENOUS ONCE AS NEEDED
Status: DISCONTINUED | OUTPATIENT
Start: 2024-04-29 | End: 2024-04-29 | Stop reason: HOSPADM

## 2024-04-29 RX ORDER — SODIUM CHLORIDE, SODIUM LACTATE, POTASSIUM CHLORIDE, CALCIUM CHLORIDE 600; 310; 30; 20 MG/100ML; MG/100ML; MG/100ML; MG/100ML
9 INJECTION, SOLUTION INTRAVENOUS CONTINUOUS PRN
Status: DISCONTINUED | OUTPATIENT
Start: 2024-04-29 | End: 2024-04-29 | Stop reason: HOSPADM

## 2024-04-29 RX ORDER — ACETAMINOPHEN 500 MG
1000 TABLET ORAL ONCE
Status: DISCONTINUED | OUTPATIENT
Start: 2024-04-29 | End: 2024-04-29

## 2024-04-29 RX ORDER — ONDANSETRON 2 MG/ML
INJECTION INTRAMUSCULAR; INTRAVENOUS AS NEEDED
Status: DISCONTINUED | OUTPATIENT
Start: 2024-04-29 | End: 2024-04-29 | Stop reason: SURG

## 2024-04-29 RX ORDER — MIDAZOLAM HYDROCHLORIDE 2 MG/2ML
2 INJECTION, SOLUTION INTRAMUSCULAR; INTRAVENOUS ONCE
Status: COMPLETED | OUTPATIENT
Start: 2024-04-29 | End: 2024-04-29

## 2024-04-29 RX ORDER — DEXAMETHASONE SODIUM PHOSPHATE 4 MG/ML
INJECTION, SOLUTION INTRA-ARTICULAR; INTRALESIONAL; INTRAMUSCULAR; INTRAVENOUS; SOFT TISSUE AS NEEDED
Status: DISCONTINUED | OUTPATIENT
Start: 2024-04-29 | End: 2024-04-29 | Stop reason: SURG

## 2024-04-29 RX ADMIN — HYDROMORPHONE HYDROCHLORIDE 0.25 MG: 1 INJECTION, SOLUTION INTRAMUSCULAR; INTRAVENOUS; SUBCUTANEOUS at 16:22

## 2024-04-29 RX ADMIN — OXYCODONE HYDROCHLORIDE 5 MG: 5 TABLET ORAL at 16:23

## 2024-04-29 RX ADMIN — HYDROMORPHONE HYDROCHLORIDE 0.5 MG: 1 INJECTION, SOLUTION INTRAMUSCULAR; INTRAVENOUS; SUBCUTANEOUS at 16:15

## 2024-04-29 RX ADMIN — FENTANYL CITRATE 50 MCG: 50 INJECTION, SOLUTION INTRAMUSCULAR; INTRAVENOUS at 14:29

## 2024-04-29 RX ADMIN — SODIUM CHLORIDE, POTASSIUM CHLORIDE, SODIUM LACTATE AND CALCIUM CHLORIDE 9 ML/HR: 600; 310; 30; 20 INJECTION, SOLUTION INTRAVENOUS at 13:50

## 2024-04-29 RX ADMIN — FENTANYL CITRATE 50 MCG: 50 INJECTION, SOLUTION INTRAMUSCULAR; INTRAVENOUS at 14:17

## 2024-04-29 RX ADMIN — MIDAZOLAM HYDROCHLORIDE 2 MG: 1 INJECTION, SOLUTION INTRAMUSCULAR; INTRAVENOUS at 14:07

## 2024-04-29 RX ADMIN — Medication 200 MCG: at 15:32

## 2024-04-29 RX ADMIN — Medication 200 MCG: at 15:38

## 2024-04-29 RX ADMIN — DEXAMETHASONE SODIUM PHOSPHATE 8 MG: 4 INJECTION, SOLUTION INTRAMUSCULAR; INTRAVENOUS at 14:12

## 2024-04-29 RX ADMIN — Medication 200 MCG: at 15:17

## 2024-04-29 RX ADMIN — OXYCODONE HYDROCHLORIDE 5 MG: 5 TABLET ORAL at 16:56

## 2024-04-29 RX ADMIN — LIDOCAINE HYDROCHLORIDE 60 MG: 20 INJECTION, SOLUTION INTRAVENOUS at 14:17

## 2024-04-29 RX ADMIN — PROPOFOL 160 MG: 10 INJECTION, EMULSION INTRAVENOUS at 14:17

## 2024-04-29 RX ADMIN — SUGAMMADEX 150 MG: 100 INJECTION, SOLUTION INTRAVENOUS at 15:40

## 2024-04-29 RX ADMIN — ONDANSETRON HYDROCHLORIDE 4 MG: 2 SOLUTION INTRAMUSCULAR; INTRAVENOUS at 15:40

## 2024-04-29 RX ADMIN — Medication 2 G: at 14:11

## 2024-04-29 RX ADMIN — ROCURONIUM BROMIDE 50 MG: 10 INJECTION, SOLUTION INTRAVENOUS at 14:17

## 2024-04-29 RX ADMIN — HYDROMORPHONE HYDROCHLORIDE 0.5 MG: 1 INJECTION, SOLUTION INTRAMUSCULAR; INTRAVENOUS; SUBCUTANEOUS at 16:06

## 2024-04-29 RX ADMIN — SODIUM CHLORIDE, POTASSIUM CHLORIDE, SODIUM LACTATE AND CALCIUM CHLORIDE: 600; 310; 30; 20 INJECTION, SOLUTION INTRAVENOUS at 15:40

## 2024-04-29 RX ADMIN — Medication 200 MCG: at 15:10

## 2024-04-29 RX ADMIN — Medication 200 MCG: at 15:23

## 2024-04-29 NOTE — ANESTHESIA POSTPROCEDURE EVALUATION
Patient: Ragini Dozier    Procedure Summary       Date: 04/29/24 Room / Location: [facility] OR 02 / [facility] MAIN OR    Anesthesia Start: 1411 Anesthesia Stop: 1600    Procedure: BILATERAL BREAST RECONSTRUCTION WITH FAT GRAFT, CAPSULOTOMY AND BILATERAL MASTOPEXY (Bilateral: Breast) Diagnosis:       Breast reconstruction deformity      (Breast reconstruction deformity [N65.0])    Surgeons: Liu Chirinos MD Provider: Keshav Mckinley CRNA    Anesthesia Type: general ASA Status: 2            Anesthesia Type: general    Vitals  Vitals Value Taken Time   /77 04/29/24 1643   Temp 36.7 °C (98.1 °F) 04/29/24 1605   Pulse 79 04/29/24 1645   Resp 14 04/29/24 1635   SpO2 90 % 04/29/24 1645   Vitals shown include unfiled device data.        Post Anesthesia Care and Evaluation    Patient location during evaluation: bedside  Patient participation: complete - patient participated  Level of consciousness: awake  Pain management: adequate    Airway patency: patent  PONV Status: none  Cardiovascular status: acceptable and stable  Respiratory status: acceptable  Hydration status: acceptable    Comments: An Anesthesiologist personally participated in the most demanding procedures (including induction and emergence if applicable) in the anesthesia plan, monitored the course of anesthesia administration at frequent intervals and remained physically present and available for immediate diagnosis and treatment of emergencies.

## 2024-04-29 NOTE — ANESTHESIA PREPROCEDURE EVALUATION
Anesthesia Evaluation     Patient summary reviewed and Nursing notes reviewed   no history of anesthetic complications:   NPO Solid Status: > 8 hours  NPO Liquid Status: > 2 hours           Airway   Mallampati: I  TM distance: >3 FB  Neck ROM: full  No difficulty expected  Dental - normal exam     Pulmonary - negative pulmonary ROS and normal exam    breath sounds clear to auscultation  Cardiovascular - negative cardio ROS and normal exam  Exercise tolerance: good (4-7 METS)    Rhythm: regular  Rate: normal        Neuro/Psych  (+) headaches, psychiatric history Anxiety and Depression  GI/Hepatic/Renal/Endo    (+) GERD    Musculoskeletal (-) negative ROS    Abdominal    Substance History - negative use     OB/GYN negative ob/gyn ROS         Other      history of cancer (breast s/p chemo last year last dose) remission    ROS/Med Hx Other: PAT Nursing Notes unavailable.                     Anesthesia Plan    ASA 2     general     (Patient understands anesthesia not responsible for dental damage.)  intravenous induction     Anesthetic plan, risks, benefits, and alternatives have been provided, discussed and informed consent has been obtained with: patient.  Pre-procedure education provided  Plan discussed with CRNA.        CODE STATUS:

## 2024-04-29 NOTE — OP NOTE
Plastic Surgery Op Note  BILATERAL BREAST RECONSTRUCTION WITH FAT GRAFT, MASTOPEXY , LEFT PARTIAL CAPSULECTOMY   BREAST RECONSTRUCTION REVISION    Ragini Dozier  4/29/2024    Pre-op Diagnosis:   Breast reconstruction deformity [N65.0]    Post-Op Diagnosis Codes:     * Breast reconstruction deformity [N65.0]  Disproportion between native and reconstructed breast     Anesthesia: General    Staff:   Circulator: Dana Li RN  Scrub Person: Danielle Rodriguez; Deepa Brown  Assistant: Jing Blank RN CSA  Other: Irene Nunez RN    Surgeon: Dr Chirinos  First Assistant: JSOE Jon who was instrumental in helping with hemostasis, visualization and retraction structures as well as surgical closure.  Her skilled assistance was necessary for the success of this case.      Estimated Blood Loss: 100 mL    Specimens:   Order Name Source Comment Collection Info Order Time   NICOTINE SCREEN, URINE Urine, Clean Catch  Collected By: Miguel, April 4/29/2024 11:52 AM     Release to patient   Routine Release              Drains:   Closed/Suction Drain 1 Inferior;Left Abdomen Bulb 15 Fr. (Active)   Dressing Status Clean;Dry;Intact 04/29/24 1554   Status To bulb suction 04/29/24 1554       Findings:   Radiation injury to the left lateral breast   Fat graft: 210cc    Implants:      No new implants were used     Complications: none     After risks and benefits were discussed with patient and informed consent was signed, patient was taken to the OR and positioned supine. The surgical site was then prepped in a sterile fashion way and a time out was performed confirming patient's name and procedure to be performed. All surgical team was introduced by name.   I started with tumescent injection over the flanks and knees and with a cannula #4, lipoaspirate was obtained and fat was trapped on a fat graft system. While the fat was being processed, I proceeded with the remaining of the surgery.  I incised the areolas  bilaterally with an areolotomo of 38 mm. Then, the dermis was released and the nipple was repositioned. Then fat graft was done. On the left, I opened the capsule, removed the implant temporarily and performed a partial capsulectomy at the area of radiation scar. Then, that area was closed with 3-0 vicryl. The pocket was irrigated with irrisept and after a drain was placed, the implant was inserted back. The skin was closed in two layers of 3-0 vicryl and a surgical glue system was applied.   Patient tolerated procedure well, there were no complications, all instruments were checked and patient was awakened and taken to PACU in stable condition.  I was present for the entire procedure.     Date: 4/29/2024  Time: 16:06 EDT           Liu Chirinos MD  04/29/24  16:06 EDASMITA

## 2024-04-29 NOTE — INTERVAL H&P NOTE
H&P reviewed. The patient was examined and there are no changes to the H&P.    Patient is not tachycardic  Respirations are non elaborated  Vitals:    04/29/24 1154   BP: 115/73   Pulse: 72   Resp: 18   Temp: 97.6 °F (36.4 °C)   SpO2: 99%     Risks and benefits reminded to patient who agrees to proceed         No

## 2024-04-29 NOTE — DISCHARGE INSTRUCTIONS
DISCHARGE INSTRUCTIONS  BREAST SURGERY           For your surgery you had:  General anesthesia (you may have a sore throat for the first 24 hours)     You may experience dizziness, drowsiness, or light-headedness for several hours following surgery/procedure.  Do not stay alone today or tonight.  Limit your activity for 24 hours.  You should not drive, operate machinery, drink alcohol, or sign legally binding documents for 24 hours or while you are taking pain medication.  Resume your diet slowly.  Follow whatever special dietary instructions you may have been given by your doctor.  NOTIFY YOUR DOCTOR IF YOU EXPERIENCE ANY OF THE FOLLOWING:  Temperature greater than 101 degrees Fahrenheit  Shaking Chills  Redness or excessive drainage from incision  Nausea, vomiting and/or pain that is not controlled by prescribed medications  Increase in bleeding or bleeding that is excessive  Unable to urinate in 6 hours after surgery  If unable to reach your doctor, please go to the closest Emergency Room    Do not do any heavy lifting, strenuous activity until released by MD.  After your surgery you may notice some bruising.  This is normal.  Medications per physician instructions as indicated on After Visit Summary.    Last dose of pain medication was given at:    Tylenol 1000mg at 12:30  Zofran 4mg at 3:40pm  Oxycodone 5mg last dose at 4:56pm.    SPECIAL INSTRUCTIONS:  Ok for regular diet  Do not drive for next 2 weeks  Ok to shower in 3 days but be aware you are a fall risk so have someone with you or place a chair in the shower. It is ok to wet the breast. Wash the drain site with water and liquid soap everyday. No sponge or cloths. Wash the drain site before other parts of the body  Strip drains q 8 hours and record drain output daily. Wash hands or wear gloves when manipulating drains.  Do not use your under garments to secure your drains.   Do not exercise for the next 6 weeks.  Sleep in an upright position  No bra for  1 week. After that, wear a comfortable soft bra  Ok to move arms slowly to the shoulder level (brushing hair movement)  Do not fly for 2 months  Take post-operative medications as directed on the bottle.   If you experience any issues or concerns, please contact the office at [phone]. If after hours a call service will notify the on-call provider.

## 2024-04-30 ENCOUNTER — TELEPHONE (OUTPATIENT)
Dept: PLASTIC SURGERY | Facility: CLINIC | Age: 64
End: 2024-04-30
Payer: OTHER GOVERNMENT

## 2024-04-30 ENCOUNTER — TREATMENT (OUTPATIENT)
Dept: PLASTIC SURGERY | Facility: CLINIC | Age: 64
End: 2024-04-30
Payer: OTHER GOVERNMENT

## 2024-04-30 DIAGNOSIS — R52 PAIN: Primary | ICD-10-CM

## 2024-04-30 LAB
QT INTERVAL: 393 MS
QTC INTERVAL: 436 MS

## 2024-04-30 RX ORDER — HYDROCODONE BITARTRATE AND ACETAMINOPHEN 5; 325 MG/1; MG/1
1 TABLET ORAL EVERY 6 HOURS PRN
Qty: 30 TABLET | Refills: 0 | Status: SHIPPED | OUTPATIENT
Start: 2024-04-30

## 2024-04-30 NOTE — PROGRESS NOTES
"Patient called stating pain is not controlled with current regimen requesting something else for pain and reported vomiting \"black stuff\"  I called and talked to patient, she reports the vomiting was last PM approx 3 times and was black, she did not look very close but did not think it looked like coffee grounds, denies bright red blood or clots. Took Zofran this am and has been nauseated with poor appetite but no more vomiting.  Pain is 10/10    Hydrocodone 5/325 sent to pharmacy of preference, she was instructed to use caution taking pain meds with gabapentin as can cause dizziness, GI upset, etc. Eat with pain meds and HOLD naproxen at this time due to dark emesis last pm. She was also instructed to continue taking Zofran. Go to the ED for any more vomiting or vomiting blood or more \"black stuff\". She also reported a sore throat, \"feels thick\", instructed to gargle with warm salt water or sip hot tea with honey, She verbalized understanding.   "

## 2024-05-08 ENCOUNTER — OFFICE VISIT (OUTPATIENT)
Dept: PLASTIC SURGERY | Facility: CLINIC | Age: 64
End: 2024-05-08
Payer: OTHER GOVERNMENT

## 2024-05-08 VITALS
DIASTOLIC BLOOD PRESSURE: 72 MMHG | SYSTOLIC BLOOD PRESSURE: 107 MMHG | TEMPERATURE: 99.5 F | HEART RATE: 94 BPM | BODY MASS INDEX: 21.75 KG/M2 | OXYGEN SATURATION: 94 % | HEIGHT: 60 IN | WEIGHT: 110.8 LBS

## 2024-05-08 DIAGNOSIS — N65.0 BREAST RECONSTRUCTION DEFORMITY: ICD-10-CM

## 2024-05-08 DIAGNOSIS — R11.0 NAUSEA: Primary | ICD-10-CM

## 2024-05-08 PROCEDURE — 99024 POSTOP FOLLOW-UP VISIT: CPT | Performed by: SURGERY

## 2024-05-08 RX ORDER — PROMETHAZINE HYDROCHLORIDE 25 MG/1
25 TABLET ORAL EVERY 6 HOURS PRN
Qty: 10 TABLET | Refills: 0 | Status: SHIPPED | OUTPATIENT
Start: 2024-05-08

## 2024-05-14 ENCOUNTER — OFFICE VISIT (OUTPATIENT)
Dept: PLASTIC SURGERY | Facility: CLINIC | Age: 64
End: 2024-05-14
Payer: OTHER GOVERNMENT

## 2024-05-14 VITALS
OXYGEN SATURATION: 96 % | TEMPERATURE: 98.3 F | HEART RATE: 75 BPM | DIASTOLIC BLOOD PRESSURE: 68 MMHG | WEIGHT: 113 LBS | SYSTOLIC BLOOD PRESSURE: 109 MMHG | HEIGHT: 60 IN | BODY MASS INDEX: 22.19 KG/M2

## 2024-05-14 DIAGNOSIS — N65.0 BREAST RECONSTRUCTION DEFORMITY: ICD-10-CM

## 2024-05-14 DIAGNOSIS — Z09 POSTOPERATIVE FOLLOW-UP: Primary | ICD-10-CM

## 2024-05-14 PROCEDURE — 99024 POSTOP FOLLOW-UP VISIT: CPT | Performed by: NURSE PRACTITIONER

## 2024-05-14 NOTE — PROGRESS NOTES
"Chief Complaint  Post-op Follow-up (Possible drain pull)    Subjective  the drain hurts        History of Present Illness  Ragini Dozier is a 64 y.o. female who presents to [facility] for Postoperative Follow-Up of BILATERAL BREAST RECONSTRUCTION WITH FAT GRAFT, CAPSULOTOMY AND BILATERAL MASTOPEXY 4/29/24.    Pt presents today for possible drain pull.   Left: 10, 10, 10CC's. Has some red areas on both nipples from adhesive. Biopatch from drain site fell off yesterday.       Allergies: Patient has no known allergies.  Allergies Reconciled.    Review of Systems   Constitutional: Negative.    HENT: Negative.     Eyes: Negative.    Respiratory: Negative.     Cardiovascular: Negative.    Gastrointestinal: Negative.    Endocrine: Negative.    Genitourinary: Negative.    Musculoskeletal:  Positive for myalgias.        Pain at drain site   Skin:  Positive for color change.   Allergic/Immunologic: Negative.    Neurological: Negative.    Hematological: Negative.    Psychiatric/Behavioral: Negative.        All review of system has been reviewed and it  is negative except the ones note above.     Objective     /68 (BP Location: Left arm, Patient Position: Sitting, Cuff Size: Adult)   Pulse 75   Temp 98.3 °F (36.8 °C) (Oral)   Ht 152.4 cm (60\")   Wt 51.3 kg (113 lb)   SpO2 96%   BMI 22.07 kg/m²     Body mass index is 22.07 kg/m².    Physical Exam   Vitals reviewed.  Constitutional  She appears well-developed and well-nourished.     Eyes  System normal.       Cardiovascular: Normal rate.     Pulmonary/Chest  Effort normal.     Skin  Skin is warm and dry.     Psychiatric  She has a normal mood and affect.     Breast    Additional breast conditions include the following:   Right Breast: There is a surgical incision.The following is true regarding the right breast:     Steri-strips in place     Breast is soft                 Nipple is viable  Left Breast: There is a " surgical incision. The following is true regarding the left breast:     Steri-strips in place     Breast is soft                 Nipple is viable      Overall breast comments: Skin glue matrix intact, pin point area of redness from peeling from skin, incision under skin glue is pink and appears intact on right nipple, left skin glue matrix intact with scant amount of old blood under glue, surrounding skin pink. No s/s of infection.   Left breast hx of radiation, drain in place, clear sanguinous drainage noted. Insertion site of drain mildly irritated, biopatch missing and dressing rolled up at bottom edge. Site cleansed with alcohol, new biopathc and 2x2 guaze with large tegaderm  applied over the site. Assisted patient with safety pinning drain to shirt and not in shorts pocket as appears to be pulling on drain and causing discomfort.     Breast: healing incisions, glue adhesive on bilateral nipples, no open areas, small irritation areas, left breast drain intact on radiated breast, bruising has resolved, remaining swelling.  Discussed reasons that drain needs to remain in place.  Result Review : no new results to review this visit      Assessment and Plan      Diagnoses and all orders for this visit:    1. Postoperative follow-up (Primary)      Plan:  Redressed left drain site with a new biopatch and tegaderm. Gave patient some supplies for her drain. We safety pinned her drain to her shirt so she isn't having discomfort to her side from the drain pulling or getting hung in her pocket. Instructed to monitor activity as increased activity can cause increased drainage and discomfort at drain site. RTC on Friday for possible drain removal. Patient is aware to have this drain at zero for 2 days before we pull it.  patient instructed to call office for any questions or concerns and verbalized understanding of all instructions given.    Scribed by Francheska Gilbert MA, acting as a scribe for ANGELICA Hyatt,  05/14/24 11:56 EDT.  ANGELICA Hyatt's signature on the note affirms that the note adequately documents the care provided.          Return in about 3 days (around 5/17/2024) for possible left drain removal-radiated left breast needs to be at zero.    Patient was given instructions and counseling regarding her condition. Please see specific information pulled into the AVS if appropriate.     Francheska Gilbert MA  05/14/2024   Answers submitted by the patient for this visit:  Other (Submitted on 5/8/2024)  Please describe your symptoms.: Follow up  Have you had these symptoms before?: Yes  How long have you been having these symptoms?: 5-7 days  Primary Reason for Visit (Submitted on 5/8/2024)  What is the primary reason for your visit?: Other

## 2024-05-20 ENCOUNTER — TELEPHONE (OUTPATIENT)
Dept: PLASTIC SURGERY | Facility: CLINIC | Age: 64
End: 2024-05-20

## 2024-05-20 ENCOUNTER — OFFICE VISIT (OUTPATIENT)
Dept: PLASTIC SURGERY | Facility: CLINIC | Age: 64
End: 2024-05-20
Payer: OTHER GOVERNMENT

## 2024-05-20 VITALS
TEMPERATURE: 98.8 F | WEIGHT: 113.4 LBS | HEIGHT: 60 IN | BODY MASS INDEX: 22.26 KG/M2 | DIASTOLIC BLOOD PRESSURE: 76 MMHG | OXYGEN SATURATION: 98 % | HEART RATE: 89 BPM | SYSTOLIC BLOOD PRESSURE: 119 MMHG

## 2024-05-20 DIAGNOSIS — Z17.0 MALIGNANT NEOPLASM OF UPPER-OUTER QUADRANT OF LEFT BREAST IN FEMALE, ESTROGEN RECEPTOR POSITIVE: Primary | ICD-10-CM

## 2024-05-20 DIAGNOSIS — C50.412 MALIGNANT NEOPLASM OF UPPER-OUTER QUADRANT OF LEFT BREAST IN FEMALE, ESTROGEN RECEPTOR POSITIVE: Primary | ICD-10-CM

## 2024-05-20 PROCEDURE — 99024 POSTOP FOLLOW-UP VISIT: CPT | Performed by: SURGERY

## 2024-05-20 NOTE — PROGRESS NOTES
"Chief Complaint  Post-op Follow-up    Subjective  the drain hurts        History of Present Illness  Ragini Dozier is a 64 y.o. female who presents to [facility] for Postoperative Follow-Up of BILATERAL BREAST RECONSTRUCTION WITH FAT GRAFT, CAPSULOTOMY AND BILATERAL MASTOPEXY 4/29/24.    Pt presents today for warmth to bilateral breast and heaviness to left breast.        Allergies: Patient has no known allergies.  Allergies Reconciled.    Review of Systems   Constitutional: Negative.    HENT: Negative.     Eyes: Negative.    Respiratory: Negative.     Cardiovascular: Negative.    Gastrointestinal: Negative.    Endocrine: Negative.    Genitourinary: Negative.    Musculoskeletal:  Positive for myalgias.        Pain at drain site   Skin:  Positive for color change.   Allergic/Immunologic: Negative.    Neurological: Negative.    Hematological: Negative.    Psychiatric/Behavioral: Negative.        All review of system has been reviewed and it  is negative except the ones note above.     Objective     /76 (BP Location: Left arm, Patient Position: Sitting, Cuff Size: Adult)   Pulse 89   Temp 98.8 °F (37.1 °C) (Temporal)   Ht 152.4 cm (60\")   Wt 51.4 kg (113 lb 6.4 oz)   SpO2 98%   BMI 22.15 kg/m²     Body mass index is 22.15 kg/m².      Breast: left breast hard, no signs of infection    Result Review : no new results to review this visit      Assessment and Plan      Diagnoses and all orders for this visit:    1. Malignant neoplasm of upper-outer quadrant of left breast in female, estrogen receptor positive (Primary)        Plan:  Left drain removed, applied ointment and covered with gauze.  Applied betadine to right breast nipple, gave supplies to pt advised to apply after showering and to cover with gauze.  Therapeutic us to left breast to soften from radiation. Pt will continue weekly for the next nine weeks.  Pt is to return to clinic in 3 " months.  Advised to call office for any issues or concerns.    Scribed by Carlie Spain, acting as a scribe for Liu Chirinos MD, 05/20/24 15:26 EDT.  Liu Chirinos MD's signature on the note affirms that the note adequately documents the care provided.            Return RTC in 3 months.    Patient was given instructions and counseling regarding her condition. Please see specific information pulled into the AVS if appropriate.     Liu Chirinos MD  05/20/2024

## 2024-05-20 NOTE — TELEPHONE ENCOUNTER
Patient called with concerns about the right breast being very red around the nipple and warm to touch.  Left breast is also warm to touch and heavy.

## 2024-05-28 ENCOUNTER — CLINICAL SUPPORT (OUTPATIENT)
Dept: PLASTIC SURGERY | Facility: CLINIC | Age: 64
End: 2024-05-28
Payer: OTHER GOVERNMENT

## 2024-05-28 DIAGNOSIS — L58.9 RADIATION DERMATITIS: Primary | ICD-10-CM

## 2024-05-28 PROCEDURE — 97035 APP MDLTY 1+ULTRASOUND EA 15: CPT | Performed by: SURGERY

## 2024-05-28 NOTE — PROGRESS NOTES
Patient comes in today for therapeutic ultrasound to left breast. Patient states she has been having some tenderness and shocks and zings occasionally. Patient is aware this is normal. Patient states she will be going out of town in a couple weeks and will need to rearrange her ultrasound appointments. Patient's  states she has been digging and overdoing it some in the yard but patient states no concerns. Does have some scabbed areas on bilateral breasts. Patient states the other day she had a pus area pop on right breast from a possible suture. RTC next week for next ultrasound. Patient is aware to call the office with any other concerns.

## 2024-06-04 ENCOUNTER — CLINICAL SUPPORT (OUTPATIENT)
Dept: PLASTIC SURGERY | Facility: CLINIC | Age: 64
End: 2024-06-04
Payer: OTHER GOVERNMENT

## 2024-06-04 DIAGNOSIS — L58.9 RADIATION DERMATITIS: Primary | ICD-10-CM

## 2024-06-04 PROCEDURE — 97035 APP MDLTY 1+ULTRASOUND EA 15: CPT | Performed by: SURGERY

## 2024-06-04 NOTE — PROGRESS NOTES
Theraputic Ultrasound done in office today to left breast for 7 min, 1.1w/cm2 3.3mhz. Patient tolerated well and will return for next scheduled ultrasound/appt.

## 2024-06-11 ENCOUNTER — CLINICAL SUPPORT (OUTPATIENT)
Dept: PLASTIC SURGERY | Facility: CLINIC | Age: 64
End: 2024-06-11
Payer: OTHER GOVERNMENT

## 2024-06-11 DIAGNOSIS — L58.9 RADIATION DERMATITIS: Primary | ICD-10-CM

## 2024-06-11 PROCEDURE — 97035 APP MDLTY 1+ULTRASOUND EA 15: CPT | Performed by: SURGERY

## 2024-06-11 NOTE — PROGRESS NOTES
Theraputic Ultrasound done in office today to left breast for 7 min, 1.1w/cm2 3.3mhz. Patient tolerated well and will return for next scheduled ultrasound/appt.    Pt states the pain is improving.

## 2024-07-02 ENCOUNTER — CLINICAL SUPPORT (OUTPATIENT)
Dept: PLASTIC SURGERY | Facility: CLINIC | Age: 64
End: 2024-07-02
Payer: OTHER GOVERNMENT

## 2024-07-02 DIAGNOSIS — L58.9 RADIATION DERMATITIS: Primary | ICD-10-CM

## 2024-07-02 PROCEDURE — 97035 APP MDLTY 1+ULTRASOUND EA 15: CPT | Performed by: SURGERY

## 2024-07-02 NOTE — PROGRESS NOTES
Theraputic Ultrasound done in office today to left breast for 7 min, 1.1w/cm2 3.3mhz. Patient tolerated well and will return for next scheduled ultrasound/appt.    Pt states the pain is improving and getting softer.

## 2024-07-09 ENCOUNTER — CLINICAL SUPPORT (OUTPATIENT)
Dept: PLASTIC SURGERY | Facility: CLINIC | Age: 64
End: 2024-07-09
Payer: OTHER GOVERNMENT

## 2024-07-09 DIAGNOSIS — L58.9 RADIATION DERMATITIS: Primary | ICD-10-CM

## 2024-07-09 PROCEDURE — 97035 APP MDLTY 1+ULTRASOUND EA 15: CPT | Performed by: SURGERY

## 2024-07-23 ENCOUNTER — CLINICAL SUPPORT (OUTPATIENT)
Dept: PLASTIC SURGERY | Facility: CLINIC | Age: 64
End: 2024-07-23
Payer: OTHER GOVERNMENT

## 2024-07-23 DIAGNOSIS — L58.9 RADIATION DERMATITIS: Primary | ICD-10-CM

## 2024-08-23 NOTE — PROGRESS NOTES
"Chief Complaint  Follow-up    Subjective  the drain hurts        History of Present Illness  Ragini Dozier is a 64 y.o. female who presents to [facility] for Postoperative Follow-Up of BILATERAL BREAST RECONSTRUCTION WITH FAT GRAFT, CAPSULOTOMY AND BILATERAL MASTOPEXY 4/29/24.    Pt presents today for 4m post op.        Allergies: Patient has no known allergies.  Allergies Reconciled.    Review of Systems   Constitutional: Negative.    HENT: Negative.     Eyes: Negative.    Respiratory: Negative.     Cardiovascular: Negative.    Gastrointestinal: Negative.    Endocrine: Negative.    Genitourinary: Negative.    Musculoskeletal:  Positive for myalgias.        Pain at drain site   Skin:  Positive for color change.   Allergic/Immunologic: Negative.    Neurological: Negative.    Hematological: Negative.    Psychiatric/Behavioral: Negative.        All review of system has been reviewed and it  is negative except the ones note above.     Objective     /82 (BP Location: Left arm, Patient Position: Sitting, Cuff Size: Adult)   Pulse 66   Ht 152.4 cm (60\")   Wt 51.3 kg (113 lb)   SpO2 96%   BMI 22.07 kg/m²     Body mass index is 22.07 kg/m².      Breast: improved symmetry, painless, Baker 1 on left breast    Result Review : no new results to review this visit      Assessment and Plan      Diagnoses and all orders for this visit:    1. Breast reconstruction deformity (Primary)          Plan:    Photos obtained     Pt will return to office if decides to have any more revisions.    Scribed by Carlie Spain, acting as a scribe for Liu Chirinos MD, 08/29/24 14:55 EDT.  Liu Chirinos MD's signature on the note affirms that the note adequately documents the care provided.        Return if symptoms worsen or fail to improve.    Patient was given instructions and counseling regarding her condition. Please see specific information pulled into the AVS if " appropriate.     Liu Chirinos MD  08/29/2024

## 2024-08-29 ENCOUNTER — OFFICE VISIT (OUTPATIENT)
Dept: PLASTIC SURGERY | Facility: CLINIC | Age: 64
End: 2024-08-29
Payer: OTHER GOVERNMENT

## 2024-08-29 VITALS
WEIGHT: 113 LBS | SYSTOLIC BLOOD PRESSURE: 124 MMHG | OXYGEN SATURATION: 96 % | HEIGHT: 60 IN | BODY MASS INDEX: 22.19 KG/M2 | HEART RATE: 66 BPM | DIASTOLIC BLOOD PRESSURE: 82 MMHG

## 2024-08-29 DIAGNOSIS — N65.0 BREAST RECONSTRUCTION DEFORMITY: Primary | ICD-10-CM

## 2024-08-29 PROCEDURE — 99024 POSTOP FOLLOW-UP VISIT: CPT | Performed by: SURGERY

## 2024-09-04 ENCOUNTER — HOSPITAL ENCOUNTER (OUTPATIENT)
Dept: ONCOLOGY | Facility: HOSPITAL | Age: 64
Discharge: HOME | End: 2024-09-04
Payer: OTHER GOVERNMENT

## 2024-09-04 ENCOUNTER — LAB (OUTPATIENT)
Dept: ONCOLOGY | Facility: HOSPITAL | Age: 64
End: 2024-09-04
Payer: OTHER GOVERNMENT

## 2024-09-04 ENCOUNTER — OFFICE VISIT (OUTPATIENT)
Dept: ONCOLOGY | Facility: HOSPITAL | Age: 64
End: 2024-09-04
Payer: OTHER GOVERNMENT

## 2024-09-04 VITALS
SYSTOLIC BLOOD PRESSURE: 116 MMHG | TEMPERATURE: 97.8 F | OXYGEN SATURATION: 98 % | WEIGHT: 112 LBS | HEIGHT: 60 IN | HEART RATE: 79 BPM | RESPIRATION RATE: 16 BRPM | DIASTOLIC BLOOD PRESSURE: 74 MMHG | BODY MASS INDEX: 21.99 KG/M2

## 2024-09-04 DIAGNOSIS — M85.80 OSTEOPENIA AFTER MENOPAUSE: Primary | ICD-10-CM

## 2024-09-04 DIAGNOSIS — C50.412 MALIGNANT NEOPLASM OF UPPER-OUTER QUADRANT OF LEFT BREAST IN FEMALE, ESTROGEN RECEPTOR POSITIVE: ICD-10-CM

## 2024-09-04 DIAGNOSIS — Z17.0 MALIGNANT NEOPLASM OF UPPER-OUTER QUADRANT OF LEFT BREAST IN FEMALE, ESTROGEN RECEPTOR POSITIVE: ICD-10-CM

## 2024-09-04 DIAGNOSIS — Z78.0 OSTEOPENIA AFTER MENOPAUSE: Primary | ICD-10-CM

## 2024-09-04 DIAGNOSIS — Z12.31 ENCOUNTER FOR SCREENING MAMMOGRAM FOR MALIGNANT NEOPLASM OF BREAST: Primary | ICD-10-CM

## 2024-09-04 LAB
ALBUMIN SERPL-MCNC: 4.6 G/DL (ref 3.5–5.2)
ALBUMIN/GLOB SERPL: 2.2 G/DL
ALP SERPL-CCNC: 58 U/L (ref 39–117)
ALT SERPL W P-5'-P-CCNC: 16 U/L (ref 1–33)
ANION GAP SERPL CALCULATED.3IONS-SCNC: 8.8 MMOL/L (ref 5–15)
AST SERPL-CCNC: 26 U/L (ref 1–32)
BASOPHILS # BLD AUTO: 0.03 10*3/MM3 (ref 0–0.2)
BASOPHILS NFR BLD AUTO: 0.6 % (ref 0–1.5)
BILIRUB SERPL-MCNC: 0.5 MG/DL (ref 0–1.2)
BUN SERPL-MCNC: 15 MG/DL (ref 8–23)
BUN/CREAT SERPL: 16.1 (ref 7–25)
CALCIUM SPEC-SCNC: 9.5 MG/DL (ref 8.6–10.5)
CHLORIDE SERPL-SCNC: 102 MMOL/L (ref 98–107)
CO2 SERPL-SCNC: 27.2 MMOL/L (ref 22–29)
CREAT SERPL-MCNC: 0.93 MG/DL (ref 0.57–1)
DEPRECATED RDW RBC AUTO: 46.4 FL (ref 37–54)
EGFRCR SERPLBLD CKD-EPI 2021: 69.6 ML/MIN/1.73
EOSINOPHIL # BLD AUTO: 0.07 10*3/MM3 (ref 0–0.4)
EOSINOPHIL NFR BLD AUTO: 1.4 % (ref 0.3–6.2)
ERYTHROCYTE [DISTWIDTH] IN BLOOD BY AUTOMATED COUNT: 13.7 % (ref 12.3–15.4)
GLOBULIN UR ELPH-MCNC: 2.1 GM/DL
GLUCOSE SERPL-MCNC: 217 MG/DL (ref 65–99)
HCT VFR BLD AUTO: 41 % (ref 34–46.6)
HGB BLD-MCNC: 13.4 G/DL (ref 12–15.9)
IMM GRANULOCYTES # BLD AUTO: 0.01 10*3/MM3 (ref 0–0.05)
IMM GRANULOCYTES NFR BLD AUTO: 0.2 % (ref 0–0.5)
LYMPHOCYTES # BLD AUTO: 1.03 10*3/MM3 (ref 0.7–3.1)
LYMPHOCYTES NFR BLD AUTO: 21.2 % (ref 19.6–45.3)
MAGNESIUM SERPL-MCNC: 2 MG/DL (ref 1.6–2.4)
MCH RBC QN AUTO: 30 PG (ref 26.6–33)
MCHC RBC AUTO-ENTMCNC: 32.7 G/DL (ref 31.5–35.7)
MCV RBC AUTO: 91.9 FL (ref 79–97)
MONOCYTES # BLD AUTO: 0.32 10*3/MM3 (ref 0.1–0.9)
MONOCYTES NFR BLD AUTO: 6.6 % (ref 5–12)
NEUTROPHILS NFR BLD AUTO: 3.4 10*3/MM3 (ref 1.7–7)
NEUTROPHILS NFR BLD AUTO: 70 % (ref 42.7–76)
PHOSPHATE SERPL-MCNC: 3.3 MG/DL (ref 2.5–4.5)
PLATELET # BLD AUTO: 211 10*3/MM3 (ref 140–450)
PMV BLD AUTO: 9.4 FL (ref 6–12)
POTASSIUM SERPL-SCNC: 3.5 MMOL/L (ref 3.5–5.2)
PROT SERPL-MCNC: 6.7 G/DL (ref 6–8.5)
RBC # BLD AUTO: 4.46 10*6/MM3 (ref 3.77–5.28)
SODIUM SERPL-SCNC: 138 MMOL/L (ref 136–145)
WBC NRBC COR # BLD AUTO: 4.86 10*3/MM3 (ref 3.4–10.8)

## 2024-09-04 PROCEDURE — 36415 COLL VENOUS BLD VENIPUNCTURE: CPT

## 2024-09-04 PROCEDURE — 84100 ASSAY OF PHOSPHORUS: CPT

## 2024-09-04 PROCEDURE — 80053 COMPREHEN METABOLIC PANEL: CPT

## 2024-09-04 PROCEDURE — 85025 COMPLETE CBC W/AUTO DIFF WBC: CPT

## 2024-09-04 PROCEDURE — 83735 ASSAY OF MAGNESIUM: CPT

## 2024-09-04 PROCEDURE — 99214 OFFICE O/P EST MOD 30 MIN: CPT | Performed by: NURSE PRACTITIONER

## 2024-09-04 PROCEDURE — G0463 HOSPITAL OUTPT CLINIC VISIT: HCPCS | Performed by: NURSE PRACTITIONER

## 2024-09-04 RX ORDER — ANASTROZOLE 1 MG/1
1 TABLET ORAL DAILY
Qty: 90 TABLET | Refills: 1 | Status: SHIPPED | OUTPATIENT
Start: 2024-09-04

## 2024-09-04 NOTE — PROGRESS NOTES
Chief Complaint/Reason for Referral:  Follow-up (Breast cancer-LT)    Aure Jacques, Aure Ojeda, ANGELICA    Records Obtained:  Records of the patients history including those obtained from  [facility] and patient information  were reviewed and summarized in detail.    Subjective    History of Present Illness  Ms. Ragini lombardo presents for 6 month follow up for breast cancer. Reports she got back from [place] recently.     She is on endocrine therapy with Anastrozole currently for stage IA left sided breast cancer. Initally started Letrozole and did not tolerate. Seems to be tolerating Letrozole better. Taking Calcium and Vitamin D supp as well. Here for Prolia injection today. She reports her dentist told her she has bone loss in the lower dental area when xrays were done recently.     Labs today: CBC is normal. CMP: normal.     Cancer Staging   Malignant neoplasm of upper-outer quadrant of left breast in female, estrogen receptor positive  Staging form: Breast, AJCC 8th Edition  - Clinical stage from 12/3/2020: Stage IA (cT1c, cN0, cM0, G2, ER+, RI+, HER2+) - Signed by Gia Davis APRN on 10/10/2023  - Pathologic stage from 5/11/2021: ypT0, pN0(sn), cM0 - Signed by Gia Davis APRN on 10/10/2023       Treatment intent: curative    Oncology/Hematology History Overview Note   HR positive, HER-2 positive breast cancer:  -Found on routine screening mammogram  -Diagnostic mammogram on 11/10/2020: Calcifications in the left breast  -11/10/2020 left breast biopsy: Pathology positive for DCIS, high-grade, estrogen receptor positive (5%, strong), progesterone receptor positive (10%, weak-moderate)  -11/19/2020 MRI of the breast: 1.8 cm hematoma at the site of the recent biopsy showing DCIS.  There is a 1.1 cm nonfocal mass enhancement at the superior lateral margin of the hematoma and a separate 1.2 cm suspicious mass along the inferior medial margin.  These correspond the masses seen on ultrasound on  10/20/2020  -12/23/2020 MRI guided breast biopsy: Left lower inner quadrant pathology positive for invasive ductal carcinoma, grade 2, estrogen receptor positive (3%, moderate), progesterone receptor positive (1%, weak), HER-2 positive (3+ by IHC), Ki-67 approximately 60%.  Associated with high-grade ductal carcinoma in situ.  -Cycle 1 of TCH P on 12/18/2020.  Echocardiogram on 12/14/2020 shows an EF of 55%. C3 on 1/29/21  -C5 of TCHP on 3/12/21. Held C6 due to side effects.  -Left lumpectomy on 5/11/2021: No residual invasive carcinoma found, 0/8 lymph nodes involved, ypT0N0    Osteoporosis:  -Patient reports osteoporosis diagnosed on DEXA scan in January 2021.  -Her primary care provider has treated her with Boniva as well as calcium/vitamin D     Malignant neoplasm of upper-outer quadrant of left breast in female, estrogen receptor positive   12/3/2020 Cancer Staged    Staging form: Breast, AJCC 8th Edition  - Clinical stage from 12/3/2020: Stage IA (cT1c, cN0, cM0, G2, ER+, TN+, HER2+) - Signed by Gia Davis APRN on 10/10/2023     5/11/2021 Cancer Staged    Staging form: Breast, AJCC 8th Edition  - Pathologic stage from 5/11/2021: ypT0, pN0(sn), cM0 - Signed by Gia Davis APRN on 10/10/2023     6/23/2021 Initial Diagnosis    Malignant neoplasm of upper-outer quadrant of left female breast (CMS/HCC)     6/24/2021 - 3/10/2022 Chemotherapy    OP BREAST Trastuzumab Q21D (maintenance)      Chemotherapy    TCHP: 12/18/2020-3/12/21 (5 cycles)  OP BREAST Trastuzumab-anns Q21D (maintenance)   - 6/3/21-present     6/29/2021 - 7/21/2021 Radiation    Radiation OncologyTreatment Course:  Ragini Dozier received 4,256 cGy in 16  fractions to left breast.         Review of Systems   Constitutional: Negative.    HENT: Negative.     Eyes: Negative.    Respiratory: Negative.     Cardiovascular: Negative.    Gastrointestinal: Negative.    Endocrine: Negative.    Genitourinary: Negative.    Musculoskeletal: Negative.    Skin:  Negative.         Loss of bone in mouth - pt reported from dentist   Allergic/Immunologic: Negative.    Neurological: Negative.    Hematological: Negative.    Psychiatric/Behavioral: Negative.     All other systems reviewed and are negative.      Current Outpatient Medications on File Prior to Visit   Medication Sig Dispense Refill    busPIRone (BUSPAR) 10 MG tablet Take 2 tablets by mouth 3 (Three) Times a Day.      Calcium Carbonate-Vitamin D (calcium-vitamin D) 500-200 MG-UNIT tablet per tablet 500 mg 2 (Two) Times a Day.      carboxymethylcellulose (REFRESH PLUS) 0.5 % solution       traZODone (DESYREL) 100 MG tablet Take 1 tablet by mouth Every Night.      zolpidem (AMBIEN) 10 MG tablet Take 0.5 tablets by mouth At Night As Needed. Takes 1/2 tab (5 mg)      [DISCONTINUED] anastrozole (ARIMIDEX) 1 MG tablet Take 1 tablet by mouth Daily. 90 tablet 1    acetaminophen (TYLENOL) 500 MG tablet Take 2 tablets by mouth Every 6 (Six) Hours As Needed for Moderate Pain for up to 18 doses. 18 tablet 0    gabapentin (Neurontin) 300 MG capsule Take 1 capsule by mouth 3 (Three) Times a Day for 18 doses. 18 capsule 0    HYDROcodone-acetaminophen (NORCO) 5-325 MG per tablet Take 1 tablet by mouth Every 6 (Six) Hours As Needed for Severe Pain. 30 tablet 0    promethazine (PHENERGAN) 25 MG tablet Take 1 tablet by mouth Every 6 (Six) Hours As Needed for Nausea or Vomiting. 10 tablet 0     No current facility-administered medications on file prior to visit.       No Known Allergies  Past Medical History:   Diagnosis Date    Breast cancer 2020    LEFT    Breast cancer screening by mammogram 2020    Depression     Encounter for Hemoccult screening 2019    GERD (gastroesophageal reflux disease)     Hx of radiation therapy     Insomnia     Malignant neoplasm of upper-outer quadrant of left female breast     Migraine headache     Osteoporosis      Past Surgical History:   Procedure Laterality Date    AUGMENTATION MAMMAPLASTY      BREAST  AUGMENTATION Bilateral 05/10/2022    Procedure: Bilateral implants with mesh and scar revision of left breast;  Surgeon: Liu Chirinos MD;  Location: formerly [facility] [place];  Service: Plastics;  Laterality: Bilateral;    BREAST LUMPECTOMY      BREAST LUMPECTOMY WITH SENTINEL NODE BIOPSY Left     BREAST RECONSTRUCTION Bilateral 4/29/2024    Procedure: BILATERAL BREAST RECONSTRUCTION WITH FAT GRAFT, CAPSULOTOMY AND BILATERAL MASTOPEXY;  Surgeon: Liu Chirinos MD;  Location: formerly [facility] MAIN OR;  Service: Plastics;  Laterality: Bilateral;    BUNIONECTOMY Bilateral     CATARACT EXTRACTION      COLONOSCOPY  2019    COLONOSCOPY N/A 08/09/2022    Procedure: COLONOSCOPY;  Surgeon: Hue Meyer MD;  Location: formerly [facility] ENDOSCOPY;  Service: Gastroenterology;  Laterality: N/A;  HEMORRHOIDS, POOR PREP    COLONOSCOPY N/A 1/9/2023    Procedure: COLONOSCOPY FOR SCREENING;  Surgeon: Hue Meyer MD;  Location: formerly [facility] ENDOSCOPY;  Service: Gastroenterology;  Laterality: N/A;  DIVERTICULOSIS HEMORRHOIDS    ENDOSCOPY N/A 10/01/2021    Procedure: ESOPHAGOGASTRODUODENOSCOPY WITH BIOPSY;  Surgeon: Hue Meyer MD;  Location: formerly [facility] ENDOSCOPY;  Service: Gastroenterology;  Laterality: N/A;  ESOPHAGITIS/GASTRITIS    HYSTERECTOMY      PARTIAL    OTHER SURGICAL HISTORY      BIOPSY    SKIN CANCER EXCISION      TONSILLECTOMY      UPPER GASTROINTESTINAL ENDOSCOPY      VENOUS ACCESS DEVICE (PORT) REMOVAL N/A 05/10/2022    Procedure: REMOVAL VENOUS ACCESS DEVICE;  Surgeon: Lizbeth York MD;  Location: formerly [facility] [place];  Service: General;  Laterality: N/A;     Social History     Socioeconomic History    Marital status:     Number of children: 2   Tobacco Use    Smoking status: Never     Passive exposure: Never    Smokeless tobacco: Never   Vaping Use    Vaping status: Never Used   Substance and Sexual Activity    Alcohol use: Yes     Comment: OCCASIONAL    Drug use: Never    Sexual activity: Defer      Family History   Problem Relation Age of Onset    Kidney cancer Father     Skin cancer Father     Other Father         MYELOMA    No Known Problems Mother     Malig Hyperthermia Neg Hx      Immunization History   Administered Date(s) Administered    COVID-19 (MODERNA) 1st,2nd,3rd Dose Monovalent 03/09/2021, 04/06/2021, 10/05/2021    Fluzone (or Fluarix & Flulaval for VFC) >6mos 10/14/2022, 09/22/2023    Influenza, Unspecified 11/02/2020, 11/02/2020    Pneumococcal Conjugate 20-Valent (PCV20) 08/29/2022       Tobacco Use: Low Risk  (9/4/2024)    Patient History     Smoking Tobacco Use: Never     Smokeless Tobacco Use: Never     Passive Exposure: Never       Objective     Physical Exam  Vitals and nursing note reviewed.   Constitutional:       Appearance: Normal appearance.   HENT:      Head: Normocephalic.      Nose: Nose normal.      Mouth/Throat:      Mouth: Mucous membranes are moist.   Eyes:      Pupils: Pupils are equal, round, and reactive to light.   Cardiovascular:      Rate and Rhythm: Normal rate and regular rhythm.      Pulses: Normal pulses.      Heart sounds: Normal heart sounds.   Pulmonary:      Effort: Pulmonary effort is normal.      Breath sounds: Normal breath sounds.   Abdominal:      Palpations: Abdomen is soft.   Musculoskeletal:         General: Normal range of motion.      Cervical back: Normal range of motion and neck supple.   Skin:     General: Skin is warm and dry.      Capillary Refill: Capillary refill takes less than 2 seconds.   Neurological:      General: No focal deficit present.      Mental Status: She is alert and oriented to person, place, and time.   Psychiatric:         Mood and Affect: Mood normal.         Behavior: Behavior normal.         Thought Content: Thought content normal.         Judgment: Judgment normal.         Vitals:    09/04/24 1356   BP: 116/74   Pulse: 79   Resp: 16   Temp: 97.8 °F (36.6 °C)   TempSrc: Temporal   SpO2: 98%   Weight: 50.8 kg (112 lb)  "  Height: 152.4 cm (60\")   PainSc: 0-No pain       Wt Readings from Last 3 Encounters:   09/04/24 50.8 kg (112 lb)   08/29/24 51.3 kg (113 lb)   05/20/24 51.4 kg (113 lb 6.4 oz)        BMI is within normal parameters. No other follow-up for BMI required.       ECOG score: 0         ECOG: (0) Fully Active - Able to Carry On All Pre-disease Performance Without Restriction  Fall Risk Assessment was completed, and patient is at low risk for falls.  PHQ-9 Total Score: 0       The patient is  experiencing fatigue. Fatigue score: 1    PT/OT Functional Screening: PT fx screen : No needs identified  Speech Functional Screening: Speech fx screen : No needs identified  Rehab to be ordered: Rehab to be ordered : No needs identified        Result Review :  The following data was reviewed by: ANGELICA Quijano on 09/04/2024:  Lab Results   Component Value Date    HGB 13.4 09/04/2024    HCT 41.0 09/04/2024    MCV 91.9 09/04/2024     09/04/2024    WBC 4.86 09/04/2024    NEUTROABS 3.40 09/04/2024    LYMPHSABS 1.03 09/04/2024    MONOSABS 0.32 09/04/2024    EOSABS 0.07 09/04/2024    BASOSABS 0.03 09/04/2024     Lab Results   Component Value Date    GLUCOSE 217 (H) 09/04/2024    BUN 15 09/04/2024    CREATININE 0.93 09/04/2024     09/04/2024    K 3.5 09/04/2024     09/04/2024    CO2 27.2 09/04/2024    CALCIUM 9.5 09/04/2024    PROTEINTOT 6.7 09/04/2024    ALBUMIN 4.6 09/04/2024    BILITOT 0.5 09/04/2024    ALKPHOS 58 09/04/2024    AST 26 09/04/2024    ALT 16 09/04/2024     Lab Results   Component Value Date    FERRITIN 419.60 (H) 10/28/2021    VFTJLAXH24 >2000 (H) 03/24/2021    FOLATE 7.4 03/24/2021     Lab Results   Component Value Date    IRON 89 10/28/2021    LABIRON 37 10/28/2021    TRANSFERRIN 161 (L) 10/28/2021    TIBC 240 (L) 10/28/2021     Lab Results   Component Value Date    FERRITIN 419.60 (H) 10/28/2021    PVURZCGQ88 >2000 (H) 03/24/2021    FOLATE 7.4 03/24/2021     No results found for: \"PSA\", " "\"CEA\", \"AFP\", \"\", \"\"    No Images in the past 120 days found..         Assessment and Plan:  Diagnoses and all orders for this visit:    1. Encounter for screening mammogram for malignant neoplasm of breast (Primary)  -     Mammo Screening Digital Tomosynthesis Bilateral With CAD; Future    2. Malignant neoplasm of upper-outer quadrant of left breast in female, estrogen receptor positive  -     Mammo Screening Digital Tomosynthesis Bilateral With CAD; Future  -     anastrozole (ARIMIDEX) 1 MG tablet; Take 1 tablet by mouth Daily.  Dispense: 90 tablet; Refill: 1      Left breast cancer: triple positive breat cancer. Initially on Letrozole, now on anastrozole and tolerating well. Has been on AI therapy since October of 2023.     Due for annual mammogram in December 2024. This has been ordered. Due for repeat dexa scan in December. Wants to do on same day.     Reports recent dental visit with x-rays and was told she has bone loss in the lower dental area by her dentist. Discussed with Dr. Gonzalez. Need to hold Prolia until cleared by her dentist for Prolia. Hold Prolia until further notice. Will need a letter stating OK to proceed.     Refills given for Anastrozole.       I spent 30 minutes caring for Ragini on this date of service. This time includes time spent by me in the following activities:preparing for the visit, reviewing tests, obtaining and/or reviewing a separately obtained history, performing a medically appropriate examination and/or evaluation , counseling and educating the patient/family/caregiver, ordering medications, tests, or procedures, referring and communicating with other health care professionals , documenting information in the medical record, and independently interpreting results and communicating that information with the patient/family/caregiver    Patient Follow Up: 6 months with MD.     Patient was given instructions and counseling regarding her condition or for health " maintenance advice. Please see specific information pulled into the AVS if appropriate.     Bobbi Christianson, APRN    9/4/2024    .[email or web address]

## 2024-09-04 NOTE — ADDENDUM NOTE
Encounter addended by: Dorys Emanuel RN on: 9/4/2024 2:54 PM   Actions taken: Order list changed, Diagnosis association updated, Treatment plan modified

## 2024-09-13 ENCOUNTER — HOSPITAL ENCOUNTER (OUTPATIENT)
Dept: ONCOLOGY | Facility: HOSPITAL | Age: 64
Discharge: HOME | End: 2024-09-13
Payer: OTHER GOVERNMENT

## 2024-09-13 DIAGNOSIS — Z78.0 OSTEOPENIA AFTER MENOPAUSE: Primary | ICD-10-CM

## 2024-09-13 DIAGNOSIS — M85.80 OSTEOPENIA AFTER MENOPAUSE: Primary | ICD-10-CM

## 2024-09-13 PROCEDURE — 96372 THER/PROPH/DIAG INJ SC/IM: CPT

## 2024-09-13 PROCEDURE — 25010000002 DENOSUMAB 60 MG/ML SOLUTION PREFILLED SYRINGE: Performed by: NURSE PRACTITIONER

## 2024-09-13 RX ADMIN — DENOSUMAB 60 MG: 60 INJECTION SUBCUTANEOUS at 12:33

## 2024-09-19 ENCOUNTER — OFFICE VISIT (OUTPATIENT)
Dept: INTERNAL MEDICINE | Facility: CLINIC | Age: 64
End: 2024-09-19
Payer: OTHER GOVERNMENT

## 2024-09-19 VITALS
WEIGHT: 113.8 LBS | BODY MASS INDEX: 22.34 KG/M2 | HEIGHT: 60 IN | RESPIRATION RATE: 18 BRPM | DIASTOLIC BLOOD PRESSURE: 68 MMHG | TEMPERATURE: 97.3 F | OXYGEN SATURATION: 98 % | SYSTOLIC BLOOD PRESSURE: 124 MMHG | HEART RATE: 77 BPM

## 2024-09-19 DIAGNOSIS — Z11.59 NEED FOR HEPATITIS C SCREENING TEST: ICD-10-CM

## 2024-09-19 DIAGNOSIS — G47.00 INSOMNIA, UNSPECIFIED TYPE: ICD-10-CM

## 2024-09-19 DIAGNOSIS — Z00.00 ANNUAL PHYSICAL EXAM: Primary | ICD-10-CM

## 2024-09-19 LAB
CHOLEST SERPL-MCNC: 236 MG/DL (ref 0–200)
HCV AB SER QL: NORMAL
HDLC SERPL-MCNC: 85 MG/DL (ref 40–60)
LDLC SERPL CALC-MCNC: 136 MG/DL (ref 0–100)
LDLC/HDLC SERPL: 1.57 {RATIO}
TRIGL SERPL-MCNC: 86 MG/DL (ref 0–150)
TSH SERPL DL<=0.05 MIU/L-ACNC: 1.32 UIU/ML (ref 0.27–4.2)
VLDLC SERPL-MCNC: 15 MG/DL (ref 5–40)

## 2024-09-19 PROCEDURE — 84443 ASSAY THYROID STIM HORMONE: CPT | Performed by: NURSE PRACTITIONER

## 2024-09-19 PROCEDURE — 90656 IIV3 VACC NO PRSV 0.5 ML IM: CPT | Performed by: NURSE PRACTITIONER

## 2024-09-19 PROCEDURE — 90471 IMMUNIZATION ADMIN: CPT | Performed by: NURSE PRACTITIONER

## 2024-09-19 PROCEDURE — 36415 COLL VENOUS BLD VENIPUNCTURE: CPT | Performed by: NURSE PRACTITIONER

## 2024-09-19 PROCEDURE — 99396 PREV VISIT EST AGE 40-64: CPT | Performed by: NURSE PRACTITIONER

## 2024-09-19 PROCEDURE — 86803 HEPATITIS C AB TEST: CPT | Performed by: NURSE PRACTITIONER

## 2024-09-19 PROCEDURE — 80061 LIPID PANEL: CPT | Performed by: NURSE PRACTITIONER

## 2024-10-24 NOTE — PROGRESS NOTES
Follow Up Office Visit      Encounter Date: 10/25/2024   Patient Name: Ragini Dozier  YOB: 1960   Medical Record Number: [id number]   Primary Diagnosis: Malignant neoplasm of upper-outer quadrant of left breast in female, estrogen receptor positive [C50.412, Z17.0]     Cancer Staging   Malignant neoplasm of upper-outer quadrant of left breast in female, estrogen receptor positive  Staging form: Breast, AJCC 8th Edition  - Clinical stage from 12/3/2020: Stage IA (cT1c, cN0, cM0, G2, ER+, NH+, HER2+) - Signed by Gia Davis APRN on 10/10/2023  - Pathologic stage from 5/11/2021: ypT0, pN0(sn), cM0 - Signed by Gia Davis APRN on 10/10/2023    Radiation Completion Date:  7/21/2021     Chief Complaint:    Chief Complaint   Patient presents with    Follow-up    Breast Cancer       Oncology/Hematology History Overview Note   HR positive, HER-2 positive breast cancer:  -Found on routine screening mammogram  -Diagnostic mammogram on 11/10/2020: Calcifications in the left breast  -11/10/2020 left breast biopsy: Pathology positive for DCIS, high-grade, estrogen receptor positive (5%, strong), progesterone receptor positive (10%, weak-moderate)  -11/19/2020 MRI of the breast: 1.8 cm hematoma at the site of the recent biopsy showing DCIS.  There is a 1.1 cm nonfocal mass enhancement at the superior lateral margin of the hematoma and a separate 1.2 cm suspicious mass along the inferior medial margin.  These correspond the masses seen on ultrasound on 10/20/2020  -12/23/2020 MRI guided breast biopsy: Left lower inner quadrant pathology positive for invasive ductal carcinoma, grade 2, estrogen receptor positive (3%, moderate), progesterone receptor positive (1%, weak), HER-2 positive (3+ by IHC), Ki-67 approximately 60%.  Associated with high-grade ductal carcinoma in situ.  -Cycle 1 of TCH P on 12/18/2020.  Echocardiogram on 12/14/2020 shows an EF of 55%. C3 on 1/29/21  -C5 of TCHP on 3/12/21. Held C6 due  to side effects.  -Left lumpectomy on 5/11/2021: No residual invasive carcinoma found, 0/8 lymph nodes involved, ypT0N0    Osteoporosis:  -Patient reports osteoporosis diagnosed on DEXA scan in January 2021.  -Her primary care provider has treated her with Boniva as well as calcium/vitamin D     Malignant neoplasm of upper-outer quadrant of left breast in female, estrogen receptor positive   12/3/2020 Cancer Staged    Staging form: Breast, AJCC 8th Edition  - Clinical stage from 12/3/2020: Stage IA (cT1c, cN0, cM0, G2, ER+, AL+, HER2+) - Signed by Gia Davis APRN on 10/10/2023     5/11/2021 Cancer Staged    Staging form: Breast, AJCC 8th Edition  - Pathologic stage from 5/11/2021: ypT0, pN0(sn), cM0 - Signed by Gia Davis APRN on 10/10/2023     6/23/2021 Initial Diagnosis    Malignant neoplasm of upper-outer quadrant of left female breast (CMS/HCC)     6/24/2021 - 3/10/2022 Chemotherapy    OP BREAST Trastuzumab Q21D (maintenance)      Chemotherapy    TCHP: 12/18/2020-3/12/21 (5 cycles)  OP BREAST Trastuzumab-anns Q21D (maintenance)   - 6/3/21-present     6/29/2021 - 7/21/2021 Radiation    Radiation OncologyTreatment Course:  Ragini Dozier received 4,256 cGy in 16  fractions to left breast.         History of Present Illness: Ragini Dozier is a 64 y.o. female who returns to [facility] for routine annual follow-up of her breast cancer. She presents today with her . She reports feeling well overall with no new complaints or concerns. She occasionally experiences an intermittent discomfort within the left breast that is not terribly bothersome. She also continues to have some numbness in left axilla region. She denies any other breast pain, palpable masses, nipple changes or nipple discharge, pain or limited range of motion in upper extremity. She followed up with Medical Oncology APRN on 9/4/24; note reviewed. She continues to tolerate anastrozole well. She continues to follow-up with  Lymphedema Therapy. Since prior visit she underwent bilateral breast reconstruction revision on 4/29/24 with Dr. Chirinos.     Subjective      Review of Systems: Review of Systems   Constitutional:  Positive for fatigue (5/10). Negative for appetite change and fever.   HENT:  Negative for sore throat and trouble swallowing.    Eyes:  Negative for visual disturbance.   Respiratory:  Negative for cough and shortness of breath.    Cardiovascular:  Negative for chest pain and palpitations.   Gastrointestinal:  Negative for abdominal pain, blood in stool, constipation, diarrhea, nausea and vomiting.   Genitourinary:  Negative for difficulty urinating, dysuria, frequency, hematuria and urgency.   Musculoskeletal:  Positive for back pain (chronic). Negative for neck pain and neck stiffness.   Skin:  Positive for wound (reconstruction done in April 2024).   Neurological:  Negative for dizziness, facial asymmetry, weakness and light-headedness.   Psychiatric/Behavioral:  Positive for sleep disturbance (wakes frequently through the night).        The following portions of the patient's history were reviewed and updated as appropriate: allergies, current medications, past family history, past medical history, past social history, past surgical history and problem list.    Medications:     Current Outpatient Medications:     anastrozole (ARIMIDEX) 1 MG tablet, Take 1 tablet by mouth Daily., Disp: 90 tablet, Rfl: 1    busPIRone (BUSPAR) 10 MG tablet, Take 2 tablets by mouth 3 (Three) Times a Day., Disp: , Rfl:     Calcium Carbonate-Vitamin D (calcium-vitamin D) 500-200 MG-UNIT tablet per tablet, 500 mg 2 (Two) Times a Day., Disp: , Rfl:     carboxymethylcellulose (REFRESH PLUS) 0.5 % solution, , Disp: , Rfl:     Allergies:   No Known Allergies    Patient Smoking History:   Social History     Tobacco Use   Smoking Status Never    Passive exposure: Never   Smokeless Tobacco Never   Tobacco Comments    0       Measures:  PHQ-9  Total Score: 3   Quality of Life: 100 - Full Activity   ECOG score: 0  ECOG: (0) Fully active, able to carry on all predisease performance without restriction  Pain: (on a scale of 0-10)   Pain Score    10/25/24 0931   PainSc: 0-No pain       Objective     Physical Exam:   Vital Signs:   Vitals:    10/25/24 0931   BP: 121/75   Pulse: 67   Resp: 16   Temp: 98.1 °F (36.7 °C)   TempSrc: Temporal   SpO2: 100%   Weight: 53.1 kg (117 lb 1 oz)   PainSc: 0-No pain     Body mass index is 22.86 kg/m².   Wt Readings from Last 3 Encounters:   10/25/24 53.1 kg (117 lb 1 oz)   09/19/24 51.6 kg (113 lb 12.8 oz)   09/04/24 50.8 kg (112 lb)       Physical Exam  Vitals reviewed. Exam conducted with a chaperone present.   Constitutional:       General: She is not in acute distress.     Appearance: Normal appearance. She is normal weight. She is not ill-appearing.   HENT:      Head: Normocephalic and atraumatic.      Mouth/Throat:      Mouth: Mucous membranes are moist.      Pharynx: Oropharynx is clear. No oropharyngeal exudate or posterior oropharyngeal erythema.   Eyes:      Conjunctiva/sclera: Conjunctivae normal.      Pupils: Pupils are equal, round, and reactive to light.   Cardiovascular:      Rate and Rhythm: Normal rate and regular rhythm.      Pulses: Normal pulses.      Heart sounds: Normal heart sounds.   Pulmonary:      Effort: Pulmonary effort is normal. No respiratory distress.      Breath sounds: Normal breath sounds.   Chest:   Breasts:     Right: Tenderness (at lateral chest wall muscle tightness) present. No swelling, bleeding, inverted nipple, mass, nipple discharge or skin change.      Left: Tenderness (at lateral chest wall muscle tightness) present. No swelling, bleeding, inverted nipple, mass, nipple discharge or skin change.       Musculoskeletal:         General: Normal range of motion.      Cervical back: Normal range of motion.   Lymphadenopathy:      Upper Body:      Right upper body: No supraclavicular or  axillary adenopathy.      Left upper body: No supraclavicular or axillary adenopathy.   Skin:     General: Skin is warm and dry.   Neurological:      General: No focal deficit present.      Mental Status: She is alert and oriented to person, place, and time. Mental status is at baseline.   Psychiatric:         Mood and Affect: Mood normal.         Behavior: Behavior normal.       Result Review: I independently reviewed the following data.   -- RADIOLOGY - SCAN - RADIOLOGY REPORTS, [facility], 02/21/2024 (03/06/2024)     Pathology:   Lab Results   Component Value Date    CLININFO  05/10/2022      Comment:      Adjustment and management of vascular access device      FINALDX  05/10/2022     Left breast tissue, excision:   - Benign breast tissue with scar       Imaging: No radiology results for the last 90 days.     Labs:   WBC   Date Value Ref Range Status   09/04/2024 4.86 3.40 - 10.80 10*3/mm3 Final   04/13/2021 9.55 4.80 - 10.80 10*3/uL Final     Hemoglobin   Date Value Ref Range Status   09/04/2024 13.4 12.0 - 15.9 g/dL Final     Hematocrit   Date Value Ref Range Status   09/04/2024 41.0 34.0 - 46.6 % Final     Platelets   Date Value Ref Range Status   09/04/2024 211 140 - 450 10*3/mm3 Final     Creatinine   Date Value Ref Range Status   09/04/2024 0.93 0.57 - 1.00 mg/dL Final     BUN   Date Value Ref Range Status   09/04/2024 15 8 - 23 mg/dL Final     eGFR   Date Value Ref Range Status   09/04/2024 69.6 >60.0 mL/min/1.73 Final     TSH   Date Value Ref Range Status   09/19/2024 1.320 0.270 - 4.200 uIU/mL Final     Assessment / Plan      Impression: Ragini Dozier is a pleasant 64 y.o. female with cT1 cN0 M0 invasive ductal carcinoma of the left breast, grade 2, ER+TN+, HER2+. She was initially diagnosed with HR+ high-grade DCIS.  After breast MRI on 11/19/2020 she was found to have a second 1.2 cm lesion in the left lower inner quadrant that was also biopsy proven ER/TN+ and HER2+, Ki-67 60%. She underwent neoadjuvant  chemotherapy with TCHP and experienced pathologic complete response upon lumpectomy and sentinel lymph node biopsy performed on 5/25/2021. No residual invasive carcinoma found, 0/8 lymph nodes involved, [id number]. She is status post external beam radiotherapy to the left breast, completed on 7/21/2021. She completed adjuvant Herceptin. She is status post breast reconstruction with bilateral implants for symmetry and scar revision of the left breast performed on 5/10/2022 by Dr. Chirinos. Her screening mammogram on 2/21/2024 is benign; BI-RADS 2.  Her next screening mammogram is scheduled for 2/26/25. She is status post bilateral breast reconstruction revision and left partial capsulectomy with Dr. Chirinos on 4/29/24. She is doing well overall and remains without evidence of disease clinically. She is taking anastrozole and tolerating well.     There is nothing further to offer from a Radiation Oncology standpoint now that Mrs. Dozier is 3 years out from completion of radiotherapy. She may be released from our care and follow-up as needed. We will remain available for questions or concerns. Recommend continued medical oncologic care per Dr. Richmond.     Assessment/Plan:   Diagnoses and all orders for this visit:    1. Malignant neoplasm of upper-outer quadrant of left breast in female, estrogen receptor positive (Primary)    2. HER2-positive carcinoma of left breast    3. Personal history of radiation therapy    4. Encounter for follow-up surveillance of breast cancer    5. Aromatase inhibitor use    6. At risk for lymphedema       Follow Up:   No follow-up scheduled. Return as needed. Encouraged performance of post-breast surgery stretching exercises to address tightness in the pectoral muscle and under the axilla. Handout provided.   Recommend continuation of annual screening mammogram; next mammography due in 2/2025 and has already been scheduled for 2/26/25.   Follow-up with Dr. Richmond on 3/5/25..  Follow-up  with Lymphedema Therapy on 11/12/24.    No follow-ups on file.  Ragini Dozier was encouraged to contact me in the interim with any questions or concerns regarding her care.      ANGELICA Wilson  Radiation Oncology  [facility]    This document has been signed by ANGELICA Horan on October 25, 2024 10:27 EDT

## 2024-10-25 ENCOUNTER — OFFICE VISIT (OUTPATIENT)
Dept: RADIATION ONCOLOGY | Facility: HOSPITAL | Age: 64
End: 2024-10-25
Payer: OTHER GOVERNMENT

## 2024-10-25 VITALS
HEART RATE: 67 BPM | BODY MASS INDEX: 22.86 KG/M2 | SYSTOLIC BLOOD PRESSURE: 121 MMHG | RESPIRATION RATE: 16 BRPM | TEMPERATURE: 98.1 F | DIASTOLIC BLOOD PRESSURE: 75 MMHG | OXYGEN SATURATION: 100 % | WEIGHT: 117.06 LBS

## 2024-10-25 DIAGNOSIS — Z85.3 ENCOUNTER FOR FOLLOW-UP SURVEILLANCE OF BREAST CANCER: ICD-10-CM

## 2024-10-25 DIAGNOSIS — Z79.811 AROMATASE INHIBITOR USE: ICD-10-CM

## 2024-10-25 DIAGNOSIS — C50.912 HER2-POSITIVE CARCINOMA OF LEFT BREAST: ICD-10-CM

## 2024-10-25 DIAGNOSIS — Z17.0 MALIGNANT NEOPLASM OF UPPER-OUTER QUADRANT OF LEFT BREAST IN FEMALE, ESTROGEN RECEPTOR POSITIVE: Primary | ICD-10-CM

## 2024-10-25 DIAGNOSIS — C50.412 MALIGNANT NEOPLASM OF UPPER-OUTER QUADRANT OF LEFT BREAST IN FEMALE, ESTROGEN RECEPTOR POSITIVE: Primary | ICD-10-CM

## 2024-10-25 DIAGNOSIS — Z17.31 HER2-POSITIVE CARCINOMA OF LEFT BREAST: ICD-10-CM

## 2024-10-25 DIAGNOSIS — Z08 ENCOUNTER FOR FOLLOW-UP SURVEILLANCE OF BREAST CANCER: ICD-10-CM

## 2024-10-25 DIAGNOSIS — Z91.89 AT RISK FOR LYMPHEDEMA: ICD-10-CM

## 2024-10-25 DIAGNOSIS — Z92.3 PERSONAL HISTORY OF RADIATION THERAPY: ICD-10-CM

## 2024-10-25 PROCEDURE — G0463 HOSPITAL OUTPT CLINIC VISIT: HCPCS | Performed by: NURSE PRACTITIONER

## 2024-11-01 ENCOUNTER — TELEPHONE (OUTPATIENT)
Dept: PLASTIC SURGERY | Facility: CLINIC | Age: 64
End: 2024-11-01
Payer: OTHER GOVERNMENT

## 2024-11-01 DIAGNOSIS — L53.9 REDNESS: Primary | ICD-10-CM

## 2024-11-01 RX ORDER — SULFAMETHOXAZOLE AND TRIMETHOPRIM 800; 160 MG/1; MG/1
1 TABLET ORAL 2 TIMES DAILY
Qty: 60 TABLET | Refills: 0 | Status: SHIPPED | OUTPATIENT
Start: 2024-11-01 | End: 2024-12-01

## 2024-11-01 RX ORDER — FLUCONAZOLE 100 MG/1
100 TABLET ORAL DAILY
Qty: 4 TABLET | Refills: 0 | Status: SHIPPED | OUTPATIENT
Start: 2024-11-01 | End: 2024-11-05

## 2024-11-01 NOTE — TELEPHONE ENCOUNTER
Spoke with pt regarding the redness on left breast. Advised will send in antibiotic for 30 days. Also sent diflucan and lactobacillus to help with any issues from antibiotic.  Pt will be seen by Sonja Valdez on Monday 11/4/24 for evaluation.

## 2024-11-04 ENCOUNTER — OFFICE VISIT (OUTPATIENT)
Dept: PLASTIC SURGERY | Facility: CLINIC | Age: 64
End: 2024-11-04
Payer: OTHER GOVERNMENT

## 2024-11-04 VITALS
DIASTOLIC BLOOD PRESSURE: 79 MMHG | WEIGHT: 113.8 LBS | HEART RATE: 74 BPM | HEIGHT: 60 IN | TEMPERATURE: 98.2 F | SYSTOLIC BLOOD PRESSURE: 121 MMHG | OXYGEN SATURATION: 97 % | BODY MASS INDEX: 22.34 KG/M2

## 2024-11-04 DIAGNOSIS — N64.4 BREAST PAIN, LEFT: Primary | ICD-10-CM

## 2024-11-04 DIAGNOSIS — T14.8XXA WOUND DRAINAGE: ICD-10-CM

## 2024-11-04 PROCEDURE — 87205 SMEAR GRAM STAIN: CPT | Performed by: PHYSICIAN ASSISTANT

## 2024-11-04 PROCEDURE — 87070 CULTURE OTHR SPECIMN AEROBIC: CPT | Performed by: PHYSICIAN ASSISTANT

## 2024-11-04 PROCEDURE — 99213 OFFICE O/P EST LOW 20 MIN: CPT | Performed by: PHYSICIAN ASSISTANT

## 2024-11-04 NOTE — PROGRESS NOTES
Chief Complaint  No chief complaint on file.    Subjective        History of Present Illness  Ragini Dozier is a 64 y.o. female who presents to [facility] for Postoperative Follow-Up of BILATERAL BREAST RECONSTRUCTION WITH FAT GRAFT, CAPSULOTOMY AND BILATERAL MASTOPEXY 4/29/24.    Patient presents today with c/o redness and swelling of her Left breast. She had surgery last Wednesday, 10/30, on her left foot for a bone spur. She called the office last Friday, 11/1, and sent photos and Bactrim DS twice daily was sent in along with probiotic and fluconazole. She has picked up 2 of the 3 prescriptions, with 1 more to  today. She believes she has been taking the Bactrim since Friday. Overall, she is somewhat improved, however, still red and swollen and uncomfortable. She denies any other symptoms, I.e. fever, malaise, nausea. She also mentions some drainage from around the nipple of the Left breast.         Allergies: Patient has no known allergies.  Allergies Reconciled.    Review of Systems   Constitutional: Negative.    HENT: Negative.     Eyes: Negative.    Respiratory: Negative.     Cardiovascular: Negative.    Gastrointestinal: Negative.    Endocrine: Negative.    Genitourinary: Negative.    Musculoskeletal:  Positive for myalgias.        Pain at drain site   Skin:  Positive for color change.   Allergic/Immunologic: Negative.    Neurological: Negative.    Hematological: Negative.    Psychiatric/Behavioral: Negative.        All review of system has been reviewed and it  is negative except the ones note above.     Objective   General - no acute distress, comfortable.   Left breast - persistent erythema and swelling, although not as red as Friday's photo. No incisional dehiscence, no open wounds.   Right breast - within normal limits.     /79 (BP Location: Left arm, Patient Position: Sitting, Cuff Size: Adult)   Pulse 74   Temp 98.2 °F (36.8 °C) (Oral)  "  Ht 152.4 cm (60\")   Wt 51.6 kg (113 lb 12.8 oz)   SpO2 97%   BMI 22.23 kg/m²     Body mass index is 22.23 kg/m².      Breast:     Result Review : no new results to review this visit      Assessment and Plan      Diagnoses and all orders for this visit:    1. Breast pain, left (Primary)    2. Wound drainage  -     Wound Culture - Wound, Breast, Left; Future  -     Wound Culture - Wound, Breast, Left            Plan:  Photos taken today.   Wound culture taken today from Left breast.  She will continue the Bactrim DS twice daily for 1 month, pending results of culture.   Will call patient to check status and with culture results and if Dr. Chirinos recommends any changes to current regimen.   Will have patient f/u next week when Dr. Chirinos is back in office.     Return for Pending wound culture .    Patient was given instructions and counseling regarding her condition. Please see specific information pulled into the AVS if appropriate.     Sonja Valdez PA-C  11/04/2024     "

## 2024-11-07 ENCOUNTER — TELEPHONE (OUTPATIENT)
Dept: PLASTIC SURGERY | Facility: CLINIC | Age: 64
End: 2024-11-07
Payer: OTHER GOVERNMENT

## 2024-11-07 LAB
BACTERIA SPEC AEROBE CULT: NORMAL
GRAM STN SPEC: NORMAL

## 2024-11-07 NOTE — TELEPHONE ENCOUNTER
LM advising pt of negative culture. Requested a call back or updated pictures to see if there is still redness or swelling.

## 2024-11-12 ENCOUNTER — CLINICAL SUPPORT (OUTPATIENT)
Dept: PLASTIC SURGERY | Facility: CLINIC | Age: 64
End: 2024-11-12
Payer: OTHER GOVERNMENT

## 2024-11-12 DIAGNOSIS — L58.9 RADIATION DERMATITIS: Primary | ICD-10-CM

## 2024-11-12 NOTE — PROGRESS NOTES
Theraputic Ultrasound done in office today to right breast for 7 min, 1.1w/cm2 3.3mhz. Patient also received Red Light Therapy. Patient tolerated well and will return for next scheduled ultrasound RLT/appt.   2/10

## 2024-11-19 ENCOUNTER — CLINICAL SUPPORT (OUTPATIENT)
Dept: PLASTIC SURGERY | Facility: CLINIC | Age: 64
End: 2024-11-19
Payer: OTHER GOVERNMENT

## 2024-11-19 DIAGNOSIS — L58.9 RADIATION DERMATITIS: Primary | ICD-10-CM

## 2024-11-19 PROCEDURE — 76999 ECHO EXAMINATION PROCEDURE: CPT | Performed by: SURGERY

## 2024-11-19 NOTE — PROGRESS NOTES
Theraputic Ultrasound done in office today to left breast for 7 min, 1.1w/cm2 3.3mhz. Patient also received Red Light Therapy. Patient tolerated well and will return for next scheduled ultrasound RLT/appt.   3/10

## 2024-11-26 ENCOUNTER — CLINICAL SUPPORT (OUTPATIENT)
Dept: PLASTIC SURGERY | Facility: CLINIC | Age: 64
End: 2024-11-26
Payer: OTHER GOVERNMENT

## 2024-11-26 DIAGNOSIS — L58.9 RADIATION DERMATITIS: Primary | ICD-10-CM

## 2024-11-26 NOTE — PROGRESS NOTES
Theraputic Ultrasound done in office today to left breast for 7 min, 1.1w/cm2 3.3mhz. Patient also received Red Light Therapy. Patient tolerated well and will return for next scheduled ultrasound RLT/appt.   4/10

## 2024-11-27 ENCOUNTER — HOSPITAL ENCOUNTER (OUTPATIENT)
Facility: HOSPITAL | Age: 64
Setting detail: THERAPIES SERIES
Discharge: HOME | End: 2024-11-27
Payer: OTHER GOVERNMENT

## 2024-11-27 DIAGNOSIS — C50.412 MALIGNANT NEOPLASM OF UPPER-OUTER QUADRANT OF LEFT BREAST IN FEMALE, ESTROGEN RECEPTOR POSITIVE: ICD-10-CM

## 2024-11-27 DIAGNOSIS — Z17.0 MALIGNANT NEOPLASM OF UPPER-OUTER QUADRANT OF LEFT BREAST IN FEMALE, ESTROGEN RECEPTOR POSITIVE: ICD-10-CM

## 2024-11-27 DIAGNOSIS — Z91.89 AT RISK FOR LYMPHEDEMA: Primary | ICD-10-CM

## 2024-12-03 ENCOUNTER — CLINICAL SUPPORT (OUTPATIENT)
Dept: PLASTIC SURGERY | Facility: CLINIC | Age: 64
End: 2024-12-03
Payer: OTHER GOVERNMENT

## 2024-12-03 DIAGNOSIS — L58.9 RADIATION DERMATITIS: Primary | ICD-10-CM

## 2024-12-03 NOTE — PROGRESS NOTES
Theraputic Ultrasound done in office today to left breast for 7 min, 1.1w/cm2 3.3mhz. Patient also received Red Light Therapy. Patient tolerated well and will return for next scheduled ultrasound RLT/appt.   5/10

## 2024-12-17 ENCOUNTER — CLINICAL SUPPORT (OUTPATIENT)
Dept: PLASTIC SURGERY | Facility: CLINIC | Age: 64
End: 2024-12-17
Payer: OTHER GOVERNMENT

## 2024-12-17 DIAGNOSIS — L58.9 RADIATION DERMATITIS: Primary | ICD-10-CM

## 2024-12-17 NOTE — PROGRESS NOTES
Theraputic Ultrasound done in office today to left breast for 7 min, 1.1w/cm2 3.3mhz. Patient also received Red Light Therapy. Patient tolerated well and will return for next scheduled ultrasound RLT/appt.   6/10

## 2024-12-19 ENCOUNTER — HOSPITAL ENCOUNTER (OUTPATIENT)
Facility: HOSPITAL | Age: 64
Setting detail: THERAPIES SERIES
Discharge: HOME | End: 2024-12-19
Payer: OTHER GOVERNMENT

## 2024-12-19 DIAGNOSIS — L90.5 SCAR CONDITION AND FIBROSIS OF SKIN: ICD-10-CM

## 2024-12-19 DIAGNOSIS — C50.412 MALIGNANT NEOPLASM OF UPPER-OUTER QUADRANT OF LEFT BREAST IN FEMALE, ESTROGEN RECEPTOR POSITIVE: ICD-10-CM

## 2024-12-19 DIAGNOSIS — Z98.890 S/P LUMPECTOMY, LEFT BREAST: ICD-10-CM

## 2024-12-19 DIAGNOSIS — Z17.0 MALIGNANT NEOPLASM OF UPPER-OUTER QUADRANT OF LEFT BREAST IN FEMALE, ESTROGEN RECEPTOR POSITIVE: ICD-10-CM

## 2024-12-19 DIAGNOSIS — Z91.89 AT RISK FOR LYMPHEDEMA: Primary | ICD-10-CM

## 2024-12-19 PROCEDURE — 97535 SELF CARE MNGMENT TRAINING: CPT

## 2024-12-19 NOTE — THERAPY RE-EVALUATION
Outpatient Occupational Therapy Lymphedema Re-Evaluation   [place]     Patient Name: Ragini Dozier  : 1960  MRN: [id number]  Today's Date: 2024      Visit Date: 2024    Patient Active Problem List   Diagnosis    Anxiety    Cataract    Depression    Migraine    Esophageal dysphagia    Globus sensation    Malignant neoplasm of upper-outer quadrant of left breast in female, estrogen receptor positive    Adjustment and management of vascular access device    Anemia associated with chemotherapy    Age-related osteoporosis without current pathological fracture    Gastroesophageal reflux disease without esophagitis    Osteopenia after menopause    Breast reconstruction deformity        Past Medical History:   Diagnosis Date    Anxiety     Breast cancer     LEFT    Breast cancer screening by mammogram     Cataract     Corrected    Depression     Encounter for Hemoccult screening     GERD (gastroesophageal reflux disease)     HL (hearing loss)     Hearing aids    Hx of radiation therapy     Insomnia     Malignant neoplasm of upper-outer quadrant of left female breast     Migraine headache     Osteopenia 2007    Osteoporosis         Past Surgical History:   Procedure Laterality Date    AUGMENTATION MAMMAPLASTY      BREAST AUGMENTATION Bilateral 05/10/2022    Procedure: Bilateral implants with mesh and scar revision of left breast;  Surgeon: Liu Chirinos MD;  [place] [facility] [place] [facility] [place]  Service: [place];  Laterality: Bilateral;    BREAST LUMPECTOMY      BREAST LUMPECTOMY WITH SENTINEL NODE BIOPSY Left     BREAST RECONSTRUCTION Bilateral 2024    Procedure: BILATERAL BREAST RECONSTRUCTION WITH FAT GRAFT, CAPSULOTOMY AND BILATERAL MASTOPEXY;  Surgeon: Liu Chirinos MD;  Location: [facility];  Service: Plastics;  Laterality: Bilateral;    BUNIONECTOMY Bilateral     CATARACT EXTRACTION      COLONOSCOPY      COLONOSCOPY N/A 2022    Procedure: COLONOSCOPY;   Surgeon: Hue Meyer MD;  Location: [facility] ENDOSCOPY;  Service: Gastroenterology;  Laterality: N/A;  HEMORRHOIDS, POOR PREP    COLONOSCOPY N/A 01/09/2023    Procedure: COLONOSCOPY FOR SCREENING;  Surgeon: Hue Meyer MD;  Location: [facility] ENDOSCOPY;  Service: Gastroenterology;  Laterality: N/A;  DIVERTICULOSIS HEMORRHOIDS    ENDOSCOPY N/A 10/01/2021    Procedure: ESOPHAGOGASTRODUODENOSCOPY WITH BIOPSY;  Surgeon: Hue Meyer MD;  Location: [facility] ENDOSCOPY;  Service: Gastroenterology;  Laterality: N/A;  ESOPHAGITIS/GASTRITIS    HYSTERECTOMY      PARTIAL    OTHER SURGICAL HISTORY      BIOPSY    SKIN CANCER EXCISION      SUBTOTAL HYSTERECTOMY  1992    TONSILLECTOMY      TUBAL ABDOMINAL LIGATION  1990    UPPER GASTROINTESTINAL ENDOSCOPY      VENOUS ACCESS DEVICE (PORT) REMOVAL N/A 05/10/2022    Procedure: REMOVAL VENOUS ACCESS DEVICE;  Surgeon: Lizbeth York MD;  Location: [facility] OR [facility] [place];  Service: General;  Laterality: N/A;         Visit Dx:     ICD-10-CM ICD-9-CM   1. At risk for lymphedema  Z91.89 V49.89   2. Malignant neoplasm of upper-outer quadrant of left breast in female, estrogen receptor positive  C50.412 174.4    Z17.0 V86.0   3. S/P lumpectomy, left breast  Z98.890 V45.89   4. Scar condition and fibrosis of skin  L90.5 709.2        Patient History       Row Name 12/19/24 0800             History    Chief Complaint Other 1 (comment)  -SF      Date Current Problem(s) Began 05/21/21  -SF      Brief Description of Current Complaint Left invasive ductal carcinoma with associated DCIS status post lumpectomy with sentinel node biopsy (x8). Pt. has completed XRT.  Pt. is completing monthy herceptin.   -SF      Hand Dominance right-handed  -SF         Services    Are you currently receiving Home Health services No  -SF      Do you plan to receive Home Health services in the near future No  -SF         Daily Activities    Primary Language English  -SF      Are you able to read  "Yes  -SF      Are you able to write Yes  -SF      How does patient learn best? Listening;Reading;Demonstration;Pictures/Video  -SF      Barriers to learning None  -SF      Pt Participated in POC and Goals Yes  -SF         Safety    Are you being hurt, hit, or frightened by anyone at home or in your life? No  -SF      Are you being neglected by a caregiver No  -SF      Have you had any of the following issues with Depression;Eating Disorders  controlled at this time  -SF                User Key  (r) = Recorded By, (t) = Taken By, (c) = Cosigned By      Initials Name Provider Type    SF Blanca Gómez OT Occupational Therapist                     Lymphedema       Row Name 12/19/24 0800             Subjective Pain    Able to rate subjective pain? yes  -SF      Pre-Treatment Pain Level 0  -SF      Post-Treatment Pain Level 0  -SF         Subjective    Subjective Comments Patient reports she had an infection in her L breast following reconstruction revision. Patient reports she is currently receiving ultrasound and redlight therapy.  -SF         Lymphedema Assessment    Lymphedema Classification LUE:;at risk/stage 0  -SF      Lymphedema Cancer Related Sx left;lumpectomy;sentinel node biopsy  -SF      Lymph Nodes Removed # 8  -SF      Positive Lymph Nodes # 0  -SF      Chemo Received yes  -SF      Chemo Treatments #/Timeframe TCHP  -SF      Radiation Therapy Received yes  -SF      Radiation Treatments #/Timeframe 16  -SF         LLIS - Physical Concerns    The amount of pain associated with my lymphedema is: 0  -SF      The amount of limb heaviness associated with my lymphedema is: 0  -SF      The amount of skin tightness associated with my lymphedema is: 0  -SF      The size of my swollen limb(s) seems: 0  -SF      Lymphedema affects the movement of my swollen limb(s): 0  -SF      The strength in my swollen limb(s) is: 0  -SF         LLIS - Psychosocial Concerns    Lymphedema affects my body image (i.e., \"how I think I " "look\"). 0  -SF      Lymphedema affects my socializing with others. 0  -SF      Lymphedema affects my intimate relations with spouse or partner (rate 0 if not applicable 0  -SF      Lymphedema \"gets me down\" (i.e., depression, frustration, or anger) 0  -SF      I must rely on others for help due to my lymphedema. 0  -SF      I know what to do to manage my lymphedema 1  -SF         LLIS - Functional Concerns    Lymphedema affects my ability to perform self-care activities (i.e. eating, dressing, hygiene) 0  -SF      Lymphedema affects my ability to perform routine home or work-related activities. 0  -SF      Lymphedema affects my performance of preferred leisure activities. 0  -SF      Lymphedema affects proper fit of clothing/shoes 0  -SF      Lymphedema affects my sleep 0  -SF         Posture/Observations    Posture- WNL Posture is WNL  -SF         General ROM    GENERAL ROM COMMENTS BUE WFL  -SF         MMT (Manual Muscle Testing)    General MMT Comments BUE WFL  -SF         Lymphedema Measurements    Measurement Type(s) Circumferential  -SF      Circumferential Areas Upper extremities  -SF      Lymphedema Measurements Comments Patient is demonstrating normalized lymphatic functioning through her circumferential measurements.  -SF         Lymphedema Life Impact Scale Totals    A.  Total Q1 - Q17 (Do not include Q18) 1  -SF      B.  Total number of questions answered (Q1-Q17) 17  -SF      C. Divide A by B 0.06  -SF      D. Multiple C by 25 1.5  -SF                User Key  (r) = Recorded By, (t) = Taken By, (c) = Cosigned By      Initials Name Provider Type    SF Blanca Gómez, OT Occupational Therapist                       STEVE VELIZ   1 20.2 19.9   2 16 15.4   3 18.2 16.8   4 24.5 22.4   5 24.4 24.6   6 26 25.5   7 27 27           Therapy Education  Education Details: Review of stoplight program.  Given: Symptoms/condition management  Program: Reinforced  How Provided: Verbal  Provided to: Patient  Level of " Understanding: Verbalized  75549 - OT Self Care/Mgmt Minutes: 25         OT Goals       Row Name 12/19/24 0952          Time Calculation    OT Goal Re-Cert Due Date 01/18/25  -               User Key  (r) = Recorded By, (t) = Taken By, (c) = Cosigned By      Initials Name Provider Type    Blanca Dc OT Occupational Therapist                  Goals  1. Self-care Functional Limitation                     LTG 1: 12 weeks:  As an indicator of no exacerbation of lymphedema staging, the patient will present with no greater than 10% increase in total UE lymph volumetric from baseline of non-affected upper extremity.                          STATUS: Met: Ongoing  STG 1a: 4 weeks:  As an indicator of no exacerbation of lymphedema staging, the patient will present with no greater than 10% increase in total UE lymph volumetric from baseline of non-affected upper extremity.              STATUS: Met: Ongoing     LTG 1a: 90 days:  As an indicator of no exacerbation of lymphedema staging, the patient will present with an L-Dex score less than 7 points from preoperative baseline.                          STATUS: DC  STG1b: 4 weeks: To prevent exacerbation of mixed edema to lymphedema, patient will utilize the 2 (75745) Daya compression bra  daily.                                                 STATUS: Met              STG 1c: 4 weeks: Patient will be independent with self-manual lymphatic massage.                          STATUS: Met: Ongoing              STG 1d: 4 weeks: Patient will be independent with identification of signs and symptoms of lymphedema exasperation per stoplight to recovery education sheet.                          STATUS: Met: Ongoing  TREATMENT:  Self Care/ADL retraining, Therapeutic Activity, Neuromuscular Re-education, Therapeutic Exercise, Bioimpedence Fluid Analysis, Post-Surgical compression x [id number] Daya Zip-ST-High, Orthotic Management and training,  and Manual Therapy.     OT Assessment/Plan        Row Name 12/19/24 0950          OT Assessment    Functional Limitations Other (comment)  -SF     Impairments Impaired lymphatic circulation  -SF     Assessment Comments Patient is demonstrating normalized lymphatic functioning and denies pain, swelling and decreased ROM. Patient is currently receiving red light and ultrasound therapies to her left breast.  Patient was encouraged to contact lymphedema clinic before follow-up appointment.  Patient will continue to benefit from skilled occupational therapy to further evaluate ongoing lymphatic functioning to prevent advancement in lymphedema staging, increased pain and decreased range of motion  -SF     OT Diagnosis At risk for lymphedema  -SF     OT Rehab Potential Excellent  -SF     Patient/caregiver participated in establishment of treatment plan and goals Yes  -SF     Patient would benefit from skilled therapy intervention Yes  -SF        OT Plan    OT Frequency Other (comment)  -SF     Predicted Duration of Therapy Intervention (OT) Patient will be reevaluated every 6 months years 3 through 5.  Initial surgery 5/2021  -SF     Planned CPT's? OT EVAL LOW COMPLEXITY: 29157;OT EVAL MOD COMPLEXITY: 27532;OT EVAL HIGH COMPLEXITY: 57116;OT RE-EVAL: 67275;OT THER ACT EA 15 MIN: 34996RI;OT THER PROC EA 15 MIN: 18140HH;OT SELF CARE/MGMT/TRAIN 15 MIN: 76433;OT CARE PLAN EA 15 MIN;OT MANUAL THERAPY EA 15 MIN: 86584;OT ORTHO/PROSTHET CHECKOUT EA 15 MIN: 67196;OT ORTHOTIC MGMT/TRAIN EA 15 MIN: 10888  -SF     Planned Therapy Interventions (Optional Details) home exercise program;manual therapy techniques;strengthening;ROM (Range of Motion);prosthetic fitting/training;patient/family education;orthotic fitting/training  -SF     OT Plan Comments Continue POC  -SF               User Key  (r) = Recorded By, (t) = Taken By, (c) = Cosigned By      Initials Name Provider Type    Blanca Dc OT Occupational Therapist                              Time Calculation:   Timed  Charges  03169 - OT Self Care/Mgmt Minutes: 25  Total Minutes  Timed Charges Total Minutes: 25   Total Minutes: 25     Therapy Charges for Today       Code Description Service Date Service Provider Modifiers Qty    [id number] HC OT SELF CARE/MGMT/TRAIN EA 15 MIN 12/19/2024 Blanca Gómez OT GO 2                      Blanca Gómez OT  12/19/2024

## 2025-01-29 PROBLEM — L58.9 RADIATION DERMATITIS: Status: ACTIVE | Noted: 2025-01-29

## 2025-02-26 ENCOUNTER — HOSPITAL ENCOUNTER (OUTPATIENT)
Dept: MAMMOGRAPHY | Facility: HOSPITAL | Age: 65
Discharge: HOME | End: 2025-02-26
Payer: OTHER GOVERNMENT

## 2025-02-26 ENCOUNTER — HOSPITAL ENCOUNTER (OUTPATIENT)
Dept: BONE DENSITY | Facility: HOSPITAL | Age: 65
Discharge: HOME | End: 2025-02-26
Payer: OTHER GOVERNMENT

## 2025-02-26 DIAGNOSIS — C50.412 MALIGNANT NEOPLASM OF UPPER-OUTER QUADRANT OF LEFT BREAST IN FEMALE, ESTROGEN RECEPTOR POSITIVE: ICD-10-CM

## 2025-02-26 DIAGNOSIS — N63.20 MASS OF LEFT BREAST, UNSPECIFIED QUADRANT: Primary | ICD-10-CM

## 2025-02-26 DIAGNOSIS — Z78.0 OSTEOPENIA AFTER MENOPAUSE: ICD-10-CM

## 2025-02-26 DIAGNOSIS — M85.80 OSTEOPENIA AFTER MENOPAUSE: ICD-10-CM

## 2025-02-26 DIAGNOSIS — Z17.0 MALIGNANT NEOPLASM OF UPPER-OUTER QUADRANT OF LEFT BREAST IN FEMALE, ESTROGEN RECEPTOR POSITIVE: ICD-10-CM

## 2025-02-26 DIAGNOSIS — Z12.31 ENCOUNTER FOR SCREENING MAMMOGRAM FOR MALIGNANT NEOPLASM OF BREAST: ICD-10-CM

## 2025-02-26 PROCEDURE — 77080 DXA BONE DENSITY AXIAL: CPT

## 2025-03-13 ENCOUNTER — HOSPITAL ENCOUNTER (OUTPATIENT)
Dept: MAMMOGRAPHY | Facility: HOSPITAL | Age: 65
Discharge: HOME | End: 2025-03-13
Payer: OTHER GOVERNMENT

## 2025-03-13 ENCOUNTER — HOSPITAL ENCOUNTER (OUTPATIENT)
Dept: ULTRASOUND IMAGING | Facility: HOSPITAL | Age: 65
Discharge: HOME | End: 2025-03-13
Payer: OTHER GOVERNMENT

## 2025-03-13 DIAGNOSIS — N63.20 MASS OF LEFT BREAST, UNSPECIFIED QUADRANT: ICD-10-CM

## 2025-03-13 PROCEDURE — 77066 DX MAMMO INCL CAD BI: CPT

## 2025-03-13 PROCEDURE — 76642 ULTRASOUND BREAST LIMITED: CPT

## 2025-03-13 PROCEDURE — G0279 TOMOSYNTHESIS, MAMMO: HCPCS

## 2025-03-19 ENCOUNTER — LAB (OUTPATIENT)
Dept: ONCOLOGY | Facility: HOSPITAL | Age: 65
End: 2025-03-19
Payer: OTHER GOVERNMENT

## 2025-03-19 ENCOUNTER — OFFICE VISIT (OUTPATIENT)
Dept: ONCOLOGY | Facility: HOSPITAL | Age: 65
End: 2025-03-19
Payer: OTHER GOVERNMENT

## 2025-03-19 ENCOUNTER — HOSPITAL ENCOUNTER (OUTPATIENT)
Dept: ONCOLOGY | Facility: HOSPITAL | Age: 65
Discharge: HOME | End: 2025-03-19
Payer: OTHER GOVERNMENT

## 2025-03-19 ENCOUNTER — TELEPHONE (OUTPATIENT)
Dept: ONCOLOGY | Facility: HOSPITAL | Age: 65
End: 2025-03-19

## 2025-03-19 VITALS
TEMPERATURE: 97.4 F | HEART RATE: 67 BPM | BODY MASS INDEX: 23.25 KG/M2 | OXYGEN SATURATION: 99 % | SYSTOLIC BLOOD PRESSURE: 109 MMHG | WEIGHT: 118.4 LBS | RESPIRATION RATE: 18 BRPM | DIASTOLIC BLOOD PRESSURE: 68 MMHG | HEIGHT: 60 IN

## 2025-03-19 DIAGNOSIS — C50.412 MALIGNANT NEOPLASM OF UPPER-OUTER QUADRANT OF LEFT BREAST IN FEMALE, ESTROGEN RECEPTOR POSITIVE: ICD-10-CM

## 2025-03-19 DIAGNOSIS — Z17.0 MALIGNANT NEOPLASM OF UPPER-OUTER QUADRANT OF LEFT BREAST IN FEMALE, ESTROGEN RECEPTOR POSITIVE: Primary | ICD-10-CM

## 2025-03-19 DIAGNOSIS — M81.0 AGE-RELATED OSTEOPOROSIS WITHOUT CURRENT PATHOLOGICAL FRACTURE: Primary | ICD-10-CM

## 2025-03-19 DIAGNOSIS — M81.0 AGE-RELATED OSTEOPOROSIS WITHOUT CURRENT PATHOLOGICAL FRACTURE: ICD-10-CM

## 2025-03-19 DIAGNOSIS — C50.412 MALIGNANT NEOPLASM OF UPPER-OUTER QUADRANT OF LEFT BREAST IN FEMALE, ESTROGEN RECEPTOR POSITIVE: Primary | ICD-10-CM

## 2025-03-19 DIAGNOSIS — Z17.0 MALIGNANT NEOPLASM OF UPPER-OUTER QUADRANT OF LEFT BREAST IN FEMALE, ESTROGEN RECEPTOR POSITIVE: ICD-10-CM

## 2025-03-19 LAB
ALBUMIN SERPL-MCNC: 4.5 G/DL (ref 3.5–5.2)
ALBUMIN/GLOB SERPL: 1.8 G/DL
ALP SERPL-CCNC: 68 U/L (ref 39–117)
ALT SERPL W P-5'-P-CCNC: 16 U/L (ref 1–33)
ANION GAP SERPL CALCULATED.3IONS-SCNC: 5 MMOL/L (ref 5–15)
AST SERPL-CCNC: 30 U/L (ref 1–32)
BASOPHILS # BLD AUTO: 0.06 10*3/MM3 (ref 0–0.2)
BASOPHILS NFR BLD AUTO: 0.8 % (ref 0–1.5)
BILIRUB SERPL-MCNC: 0.2 MG/DL (ref 0–1.2)
BUN SERPL-MCNC: 20 MG/DL (ref 8–23)
BUN/CREAT SERPL: 19.2 (ref 7–25)
CALCIUM SPEC-SCNC: 9.1 MG/DL (ref 8.6–10.5)
CHLORIDE SERPL-SCNC: 101 MMOL/L (ref 98–107)
CO2 SERPL-SCNC: 28 MMOL/L (ref 22–29)
CREAT SERPL-MCNC: 1.04 MG/DL (ref 0.57–1)
DEPRECATED RDW RBC AUTO: 44 FL (ref 37–54)
EGFRCR SERPLBLD CKD-EPI 2021: 60.9 ML/MIN/1.73
EOSINOPHIL # BLD AUTO: 0.15 10*3/MM3 (ref 0–0.4)
EOSINOPHIL NFR BLD AUTO: 2.1 % (ref 0.3–6.2)
ERYTHROCYTE [DISTWIDTH] IN BLOOD BY AUTOMATED COUNT: 12.7 % (ref 12.3–15.4)
GLOBULIN UR ELPH-MCNC: 2.5 GM/DL
GLUCOSE SERPL-MCNC: 71 MG/DL (ref 65–99)
HCT VFR BLD AUTO: 35.9 % (ref 34–46.6)
HGB BLD-MCNC: 11.9 G/DL (ref 12–15.9)
IMM GRANULOCYTES # BLD AUTO: 0.03 10*3/MM3 (ref 0–0.05)
IMM GRANULOCYTES NFR BLD AUTO: 0.4 % (ref 0–0.5)
LYMPHOCYTES # BLD AUTO: 1.18 10*3/MM3 (ref 0.7–3.1)
LYMPHOCYTES NFR BLD AUTO: 16.6 % (ref 19.6–45.3)
MAGNESIUM SERPL-MCNC: 2.3 MG/DL (ref 1.6–2.4)
MCH RBC QN AUTO: 31.6 PG (ref 26.6–33)
MCHC RBC AUTO-ENTMCNC: 33.1 G/DL (ref 31.5–35.7)
MCV RBC AUTO: 95.5 FL (ref 79–97)
MONOCYTES # BLD AUTO: 0.59 10*3/MM3 (ref 0.1–0.9)
MONOCYTES NFR BLD AUTO: 8.3 % (ref 5–12)
NEUTROPHILS NFR BLD AUTO: 5.09 10*3/MM3 (ref 1.7–7)
NEUTROPHILS NFR BLD AUTO: 71.8 % (ref 42.7–76)
NRBC BLD AUTO-RTO: 0 /100 WBC (ref 0–0.2)
PHOSPHATE SERPL-MCNC: 2.7 MG/DL (ref 2.5–4.5)
PLATELET # BLD AUTO: 240 10*3/MM3 (ref 140–450)
PMV BLD AUTO: 8.9 FL (ref 6–12)
POTASSIUM SERPL-SCNC: 4 MMOL/L (ref 3.5–5.2)
PROT SERPL-MCNC: 7 G/DL (ref 6–8.5)
RBC # BLD AUTO: 3.76 10*6/MM3 (ref 3.77–5.28)
SODIUM SERPL-SCNC: 134 MMOL/L (ref 136–145)
WBC NRBC COR # BLD AUTO: 7.1 10*3/MM3 (ref 3.4–10.8)

## 2025-03-19 PROCEDURE — 85025 COMPLETE CBC W/AUTO DIFF WBC: CPT

## 2025-03-19 PROCEDURE — 36415 COLL VENOUS BLD VENIPUNCTURE: CPT

## 2025-03-19 PROCEDURE — 84100 ASSAY OF PHOSPHORUS: CPT

## 2025-03-19 PROCEDURE — 96372 THER/PROPH/DIAG INJ SC/IM: CPT

## 2025-03-19 PROCEDURE — 25010000002 DENOSUMAB 60 MG/ML SOLUTION PREFILLED SYRINGE: Performed by: INTERNAL MEDICINE

## 2025-03-19 PROCEDURE — 80053 COMPREHEN METABOLIC PANEL: CPT

## 2025-03-19 PROCEDURE — 83735 ASSAY OF MAGNESIUM: CPT

## 2025-03-19 RX ORDER — ANASTROZOLE 1 MG/1
1 TABLET ORAL DAILY
Qty: 90 TABLET | Refills: 1 | Status: SHIPPED | OUTPATIENT
Start: 2025-03-19 | End: 2025-03-19 | Stop reason: SDUPTHER

## 2025-03-19 RX ORDER — ANASTROZOLE 1 MG/1
1 TABLET ORAL DAILY
Qty: 90 TABLET | Refills: 1 | Status: SHIPPED | OUTPATIENT
Start: 2025-03-19 | End: 2025-03-20 | Stop reason: SDUPTHER

## 2025-03-19 RX ORDER — FLUOXETINE 10 MG/1
20 CAPSULE ORAL DAILY
COMMUNITY

## 2025-03-19 RX ADMIN — DENOSUMAB 60 MG: 60 INJECTION SUBCUTANEOUS at 10:34

## 2025-03-19 NOTE — PROGRESS NOTES
Chief Complaint/Care Team   Breast Cancer    Aure Jacques APRN Lucas, Jenna M, APRN    History of Present Illness     Diagnosis: Triple Positive Breast Cancer    Osteoporosis    Current Treatment: Anastrozole and Prolia due in 3/2023, pt missed a year of treatment from 3608-7140, pt was restarted on Anastrozole on 3/19/2025    Previous Treatment:   HR positive, HER-2 positive breast cancer:  -Found on routine screening mammogram  -Diagnostic mammogram on 11/10/2020: Calcifications in the left breast  -11/10/2020 left breast biopsy: Pathology positive for DCIS, high-grade, estrogen receptor positive (5%, strong), progesterone receptor positive (10%, weak-moderate)  -11/19/2020 MRI of the breast: 1.8 cm hematoma at the site of the recent biopsy showing DCIS.  There is a 1.1 cm nonfocal mass enhancement at the superior lateral margin of the hematoma and a separate 1.2 cm suspicious mass along the inferior medial margin.  These correspond the masses seen on ultrasound on 10/20/2020  -12/23/2020 MRI guided breast biopsy: Left lower inner quadrant pathology positive for invasive ductal carcinoma, grade 2, estrogen receptor positive (3%, moderate), progesterone receptor positive (1%, weak), HER-2 positive (3+ by IHC), Ki-67 approximately 60%.  Associated with high-grade ductal carcinoma in situ.  -Cycle 1 of TCH P on 12/18/2020.  Echocardiogram on 12/14/2020 shows an EF of 55%. C3 on 1/29/21  -C5 of TCHP on 3/12/21. Held C6 due to side effects.  -Left lumpectomy on 5/11/2021: No residual invasive carcinoma found, 0/8 lymph nodes involved, ypT0N0  - pt was initially on letrozole started 10/28/2021 but switched to anstrazole due to depression symptoms from letrozole on 9/28/2023    Osteoporosis:  -Patient reports osteoporosis diagnosed on DEXA scan in January 2021.  -Her primary care provider has treated her with Boniva, but she is currently on prolia as well as calcium/vitamin D    Pt previously under the care of   Princess Alvarez and transitioned care to Dr. Richmond on 11/8/2023.    Ragini Dozier is a 64 y.o. female who presents to [facility]AT[facility] ON[facility]Y for follow up of triple positive breast cancer. Pt was on letrozole initially after completing radiation and chemotherapy.  Pt has been on AI since 10/28/2023, 2 week break in 9/2023 due to depression symptoms. Patient reports developing worsening depression for which she was switched to anastrozole and reports tolerating well since the switch. Pt denies any hot flashes, night sweats or worsening musculoskeletal pain.  Patient underwent ultrasound and diagnostic mammogram in March 2025 and she is here to discuss those results.  Patient reports she was unable to get her anastrozole medication secondary to [place] pharmacy not have it in stock, that she has not taken the medication in approximately 1 year.  Patient also reports she is scheduled [facility].    Review of Systems   Constitutional:  Negative for appetite change, diaphoresis, fatigue, fever, unexpected weight gain and unexpected weight loss.   HENT:  Negative for hearing loss, mouth sores, sore throat, swollen glands, trouble swallowing and voice change.    Eyes:  Negative for blurred vision.   Respiratory:  Negative for cough, shortness of breath and wheezing.    Cardiovascular:  Negative for chest pain and palpitations.   Gastrointestinal:  Negative for abdominal pain, blood in stool, constipation, diarrhea, nausea and vomiting.   Endocrine: Negative for cold intolerance and heat intolerance.   Genitourinary:  Negative for difficulty urinating, dysuria, frequency, hematuria and urinary incontinence.   Musculoskeletal:  Negative for arthralgias, back pain and myalgias.   Skin:  Negative for rash, skin lesions and wound.   Neurological:  Negative for dizziness, seizures, weakness, numbness and headache.    Hematological:  Does not bruise/bleed easily.   Psychiatric/Behavioral:  Negative for depressed mood. The patient is not nervous/anxious.    All other systems reviewed and are negative.       Oncology/Hematology History Overview Note   HR positive, HER-2 positive breast cancer:  -Found on routine screening mammogram  -Diagnostic mammogram on 11/10/2020: Calcifications in the left breast  -11/10/2020 left breast biopsy: Pathology positive for DCIS, high-grade, estrogen receptor positive (5%, strong), progesterone receptor positive (10%, weak-moderate)  -11/19/2020 MRI of the breast: 1.8 cm hematoma at the site of the recent biopsy showing DCIS.  There is a 1.1 cm nonfocal mass enhancement at the superior lateral margin of the hematoma and a separate 1.2 cm suspicious mass along the inferior medial margin.  These correspond the masses seen on ultrasound on 10/20/2020  -12/23/2020 MRI guided breast biopsy: Left lower inner quadrant pathology positive for invasive ductal carcinoma, grade 2, estrogen receptor positive (3%, moderate), progesterone receptor positive (1%, weak), HER-2 positive (3+ by IHC), Ki-67 approximately 60%.  Associated with high-grade ductal carcinoma in situ.  -Cycle 1 of TCH P on 12/18/2020.  Echocardiogram on 12/14/2020 shows an EF of 55%. C3 on 1/29/21  -C5 of TCHP on 3/12/21. Held C6 due to side effects.  -Left lumpectomy on 5/11/2021: No residual invasive carcinoma found, 0/8 lymph nodes involved, ypT0N0    Osteoporosis:  -Patient reports osteoporosis diagnosed on DEXA scan in January 2021.  -Her primary care provider has treated her with Boniva as well as calcium/vitamin D     Malignant neoplasm of upper-outer quadrant of left breast in female, estrogen receptor positive   12/3/2020 Cancer Staged    Staging form: Breast, AJCC 8th Edition  - Clinical stage from 12/3/2020: Stage IA (cT1c, cN0, cM0, G2, ER+, VA+, HER2+) - Signed by Gia Davis APRN on 10/10/2023     5/11/2021 Cancer Staged     "Staging form: Breast, AJCC 8th Edition  - Pathologic stage from 5/11/2021: ypT0, pN0(sn), cM0 - Signed by Gia Davis APRN on 10/10/2023     6/23/2021 Initial Diagnosis    Malignant neoplasm of upper-outer quadrant of left female breast (CMS/HCC)     6/24/2021 - 3/10/2022 Chemotherapy    OP BREAST Trastuzumab Q21D (maintenance)      Chemotherapy    TCHP: 12/18/2020-3/12/21 (5 cycles)  OP BREAST Trastuzumab-anns Q21D (maintenance)   - 6/3/21-present     6/29/2021 - 7/21/2021 Radiation    Radiation OncologyTreatment Course:  Ragini Dozier received 4,256 cGy in 16  fractions to left breast.         Objective     Vitals:    03/19/25 0928   BP: 109/68   Pulse: 67   Resp: 18   Temp: 97.4 °F (36.3 °C)   TempSrc: Temporal   SpO2: 99%   Weight: 53.7 kg (118 lb 6.4 oz)   Height: 152.4 cm (60\")   PainSc: 0-No pain         ECOG score: 0         PHQ-9 Total Score:         Physical Exam  Vitals reviewed. Exam conducted with a chaperone present.   Constitutional:       General: She is not in acute distress.     Appearance: Normal appearance.   HENT:      Head: Normocephalic and atraumatic.   Eyes:      Extraocular Movements: Extraocular movements intact.      Conjunctiva/sclera: Conjunctivae normal.   Pulmonary:      Effort: Pulmonary effort is normal.   Musculoskeletal:      Cervical back: Normal range of motion and neck supple.   Skin:     General: Skin is warm and dry.      Findings: No bruising.   Neurological:      Mental Status: She is oriented to person, place, and time.           Past Medical History     Past Medical History:   Diagnosis Date    Anxiety     Breast cancer 2020    LEFT    Breast cancer screening by mammogram 2020    Cataract     Corrected    Depression     Encounter for Hemoccult screening 2019    GERD (gastroesophageal reflux disease)     HL (hearing loss) 2023    Hearing aids    Hx of radiation therapy     Insomnia     Malignant neoplasm of upper-outer quadrant of left female breast     Migraine " headache     Osteopenia 2007    Osteoporosis      Current Outpatient Medications on File Prior to Visit   Medication Sig Dispense Refill    Calcium Carbonate-Vitamin D (calcium-vitamin D) 500-200 MG-UNIT tablet per tablet 500 mg 2 (Two) Times a Day.      carboxymethylcellulose (REFRESH PLUS) 0.5 % solution       esomeprazole (nexIUM) 20 MG capsule Take 1 capsule by mouth Every Morning Before Breakfast.      FLUoxetine (PROzac) 10 MG capsule Take 2 capsules by mouth Daily.      busPIRone (BUSPAR) 10 MG tablet Take 2 tablets by mouth 3 (Three) Times a Day. (Patient not taking: Reported on 3/19/2025)      [DISCONTINUED] anastrozole (ARIMIDEX) 1 MG tablet Take 1 tablet by mouth Daily. (Patient not taking: Reported on 3/19/2025) 90 tablet 1     No current facility-administered medications on file prior to visit.      No Known Allergies  Past Surgical History:   Procedure Laterality Date    AUGMENTATION MAMMAPLASTY      BREAST AUGMENTATION Bilateral 05/10/2022    Procedure: Bilateral implants with mesh and scar revision of left breast;  Surgeon: Liu Chirinos MD;  [place] [facility] [place] [facility] [place]  Service: [place];  Laterality: Bilateral;    BREAST LUMPECTOMY      BREAST LUMPECTOMY WITH SENTINEL NODE BIOPSY Left     BREAST RECONSTRUCTION Bilateral 04/29/2024    Procedure: BILATERAL BREAST RECONSTRUCTION WITH FAT GRAFT, CAPSULOTOMY AND BILATERAL MASTOPEXY;  Surgeon: iLu Chirinos MD;  Location: [facility] MAIN OR;  Service: Plastics;  Laterality: Bilateral;    BUNIONECTOMY Bilateral     CATARACT EXTRACTION      COLONOSCOPY  2019    COLONOSCOPY N/A 08/09/2022    Procedure: COLONOSCOPY;  Surgeon: Hue Meyer MD;  Location: [facility] ENDOSCOPY;  Service: Gastroenterology;  Laterality: N/A;  HEMORRHOIDS, POOR PREP    COLONOSCOPY N/A 01/09/2023    Procedure: COLONOSCOPY FOR SCREENING;  Surgeon: Hue Meyer MD;  Location: [facility] ENDOSCOPY;  Service: Gastroenterology;  Laterality: N/A;  DIVERTICULOSIS  HEMORRHOIDS    ENDOSCOPY N/A 10/01/2021    Procedure: ESOPHAGOGASTRODUODENOSCOPY WITH BIOPSY;  Surgeon: Hue Meyer MD;  Location: [facility] ENDOSCOPY;  Service: Gastroenterology;  Laterality: N/A;  ESOPHAGITIS/GASTRITIS    HYSTERECTOMY      PARTIAL    OTHER SURGICAL HISTORY      BIOPSY    SKIN CANCER EXCISION      SUBTOTAL HYSTERECTOMY  1992    TONSILLECTOMY      TUBAL ABDOMINAL LIGATION  1990    UPPER GASTROINTESTINAL ENDOSCOPY      VENOUS ACCESS DEVICE (PORT) REMOVAL N/A 05/10/2022    Procedure: REMOVAL VENOUS ACCESS DEVICE;  Surgeon: Lizbeth York MD;  [place] [facility] [place] [facility] [place];  Service: General;  Laterality: N/A;     Social History     Socioeconomic History    Marital status:     Number of children: 2   Tobacco Use    Smoking status: Never     Passive exposure: Never    Smokeless tobacco: Never    Tobacco comments:     0   Vaping Use    Vaping status: Never Used   Substance and Sexual Activity    Alcohol use: Not Currently     Alcohol/week: 1.0 standard drink of alcohol     Types: 1 Drinks containing 0.5 oz of alcohol per week     Comment: A month    Drug use: Never    Sexual activity: Yes     Partners: Male     Birth control/protection: Tubal ligation     Family History   Problem Relation Age of Onset    Kidney cancer Father     Skin cancer Father     Anxiety disorder Father     Cancer Father     Depression Father     Diabetes Father         Type 2    Kidney disease Father     Depression Mother     Malig Hyperthermia Neg Hx        Results     Result Review   The following data was reviewed by: Josue Richmond MD on 11/08/2023:  Lab Results   Component Value Date    HGB 11.9 (L) 03/19/2025    HCT 35.9 03/19/2025    MCV 95.5 03/19/2025     03/19/2025    WBC 7.10 03/19/2025    NEUTROABS 5.09 03/19/2025    LYMPHSABS 1.18 03/19/2025    MONOSABS 0.59 03/19/2025    EOSABS 0.15 03/19/2025    BASOSABS 0.06 03/19/2025     Lab Results   Component Value Date    GLUCOSE 71 03/19/2025    BUN 20  03/19/2025    CREATININE 1.04 (H) 03/19/2025     (L) 03/19/2025    K 4.0 03/19/2025     03/19/2025    CO2 28.0 03/19/2025    CALCIUM 9.1 03/19/2025    PROTEINTOT 7.0 03/19/2025    ALBUMIN 4.5 03/19/2025    BILITOT 0.2 03/19/2025    ALKPHOS 68 03/19/2025    AST 30 03/19/2025    ALT 16 03/19/2025     Lab Results   Component Value Date    MG 2.3 03/19/2025    PHOS 2.7 03/19/2025    TSH 1.320 09/19/2024           No radiology results for the last day  Mammo Diagnostic Digital Tomosynthesis Bilateral With CAD  Result Date: 3/13/2025  Impression: 1.  No sonographic abnormality seen at the patient's area of left breast lump at 9:00, 9 cm from the nipple. Recommend management based on findings at clinical examination. 2.  No mammographic signs of malignancy in either breast. Recommend annual screening mammography. I discussed results with the patient following the exam.  BI-RADS ASSESSMENT: Category 2: Benign   The patient's information is entered into a computerized reminder system with a targeted due date for the next mammogram.  Note:  It has been reported that there is approximately a 15% false negative in mammography.  Therefore, management of a palpable abnormality should not be deferred because of a negative mammogram.            3/13/2025 3:53 PM by Hue Nguyen MD on Workstation: [id number]      US Breast Left Limited  Result Date: 3/13/2025  Impression: 1.  No sonographic abnormality seen at the patient's area of left breast lump at 9:00, 9 cm from the nipple. Recommend management based on findings at clinical examination. 2.  No mammographic signs of malignancy in either breast. Recommend annual screening mammography. I discussed results with the patient following the exam.  BI-RADS ASSESSMENT: Category 2: Benign   The patient's information is entered into a computerized reminder system with a targeted due date for the next mammogram.  Note:  It has been reported that there is approximately a 15% false  negative in mammography.  Therefore, management of a palpable abnormality should not be deferred because of a negative mammogram.            3/13/2025 3:53 PM by Hue Nguyen MD on Workstation: [id number]      DEXA Bone Density Axial  Result Date: 2/26/2025  Impression: Impression: Osteoporosis. Report dictated by: Krista Nicolasa  I have personally reviewed this case and agree with the findings above: Electronically Signed: Raymond Higgins MD  2/26/2025 1:13 PM EST  Workstation ID: [id number]      Assessment & Plan     Diagnoses and all orders for this visit:    1. Malignant neoplasm of upper-outer quadrant of left breast in female, estrogen receptor positive (Primary)  -     MRI Breast Bilateral Screening With & Without Contrast; Future  -     anastrozole (ARIMIDEX) 1 MG tablet; Take 1 tablet by mouth Daily.  Dispense: 90 tablet; Refill: 1    2. Age-related osteoporosis without current pathological fracture    Other orders  -     Cancel: denosumab (PROLIA) syringe 60 mg            Ragini Dozier is a 64 y.o. female who presents to [facility]EMATOLOG[facility] ONCOLOGY for follow up of triple positive breast cancer. Pt was on letrozole initially after completing radiation and chemotherapy.  Pt has been on AI since 10/28/2023, 2 week break in 9/2023 due to depression symptoms. Patient reports developing worsening depression for which she was switched to anastrozole and reports tolerating well since the switch. Pt currently on anastrozole.    -recommend pt continue with annual mammogram, most recent mammogram from 3/2025 was BI-RADS 2 benign, plan to repeat in 1 year i.e. March 2026  -Plan to alternate mammogram with MRI breast for increased surveillance given density seen on mammogram  -Patient underwent DEXA scan in February 2025 which revealed osteoporosis, plan to repeat DEXA scan in 2 years i.e. February 2027  -pt due for Prolia today, last reviewed plan to proceed as scheduled today, plan to continue  treatment every 6 months  -recommend pt continue Anastrazole, provided refill today  - Recommend patient continue calcium and vitamin D supplementation    Will have pt follow up with me in 6 months with Prolia treatment and MRI Breast results.    Please note that portions of this note were completed with a voice recognition program.      Electronically signed by Josue Richmond MD, 03/19/25, 10:24 AM EDT.    Follow Up     I spent 30 minutes caring for Ragini on this date of service. This time includes time spent by me in the following activities:preparing for the visit, reviewing tests, obtaining and/or reviewing a separately obtained history, performing a medically appropriate examination and/or evaluation , counseling and educating the patient/family/caregiver, ordering medications, tests, or procedures, referring and communicating with other health care professionals , documenting information in the medical record, independently interpreting results and communicating that information with the patient/family/caregiver, and care coordination.    This is an acute or chronic illness that poses a threat to life or bodily function. The above treatment plan involves a high risk of complications and/or mortality of patient management.    The patient was seen and examined. Work by the provider also included review and/or ordering of lab tests, review and/or ordering of radiology tests, review and/or ordering of medicine tests, discussion with other physicians or providers, independent review of data, obtaining old records, review/summation of old records, and/or other review.    I have reviewed the family history, social history, and past medical history for this patient. Previous information and data has been reviewed and updated as needed. I have reviewed and verified the chief complaint, history, and other documentation. The patient was interviewed and examined in the clinic and the chart reviewed. The previous  observations, recommendations, and conclusions were reviewed including those of other providers.     The plan was discussed with the patient and/or family. The patient was given time to ask questions and these questions were answered. At the conclusion of their visit they had no additional questions or concerns and all questions were answered to their satisfaction.    Patient was given instructions and counseling regarding her condition or for health maintenance advice. Please see specific information pulled into the AVS if appropriate.

## 2025-03-19 NOTE — TELEPHONE ENCOUNTER
Caller: Ragini Dozier    Relationship: Self    Best call back number: [phone]      Who are you requesting to speak with (clinical staff, provider,  specific staff member): CLINICAL      What was the call regarding: PT ASKING FOR HER ANASTROZOLE SCRIPT TO BE RE-SENT.  PHARMACY STATES THEY DID NOT RECEIVE IT        [facility] [id number] [address] - [phone]  - [phone] FX

## 2025-03-20 DIAGNOSIS — Z17.0 MALIGNANT NEOPLASM OF UPPER-OUTER QUADRANT OF LEFT BREAST IN FEMALE, ESTROGEN RECEPTOR POSITIVE: ICD-10-CM

## 2025-03-20 DIAGNOSIS — C50.412 MALIGNANT NEOPLASM OF UPPER-OUTER QUADRANT OF LEFT BREAST IN FEMALE, ESTROGEN RECEPTOR POSITIVE: ICD-10-CM

## 2025-03-20 RX ORDER — ANASTROZOLE 1 MG/1
1 TABLET ORAL DAILY
Qty: 90 TABLET | Refills: 1 | Status: CANCELLED | OUTPATIENT
Start: 2025-03-20

## 2025-03-20 NOTE — TELEPHONE ENCOUNTER
Caller: Ragini Dozier    Relationship: Self    Best call back number: [phone]     Requested Prescriptions:   Requested Prescriptions     Pending Prescriptions Disp Refills    anastrozole (ARIMIDEX) 1 MG tablet 90 tablet 1     Sig: Take 1 tablet by mouth Daily.        Pharmacy where request should be sent: [id number] [place] [place] [facility] [address] [facility] [phone]  - [phone] FX     Last office visit with prescribing clinician: 3/19/2025   Last telemedicine visit with prescribing clinician: Visit date not found   Next office visit with prescribing clinician: 9/17/2025     Additional details provided by patient: THE PT WENT BACK TO [facility] TODAY AND THEY SAID THAT THEY STILL DID NOT GET THE RX. PT WOULD LIKE TO HAVE THAT RESENT TO [facility] THIS TIME.     Does the patient have less than a 3 day supply:  [x] Yes  [] No    Would you like a call back once the refill request has been completed: [x] Yes [] No    If the office needs to give you a call back, can they leave a voicemail: [x] Yes [] No

## 2025-03-21 ENCOUNTER — TELEPHONE (OUTPATIENT)
Dept: ONCOLOGY | Facility: HOSPITAL | Age: 65
End: 2025-03-21
Payer: OTHER GOVERNMENT

## 2025-03-21 RX ORDER — ANASTROZOLE 1 MG/1
1 TABLET ORAL DAILY
Qty: 90 TABLET | Refills: 1 | Status: SHIPPED | OUTPATIENT
Start: 2025-03-21 | End: 2025-03-24 | Stop reason: SDUPTHER

## 2025-03-21 NOTE — TELEPHONE ENCOUNTER
Spoke with pt and let her know that Dr. Richmond just sent a prescription for the anastrozole to the [facility] in [place] per her request.

## 2025-03-21 NOTE — TELEPHONE ENCOUNTER
PATIENT CALLING BACK STATES PHARMACY STILL DOESN'T HAVE HER SCRIPT  PLEASE RESEND TO     [id number] - [place], [place] - [address] AT [facility] - [phone]  - [phone] FX     PLEASE CALL PATIENT WHEN COMPLETE

## 2025-03-24 DIAGNOSIS — C50.412 MALIGNANT NEOPLASM OF UPPER-OUTER QUADRANT OF LEFT BREAST IN FEMALE, ESTROGEN RECEPTOR POSITIVE: ICD-10-CM

## 2025-03-24 DIAGNOSIS — Z17.0 MALIGNANT NEOPLASM OF UPPER-OUTER QUADRANT OF LEFT BREAST IN FEMALE, ESTROGEN RECEPTOR POSITIVE: ICD-10-CM

## 2025-03-24 RX ORDER — ANASTROZOLE 1 MG/1
1 TABLET ORAL DAILY
Qty: 90 TABLET | Refills: 1 | Status: SHIPPED | OUTPATIENT
Start: 2025-03-24

## 2025-03-24 NOTE — TELEPHONE ENCOUNTER
The pt called and states that [facility] did not receive her script for Anastrozole. This nurse resent the script. This nurse called [facility] to confirm that they received the script. [facility] states that they still have not received the script. This nurse gave a V.O. for Anastrozole 1mg PO QD - Qty 90 - Refill 1. [facility] is fill the script for the pt. The pt will  the script later this evening.

## 2025-08-10 ENCOUNTER — HOSPITAL ENCOUNTER (EMERGENCY)
Facility: HOSPITAL | Age: 65
Discharge: HOME | End: 2025-08-10
Attending: EMERGENCY MEDICINE | Admitting: EMERGENCY MEDICINE
Payer: OTHER GOVERNMENT

## 2025-08-10 ENCOUNTER — APPOINTMENT (OUTPATIENT)
Dept: GENERAL RADIOLOGY | Facility: HOSPITAL | Age: 65
End: 2025-08-10
Payer: OTHER GOVERNMENT

## 2025-08-10 ENCOUNTER — APPOINTMENT (OUTPATIENT)
Dept: MRI IMAGING | Facility: HOSPITAL | Age: 65
End: 2025-08-10
Payer: OTHER GOVERNMENT

## 2025-08-10 VITALS
SYSTOLIC BLOOD PRESSURE: 98 MMHG | TEMPERATURE: 98.5 F | WEIGHT: 117.95 LBS | HEART RATE: 68 BPM | OXYGEN SATURATION: 97 % | RESPIRATION RATE: 18 BRPM | BODY MASS INDEX: 23.16 KG/M2 | HEIGHT: 60 IN | DIASTOLIC BLOOD PRESSURE: 63 MMHG

## 2025-08-10 DIAGNOSIS — N39.0 ACUTE UTI: Primary | ICD-10-CM

## 2025-08-10 LAB
ALBUMIN SERPL-MCNC: 4.3 G/DL (ref 3.5–5.2)
ALBUMIN/GLOB SERPL: 1.9 G/DL
ALP SERPL-CCNC: 53 U/L (ref 39–117)
ALT SERPL W P-5'-P-CCNC: 10 U/L (ref 1–33)
ANION GAP SERPL CALCULATED.3IONS-SCNC: 8.7 MMOL/L (ref 5–15)
AST SERPL-CCNC: 19 U/L (ref 1–32)
BACTERIA UR QL AUTO: ABNORMAL /HPF
BASOPHILS # BLD AUTO: 0.03 10*3/MM3 (ref 0–0.2)
BASOPHILS NFR BLD AUTO: 0.6 % (ref 0–1.5)
BILIRUB SERPL-MCNC: 0.3 MG/DL (ref 0–1.2)
BILIRUB UR QL STRIP: NEGATIVE
BUN SERPL-MCNC: 17.7 MG/DL (ref 8–23)
BUN/CREAT SERPL: 17 (ref 7–25)
CALCIUM SPEC-SCNC: 9.3 MG/DL (ref 8.6–10.5)
CHLORIDE SERPL-SCNC: 102 MMOL/L (ref 98–107)
CLARITY UR: CLEAR
CO2 SERPL-SCNC: 27.3 MMOL/L (ref 22–29)
COLOR UR: YELLOW
CREAT SERPL-MCNC: 1.04 MG/DL (ref 0.57–1)
DEPRECATED RDW RBC AUTO: 44.9 FL (ref 37–54)
EGFRCR SERPLBLD CKD-EPI 2021: 60.5 ML/MIN/1.73
EOSINOPHIL # BLD AUTO: 0.09 10*3/MM3 (ref 0–0.4)
EOSINOPHIL NFR BLD AUTO: 1.7 % (ref 0.3–6.2)
ERYTHROCYTE [DISTWIDTH] IN BLOOD BY AUTOMATED COUNT: 13.7 % (ref 12.3–15.4)
GLOBULIN UR ELPH-MCNC: 2.3 GM/DL
GLUCOSE SERPL-MCNC: 84 MG/DL (ref 65–99)
GLUCOSE UR STRIP-MCNC: NEGATIVE MG/DL
HCT VFR BLD AUTO: 37.2 % (ref 34–46.6)
HGB BLD-MCNC: 12.1 G/DL (ref 12–15.9)
HGB UR QL STRIP.AUTO: NEGATIVE
HOLD SPECIMEN: NORMAL
HYALINE CASTS UR QL AUTO: ABNORMAL /LPF
IMM GRANULOCYTES # BLD AUTO: 0.01 10*3/MM3 (ref 0–0.05)
IMM GRANULOCYTES NFR BLD AUTO: 0.2 % (ref 0–0.5)
KETONES UR QL STRIP: NEGATIVE
LEUKOCYTE ESTERASE UR QL STRIP.AUTO: ABNORMAL
LYMPHOCYTES # BLD AUTO: 0.85 10*3/MM3 (ref 0.7–3.1)
LYMPHOCYTES NFR BLD AUTO: 15.7 % (ref 19.6–45.3)
MAGNESIUM SERPL-MCNC: 1.8 MG/DL (ref 1.6–2.4)
MCH RBC QN AUTO: 29 PG (ref 26.6–33)
MCHC RBC AUTO-ENTMCNC: 32.5 G/DL (ref 31.5–35.7)
MCV RBC AUTO: 89.2 FL (ref 79–97)
MONOCYTES # BLD AUTO: 0.48 10*3/MM3 (ref 0.1–0.9)
MONOCYTES NFR BLD AUTO: 8.9 % (ref 5–12)
NEUTROPHILS NFR BLD AUTO: 3.94 10*3/MM3 (ref 1.7–7)
NEUTROPHILS NFR BLD AUTO: 72.9 % (ref 42.7–76)
NITRITE UR QL STRIP: NEGATIVE
NRBC BLD AUTO-RTO: 0 /100 WBC (ref 0–0.2)
PH UR STRIP.AUTO: 6.5 [PH] (ref 5–8)
PLATELET # BLD AUTO: 172 10*3/MM3 (ref 140–450)
PMV BLD AUTO: 9.3 FL (ref 6–12)
POTASSIUM SERPL-SCNC: 4.2 MMOL/L (ref 3.5–5.2)
PROT SERPL-MCNC: 6.6 G/DL (ref 6–8.5)
PROT UR QL STRIP: NEGATIVE
RBC # BLD AUTO: 4.17 10*6/MM3 (ref 3.77–5.28)
RBC # UR STRIP: ABNORMAL /HPF
REF LAB TEST METHOD: ABNORMAL
SODIUM SERPL-SCNC: 138 MMOL/L (ref 136–145)
SP GR UR STRIP: 1.01 (ref 1–1.03)
SQUAMOUS #/AREA URNS HPF: ABNORMAL /HPF
TROPONIN T SERPL HS-MCNC: <6 NG/L
UROBILINOGEN UR QL STRIP: ABNORMAL
WBC # UR STRIP: ABNORMAL /HPF
WBC NRBC COR # BLD AUTO: 5.4 10*3/MM3 (ref 3.4–10.8)
WHOLE BLOOD HOLD COAG: NORMAL
WHOLE BLOOD HOLD SPECIMEN: NORMAL

## 2025-08-10 PROCEDURE — 99284 EMERGENCY DEPT VISIT MOD MDM: CPT

## 2025-08-10 PROCEDURE — 71045 X-RAY EXAM CHEST 1 VIEW: CPT

## 2025-08-10 PROCEDURE — 25010000002 CEFTRIAXONE PER 250 MG: Performed by: EMERGENCY MEDICINE

## 2025-08-10 PROCEDURE — 36415 COLL VENOUS BLD VENIPUNCTURE: CPT

## 2025-08-10 PROCEDURE — 25810000003 SODIUM CHLORIDE 0.9 % SOLUTION: Performed by: EMERGENCY MEDICINE

## 2025-08-10 PROCEDURE — 84484 ASSAY OF TROPONIN QUANT: CPT | Performed by: EMERGENCY MEDICINE

## 2025-08-10 PROCEDURE — 85025 COMPLETE CBC W/AUTO DIFF WBC: CPT

## 2025-08-10 PROCEDURE — 93005 ELECTROCARDIOGRAM TRACING: CPT

## 2025-08-10 PROCEDURE — 81001 URINALYSIS AUTO W/SCOPE: CPT

## 2025-08-10 PROCEDURE — 96365 THER/PROPH/DIAG IV INF INIT: CPT

## 2025-08-10 PROCEDURE — 93005 ELECTROCARDIOGRAM TRACING: CPT | Performed by: EMERGENCY MEDICINE

## 2025-08-10 PROCEDURE — 70551 MRI BRAIN STEM W/O DYE: CPT

## 2025-08-10 PROCEDURE — 80053 COMPREHEN METABOLIC PANEL: CPT | Performed by: EMERGENCY MEDICINE

## 2025-08-10 PROCEDURE — 83735 ASSAY OF MAGNESIUM: CPT | Performed by: EMERGENCY MEDICINE

## 2025-08-10 RX ORDER — SODIUM CHLORIDE 0.9 % (FLUSH) 0.9 %
10 SYRINGE (ML) INJECTION AS NEEDED
Status: DISCONTINUED | OUTPATIENT
Start: 2025-08-10 | End: 2025-08-10 | Stop reason: HOSPADM

## 2025-08-10 RX ORDER — CEPHALEXIN 500 MG/1
500 CAPSULE ORAL 3 TIMES DAILY
Qty: 21 CAPSULE | Refills: 0 | Status: SHIPPED | OUTPATIENT
Start: 2025-08-10

## 2025-08-10 RX ADMIN — SODIUM CHLORIDE 1000 ML: 9 INJECTION, SOLUTION INTRAVENOUS at 13:43

## 2025-08-10 RX ADMIN — CEFTRIAXONE SODIUM 1000 MG: 1 INJECTION, POWDER, FOR SOLUTION INTRAMUSCULAR; INTRAVENOUS at 15:52

## 2025-08-12 LAB
QT INTERVAL: 419 MS
QTC INTERVAL: 416 MS

## (undated) DEVICE — 450 ML BOTTLE OF 0.05% CHLORHEXIDINE GLUCONATE IN 99.95% STERILE WATER FOR IRRIGATION, USP AND APPLICATOR.: Brand: IRRISEPT ANTIMICROBIAL WOUND LAVAGE

## (undated) DEVICE — DECANTER BAG 9": Brand: MEDLINE INDUSTRIES, INC.

## (undated) DEVICE — ELECTRD BLD EDGE COAT 3IN

## (undated) DEVICE — SOL IRRG H2O PL/BG 1000ML STRL

## (undated) DEVICE — INTENDED FOR TISSUE SEPARATION, AND OTHER PROCEDURES THAT REQUIRE A SHARP SURGICAL BLADE TO PUNCTURE OR CUT.: Brand: BARD-PARKER ® CARBON RIB-BACK BLADES

## (undated) DEVICE — ANTIBACTERIAL UNDYED BRAIDED (POLYGLACTIN 910), SYNTHETIC ABSORBABLE SUTURE: Brand: COATED VICRYL

## (undated) DEVICE — LINER SURG CANSTR SXN S/RIGD 1500CC

## (undated) DEVICE — TBG PENCL TELESCP MEGADYNE SMOKE EVAC 10FT

## (undated) DEVICE — STPLR SKIN SUBCUTICULAR INSORB 2030

## (undated) DEVICE — CONN JET HYDRA H20 AUXILIARY DISP

## (undated) DEVICE — PROXIMATE RH ROTATING HEAD SKIN STAPLERS (35 WIDE) CONTAINS 35 STAINLESS STEEL STAPLES: Brand: PROXIMATE

## (undated) DEVICE — SINGLE-USE BIOPSY FORCEPS: Brand: RADIAL JAW 4

## (undated) DEVICE — BLAKE SILICONE DRAIN, 15 FR ROUND, HUBLESS WITH 3/16" TROCAR: Brand: BLAKE

## (undated) DEVICE — SYS FAT GRAFTING REVOLVE SGL

## (undated) DEVICE — MARKER,SKIN,WI/RULER AND LABELS: Brand: MEDLINE

## (undated) DEVICE — DEV DEL BRST/IMP GEL KELLER2 FUNNEL EA/5

## (undated) DEVICE — INFILTRATION TUBING SET: Brand: SINGLE SPIKE INFILTRATION TUBING

## (undated) DEVICE — SYR CATH/TIP 50ML 2OZ STRL 1P/U

## (undated) DEVICE — Device: Brand: DEFENDO AIR/WATER/SUCTION AND BIOPSY VALVE

## (undated) DEVICE — SLV SCD KN/LEN ADJ EXPRSS BLENDED MD 1P/U

## (undated) DEVICE — SYR LL TP 10ML STRL

## (undated) DEVICE — ANTIBACTERIAL VIOLET BRAIDED (POLYGLACTIN 910), SYNTHETIC ABSORBABLE SUTURE: Brand: COATED VICRYL

## (undated) DEVICE — JACKSON-PRATT 100CC BULB RESERVOIR: Brand: CARDINAL HEALTH

## (undated) DEVICE — BIOPATCH™ ANTIMICROBIAL DRESSING WITH CHLORHEXIDINE GLUCONATE IS A HYDROPHILLIC POLYURETHANE ABSORPTIVE FOAM WITH CHLORHEXIDINE GLUCONATE (CHG) WHICH INHIBITS BACTERIAL GROWTH UNDER THE DRESSING. THE DRESSING IS INTENDED TO BE USED TO ABSORB EXUDATE, COVER A WOUND CAUSED BY VASCULAR AND NONVASCULAR PERCUTANEOUS MEDICAL DEVICES DURING SURGERY, AS WELL AS REDUCE LOCAL INFECTION AND COLONIZATION OF MICROORGANISMS.: Brand: BIOPATCH

## (undated) DEVICE — ASPIRATION TUBING SET, DISPOSABLE: Brand: MICROAIRE®

## (undated) DEVICE — SYR LUERLOK 30CC

## (undated) DEVICE — GLOVE,SURG,SENSICARE SLT,LF,PF,5.5: Brand: MEDLINE

## (undated) DEVICE — GLV SURG SENSICARE SLT PF LF 6.5 STRL

## (undated) DEVICE — GLV SURG SENSICARE PI PF LF 7 GRN STRL

## (undated) DEVICE — MAJOR-LF: Brand: MEDLINE INDUSTRIES, INC.

## (undated) DEVICE — PLASTIC MAJOR-LF: Brand: MEDLINE INDUSTRIES, INC.

## (undated) DEVICE — STERILE POLYISOPRENE POWDER-FREE SURGICAL GLOVES WITH EMOLLIENT COATING: Brand: PROTEXIS

## (undated) DEVICE — Device

## (undated) DEVICE — COLON KIT: Brand: MEDLINE INDUSTRIES, INC.

## (undated) DEVICE — PENCL E/S SMOKEEVAC W/TELESCP CANN

## (undated) DEVICE — SUT VIC 3/0 SH 27IN J416H

## (undated) DEVICE — STANDARD HYPODERMIC NEEDLE,POLYPROPYLENE HUB: Brand: MONOJECT

## (undated) DEVICE — CVR HNDL LT SURG ACCSSRY BLU STRL

## (undated) DEVICE — Device: Brand: MICROAIRE®

## (undated) DEVICE — GOWN,REINFORCE,POLY,SIRUS,BREATH SLV,XLG: Brand: MEDLINE

## (undated) DEVICE — SZR BRST RND H/PROJ 535CC

## (undated) DEVICE — SOLIDIFIER LIQLOC PLS 1500CC BT

## (undated) DEVICE — ADHS LIQ MASTISOL 2/3ML

## (undated) DEVICE — SOL NACL 0.9PCT 1000ML

## (undated) DEVICE — GLV SURG SENSICARE SLT PF LF 5.5 STRL

## (undated) DEVICE — ADHS SKIN PREMIERPRO EXOFIN TOPICAL HI/VISC .5ML

## (undated) DEVICE — DRSNG SURESITE WNDW 4X4.5

## (undated) DEVICE — GLOVE,SURG,SENSICARE SLT,LF,PF,6.5: Brand: MEDLINE

## (undated) DEVICE — LARGE BORE STOPCOCK WITH ROTATING MALE LUER LOCK

## (undated) DEVICE — SYS CLS SKIN PREMIERPRO EXOFINFUSION 22CM

## (undated) DEVICE — SYR LUERLOK 50ML

## (undated) DEVICE — 3M™ STERI-STRIP™ REINFORCED ADHESIVE SKIN CLOSURES, R1547, 1/2 IN X 4 IN (12 MM X 100 MM), 6 STRIPS/ENVELOPE: Brand: 3M™ STERI-STRIP™

## (undated) DEVICE — EGD OR ERCP KIT: Brand: MEDLINE INDUSTRIES, INC.

## (undated) DEVICE — SOL IRR NACL 0.9PCT BT 1000ML

## (undated) DEVICE — MARKR SKIN W/RULR AND LBL